# Patient Record
Sex: MALE | Race: WHITE | NOT HISPANIC OR LATINO | Employment: OTHER | URBAN - METROPOLITAN AREA
[De-identification: names, ages, dates, MRNs, and addresses within clinical notes are randomized per-mention and may not be internally consistent; named-entity substitution may affect disease eponyms.]

---

## 2018-11-21 RX ORDER — LOSARTAN POTASSIUM 50 MG/1
50 TABLET ORAL DAILY
Refills: 0 | COMMUNITY
Start: 2018-11-04 | End: 2021-01-01 | Stop reason: HOSPADM

## 2018-11-21 RX ORDER — CEPHALEXIN 500 MG/1
500 CAPSULE ORAL 3 TIMES DAILY
Refills: 0 | COMMUNITY
Start: 2018-11-07 | End: 2018-11-26

## 2018-11-21 RX ORDER — FUROSEMIDE 40 MG/1
40 TABLET ORAL DAILY
Refills: 0 | Status: ON HOLD | COMMUNITY
Start: 2018-09-17 | End: 2021-01-01 | Stop reason: SDUPTHER

## 2018-11-21 RX ORDER — PANTOPRAZOLE SODIUM 40 MG/1
TABLET, DELAYED RELEASE ORAL
Refills: 0 | COMMUNITY
Start: 2018-11-14 | End: 2018-11-27 | Stop reason: ALTCHOICE

## 2018-11-21 RX ORDER — ONDANSETRON 4 MG/1
TABLET, ORALLY DISINTEGRATING ORAL
Refills: 0 | COMMUNITY
Start: 2018-11-07 | End: 2018-11-26

## 2018-11-26 ENCOUNTER — OFFICE VISIT (OUTPATIENT)
Dept: FAMILY MEDICINE CLINIC | Facility: CLINIC | Age: 73
End: 2018-11-26
Payer: MEDICARE

## 2018-11-26 VITALS
HEART RATE: 110 BPM | RESPIRATION RATE: 16 BRPM | BODY MASS INDEX: 28.99 KG/M2 | HEIGHT: 69 IN | OXYGEN SATURATION: 95 % | WEIGHT: 195.7 LBS | DIASTOLIC BLOOD PRESSURE: 80 MMHG | TEMPERATURE: 97.1 F | SYSTOLIC BLOOD PRESSURE: 142 MMHG

## 2018-11-26 DIAGNOSIS — Z23 NEED FOR INFLUENZA VACCINATION: ICD-10-CM

## 2018-11-26 DIAGNOSIS — K85.10 ACUTE GALLSTONE PANCREATITIS: ICD-10-CM

## 2018-11-26 DIAGNOSIS — C50.922: ICD-10-CM

## 2018-11-26 DIAGNOSIS — Z12.11 SCREENING FOR COLON CANCER: Primary | ICD-10-CM

## 2018-11-26 PROBLEM — K86.89 PANCREATIC MASS: Status: ACTIVE | Noted: 2018-11-10

## 2018-11-26 PROBLEM — C50.912 CARCINOMA OF LEFT BREAST (HCC): Status: ACTIVE | Noted: 2018-11-26

## 2018-11-26 PROBLEM — R74.01 TRANSAMINITIS: Status: ACTIVE | Noted: 2018-11-10

## 2018-11-26 PROBLEM — K85.90 PANCREATITIS: Status: ACTIVE | Noted: 2018-11-10

## 2018-11-26 PROBLEM — D72.829 LEUKOCYTOSIS: Status: ACTIVE | Noted: 2018-11-10

## 2018-11-26 PROBLEM — K80.20 CHOLELITHIASIS: Status: ACTIVE | Noted: 2018-11-11

## 2018-11-26 PROBLEM — N63.20 MASS OF BREAST, LEFT: Status: ACTIVE | Noted: 2018-11-10

## 2018-11-26 PROBLEM — I49.1 PAC (PREMATURE ATRIAL CONTRACTION): Status: ACTIVE | Noted: 2017-08-18

## 2018-11-26 PROCEDURE — 99205 OFFICE O/P NEW HI 60 MIN: CPT | Performed by: FAMILY MEDICINE

## 2018-11-26 RX ORDER — ASPIRIN 81 MG/1
81 TABLET, DELAYED RELEASE ORAL
COMMUNITY
End: 2018-12-11 | Stop reason: ALTCHOICE

## 2018-11-26 RX ORDER — ACETAMINOPHEN 500 MG
500 TABLET ORAL EVERY 6 HOURS PRN
COMMUNITY
Start: 2018-11-14 | End: 2018-12-31

## 2018-11-26 NOTE — PROGRESS NOTES
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 breast surgeon today to review his results however patient states that he did not prefer to see any 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  Given this recent finding, patient was advised that he must see a breast specialist   Will refer patient to General surgery immediately to have this further evaluated  Appointment was made for patient on 11/27  Patient was advised that he must keep this appointment  - Ambulatory referral to General Surgery; Future    2  Acute gallstone pancreatitis  Patient appears clinically stable today  At this time, his symptoms appear mildly improving  It appears that he did have cholelithiasis which did cause his pancreatitis  Recommendations were made for patient to follow up after his discharge to have this further evaluated for possible cholecystectomy  Refer patient to General surgery a again further evaluation of this as well  Patient was advised to follow up if any symptoms are worsening   - Ambulatory referral to General Surgery; Future    3  Screening for colon cancer  - Ambulatory referral to Gastroenterology; Future    4  Need for influenza vaccination     Diagnoses and all orders for this visit:    Screening for colon cancer  -     Ambulatory referral to Gastroenterology; Future    Need for influenza vaccination  -     influenza vaccine, 7216-4909, high-dose, PF 0 5 mL, for patients 65 yr+ (FLUZONE HIGH-DOSE)    Other orders  -     furosemide (LASIX) 40 mg tablet;  Take 40 mg by mouth daily  -     losartan (COZAAR) 50 mg tablet;   -     Discontinue: ondansetron (ZOFRAN-ODT) 4 mg disintegrating tablet; dissolve 1 tablet ON TONGUE every 12 hours if needed for nausea and vomiting  -     pantoprazole (PROTONIX) 40 mg tablet; take 1 tablet by mouth every morning 30 MINUTES BEFORE EATING  -     Discontinue: cephalexin (KEFLEX) 500 mg capsule; Take 500 mg by mouth 3 (three) times a day  -     aspirin (EC-81 ASPIRIN) 81 mg EC tablet; Take 81 mg by mouth  -     acetaminophen (TYLENOL) 500 mg tablet; Take 500 mg by mouth every 6 (six) hours as needed          Subjective:    Chief Complaint   Patient presents with    [facility]        Patient ID: Noemi Sigala is a 74 y o  male  Patient is a 13-year-old male presents today as a new patient to [facility]  He has multiple complaints today  He states that he was recently admitted in patient earlier this month secondary to his abdominal pain  He was found to have acute gallstone pancreatitis  He was evaluated medically while at [facility]   He states that his symptoms have been improving  He does have follow-up appointments with General surgery to further evaluate this problem  Patient also has complaints of a breast mass  He has noticed this over the left side of his chest for the past few years  He states that he never had this evaluated  Until recently, he was seen and did have evaluations with mammography as well as an ultrasound  He states that he recently did a biopsy taken however was never given the results  He denies any change in growth  Review of Systems   Constitutional: Negative for activity change, chills, fatigue and fever  HENT: Negative for congestion, ear pain, sinus pressure and sore throat  Eyes: Negative for redness, itching and visual disturbance  Respiratory: Negative for cough and shortness of breath  Cardiovascular: Negative for chest pain and palpitations  Gastrointestinal: Positive for abdominal pain  Negative for diarrhea and nausea     Endocrine: Negative for cold intolerance and heat intolerance  Genitourinary: Negative for dysuria, flank pain and frequency  Musculoskeletal: Negative for arthralgias, back pain, gait problem and myalgias  Skin: Negative for color change  Allergic/Immunologic: Negative for environmental allergies  Neurological: Negative for dizziness, numbness and headaches  Psychiatric/Behavioral: Negative for behavioral problems and sleep disturbance  The following portions of the patient's history were reviewed and updated as appropriate : past family history, past medical history, past social history and past surgical history  Current Outpatient Prescriptions:     acetaminophen (TYLENOL) 500 mg tablet, Take 500 mg by mouth every 6 (six) hours as needed, Disp: , Rfl:     aspirin (EC-81 ASPIRIN) 81 mg EC tablet, Take 81 mg by mouth, Disp: , Rfl:     furosemide (LASIX) 40 mg tablet, Take 40 mg by mouth daily, Disp: , Rfl: 0    losartan (COZAAR) 50 mg tablet, , Disp: , Rfl: 0    pantoprazole (PROTONIX) 40 mg tablet, take 1 tablet by mouth every morning 30 MINUTES BEFORE EATING, Disp: , Rfl: 0    Objective:    Vitals:    11/26/18 0934   BP: 142/80   BP Location: Left arm   Patient Position: Sitting   Cuff Size: Adult   Pulse: (!) 110   Resp: 16   Temp: (!) 97 1 °F (36 2 °C)   TempSrc: Tympanic   SpO2: 95%   Weight: 88 8 kg (195 lb 11 2 oz)   Height: 5' 8 5" (1 74 m)        Physical Exam   Constitutional: He is oriented to person, place, and time  He appears well-developed and well-nourished  HENT:   Head: Normocephalic and atraumatic  Nose: Nose normal    Mouth/Throat: No oropharyngeal exudate  Eyes: Pupils are equal, round, and reactive to light  Right eye exhibits no discharge  Left eye exhibits no discharge  Neck: Normal range of motion  Neck supple  No tracheal deviation present  Cardiovascular: Normal rate, regular rhythm and intact distal pulses  Exam reveals no gallop and no friction rub      No murmur heard  Pulses:       Dorsalis pedis pulses are 2+ on the right side, and 2+ on the left side  Posterior tibial pulses are 2+ on the right side, and 2+ on the left side  Pulmonary/Chest: Effort normal and breath sounds normal  No respiratory distress  He has no wheezes  He has no rales  Abdominal: Soft  Bowel sounds are normal  He exhibits no distension  There is no tenderness  There is no rebound and no guarding  Musculoskeletal: Normal range of motion  He exhibits no edema  Lymphadenopathy:        Head (right side): No submental and no submandibular adenopathy present  Head (left side): No submental and no submandibular adenopathy present  He has no cervical adenopathy  Right cervical: No superficial cervical, no deep cervical and no posterior cervical adenopathy present  Left cervical: No superficial cervical, no deep cervical and no posterior cervical adenopathy present  Neurological: He is alert and oriented to person, place, and time  No cranial nerve deficit or sensory deficit  Skin: Skin is warm, dry and intact  Psychiatric: His speech is normal and behavior is normal  Judgment normal  His mood appears not anxious  Cognition and memory are normal  He does not exhibit a depressed mood  Vitals reviewed

## 2018-11-27 ENCOUNTER — TELEPHONE (OUTPATIENT)
Dept: SURGERY | Facility: CLINIC | Age: 73
End: 2018-11-27

## 2018-11-27 ENCOUNTER — OFFICE VISIT (OUTPATIENT)
Dept: SURGERY | Facility: CLINIC | Age: 73
End: 2018-11-27
Payer: MEDICARE

## 2018-11-27 VITALS
SYSTOLIC BLOOD PRESSURE: 142 MMHG | HEART RATE: 96 BPM | BODY MASS INDEX: 28.88 KG/M2 | TEMPERATURE: 97.7 F | HEIGHT: 69 IN | WEIGHT: 195 LBS | DIASTOLIC BLOOD PRESSURE: 78 MMHG

## 2018-11-27 DIAGNOSIS — K85.10 ACUTE GALLSTONE PANCREATITIS: ICD-10-CM

## 2018-11-27 DIAGNOSIS — C50.922: ICD-10-CM

## 2018-11-27 PROCEDURE — 1123F ACP DISCUSS/DSCN MKR DOCD: CPT | Performed by: PHYSICIAN ASSISTANT

## 2018-11-27 PROCEDURE — 99204 OFFICE O/P NEW MOD 45 MIN: CPT | Performed by: SURGERY

## 2018-11-27 NOTE — TELEPHONE ENCOUNTER
Patient is schedule for two upcoming surgery with Dr Tatianna Cavazos  1   Lap teresa on 12/18/2018  2  Left breast mastectomy on 01/02/2019    Dr Tatianna Cavazos is requesting for medical clearance before proceeding with the surgery  Will Dr Romina Bowman clear the patient from his last office visit or does the patient need to have an appointment in your office?     Thanks

## 2018-11-27 NOTE — LETTER
November 28, 2018     Patient: Kristine Downey   YOB: 1945   Date of Visit: 11/27/2018       To Whom it May Concern:    Kristine Downey is under my professional care  He was seen in my office on 11/27/2018  He is scheduled to have a procedure on 12/18/18 and will need 2-3 weeks of recovery time  This date is subject to change  If you have any questions or concerns, please don't hesitate to call           Sincerely,            Gen Bah MD      CC: No Recipients

## 2018-11-27 NOTE — LETTER
2018     Inderjit MiloDO  [address]  [address] 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 [address] ) and Acute gallstone pancreatitis were pertinent to this visit  1  6 cm node positive, clinically, T3/4 N1 left breast cancer, pathology from [facility] states invasive mucinous carcinoma, nuclear grade 1 mitotic rate 1  ER positive, SD positive, HER2 Mayuri negative  Breast MRI for staging to see if this is invaded the chest wall which it has not appear to be clinically  Outside review of slides by our pathologist, slides at being ordered from [facility]    Will eventually need modified radical mastectomy  We discussed anti hormone therapy as a form of chemotherapy that he might be willing to take since he is estrogen receptor positive  He is afraid of radiation therapy and he is likely not a candidate at his age for chemotherapy but we will have these consult made for him postoperatively  Important point is any male with breast cancer needs to have genetic testing for BRCA testing  Fortunately in this case, he has no biological children  His brothers and sisters are all  but he does have blood relatives nieces and nephews and it is important that they know whether he has genetically positive were not so they can choose whether to pursue testing  We engaged in some genetic counseling  He should see a certified genetic counselor as I am not a certified counselor but I do counseled patients regarding genetic testing  His understanding is complete but also limited and he agreed to genetic counseling in theory  He is willing to have a genetic testing sent today  He understands there might be a small co-pay, but agrees to proceed for the sake of his nieces and nephews  2  Contracted gallbladder with gallstones and recent diagnosis of gallstone pancreatitis  Ultrasound shows a contracted gallbladder with gallstones and sludge  The liver measures 15 cm  There is mild wall thickening and this was on an ultrasound from early November 2018  No common bile duct dilatation was noted, and factor was noted to be 0 34 cm, but he was still given the diagnosis of acute pancreatitis based on gallstones  A CT of the abdomen did show tail of the pancreas edema but I am not sure this was really related to gallstones  3  14 mm 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, per workup at [facility] and no further workup is needed  4  Needs screening colonoscopy  He is very old cool and somewhat against this  We counseled him regarding this  The timing of either gallbladder or mastectomy is not that important is he is had this breast mass for at least 3 years, and has already positive adenopathy  I am not convinced that it was truly gallstone pancreatitis as the laboratory values that I have showed normal bilirubin but perhaps that was upon discharge  In any case a cholecystectomy is probably warranted, but he can't have an MRI of his breast unless he can lie flat on his belly  Therefore will get the MRI of the breast and then time the surgery of the gallbladder and the mastectomy based on his level of concern and his symptoms  Plan:   1  Breast workup including MRI, and genetic testing    Plan mastectomy with lymph node dissection full axillary dissection  Outside review of [facility] slides to confirm the diagnosis  2  Laparoscopic cholecystectomy  This should be done after the breast MRI since he has to lay on his belly  3  Modified radical mastectomy with full axillary dissection, left    4  Consult Radiation Oncology and Medical Oncology as outpatient after the surgery even if he refuses therapy he should be aware of the treatment options  He understands this and at least in theory agrees to this plan  5  If gene positive by blood test, would recommend genetic counseling bite official genetic counselor  Preoperative clearance by Medicine as he is a fairly elderly gentleman in clinical appearance, and he might need to go back to his primary care doctor which she has just switch to [facility], for preoperative clearance  Preoperative Clearance: None and Medical        - None, continue medication regimen including morning of surgery, with sip of water    ______________________________________________________  CC:Breast Complaint (BIRADS 4- Mass of Left breast)    HPI:  Lisa Peres is a 74 y  o male who was referred for evaluation of Breast Complaint (BIRADS 4- Mass of Left breast)    Currently patient has had a 3 year history of a left breast mass for which she has refused or denied further workup  He was admitted on November 9, 2018 to [facility] for pancreatitis of the tail for abdominal pain  Extensive workup was done at that time  Mass of the left breast was noted on physical exam   Subsequent core biopsy that showed this to be a mucinous carcinoma invasive, grade 1, with ERPR positive  He has suspicious lymph nodes on mammogram and ultrasound but these were not biopsied  He had pancreatitis in the tail of his pancreas  This is less likely to be related to gallstones although he does have gallstones  He has occasional abdominal pain    He feels his lower abdominal pain is related to his gallbladder and I did  him that this is not the case  He is adamant about not having chemo or radiation due to his personal experiences in the past   We did  him that moderate techniques her much more kind in gentle and that he would likely survive adjuvant therapy if we determine it is needed  14 the mm mass in the tail of the pancreas which on workup as determined to be a splenule and not worthy of any further workup or intervention  Symptomatic or he has no pain but has a contracted breast with dimpling  He has some lower abdominal pain but it is unclear that this is truly biliary colic  He denies any change in his bowel habits or blood in his stool but he has never had a colonoscopy          ROS:  General ROS: negative  negative for - chills, fatigue, fever or night sweats, weight loss  Respiratory ROS: no cough, shortness of breath, or wheezing  Cardiovascular ROS: no chest pain or dyspnea on exertion  Genito-Urinary ROS: no dysuria, trouble voiding, or hematuria  Musculoskeletal ROS: negative for - gait disturbance, joint pain or muscle pain  Neurological ROS: no TIA or stroke symptoms  Gastrointestinal: see HPI  No abdominal pain, melena, BRB unless specified in HPI  Skin ROS: no new rashes or lesions   Lymphatic ROS: no new adenopathy noted by pt  GYN ROS: see HPI, no new GYN history or bleeding noted  Psy ROS: no new mental or behavioral disturbances       Patient Active Problem List   Diagnosis    Mass of breast, left    Carcinoma of left breast ([facility] [address] )    Hypertension    Acute gallstone pancreatitis    Cholelithiasis    Leukocytosis    PAC (premature atrial contraction)    Pancreatic mass    Pancreatitis    RBBB (right bundle branch block with left anterior fascicular block)    Transaminitis         Allergies:  Patient has no known allergies        Current Outpatient Prescriptions:     acetaminophen (TYLENOL) 500 mg tablet, Take 500 mg by mouth every 6 (six) hours as needed, Disp: , Rfl:     aspirin (EC-81 ASPIRIN) 81 mg EC tablet, Take 81 mg by mouth, Disp: , Rfl:     furosemide (LASIX) 40 mg tablet, Take 40 mg by mouth daily, Disp: , Rfl: 0    losartan (COZAAR) 50 mg tablet, , Disp: , Rfl: 0    Past Medical History:   Diagnosis Date    Acute gallstone pancreatitis     Atrial fibrillation (HCC)     Hypertension     Irregular heartbeat     PAC (premature atrial contraction)     PVC (premature ventricular contraction)     RBBB        Past Surgical History:   Procedure Laterality Date    APPENDECTOMY      CHOLECYSTECTOMY         Family History   Problem Relation Age of Onset    Cancer Mother     Heart disease Father         reports that he has quit smoking  He has never used smokeless tobacco  He reports that he does not drink alcohol or use drugs  Labs:   No results found for: WBC, HGB, HCT, MCV, PLT  No results found for: NA, K, CL, CO2, ANIONGAP, BUN, CREATININE, GLUCOSE, GLUF, CALCIUM, CORRECTEDCA, AST, ALT, ALKPHOS, PROT, BILITOT, EGFR      Imaging: I personally reviewed the radiology studies, my impression is as follows:  Extensive studies from [facility]  We do not have the disc but we reviewed all his reports including MRI of the abdomen, CT of the abdomen, ultrasound of the breast, ultrasound of the right upper quadrant           PHYSICAL EXAM        PHYSICAL EXAM  General Appearance:    Alert, cooperative, no distress, elderly moderately obese   Head:    Normocephalic without obvious abnormality   Eyes:    PERRL, conjunctiva/corneas clear, EOM's intact        Neck:   Supple, no adenopathy, no JVD   Back:     Symmetric, no spinal or CVA tenderness   Lungs:     Clear to auscultation bilaterally, no wheezing or rhonchi   Heart:    Regular rate and rhythm, S1 and S2 normal, no murmur   Abdomen:     Soft, nontender nondistended  Obese     Extremities:   Extremities normal  No clubbing, cyanosis or edema   Psych:   Normal Affect   Neurologic:   CNII-XII intact  Strength symmetric, speech intact      Left breast 6 cm mass palpable, does not appear to be invading the chest wall, palpable adenopathy  Dimpling of the skin or at least retraction of the skin but not true invasion into the skin  Physical Exam   Pulmonary/Chest:       6 cm non fixed mass, with significant gynecomastia on both sides  Palpable adenopathy  No supraclavicular adenopathy  Skin retraction in the areola area without true skin dimpling  Clinical T4 N1  Some portions of this record may have been generated with voice recognition software  There may be translation, syntax,  or grammatical errors  Occasional wrong word or "sound-a-like" substitutions may have occurred due to the inherent limitations of the voice recognition software  Read the chart carefully and recognize, using context, where substitutions may have occurred  If you have any questions, please contact the dictating provider for clarification or correction, as needed  This encounter has been coded by a non-certified coder         Heber Rivera PA-C    Date: 11/27/2018 Time: 1:57 PM

## 2018-11-27 NOTE — PROGRESS NOTES
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, nuclear grade 1 mitotic rate 1  ER positive, OK positive, HER2 Mayuri negative  Breast MRI for staging to see if this is invaded the chest wall which it has not appear to be clinically  Outside review of slides by our pathologist, slides at being ordered from [facility]    Will eventually need modified radical mastectomy  We discussed anti hormone therapy as a form of chemotherapy that he might be willing to take since he is estrogen receptor positive  He is afraid of radiation therapy and he is likely not a candidate at his age for chemotherapy but we will have these consult made for him postoperatively  Important point is any male with breast cancer needs to have genetic testing for BRCA testing  Fortunately in this case, he has no biological children  His brothers and sisters are all  but he does have blood relatives nieces and nephews and it is important that they know whether he has genetically positive were not so they can choose whether to pursue testing  We engaged in some genetic counseling  He should see a certified genetic counselor as I am not a certified counselor but I do counseled patients regarding genetic testing  His understanding is complete but also limited and he agreed to genetic counseling in theory  He is willing to have a genetic testing sent today    He understands there might be a small co-pay, but agrees to proceed for the sake of his nieces and nephews  2  Contracted gallbladder with gallstones and recent diagnosis of gallstone pancreatitis  Ultrasound shows a contracted gallbladder with gallstones and sludge  The liver measures 15 cm  There is mild wall thickening and this was on an ultrasound from early November 2018  No common bile duct dilatation was noted, and factor was noted to be 0 34 cm, but he was still given the diagnosis of acute pancreatitis based on gallstones  A CT of the abdomen did show tail of the pancreas edema but I am not sure this was really related to gallstones  3  14 mm 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, per workup at [facility] and no further workup is needed  4  Needs screening colonoscopy  He is very old cool and somewhat against this  We counseled him regarding this  5  Medical oncology consult for large size tumor  Perhaps preoperative hormonal manipulation would be appropriate to shrink the tumor before resection  He strongly wishes to avoid chemotherapy but we explained to him that he might just need to take a hormonal pill that would shrink tumor size over few months  Will ask medical oncology to consult and I have instructed the office for medical oncology consult  The timing of either gallbladder or mastectomy is not that important is he is had this breast mass for at least 3 years, and has already positive adenopathy  I am not convinced that it was truly gallstone pancreatitis as the laboratory values that I have showed normal bilirubin but perhaps that was upon discharge  In any case a cholecystectomy is probably warranted, but he can't have an MRI of his breast unless he can lie flat on his belly  Therefore will get the MRI of the breast and then time the surgery of the gallbladder and the mastectomy based on his level of concern and his symptoms  Plan:   1   Breast workup including MRI, and genetic testing  Plan mastectomy with lymph node dissection full axillary dissection  Outside review of [facility] slides to confirm the diagnosis  2  Laparoscopic cholecystectomy  This should be done after the breast MRI since he has to lay on his belly  3  Modified radical mastectomy with full axillary dissection, left    4  Consult Radiation Oncology and Medical Oncology as outpatient after the surgery even if he refuses therapy he should be aware of the treatment options  He understands this and at least in theory agrees to this plan  5  If gene positive by blood test, would recommend genetic counseling bite official genetic counselor  Preoperative clearance by Medicine as he is a fairly elderly gentleman in clinical appearance, and he might need to go back to his primary care doctor which she has just switch to [facility], for preoperative clearance  Preoperative Clearance: None and Medical        - None, continue medication regimen including morning of surgery, with sip of water    ______________________________________________________  CC:Breast Complaint (BIRADS 4- Mass of Left breast)    HPI:  Tila Asher is a 74 y  o male who was referred for evaluation of Breast Complaint (BIRADS 4- Mass of Left breast)    Currently patient has had a 3 year history of a left breast mass for which she has refused or denied further workup  He was admitted on November 9, 2018 to [facility] for pancreatitis of the tail for abdominal pain  Extensive workup was done at that time  Mass of the left breast was noted on physical exam   Subsequent core biopsy that showed this to be a mucinous carcinoma invasive, grade 1, with ERPR positive  He has suspicious lymph nodes on mammogram and ultrasound but these were not biopsied  He had pancreatitis in the tail of his pancreas  This is less likely to be related to gallstones although he does have gallstones    He has occasional abdominal pain  He feels his lower abdominal pain is related to his gallbladder and I did  him that this is not the case  He is adamant about not having chemo or radiation due to his personal experiences in the past   We did  him that moderate techniques her much more kind in gentle and that he would likely survive adjuvant therapy if we determine it is needed  14 the mm mass in the tail of the pancreas which on workup as determined to be a splenule and not worthy of any further workup or intervention  Symptomatic or he has no pain but has a contracted breast with dimpling  He has some lower abdominal pain but it is unclear that this is truly biliary colic  He denies any change in his bowel habits or blood in his stool but he has never had a colonoscopy          ROS:  General ROS: negative  negative for - chills, fatigue, fever or night sweats, weight loss  Respiratory ROS: no cough, shortness of breath, or wheezing  Cardiovascular ROS: no chest pain or dyspnea on exertion  Genito-Urinary ROS: no dysuria, trouble voiding, or hematuria  Musculoskeletal ROS: negative for - gait disturbance, joint pain or muscle pain  Neurological ROS: no TIA or stroke symptoms  Gastrointestinal: see HPI  No abdominal pain, melena, BRB unless specified in HPI  Skin ROS: no new rashes or lesions   Lymphatic ROS: no new adenopathy noted by pt  GYN ROS: see HPI, no new GYN history or bleeding noted  Psy ROS: no new mental or behavioral disturbances       Patient Active Problem List   Diagnosis    Mass of breast, left    Carcinoma of left breast [address] [address] )    Hypertension    Acute gallstone pancreatitis    Cholelithiasis    Leukocytosis    PAC (premature atrial contraction)    Pancreatic mass    Pancreatitis    RBBB (right bundle branch block with left anterior fascicular block)    Transaminitis         Allergies:  Patient has no known allergies        Current Outpatient Prescriptions:    acetaminophen (TYLENOL) 500 mg tablet, Take 500 mg by mouth every 6 (six) hours as needed, Disp: , Rfl:     aspirin (EC-81 ASPIRIN) 81 mg EC tablet, Take 81 mg by mouth, Disp: , Rfl:     furosemide (LASIX) 40 mg tablet, Take 40 mg by mouth daily, Disp: , Rfl: 0    losartan (COZAAR) 50 mg tablet, , Disp: , Rfl: 0    Past Medical History:   Diagnosis Date    Acute gallstone pancreatitis     Atrial fibrillation (HCC)     Hypertension     Irregular heartbeat     PAC (premature atrial contraction)     PVC (premature ventricular contraction)     RBBB        Past Surgical History:   Procedure Laterality Date    APPENDECTOMY      CHOLECYSTECTOMY         Family History   Problem Relation Age of Onset    Cancer Mother     Heart disease Father         reports that he has quit smoking  He has never used smokeless tobacco  He reports that he does not drink alcohol or use drugs  Labs:   No results found for: WBC, HGB, HCT, MCV, PLT  No results found for: NA, K, CL, CO2, ANIONGAP, BUN, CREATININE, GLUCOSE, GLUF, CALCIUM, CORRECTEDCA, AST, ALT, ALKPHOS, PROT, BILITOT, EGFR      Imaging: I personally reviewed the radiology studies, my impression is as follows:  Extensive studies from [facility]  We do not have the disc but we reviewed all his reports including MRI of the abdomen, CT of the abdomen, ultrasound of the breast, ultrasound of the right upper quadrant           PHYSICAL EXAM        PHYSICAL EXAM  General Appearance:    Alert, cooperative, no distress, elderly moderately obese   Head:    Normocephalic without obvious abnormality   Eyes:    PERRL, conjunctiva/corneas clear, EOM's intact        Neck:   Supple, no adenopathy, no JVD   Back:     Symmetric, no spinal or CVA tenderness   Lungs:     Clear to auscultation bilaterally, no wheezing or rhonchi   Heart:    Regular rate and rhythm, S1 and S2 normal, no murmur   Abdomen:     Soft, nontender nondistended  Obese     Extremities:   Extremities normal  No clubbing, cyanosis or edema   Psych:   Normal Affect   Neurologic:   CNII-XII intact  Strength symmetric, speech intact      Left breast 6 cm mass palpable, does not appear to be invading the chest wall, palpable adenopathy  Dimpling of the skin or at least retraction of the skin but not true invasion into the skin  Physical Exam   Pulmonary/Chest:       6 cm non fixed mass, with significant gynecomastia on both sides  Palpable adenopathy  No supraclavicular adenopathy  Skin retraction in the areola area without true skin dimpling  Clinical T4 N1  Some portions of this record may have been generated with voice recognition software  There may be translation, syntax,  or grammatical errors  Occasional wrong word or "sound-a-like" substitutions may have occurred due to the inherent limitations of the voice recognition software  Read the chart carefully and recognize, using context, where substitutions may have occurred  If you have any questions, please contact the dictating provider for clarification or correction, as needed  This encounter has been coded by a non-certified coder

## 2018-11-28 ENCOUNTER — TELEPHONE (OUTPATIENT)
Dept: SURGERY | Facility: CLINIC | Age: 73
End: 2018-11-28

## 2018-11-29 ENCOUNTER — TELEPHONE (OUTPATIENT)
Dept: FAMILY MEDICINE CLINIC | Facility: CLINIC | Age: 73
End: 2018-11-29

## 2018-11-29 NOTE — TELEPHONE ENCOUNTER
Pt's wife called into the office and stated that he will be going to his cardiologist for cardiac clearance   Pt is now scheduled with Dr Lidia Cavazos on 12/7 at 10:30am

## 2018-11-29 NOTE — TELEPHONE ENCOUNTER
Pt and his spouse came into the office expressing pt is having upcoming gallbladder surgery on 12/18/2018  His spouse requested to know Dr Gonzalez's hours I did inform her it does depend on the day he works one night a week the doctors rotate nights and weekends so it truly depends on the day and there may be a day that Amanda Miramontes is out of the office  Pt is scheduled as of right now for pre op clearance on 12/04/2018 at 11:00am with Dr Alejandro all is known as of right now is pt has surgery at [facility] on 12/18/208 for gallbladder surgery  Pt's spouse was asking what a pre op clearacne consists of she was informed that her spouse goes under a physical exam to make sure it is safe for him to have surgery and then a doctor usually approves if pt is cleared or not  Per pt's wife she stated so if he is not he just dies  I advised that it depends on the doctor and if a pt were not to be cleared they have a certain protocol they follow  She stated she didn't know if her  needs pre admission testing and an ekg  She stated to her  you should just cancel your surgery l this is to much  I informed pt's wife to please have the surgeon's office call us if that would be easier and we can do pt's pat's in the office at the time of his pre op clearance, advised to needs to ask surgeon what kind of blood work and if pt needs an ekg  Pt's wife did not want to give me the location of the hospital where Iv[facility] Hale would be having his surgeon   As of right now he is scheduled for a pre op clearance  on 12/04/2018 with Dr Alejandro

## 2018-11-29 NOTE — TELEPHONE ENCOUNTER
Brynda Campion called from surgeon's office asking to speak with me  I did  the phone and at the time we were very busy and I requested I please call her back when I have a free moment  Number is [phone]       I called and spoke to Jocelyn Rg verified that they can see pre op note in epic for clearance  They said as far as a cmp he has had one recently if Dwight Arango wants to do and ekg and or additional blwk it is up to him  Pt is schedule for a pre op clearance on 12/04/2018  Pt is scheduled for gallbladder  removal on 12/18/2018 at [address]  Any questions I will galdly call surgeon's office back which is [facility] surgical  There was a lot of confusion for pt and his wife it ws just easier for surgeon's office to contact us directly

## 2018-11-29 NOTE — TELEPHONE ENCOUNTER
Dr Alejandro informed me that p has a history of arrhythmia so he needs a cardiac clearance first so he is to please call his cardiologist to schedule an appointment  Pt goes to [address] [facility] Card[facility] number is [phone]         I called and left a detailed message for Ronald Rooney on his home phone consent ok advising him per Ute Black  That pt needs a cardiac clearance first and he needs to please call his cardiologist to schedule a cardiac clearance he may call our office if he has any questions

## 2018-11-29 NOTE — TELEPHONE ENCOUNTER
Pt's spouse, Stephanie Abel, called with questions regarding pt's pre-op clearance for his surgery on 12/18  Stephanie Abel wants to know why the pt has to see his cardiologist for the surgery clearance and why we wouldn't do the EKG here in the office and if the pt's portal is going to be checked  Advised I would send a message to the dr to call her when he has a chance  Stephanie Abel is aware Dr Izabela Rowley is out until Monday   Stephanie Abel can be reached at [phone]

## 2018-11-30 ENCOUNTER — TELEPHONE (OUTPATIENT)
Dept: SURGERY | Facility: CLINIC | Age: 73
End: 2018-11-30

## 2018-11-30 NOTE — TELEPHONE ENCOUNTER
Late entry   Patient referred to Dr Fernando Blankenship by Dr Rossana Salvador for carcinoma of the left male breast and a contracted gallbladder on 11/26/18  Patient was seen by Dr Fernando Blankenship on 11/27/18  At this appointment, patient scheduled cholecystectomy for 12/18/18 and mastectomy modified radical breast with full axillary dissection for 1/02/18  Patient was informed that it is necessary that he receive pre operative clearance by Dr Alejandro prior to surgery  Patient understood  He was also counselled on [facility] genetic  testing  Test was conducted in office on 11/27/18  Patient was also advised that a consult with Medical Oncology is necessary prior to mastectomy to consider the option of Tamoxifen  Patient and wife, Bonita Negro were informed that this is not radiation but an oral pill that could possibly minimize the size of the breast mass  Patients wife was not content that this would delay surgery  States why dont they just take it out and get it done with   Informed patient and wife that he is not obliged to take medication but that he must consult with Medical Oncology to consider all his available options  Patient and wife agreed  Patients wife called office 11/28/18  She was upset that patient has to make an appointment for pre operative clearance  States patient is a  and is unavailable in the early mornings and in the afternoon  Stated to her  we should just cancel all of this, this is too much   Patients wife is very anxious  Continuously states that  will not be cleared for surgery  Have attempted to calm patients concerns about appointments  Patient has pre op appointment 12/7/18  Patients wife called office 11/29/18  Concerned with pre operative appointments  States what if they dont clear him and he cant have any surgery? What kind of bloodwork do you want him to do and will there be an EKG?  The doctors office said you have to send the referrals to lab work and an [facility]  Informed patient that we do not need any further bloodwork due to his lab report from [facility] on 11/21/18 but that further testing depends on his primary care provider  It was noted that patients wife had called Dr Juan Guerrier office prior to calling office inquiring about what would be needed for testing  Called Dr Juan Guerrier office to speak with Jay mas MA to clarify the confusion  Informed them that the medical clearance would be noted from the pre-op office and that further testing would depend on PCP  As of 11/30/18, patient was advised by Amanda Miramontes that he would also need clearance by his cardiologist due to history of arrhythmia  Patients wife called office  Is upset that patient needs cardiac clearance  States were going to change his doctor  Willi Hannon just going to push this surgery until it bursts   Patient has made appointment with [facility] for 12/5/18  Have continuously reminded and advised patient and wife of appointment with Med Oncology  They are adamant about waiting until after gallbladder surgery to make appointment  State they are taking it one step at a time

## 2018-11-30 NOTE — TELEPHONE ENCOUNTER
Please call patient or his wife  At this time, it would be best for him to be seen by cardiologist preoperatively    He does have cardiac conditions which need to be cleared prior by the specialist   I am not sure what it means to check patients portal   Thank you

## 2018-12-07 ENCOUNTER — OFFICE VISIT (OUTPATIENT)
Dept: FAMILY MEDICINE CLINIC | Facility: CLINIC | Age: 73
End: 2018-12-07
Payer: MEDICARE

## 2018-12-07 VITALS
BODY MASS INDEX: 29.62 KG/M2 | HEIGHT: 69 IN | RESPIRATION RATE: 18 BRPM | HEART RATE: 98 BPM | DIASTOLIC BLOOD PRESSURE: 80 MMHG | SYSTOLIC BLOOD PRESSURE: 140 MMHG | WEIGHT: 200 LBS | TEMPERATURE: 98 F | OXYGEN SATURATION: 96 %

## 2018-12-07 DIAGNOSIS — Z01.818 PREOP EXAMINATION: ICD-10-CM

## 2018-12-07 DIAGNOSIS — K80.20 CALCULUS OF GALLBLADDER WITHOUT CHOLECYSTITIS WITHOUT OBSTRUCTION: Primary | ICD-10-CM

## 2018-12-07 DIAGNOSIS — J30.9 ALLERGIC RHINITIS, UNSPECIFIED SEASONALITY, UNSPECIFIED TRIGGER: ICD-10-CM

## 2018-12-07 PROCEDURE — 99214 OFFICE O/P EST MOD 30 MIN: CPT | Performed by: FAMILY MEDICINE

## 2018-12-07 RX ORDER — FLUTICASONE PROPIONATE 50 MCG
2 SPRAY, SUSPENSION (ML) NASAL DAILY
Qty: 16 G | Refills: 2 | Status: SHIPPED | OUTPATIENT
Start: 2018-12-07 | End: 2018-12-27 | Stop reason: ALTCHOICE

## 2018-12-07 NOTE — PROGRESS NOTES
Assessment/Plan:   1  Preop examination/Calculus of gallbladder without cholecystitis without obstruction  The patient appears well today  He has a good functional capacity  He has been following up regularly with his cardiologist   At this time, he did have cardiac clearance performed  Reviewed his previous blood work with dot this type of dot patient is cleared with intermediate perioperative medical risk  No further testing is needed today appear    2  Allergic rhinitis, unspecified seasonality, unspecified trigger  - fluticasone (FLONASE) 50 mcg/act nasal spray; 2 sprays into each nostril daily  Dispense: 16 g; Refill: 2     There are no diagnoses linked to this encounter  Subjective:    Chief Complaint   Patient presents with    Pre-op Exam     gallbladder removal on 12/18 w/ Dr Gale Khan        Patient ID: Janet Pope is a 74 y o  male  Janet Pope  74 y o   male    SURGEON:  Dr Rajendra Ingram:  Cholecystectomy    DATE OF SURGERY: 12/18    PRIOR ANESTHESIA:yes    COMPLICATION: no    BLEEDING PROBLEM: no    PERTINENT PMH: no    EXERCISE CAPACITY:   CAN WALK 4 BLOCKS AND OR CLIMB 2 FLIGHTS: Yes    HOME LIVING SITUATION SAFE AND SECURE: Yes      TOBACCO: no     ETOH: no     ILLEGAL DRUGS: no          Review of Systems   Constitutional: Negative for activity change, chills, fatigue and fever  HENT: Negative for congestion, ear pain, sinus pressure and sore throat  Eyes: Negative for redness, itching and visual disturbance  Respiratory: Negative for cough and shortness of breath  Cardiovascular: Negative for chest pain and palpitations  Gastrointestinal: Negative for abdominal pain, diarrhea and nausea  Endocrine: Negative for cold intolerance and heat intolerance  Genitourinary: Negative for dysuria, flank pain and frequency  Musculoskeletal: Negative for arthralgias, back pain, gait problem and myalgias  Skin: Negative for color change  Allergic/Immunologic: Negative for environmental allergies  Neurological: Negative for dizziness, numbness and headaches  Psychiatric/Behavioral: Negative for behavioral problems and sleep disturbance  The following portions of the patient's history were reviewed and updated as appropriate : past family history, past medical history, past social history and past surgical history  Current Outpatient Prescriptions:     acetaminophen (TYLENOL) 500 mg tablet, Take 500 mg by mouth every 6 (six) hours as needed, Disp: , Rfl:     furosemide (LASIX) 40 mg tablet, Take 40 mg by mouth daily, Disp: , Rfl: 0    losartan (COZAAR) 50 mg tablet, , Disp: , Rfl: 0    aspirin (EC-81 ASPIRIN) 81 mg EC tablet, Take 81 mg by mouth, Disp: , Rfl:     Objective:    Vitals:    12/07/18 1039   BP: 140/80   BP Location: Left arm   Patient Position: Sitting   Cuff Size: Adult   Pulse: 98   Resp: 18   Temp: 98 °F (36 7 °C)   TempSrc: Tympanic   SpO2: 96%   Weight: 90 7 kg (200 lb)   Height: 5' 8 5" (1 74 m)        Physical Exam   Constitutional: He is oriented to person, place, and time  He appears well-developed and well-nourished  HENT:   Head: Normocephalic and atraumatic  Nose: Nose normal    Mouth/Throat: No oropharyngeal exudate  Eyes: Pupils are equal, round, and reactive to light  Right eye exhibits no discharge  Left eye exhibits no discharge  Neck: Normal range of motion  Neck supple  No tracheal deviation present  Cardiovascular: Normal rate, regular rhythm and intact distal pulses  Exam reveals no gallop and no friction rub  No murmur heard  Pulses:       Dorsalis pedis pulses are 2+ on the right side, and 2+ on the left side  Posterior tibial pulses are 2+ on the right side, and 2+ on the left side  Pulmonary/Chest: Effort normal and breath sounds normal  No respiratory distress  He has no wheezes  He has no rales  Abdominal: Soft  Bowel sounds are normal  He exhibits no distension  There is no tenderness  There is no rebound and no guarding  Musculoskeletal: Normal range of motion  He exhibits no edema  Lymphadenopathy:        Head (right side): No submental and no submandibular adenopathy present  Head (left side): No submental and no submandibular adenopathy present  He has no cervical adenopathy  Right cervical: No superficial cervical, no deep cervical and no posterior cervical adenopathy present  Left cervical: No superficial cervical, no deep cervical and no posterior cervical adenopathy present  Neurological: He is alert and oriented to person, place, and time  No cranial nerve deficit or sensory deficit  Skin: Skin is warm, dry and intact  Psychiatric: His speech is normal and behavior is normal  Judgment normal  His mood appears not anxious  Cognition and memory are normal  He does not exhibit a depressed mood  Vitals reviewed

## 2018-12-08 ENCOUNTER — APPOINTMENT (OUTPATIENT)
Dept: LAB | Facility: MEDICAL CENTER | Age: 73
End: 2018-12-08
Payer: MEDICARE

## 2018-12-08 DIAGNOSIS — C50.922: ICD-10-CM

## 2018-12-08 LAB
BUN SERPL-MCNC: 20 MG/DL (ref 5–25)
CREAT SERPL-MCNC: 1.27 MG/DL (ref 0.6–1.3)
GFR SERPL CREATININE-BSD FRML MDRD: 55 ML/MIN/1.73SQ M

## 2018-12-08 PROCEDURE — 84520 ASSAY OF UREA NITROGEN: CPT

## 2018-12-08 PROCEDURE — 36415 COLL VENOUS BLD VENIPUNCTURE: CPT

## 2018-12-08 PROCEDURE — 82565 ASSAY OF CREATININE: CPT

## 2018-12-11 ENCOUNTER — TELEPHONE (OUTPATIENT)
Dept: HEMATOLOGY ONCOLOGY | Facility: CLINIC | Age: 73
End: 2018-12-11

## 2018-12-11 RX ORDER — ECHINACEA PURPUREA EXTRACT 125 MG
1 TABLET ORAL AS NEEDED
COMMUNITY
End: 2018-12-27 | Stop reason: ALTCHOICE

## 2018-12-11 NOTE — TELEPHONE ENCOUNTER
Received Navigator referral today with message to call patients spouse, Josefa Domingo,  I called patients home number, no answer, left voicemail message with contact information to return call when available    Awaiting return call

## 2018-12-11 NOTE — PRE-PROCEDURE INSTRUCTIONS
Pre-Surgery Instructions:   Medication Instructions    acetaminophen (TYLENOL) 500 mg tablet Patient was instructed by Physician and understands   fluticasone (FLONASE) 50 mcg/act nasal spray Patient was instructed by Physician and understands   furosemide (LASIX) 40 mg tablet Patient was instructed by Physician and understands   losartan (COZAAR) 50 mg tablet Patient was instructed by Physician and understands   sodium chloride (OCEAN) 0 65 % nasal spray Patient was instructed by Physician and understands  [facility] preop instructions reviewed with pt as much as pt allowed

## 2018-12-11 NOTE — TELEPHONE ENCOUNTER
Called and spoke to patient's wife, Johana Lyles  Explained that [facility] has been contacted and that a nurse navigator would be in contact with her  She showed some concern  Believes that they may push a certain treatment on   Eased her concerns and advised her that they are there to facilitate and answer questions she may have prior to Med Onc appt   States she will remain "open- minded"

## 2018-12-12 ENCOUNTER — TELEPHONE (OUTPATIENT)
Dept: SURGERY | Facility: CLINIC | Age: 73
End: 2018-12-12

## 2018-12-13 ENCOUNTER — HOSPITAL ENCOUNTER (OUTPATIENT)
Dept: RADIOLOGY | Facility: HOSPITAL | Age: 73
Discharge: HOME | End: 2018-12-13
Payer: MEDICARE

## 2018-12-13 DIAGNOSIS — C50.922: ICD-10-CM

## 2018-12-13 PROCEDURE — C8908 MRI W/O FOL W/CONT, BREAST,: HCPCS

## 2018-12-14 ENCOUNTER — TELEPHONE (OUTPATIENT)
Dept: HEMATOLOGY ONCOLOGY | Facility: CLINIC | Age: 73
End: 2018-12-14

## 2018-12-17 ENCOUNTER — ANESTHESIA EVENT (OUTPATIENT)
Dept: PERIOP | Facility: HOSPITAL | Age: 73
End: 2018-12-17
Payer: MEDICARE

## 2018-12-18 ENCOUNTER — HOSPITAL ENCOUNTER (OUTPATIENT)
Facility: HOSPITAL | Age: 73
Setting detail: OUTPATIENT SURGERY
Discharge: HOME | End: 2018-12-18
Attending: SURGERY | Admitting: SURGERY
Payer: MEDICARE

## 2018-12-18 ENCOUNTER — HOSPITAL ENCOUNTER (OUTPATIENT)
Dept: RADIOLOGY | Facility: HOSPITAL | Age: 73
Setting detail: OUTPATIENT SURGERY
Discharge: HOME | End: 2018-12-18
Payer: MEDICARE

## 2018-12-18 ENCOUNTER — ANESTHESIA (OUTPATIENT)
Dept: PERIOP | Facility: HOSPITAL | Age: 73
End: 2018-12-18
Payer: MEDICARE

## 2018-12-18 VITALS
TEMPERATURE: 98.2 F | SYSTOLIC BLOOD PRESSURE: 152 MMHG | HEART RATE: 86 BPM | DIASTOLIC BLOOD PRESSURE: 72 MMHG | BODY MASS INDEX: 32.14 KG/M2 | WEIGHT: 200 LBS | OXYGEN SATURATION: 95 % | HEIGHT: 66 IN | RESPIRATION RATE: 18 BRPM

## 2018-12-18 DIAGNOSIS — K85.10 ACUTE GALLSTONE PANCREATITIS: Primary | ICD-10-CM

## 2018-12-18 DIAGNOSIS — C50.922: ICD-10-CM

## 2018-12-18 PROBLEM — K80.20 CHOLELITHIASIS: Status: RESOLVED | Noted: 2018-11-11 | Resolved: 2018-12-18

## 2018-12-18 PROCEDURE — 47562 LAPAROSCOPIC CHOLECYSTECTOMY: CPT | Performed by: SURGERY

## 2018-12-18 PROCEDURE — 47562 LAPAROSCOPIC CHOLECYSTECTOMY: CPT | Performed by: PHYSICIAN ASSISTANT

## 2018-12-18 PROCEDURE — 88304 TISSUE EXAM BY PATHOLOGIST: CPT | Performed by: PATHOLOGY

## 2018-12-18 RX ORDER — ROCURONIUM BROMIDE 10 MG/ML
INJECTION, SOLUTION INTRAVENOUS AS NEEDED
Status: DISCONTINUED | OUTPATIENT
Start: 2018-12-18 | End: 2018-12-18 | Stop reason: SURG

## 2018-12-18 RX ORDER — ONDANSETRON 2 MG/ML
4 INJECTION INTRAMUSCULAR; INTRAVENOUS EVERY 4 HOURS PRN
Status: DISCONTINUED | OUTPATIENT
Start: 2018-12-18 | End: 2018-12-18 | Stop reason: HOSPADM

## 2018-12-18 RX ORDER — HYDROCODONE BITARTRATE AND ACETAMINOPHEN 5; 325 MG/1; MG/1
1-2 TABLET ORAL EVERY 4 HOURS PRN
Qty: 20 TABLET | Refills: 0 | Status: SHIPPED | OUTPATIENT
Start: 2018-12-18 | End: 2018-12-27 | Stop reason: ALTCHOICE

## 2018-12-18 RX ORDER — NEOSTIGMINE METHYLSULFATE 1 MG/ML
INJECTION INTRAVENOUS AS NEEDED
Status: DISCONTINUED | OUTPATIENT
Start: 2018-12-18 | End: 2018-12-18 | Stop reason: SURG

## 2018-12-18 RX ORDER — DEXTROSE AND SODIUM CHLORIDE 5; .45 G/100ML; G/100ML
80 INJECTION, SOLUTION INTRAVENOUS CONTINUOUS
Status: DISCONTINUED | OUTPATIENT
Start: 2018-12-18 | End: 2018-12-18 | Stop reason: HOSPADM

## 2018-12-18 RX ORDER — SODIUM CHLORIDE 9 MG/ML
INJECTION, SOLUTION INTRAVENOUS AS NEEDED
Status: DISCONTINUED | OUTPATIENT
Start: 2018-12-18 | End: 2018-12-18 | Stop reason: HOSPADM

## 2018-12-18 RX ORDER — ONDANSETRON 2 MG/ML
INJECTION INTRAMUSCULAR; INTRAVENOUS AS NEEDED
Status: DISCONTINUED | OUTPATIENT
Start: 2018-12-18 | End: 2018-12-18 | Stop reason: SURG

## 2018-12-18 RX ORDER — GLYCOPYRROLATE 0.2 MG/ML
INJECTION INTRAMUSCULAR; INTRAVENOUS AS NEEDED
Status: DISCONTINUED | OUTPATIENT
Start: 2018-12-18 | End: 2018-12-18 | Stop reason: SURG

## 2018-12-18 RX ORDER — HYDROCODONE BITARTRATE AND ACETAMINOPHEN 5; 325 MG/1; MG/1
1 TABLET ORAL EVERY 4 HOURS PRN
Status: DISCONTINUED | OUTPATIENT
Start: 2018-12-18 | End: 2018-12-18 | Stop reason: HOSPADM

## 2018-12-18 RX ORDER — MORPHINE SULFATE 10 MG/ML
2 INJECTION, SOLUTION INTRAMUSCULAR; INTRAVENOUS EVERY 2 HOUR PRN
Status: DISCONTINUED | OUTPATIENT
Start: 2018-12-18 | End: 2018-12-18 | Stop reason: CLARIF

## 2018-12-18 RX ORDER — ONDANSETRON 4 MG/1
4 TABLET, ORALLY DISINTEGRATING ORAL EVERY 4 HOURS PRN
Status: DISCONTINUED | OUTPATIENT
Start: 2018-12-18 | End: 2018-12-18 | Stop reason: HOSPADM

## 2018-12-18 RX ORDER — SODIUM CHLORIDE 9 MG/ML
125 INJECTION, SOLUTION INTRAVENOUS CONTINUOUS
Status: DISCONTINUED | OUTPATIENT
Start: 2018-12-18 | End: 2018-12-18 | Stop reason: HOSPADM

## 2018-12-18 RX ORDER — FENTANYL CITRATE/PF 50 MCG/ML
50 SYRINGE (ML) INJECTION
Status: COMPLETED | OUTPATIENT
Start: 2018-12-18 | End: 2018-12-18

## 2018-12-18 RX ORDER — PROPOFOL 10 MG/ML
INJECTION, EMULSION INTRAVENOUS AS NEEDED
Status: DISCONTINUED | OUTPATIENT
Start: 2018-12-18 | End: 2018-12-18 | Stop reason: SURG

## 2018-12-18 RX ORDER — ACETAMINOPHEN 325 MG/1
650 TABLET ORAL EVERY 6 HOURS PRN
Status: DISCONTINUED | OUTPATIENT
Start: 2018-12-18 | End: 2018-12-18 | Stop reason: HOSPADM

## 2018-12-18 RX ORDER — ONDANSETRON 2 MG/ML
4 INJECTION INTRAMUSCULAR; INTRAVENOUS ONCE AS NEEDED
Status: DISCONTINUED | OUTPATIENT
Start: 2018-12-18 | End: 2018-12-18 | Stop reason: HOSPADM

## 2018-12-18 RX ORDER — ONDANSETRON 4 MG/1
4 TABLET, FILM COATED ORAL EVERY 8 HOURS PRN
Qty: 20 TABLET | Refills: 0 | Status: SHIPPED | OUTPATIENT
Start: 2018-12-18 | End: 2018-12-27 | Stop reason: ALTCHOICE

## 2018-12-18 RX ORDER — CEFAZOLIN SODIUM 2 G/50ML
2000 SOLUTION INTRAVENOUS ONCE
Status: COMPLETED | OUTPATIENT
Start: 2018-12-18 | End: 2018-12-18

## 2018-12-18 RX ORDER — HYDROMORPHONE HCL/PF 1 MG/ML
0.5 SYRINGE (ML) INJECTION
Status: DISCONTINUED | OUTPATIENT
Start: 2018-12-18 | End: 2018-12-18 | Stop reason: HOSPADM

## 2018-12-18 RX ORDER — MAGNESIUM HYDROXIDE 1200 MG/15ML
LIQUID ORAL AS NEEDED
Status: DISCONTINUED | OUTPATIENT
Start: 2018-12-18 | End: 2018-12-18 | Stop reason: HOSPADM

## 2018-12-18 RX ORDER — MIDAZOLAM HYDROCHLORIDE 1 MG/ML
INJECTION INTRAMUSCULAR; INTRAVENOUS AS NEEDED
Status: DISCONTINUED | OUTPATIENT
Start: 2018-12-18 | End: 2018-12-18 | Stop reason: SURG

## 2018-12-18 RX ORDER — MORPHINE SULFATE 10 MG/ML
2 INJECTION, SOLUTION INTRAMUSCULAR; INTRAVENOUS EVERY 2 HOUR PRN
Status: DISCONTINUED | OUTPATIENT
Start: 2018-12-18 | End: 2018-12-18 | Stop reason: HOSPADM

## 2018-12-18 RX ORDER — DOCUSATE SODIUM 100 MG/1
100 CAPSULE, LIQUID FILLED ORAL 2 TIMES DAILY
Qty: 60 CAPSULE | Refills: 0 | Status: SHIPPED | OUTPATIENT
Start: 2018-12-18 | End: 2018-12-27 | Stop reason: ALTCHOICE

## 2018-12-18 RX ORDER — FENTANYL CITRATE 50 UG/ML
INJECTION, SOLUTION INTRAMUSCULAR; INTRAVENOUS AS NEEDED
Status: DISCONTINUED | OUTPATIENT
Start: 2018-12-18 | End: 2018-12-18 | Stop reason: SURG

## 2018-12-18 RX ADMIN — LIDOCAINE HYDROCHLORIDE 60 MG: 20 INJECTION, SOLUTION INTRAVENOUS at 12:14

## 2018-12-18 RX ADMIN — SODIUM CHLORIDE 125 ML/HR: 0.9 INJECTION, SOLUTION INTRAVENOUS at 10:56

## 2018-12-18 RX ADMIN — FENTANYL CITRATE 50 MCG: 50 INJECTION, SOLUTION INTRAMUSCULAR; INTRAVENOUS at 12:21

## 2018-12-18 RX ADMIN — CEFAZOLIN SODIUM 2000 MG: 2 SOLUTION INTRAVENOUS at 12:11

## 2018-12-18 RX ADMIN — FENTANYL CITRATE 100 MCG: 50 INJECTION, SOLUTION INTRAMUSCULAR; INTRAVENOUS at 12:13

## 2018-12-18 RX ADMIN — SODIUM CHLORIDE: 0.9 INJECTION, SOLUTION INTRAVENOUS at 13:05

## 2018-12-18 RX ADMIN — FENTANYL CITRATE 50 MCG: 50 INJECTION, SOLUTION INTRAMUSCULAR; INTRAVENOUS at 13:50

## 2018-12-18 RX ADMIN — PROPOFOL 150 MG: 10 INJECTION, EMULSION INTRAVENOUS at 12:14

## 2018-12-18 RX ADMIN — FENTANYL CITRATE 50 MCG: 50 INJECTION, SOLUTION INTRAMUSCULAR; INTRAVENOUS at 13:44

## 2018-12-18 RX ADMIN — ONDANSETRON 4 MG: 2 INJECTION INTRAMUSCULAR; INTRAVENOUS at 13:04

## 2018-12-18 RX ADMIN — GLYCOPYRROLATE 0.5 MG: 0.2 INJECTION, SOLUTION INTRAMUSCULAR; INTRAVENOUS at 13:04

## 2018-12-18 RX ADMIN — FENTANYL CITRATE 50 MCG: 50 INJECTION, SOLUTION INTRAMUSCULAR; INTRAVENOUS at 12:32

## 2018-12-18 RX ADMIN — NEOSTIGMINE METHYLSULFATE 3 MG: 1 INJECTION INTRAVENOUS at 13:04

## 2018-12-18 RX ADMIN — MIDAZOLAM 1 MG: 1 INJECTION INTRAMUSCULAR; INTRAVENOUS at 12:09

## 2018-12-18 RX ADMIN — ROCURONIUM BROMIDE 30 MG: 10 INJECTION INTRAVENOUS at 12:14

## 2018-12-18 NOTE — DISCHARGE SUMMARY
Discharge Summary - Janet Pope 74 y o  male MRN: [id number]    Unit/Bed#: OR [place] Encounter: [id number]      Pre-Operative Diagnosis: Pre-Op Diagnosis Codes:     * Acute gallstone pancreatitis [K85 10]    Post-Operative Diagnosis: Post-Op Diagnosis Codes:     * Acute gallstone pancreatitis [K85 10]    Procedures Performed:  Procedure(s):  LAP CHOLECYSTECTOMY; LYSIS OF ADHESIONS; ATTEMPTED CHOLANGIOGRAM    Surgeon: Salena Felix MD    See H & P for full details of admission and Operative Note for full details of operations performed  Patient was seen and examined prior to discharge  Provisions for Follow-Up Care:  See After Visit Summary for information related to follow-up care and home orders  Disposition: Home, in stable condition  Planned Readmission: No    Discharge Medications:  See after visit summary for reconciled discharge medications provided to patient and family  Post Operative instructions: Reviewed with patient and/or family  Some portions of this record may have been generated with voice recognition software  There may be translation, syntax,  or grammatical errors  Occasional wrong word or "sound-a-like" substitutions may have occurred due to the inherent limitations of the voice recognition software  Read the chart carefully and recognize, using context, where substitutions may have occurred  If you have any questions, please contact the dictating provider for clarification or correction, as needed  This encounter has been coded by non certified Coder      Signature:   Salena Felix MD  Date: 12/18/2018 Time: 1:36 PM

## 2018-12-18 NOTE — OP NOTE
LAP CHOLECYSTECTOMY; LYSIS OF ADHESIONS; ATTEMPTED CHOLANGIOGRAM  Postoperative Note  PATIENT NAME: Susana Jerez  : 1945  MRN: [id number]  AL OR ROOM 06    Surgery Date: 2018    Pre operative diagnosis:  Acute gallstone pancreatitis [K85 10]    Operative Indications:  Symptomatic gallbladder disease    Consent:  The risks, benefits, and alternatives to the surgery were discussed with the patient and with the family prior to surgery if present, personally by Dr Steven Posada  If the consent was obtained by the physician assistant or other representative, the consent was reviewed once again personally by the operating physician  Common complications particular for this procedure as well as unusual complications were discussed, including but not limited to:  bleeding, wound infection, prolonged wound healing, open wounds, reoperation, leak from the bowel or viscus, leak from the bile duct or injury to adjacent or other organs or blood vessels in the abdomen  The significance of bile duct reconstruction was discussed  If the surgery was laparoscopic, it was discussed that possible open surgery could also occur during that same surgery and is always an option in laparoscopic surgery and/or reoperation  A  was used if necessary  The patient expressed understanding of the issues discussed and wished and consented to the procedure to proceed  All questions were answered  Dr Steven Posada personally discussed the informed consent with this patient  Operative Findings:  Stones in the cystic duct (2) milked back  Unable to cannulate tortuous cystic duct  Attempted cholangiogram, but duct too tortuous  Excellent critical view of singular duct and artery         Post operative diagnosis:  Post-Op Diagnosis Codes:     * Acute gallstone pancreatitis [K85 10]    Procedure:   Procedure(s):  LAP CHOLECYSTECTOMY; LYSIS OF ADHESIONS; ATTEMPTED CHOLANGIOGRAM    Lysis of adhesions           Surgeon(s) and Role:     * Blessing Barahona MD - Primary     * Berlin Morris PA-C - Assisting     * Sander Rogel PA-C - Assisting        The Physician Assistant was medically necessary for surgical safety the case including suturing, retraction, and hemostasis  No qualified resident was available  I was present for the entire procedure  Drains:       Specimens:  ID Type Source Tests Collected by Time Destination   1 :  Tissue Gallbladder TISSUE EXAM Blessing Barahona MD 12/18/2018 1301        Estimated Blood Loss:   20 mL    Anesthesia Type:   Choice     Procedure: The patient was seen again in the Holding Room  The risks, benefits, complications, treatment options, and expected outcomes were discussed with the patient  The possibilities of reaction to medication, pulmonary aspiration, perforation of viscus, bleeding, recurrent infection, finding a normal gallbladder, the need for additional procedures, failure to diagnose a condition, the possible need to convert to an open procedure, and creating a complication requiring transfusion or operation were discussed with the patient  The patient and/or family concurred with the proposed plan, giving informed consent  The site of surgery properly noted/marked  The patient was taken to Operating Room, identified as Magdal[facility] and the procedure verified as Laparoscopic Cholecystectomy with Possible Intraoperative Cholangiogram  A Time Out was held and the above information confirmed  Prior to the induction of general anesthesia, antibiotic prophylaxis was administered  General endotracheal anesthesia was then administered and tolerated well  After the induction, the abdomen was prepped in the usual sterile fashion  The patient was positioned in the supine position  The patient was positioned in the supine position, along with some reverse Trendelenburg  Local anesthetic agent was injected into the skin near the umbilicus and an incision made   The midline fascia was incised and the open technique was used to introduce a  port under direct vision  Pneumoperitoneum was then created with CO2 and tolerated well without any adverse changes in the patient's vital signs  Additional trocars were introduced under direct vision  All skin incisions were infiltrated with a local anesthetic agent before making the incision and placing the trocars  The patient was placed in the head up, left side down position  The gallbladder was identified, the fundus grasped and retracted cephalad  Adhesions were lysed bluntly and with the electrocautery where indicated, taking care not to injure any adjacent organs or viscus  The infundibulum was grasped and retracted laterally, exposing the peritoneum overlying the triangle of Calot  The peritoneum was removed anteriorly and posteriorly to the gallbladder, with special attention to the backside of the gallbladder dissection  This allowed for freeing up the gallbladder  The critical view of the triangle Calot was carried out, dissecting out the cystic duct and cystic artery as the only two tubular structures leading to the gallbladder  Once these were clearly identified, the back wall of the gallbladder was lifted away from the cystic plate to expose the posterior aspect of this dissection, ensuring that there were no posterior structures leading into the liver  Attempt was made at a cholangiogram through a separate stab incision inserting the cholangiogram catheter  However the duct was quite tortuous, and the catheter could not be advanced and secured  Upon opening the cystic duct, 2 stones were milked out of the duct back worse  These were visualized  The critical view was excellent  However after multiple attempts we were not able to advance the cholangiogram catheter therefore the cholangiogram was then aborted and the catheter removed      The gallbladder was dissected from the liver bed in retrograde fashion with the electrocautery or harmonic scalpel where appropriate  The gallbladder was removed, via an Endo pouch, through the epigastric port    The liver bed was irrigated and inspected  Hemostasis was achieved  Copious irrigation was utilized and was repeatedly aspirated until clear  Pneumoperitoneum was completely reduced after viewing removal of the trocars under direct vision  The wounds were thoroughly irrigated and if needed, fascia was then closed with a figure of eight suture; the skin was then closed with 4-0 Monocryl sutures and a sterile dressing was applied  Instrument, sponge, and needle counts were correct at closure and at the conclusion of the case  The patient tolerated the procedure well  Some portions of this record may have been generated with voice recognition software  There may be translation, syntax,  or grammatical errors  Occasional wrong word or "sound-a-like" substitutions may have occurred due to the inherent limitations of the voice recognition software  Read the chart carefully and recognize, using context, where substations may have occurred  If you have any questions, please contact the dictating provider for clarification or correction, as needed         Complications: None    Condition: Stable to PACU    SIGNATURE: Zachary Guaman MD   DATE: December 18, 2018   TIME: 1:29 PM

## 2018-12-18 NOTE — DISCHARGE INSTRUCTIONS
[facility] Instructions  Dr Beverly Durand MD, FACS    1  General: You will feel pulling sensations around the wound or funny aches and pains around the incisions  This is normal  Even minor surgery is a change in your body and this is your bodys way of reaction to it  If you have had abdominal surgery, it may help to support the incision with a small pillow or blanket for comfort when moving or coughing  2  Wound care: Make sure to remove the bandage in about 24 hours, unless instructed otherwise  You usually don't have to redress the wound after 24-48 hours, unless for comfort  Keep the incision clean and dry  Let air get to it  If this Steri-Strips fall off, just keep the wound clean  3  Water: You may shower over the wound, unless there are drain tubes left in place  Do not bathe or use a pool or hot tub until cleared by the physician  You may shower right over the staples or Steri-Strips and packing dry when you are done  4  Activity: You may go up and down stairs, walk as much as you are comfortable, but walk at least 3 times each day  If you have had abdominal surgery, do not lift anything heavier than 15 pounds for at least 2-4 weeks, unless cleared by the doctor  5  Diet: You may resume a regular diet  If you had a same-day surgery or overnight stay surgery, you may wish to eat lightly for a few days: soups, crackers, and sandwiches  You may resume a regular diet when ready  6  Medications: Resume all of your previous medications, unless told otherwise by the doctor  Avoid aspirin or ibuprofen (Advil, Motrin, etc ) products for 2-3 days after the date of surgery  You may, at that time, began to take them again  Tylenol is always fine, unless you are taking any narcotic pain medication containing Tylenol (such as Percocet, Darvocet, Vicodin, or anything containing acetaminophen)  Do not take Tylenol if you're taking these medications   You do not need to take the narcotic pain medications unless you are having significant pain and discomfort  7  Driving: You will need someone to drive you home on the day of surgery  Do not drive or make any important decisions while on narcotic pain medication or 24 hours and after anesthesia or sedation for surgery  Generally, you may drive when your off all narcotic pain medications  8  Upset Stomach: You may take Maalox, Tums, or similar items for an upset stomach  If your narcotic pain medication causes an upset stomach, do not take it on an empty stomach  Try taking it with at least some crackers or toast      9  Constipation: Patients often experienced constipation after surgery  You may take over-the-counter medication for this, such as Metamucil, Senokot, Dulcolax, milk of magnesia, etc  You may take a suppository unless you have had anorectal surgery such as a procedure on your hemorrhoids  If you experience significant nausea or vomiting after abdominal surgery, call the office before trying any of these medications  10  Call the office: If you are experiencing any of the following, fevers above 101 5°, significant nausea or vomiting, if the wound develops drainage and/or is excessive redness around the wound, or if you have significant diarrhea or other worsening symptoms  11  Pain: You may be given a prescription for pain  This will be given to the hospital, the day of surgery  12  Sexual Activity: You may resume sexual activity when you feel ready and comfortable and your incision is sealed and healed without apparent infection risk  13  Urination: If you haven't urinated in 6 hours, go directly to the ER for evaluation for urinary retention       [address]  [facility], [address]   Phone: [phone]

## 2018-12-18 NOTE — ANESTHESIA PREPROCEDURE EVALUATION
Review of Systems/Medical History  Patient summary reviewed        Cardiovascular  Negative cardio ROS Hypertension , Dysrhythmias , atrial fibrillation,    Pulmonary  Negative pulmonary ROS Recent URI resolved,        GI/Hepatic  Negative GI/hepatic ROS   GERD well controlled,        Negative  ROS        Endo/Other  Negative endo/other ROS      GYN  Negative gynecology ROS          Hematology  Negative hematology ROS      Musculoskeletal  Negative musculoskeletal ROS   Arthritis     Neurology  Negative neurology ROS      Psychology   Negative psychology ROS              Physical Exam    Airway    Mallampati score: II  TM Distance: >3 FB  Neck ROM: full     Dental   No notable dental hx     Cardiovascular  Comment: Negative ROS, Rhythm: irregular, Rate: normal, Cardiovascular exam normal    Pulmonary  Pulmonary exam normal Breath sounds clear to auscultation,     Other Findings        Anesthesia Plan  ASA Score- 3     Anesthesia Type- general with ASA Monitors  Additional Monitors:   Airway Plan: ETT  Plan Factors-    Induction- intravenous  Postoperative Plan-     Informed Consent- Anesthetic plan and risks discussed with patient

## 2018-12-18 NOTE — H&P (VIEW-ONLY)
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, nuclear grade 1 mitotic rate 1  ER positive, SD positive, HER2 Mayuri negative  Breast MRI for staging to see if this is invaded the chest wall which it has not appear to be clinically  Outside review of slides by our pathologist, slides at being ordered from [facility]    Will eventually need modified radical mastectomy  We discussed anti hormone therapy as a form of chemotherapy that he might be willing to take since he is estrogen receptor positive  He is afraid of radiation therapy and he is likely not a candidate at his age for chemotherapy but we will have these consult made for him postoperatively  Important point is any male with breast cancer needs to have genetic testing for BRCA testing  Fortunately in this case, he has no biological children  His brothers and sisters are all  but he does have blood relatives nieces and nephews and it is important that they know whether he has genetically positive were not so they can choose whether to pursue testing  We engaged in some genetic counseling  He should see a certified genetic counselor as I am not a certified counselor but I do counseled patients regarding genetic testing  His understanding is complete but also limited and he agreed to genetic counseling in theory  He is willing to have a genetic testing sent today    He understands there might be a small co-pay, but agrees to proceed for the sake of his nieces and nephews  2  Contracted gallbladder with gallstones and recent diagnosis of gallstone pancreatitis  Ultrasound shows a contracted gallbladder with gallstones and sludge  The liver measures 15 cm  There is mild wall thickening and this was on an ultrasound from early November 2018  No common bile duct dilatation was noted, and factor was noted to be 0 34 cm, but he was still given the diagnosis of acute pancreatitis based on gallstones  A CT of the abdomen did show tail of the pancreas edema but I am not sure this was really related to gallstones  3  14 mm 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, per workup at [facility] and no further workup is needed  4  Needs screening colonoscopy  He is very old cool and somewhat against this  We counseled him regarding this  5  Medical oncology consult for large size tumor  Perhaps preoperative hormonal manipulation would be appropriate to shrink the tumor before resection  He strongly wishes to avoid chemotherapy but we explained to him that he might just need to take a hormonal pill that would shrink tumor size over few months  Will ask medical oncology to consult and I have instructed the office for medical oncology consult  The timing of either gallbladder or mastectomy is not that important is he is had this breast mass for at least 3 years, and has already positive adenopathy  I am not convinced that it was truly gallstone pancreatitis as the laboratory values that I have showed normal bilirubin but perhaps that was upon discharge  In any case a cholecystectomy is probably warranted, but he can't have an MRI of his breast unless he can lie flat on his belly  Therefore will get the MRI of the breast and then time the surgery of the gallbladder and the mastectomy based on his level of concern and his symptoms  Plan:   1   Breast workup including MRI, and genetic testing  Plan mastectomy with lymph node dissection full axillary dissection  Outside review of [facility] slides to confirm the diagnosis  2  Laparoscopic cholecystectomy  This should be done after the breast MRI since he has to lay on his belly  3  Modified radical mastectomy with full axillary dissection, left    4  Consult Radiation Oncology and Medical Oncology as outpatient after the surgery even if he refuses therapy he should be aware of the treatment options  He understands this and at least in theory agrees to this plan  5  If gene positive by blood test, would recommend genetic counseling bite official genetic counselor  Preoperative clearance by Medicine as he is a fairly elderly gentleman in clinical appearance, and he might need to go back to his primary care doctor which she has just switch to [facility], for preoperative clearance  Preoperative Clearance: None and Medical        - None, continue medication regimen including morning of surgery, with sip of water    ______________________________________________________  CC:Breast Complaint (BIRADS 4- Mass of Left breast)    HPI:  Tila Asher is a 74 y  o male who was referred for evaluation of Breast Complaint (BIRADS 4- Mass of Left breast)    Currently patient has had a 3 year history of a left breast mass for which she has refused or denied further workup  He was admitted on November 9, 2018 to [facility] for pancreatitis of the tail for abdominal pain  Extensive workup was done at that time  Mass of the left breast was noted on physical exam   Subsequent core biopsy that showed this to be a mucinous carcinoma invasive, grade 1, with ERPR positive  He has suspicious lymph nodes on mammogram and ultrasound but these were not biopsied  He had pancreatitis in the tail of his pancreas  This is less likely to be related to gallstones although he does have gallstones    He has occasional abdominal pain  He feels his lower abdominal pain is related to his gallbladder and I did  him that this is not the case  He is adamant about not having chemo or radiation due to his personal experiences in the past   We did  him that moderate techniques her much more kind in gentle and that he would likely survive adjuvant therapy if we determine it is needed  14 the mm mass in the tail of the pancreas which on workup as determined to be a splenule and not worthy of any further workup or intervention  Symptomatic or he has no pain but has a contracted breast with dimpling  He has some lower abdominal pain but it is unclear that this is truly biliary colic  He denies any change in his bowel habits or blood in his stool but he has never had a colonoscopy          ROS:  General ROS: negative  negative for - chills, fatigue, fever or night sweats, weight loss  Respiratory ROS: no cough, shortness of breath, or wheezing  Cardiovascular ROS: no chest pain or dyspnea on exertion  Genito-Urinary ROS: no dysuria, trouble voiding, or hematuria  Musculoskeletal ROS: negative for - gait disturbance, joint pain or muscle pain  Neurological ROS: no TIA or stroke symptoms  Gastrointestinal: see HPI  No abdominal pain, melena, BRB unless specified in HPI  Skin ROS: no new rashes or lesions   Lymphatic ROS: no new adenopathy noted by pt  GYN ROS: see HPI, no new GYN history or bleeding noted  Psy ROS: no new mental or behavioral disturbances       Patient Active Problem List   Diagnosis    Mass of breast, left    [facility]cinoma of [facility] [address] [address] )    Hypertension    Acute gallstone pancreatitis    Cholelithiasis    Leukocytosis    PAC (premature atrial contraction)    Pancreatic mass    Pancreatitis    RBBB (right bundle branch block with left anterior fascicular block)    Transaminitis         Allergies:  Patient has no known allergies        Current Outpatient Prescriptions:    acetaminophen (TYLENOL) 500 mg tablet, Take 500 mg by mouth every 6 (six) hours as needed, Disp: , Rfl:     aspirin (EC-81 ASPIRIN) 81 mg EC tablet, Take 81 mg by mouth, Disp: , Rfl:     furosemide (LASIX) 40 mg tablet, Take 40 mg by mouth daily, Disp: , Rfl: 0    losartan (COZAAR) 50 mg tablet, , Disp: , Rfl: 0    Past Medical History:   Diagnosis Date    Acute gallstone pancreatitis     Atrial fibrillation (HCC)     Hypertension     Irregular heartbeat     PAC (premature atrial contraction)     PVC (premature ventricular contraction)     RBBB        Past Surgical History:   Procedure Laterality Date    APPENDECTOMY      CHOLECYSTECTOMY         Family History   Problem Relation Age of Onset    Cancer Mother     Heart disease Father         reports that he has quit smoking  He has never used smokeless tobacco  He reports that he does not drink alcohol or use drugs  Labs:   No results found for: WBC, HGB, HCT, MCV, PLT  No results found for: NA, K, CL, CO2, ANIONGAP, BUN, CREATININE, GLUCOSE, GLUF, CALCIUM, CORRECTEDCA, AST, ALT, ALKPHOS, PROT, BILITOT, EGFR      Imaging: I personally reviewed the radiology studies, my impression is as follows:  Extensive studies from [facility]  We do not have the disc but we reviewed all his reports including MRI of the abdomen, CT of the abdomen, ultrasound of the breast, ultrasound of the right upper quadrant           PHYSICAL EXAM        PHYSICAL EXAM  General Appearance:    Alert, cooperative, no distress, elderly moderately obese   Head:    Normocephalic without obvious abnormality   Eyes:    PERRL, conjunctiva/corneas clear, EOM's intact        Neck:   Supple, no adenopathy, no JVD   Back:     Symmetric, no spinal or CVA tenderness   Lungs:     Clear to auscultation bilaterally, no wheezing or rhonchi   Heart:    Regular rate and rhythm, S1 and S2 normal, no murmur   Abdomen:     Soft, nontender nondistended  Obese     Extremities:   Extremities normal  No clubbing, cyanosis or edema   Psych:   Normal Affect   Neurologic:   CNII-XII intact  Strength symmetric, speech intact      Left breast 6 cm mass palpable, does not appear to be invading the chest wall, palpable adenopathy  Dimpling of the skin or at least retraction of the skin but not true invasion into the skin  Physical Exam   Pulmonary/Chest:       6 cm non fixed mass, with significant gynecomastia on both sides  Palpable adenopathy  No supraclavicular adenopathy  Skin retraction in the areola area without true skin dimpling  Clinical T4 N1  Some portions of this record may have been generated with voice recognition software  There may be translation, syntax,  or grammatical errors  Occasional wrong word or "sound-a-like" substitutions may have occurred due to the inherent limitations of the voice recognition software  Read the chart carefully and recognize, using context, where substitutions may have occurred  If you have any questions, please contact the dictating provider for clarification or correction, as needed  This encounter has been coded by a non-certified coder

## 2018-12-19 ENCOUNTER — TELEPHONE (OUTPATIENT)
Dept: SURGERY | Facility: CLINIC | Age: 73
End: 2018-12-19

## 2018-12-19 DIAGNOSIS — N63.0 BREAST MASS IN MALE: Primary | ICD-10-CM

## 2018-12-19 NOTE — TELEPHONE ENCOUNTER
Called and spoke to patient  Post -op Alison moore on 12/18/18  Patient states he is doing well  Denies N/V/F/C  Eating and drinking ok  No BM yet, will start using stool softener, not using narcotic pain meds  Will keep his mind on this and if it becomes a problem, he will contact the office  Instructed to keep incisions dry and clean, he is aware that steri strips will fall off on their own  He is aware he will receive a call once pathology is finalized  He has post-op ov on 12/31/18  As per Dr Burnett Corporal, patient to remain out of work for 2 weeks minimum  Path pending

## 2018-12-19 NOTE — TELEPHONE ENCOUNTER
Called and spoke to patient  MRI results reviewed by Dr Tatianna Cavazos  Recommended more studies of right breast due to lesions  Patient is aware of MRI results, he is aware orders have been entered for Mammogram and U/S of right breast   He will contact Central Scheduling to schedule further testing  He is aware he should have these studies done prior to his appointment with Dr Josue Ramos on 12/26/18

## 2018-12-20 NOTE — TELEPHONE ENCOUNTER
(late entry) Patient called at 10:45am with question about After Visit Summary for 12/18/18  He states the portal states they discussed upcoming breast surgery scheduled for 1/2/19 with him  However, he states they did not talk to him about that after his lap teresa on 12/18/18  I advised patient that Dr Ashu Yee will review his questions with him at his lap teresa post-op appt on 12/31  I again advised him to schedule the Mammogram and U/S, to have them completed before Dr Kb Rizvi appt on 12/26 As discussed previously  MRI was reviewed by Dr Ashu Yee and further studies recommended due to lesions in right breast   He stated his wife has bronchitis, he will try to get it done prior  If not, it will be done after

## 2018-12-20 NOTE — TELEPHONE ENCOUNTER
Received call from patient clarifying testing needed  Explained that due to MRI results, diagnostic left mammo & u/s of same  He stated that he called Central Scheduling & they told he needed and MRI as well  Reminded patient that he just had his MRI  He states that [facility] does dx mammo - can he go there? He doesn't wish to go to [facility]  I stated that yes, he could, they are now [facility]  Wife stated (screaming) question in the background  Both were speaking loudly  Told patient to ask me his second question and then he could repeat his wife's question  Patient was concerned/irritated that when he went for MRI, the back broke down and they needed/wanted to r/s him AND that the morning of surgery, he received a notification in My Chart that stated he had a 7:00 AM appt day of surgery for radiology that he was unaware of  (Notification of appt was for intra-op testing ) Apologized to patient and explained that he should not have received notification  Apologized for inconvenience at radiology, unsure of what happened  Wife was questioning when we were going to scan his whole body for cancer  If we are now thinking it's in the other breast, she states what to stop if from spreading to every other organ and he might as well be six feet under  She states that [facility] told patient he had no cancer, no [facility] is stated that it's everywhere  (both were yelling back and forth at each other during this ) Reminded patient that his original cancer dx came from [facility], not [facility]  Reminded patient that we are not stating that he now has cancer is his other breast, we are ordering additional testing to see if original cancer has begun to spread  Informed patient that if oncology felt that a PET scan was needed, they would indeed order one to check patient  Patient was very grateful for my help and answers  Wife was not  Told patient to call me if he needed me

## 2018-12-20 NOTE — TELEPHONE ENCOUNTER
Received call from patient requesting to speak to Dr Angela Perez immediately  When informed that she was not in the office, he stated that he needed to speak with her immediately  I told him that I would be more than happy to take his message, forward it to clinical team and they would return call when they have spoken with Dr Angela Perez  Patient is concerned since he hasn't had a bowel movement since Saturday  (Surgery was Tuesday ) Did something go wrong in the surgery? I asked patient was was normal for him - he states that once every day, day and 1/2 was normal  I explained that since he hasn't gone since Saturday - 4 days BEFORE surgery, that I didn't think that was the issue  Anesthesia, surgery and/or pain meds may have exacerbated the situation, but it was obviously an issue prior to surgery  Explained to patient that I was not clinical and they would call him back  (Wife was screaming in background that he needs to go to the emergency room ) Patient states that he feels like he needs to void, unable to, passing gas fine  He states that he's taken two doses of Miralax  No stool softeners  He asked if he could drink some prune juice? I encouraged him to do so  Patient questioned how much? Told him a juice glass full  (wife was screaming in the background she told him to do the same thing - he won't listen to her, why would he listen to me?) Again, told patient clinical would return call with providers recommendations  Before documentation could be entered, patient returned call to office stating that he drank the prune juice, has had a very "healthy" BM and will probably be going again shortly  He states that no return call is needed because problem is solved      (Path pending)

## 2018-12-21 NOTE — PROGRESS NOTES
Please ensure that the follow-up studies, films, or labs as noted by this result are ordered and arranged for the patient  Please notify patient    Thanks as discussed, follow-up mammogram contralateral side on the right

## 2018-12-21 NOTE — TELEPHONE ENCOUNTER
Pathology has been finalized and verified by provider  Called and spoke to patients wife (patient was unavailable)  Pathology results were given and understood  Wife understood and pleased  Gallbladder (cholecystectomy):     - Mild chronic cholecystitis, cholesterolosis and cholelithiasis  - One (1) lymph node, negative for carcinoma

## 2018-12-21 NOTE — TELEPHONE ENCOUNTER
Additional testing - dx mammo & u/s breast are scheduled for Monday, 12/24/18  (In conversation with patients wife, she was questioning if testing results from Monday would help Dr Yordan Phillips with her "decision" to await med/onc recommendation = meds vs surgery  I explained, again, that it was up to med/onc to determine course of care for patient  Reminded wife that appt with med/onc was on Wednesday, 12/26/18  She stated that she understood

## 2018-12-24 ENCOUNTER — HOSPITAL ENCOUNTER (OUTPATIENT)
Dept: ULTRASOUND IMAGING | Facility: CLINIC | Age: 73
Discharge: HOME | End: 2018-12-24
Payer: MEDICARE

## 2018-12-24 ENCOUNTER — HOSPITAL ENCOUNTER (OUTPATIENT)
Dept: MAMMOGRAPHY | Facility: CLINIC | Age: 73
Discharge: HOME | End: 2018-12-24
Payer: MEDICARE

## 2018-12-24 VITALS — HEIGHT: 66 IN | WEIGHT: 200 LBS | BODY MASS INDEX: 32.14 KG/M2

## 2018-12-24 DIAGNOSIS — N63.0 BREAST MASS IN MALE: ICD-10-CM

## 2018-12-24 PROCEDURE — 77065 DX MAMMO INCL CAD UNI: CPT

## 2018-12-26 ENCOUNTER — TELEPHONE (OUTPATIENT)
Dept: HEMATOLOGY ONCOLOGY | Facility: CLINIC | Age: 73
End: 2018-12-26

## 2018-12-26 ENCOUNTER — CONSULT (OUTPATIENT)
Dept: HEMATOLOGY ONCOLOGY | Facility: CLINIC | Age: 73
End: 2018-12-26
Payer: MEDICARE

## 2018-12-26 VITALS
SYSTOLIC BLOOD PRESSURE: 130 MMHG | OXYGEN SATURATION: 98 % | DIASTOLIC BLOOD PRESSURE: 80 MMHG | WEIGHT: 194 LBS | HEIGHT: 66 IN | RESPIRATION RATE: 18 BRPM | HEART RATE: 85 BPM | TEMPERATURE: 97 F | BODY MASS INDEX: 31.18 KG/M2

## 2018-12-26 DIAGNOSIS — N63.20 MASS OF BREAST, LEFT: Primary | ICD-10-CM

## 2018-12-26 PROCEDURE — 99204 OFFICE O/P NEW MOD 45 MIN: CPT | Performed by: INTERNAL MEDICINE

## 2018-12-26 RX ORDER — TAMOXIFEN CITRATE 20 MG/1
20 TABLET ORAL 2 TIMES DAILY
Qty: 30 TABLET | Refills: 2 | Status: SHIPPED | OUTPATIENT
Start: 2018-12-26 | End: 2018-12-28 | Stop reason: SDUPTHER

## 2018-12-26 NOTE — TELEPHONE ENCOUNTER
Patient and his wife called to let us know he won't be having the breast surgery on 1/2/19 because he was put on Tamoxifen from Dr Rosmery Yap Speak also stated that when he does have it done, he'd like to wait until chris Payne know that Dr Octavio Rodrigez would review the notes from the office visit and go over the plan when he comes in to see her Monday 12/31 for his post-op appt from the [facility]  Patient and wife understood

## 2018-12-26 NOTE — TELEPHONE ENCOUNTER
Pt's wife London Caller called  Pt is here for consult  London Caller stated pt needs letter to be out of work for "surgery on 9/9/19 " Pt is not to leave this office without a note  Attempted to talk to London Caller and ask questions but she just kept talking and said "my  will not get chemo or RT because I saw what it did to my mother and I will not allow it"  Then Mobento up  Checked pt's chart  Surgery is 1/2/19  Spoke with pt and updated on the conversation  Explained that will need to get a note to be out of work from surgeon  Verbalized understanding

## 2018-12-26 NOTE — LETTER
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

## 2018-12-26 NOTE — TELEPHONE ENCOUNTER
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

## 2018-12-26 NOTE — PROGRESS NOTES
12/26/2018    Tila Asher was seen in consultation today in regards to invasive mucinous carcinoma of left breast     He underwent laparoscopic cholecystectomy on December 18, 2018 by Dr Dominick Godfery  On surgical pathology there is mild chronic cholecystitis, 1 lymph node negative for carcinoma  Vague post abdominal discomfort is resolving  There is history of gynecomastia since his late teen years, however, he has noted increase in size of left breast in the past 2-3 years  He has been taking furosemide to control lower extremity edema at the direction of his cardiologist Dr Cait Fraire  Hematology/Oncology History:    Bilateral diagnostic mammogram November 12, 2018: There is a suspicious 6 cm mass of the central and upper outer quadrant of the left breast and a 2 3 cm left axillary node  Core biopsy of left subareolar mass on November 14, 2018: Invasive mucinous carcinoma, Jim grade 1, ER >95%, WA 75%, HER2 negative (0 by IHC)  Review of Systems:     General: Feels well, no chills or swaets  Head and Neck: No nosebleeds, no oral cavity or throat soreness  Cardiovascular: No chest pain  See HPI  Respriatory: No cough  He notes mild chronic dyspnea on exertion with 1 flight of steps  GI:   See HPI  Appetite is good, bowel habits formed and regular  : No urinary frequency  He occasionally has nocturia x1  Musculoskeletal:  He has chronic low back pain aggravated by bending  He has mild stiffness of the right knee, but denies arthritic discomfort otherwise    Skin: No skin rash  Neurological: No headache, no numbness, no weakness  Hematologic: No easy bruising  Psychiatric: No emotional problems    Medications:    Current Outpatient Prescriptions   Medication Sig Dispense Refill    acetaminophen (TYLENOL) 500 mg tablet Take 500 mg by mouth every 6 (six) hours as needed      docusate sodium (COLACE) 100 mg capsule Take 1 capsule (100 mg total) by mouth 2 (two) times a day for 30 days 60 capsule 0    fluticasone (FLONASE) 50 mcg/act nasal spray 2 sprays into each nostril daily (Patient taking differently: 2 sprays into each nostril as needed  ) 16 g 2    furosemide (LASIX) 40 mg tablet Take 40 mg by mouth daily  0    losartan (COZAAR) 50 mg tablet   0    sodium chloride (OCEAN) 0 65 % nasal spray 1 spray into each nostril as needed for congestion      HYDROcodone-acetaminophen (NORCO) 5-325 mg per tablet Take 1-2 tablets by mouth every 4 (four) hours as needed for pain for up to 20 doses Max Daily Amount: 12 tablets (Patient not taking: Reported on 2018 ) 20 tablet 0    ondansetron (ZOFRAN) 4 mg tablet Take 1 tablet (4 mg total) by mouth every 8 (eight) hours as needed for nausea or vomiting for up to 7 days 20 tablet 0     No current facility-administered medications for this visit  Past Medical History:    Gynecomastia since his late teen years  Right bundle branch block with left anterior fascicular block    Past Surgical History: Tonsillectomy during childhood  Appendectomy during childhood  Left arm compound fracture during childhood  Laparoscopic cholecystectomy on 2018     Family History:    His mother  age 80 with a pelvic malignancy  His father committed suicide at age 78  One sister  age 68, he does not know the cause of death    Social History:    He is  and lives with his wife for the past 13 years, they have no children  He has never been a cigarette smoker  He has never been an alcohol drinker  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, 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, he has been a  the for the past 8 years  Physical Examination:    General appearance: Appears well  Head: Normocephalic  Eyes: Extraocular movements intact  Ears: No gross hearing deficit  Oropharynx: Clear  Neck: Supple, No lymphadenopathy  Chest: No axillary adenopathy   There is a 9 x 6 cm mass of the central portion of the left breast extending to the upper outer quadrant, inverted left nipple, no skin ulceration or discharge, no overlying erythema  Lungs: Clear to auscultation bilaterally  Heart: Regular rate and rhythm  Abdomen:   Liver and spleen are difficult to assess due to body habitus, there is faded bruising of the lower abdomen at sites of SQ anticoagulant injections, the surgical sites are healing well; No inguinal  lymphadenopathy  Extremities: There is mild distal lower extremity edema bilaterally, left side greater than right  Skin: No rashes  There is mild venous stasis dermatitis of the distal left lower extremity  Neurologic: Grossly intact, no focal neurological deficit  Psychiatric: Oriented to person, place and time, normal mood and affect    ECOG 1-2    Laboratory:    CT of the abdomen and pelvis with IV contrast from November 10, 2018:  Liver and spleen without focal mass, 14 mm nodule within the pancreatic tail, edema about the tail of pancreas suggesting pancreatitis, left adrenal 19 mm adenoma, kidneys are unremarkable, no periaortic or pelvic lymphadenopathy no destructive osseous lesion  MRI of the abdomen with without contrast from November 11, 2018: No suspicious hepatic lesion, slight peripancreatic edema compatible with acute interstitial pancreatitis, 1 4 x 1 3 cm lesion of the distal superior pancreatic tail compatible with intraparenchymal splenule, less likely neuroendocrine tumor (follow-up examination to confirm stability is suggested), multiple splenules of spleen without splenomegaly, adenoma of left adrenal, 1 3 cm right renal cyst     Bilateral breast MRI with and without contrast from December 13, 2018:   The left breast mass occupies the large majority of the outer left breast and extends posteriorly to include the pectoralis muscle/chest wall, bulky lymphadenopathy is identified, significant skin thickening is identified, subtle lesions in the right breast would be difficult to determine in the absence of contrast     Assessment:    Invasive mucinous carcinoma, Jim grade 1, ER >95%, TN 75%, HER2 negative (0 by IHC) diagnosed by core biopsy of left subareolar mass on November 14, 2018  There is a large mass of the central and upper outer quadrant of the left breast with extension posteriorly to include the pectoralis muscle and bulky lymphadenopathy on MRI scan, clinical stage IIIB, T4a N1  Recommendations:    Staging evaluation is to be completed with contrast enhanced CT scan of the chest       Neoadjuvant systemic therapy is recommended  Options include neoadjuvant chemotherapy verses neoadjuvant endocrine therapy  According to NCCN guidelines version 3 2018 for invasive breast cancer, preoperative endocrine therapy may be considered for patients with ER positive disease based upon comorbidities and/or low risk luminal biology  The patient is not in favor of chemotherapy  Furthermore, he has increased risk of chemotherapy related side effects based upon age of 74 and chronic edema  Options for endocrine therapy include that of tamoxifen which is the preferred adjuvant endocrine therapy for hormone receptor positive breast cancer according to 2018 UpToDate, Renard Omalley MD at al, "Breast cancer in men "   Alternatively, an aromatase inhibitor in combination with a gonadotropin releasing hormone agonist for those with contraindications to tamoxifen 2  The purpose, means administration potential risks of tamoxifen were reviewed, the patient was given written information regarding tamoxifen, and written informed consent was obtained  He is aware of risk of hot flashes, mood alteration, increased thrombotic risk including deep venous thrombosis and pulmonary embolism, increased risk of cataracts as well as other risks  The plan is tamoxifen 20 mg daily    After about 3 months of neoadjuvant tamoxifen left mastectomy and axillary lymph node dissection is planned  The patient is aware seek medical attention for bothersome hot flashes, mood alteration, pain or swelling the legs, chest pain, shortness of breath, excessive fatigue, or if other new problems arise  Otherwise, plan to see him again in 6 weeks

## 2018-12-26 NOTE — PATIENT INSTRUCTIONS
The patient is aware seek medical attention for bothersome hot flashes, mood alteration, pain or swelling the legs, chest pain, shortness of breath, excessive fatigue, or if other new problems arise

## 2018-12-26 NOTE — LETTER
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

## 2018-12-27 ENCOUNTER — TELEPHONE (OUTPATIENT)
Dept: HEMATOLOGY ONCOLOGY | Facility: CLINIC | Age: 73
End: 2018-12-27

## 2018-12-27 NOTE — TELEPHONE ENCOUNTER
Called Dr Tiffani Burrows Tamoxifen 20mg one tab daily  Called pt-aware of correct directions  Also, pt noting a pinpoint area in his left breast area that itches and is bothersome-pt notes he did not talk to the   Dr  about this at his visit on 12 26 18  Will check w/Dr Shannan Kurtz on 12 28 18

## 2018-12-27 NOTE — TELEPHONE ENCOUNTER
Patient called , started his tomoxofen today  When he picked it up from the pharmacy it said to take twice a day   When in for the visit he was told to take it once a day  May leave message

## 2018-12-28 DIAGNOSIS — N63.20 MASS OF BREAST, LEFT: ICD-10-CM

## 2018-12-28 RX ORDER — TAMOXIFEN CITRATE 20 MG/1
20 TABLET ORAL DAILY
Qty: 30 TABLET | Refills: 2 | Status: SHIPPED | OUTPATIENT
Start: 2018-12-28 | End: 2019-02-08 | Stop reason: SDUPTHER

## 2018-12-29 ENCOUNTER — HOSPITAL ENCOUNTER (OUTPATIENT)
Dept: CT IMAGING | Facility: HOSPITAL | Age: 73
Discharge: HOME | End: 2018-12-29
Attending: INTERNAL MEDICINE
Payer: MEDICARE

## 2018-12-29 DIAGNOSIS — N63.20 MASS OF BREAST, LEFT: ICD-10-CM

## 2018-12-29 PROCEDURE — 71260 CT THORAX DX C+: CPT

## 2018-12-29 RX ADMIN — IOHEXOL 85 ML: 350 INJECTION, SOLUTION INTRAVENOUS at 13:55

## 2018-12-31 ENCOUNTER — TELEPHONE (OUTPATIENT)
Dept: HEMATOLOGY ONCOLOGY | Facility: HOSPITAL | Age: 73
End: 2018-12-31

## 2018-12-31 ENCOUNTER — OFFICE VISIT (OUTPATIENT)
Dept: SURGERY | Facility: CLINIC | Age: 73
End: 2018-12-31

## 2018-12-31 VITALS
DIASTOLIC BLOOD PRESSURE: 80 MMHG | HEIGHT: 66 IN | BODY MASS INDEX: 31.18 KG/M2 | WEIGHT: 194 LBS | TEMPERATURE: 98.1 F | SYSTOLIC BLOOD PRESSURE: 132 MMHG | HEART RATE: 72 BPM | RESPIRATION RATE: 18 BRPM

## 2018-12-31 DIAGNOSIS — K80.20 CALCULUS OF GALLBLADDER WITHOUT CHOLECYSTITIS WITHOUT OBSTRUCTION: Primary | ICD-10-CM

## 2018-12-31 DIAGNOSIS — Z17.0 MALIGNANT NEOPLASM OF LEFT BREAST IN MALE, ESTROGEN RECEPTOR POSITIVE, UNSPECIFIED SITE OF BREAST (HCC): ICD-10-CM

## 2018-12-31 DIAGNOSIS — C50.922 MALIGNANT NEOPLASM OF LEFT BREAST IN MALE, ESTROGEN RECEPTOR POSITIVE, UNSPECIFIED SITE OF BREAST (HCC): ICD-10-CM

## 2018-12-31 PROCEDURE — 99024 POSTOP FOLLOW-UP VISIT: CPT | Performed by: SURGERY

## 2018-12-31 NOTE — PROGRESS NOTES
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, as he is now to his preoperative baseline  If his preoperative baseline was sufficient for school bus driving, he has now returned to that state  Pathology: Reviewed with patient, all questions answered  2  The regarding the left breast cancer, Hematology-Oncology has decided to place him on tamoxifen, appropriately so, and we will see him back in 3 months to consider surgical options  There is a 6 cm mass with a 2 3 cm left axillary node  Genetic testing, BRCA testing specifically, was negative  I gave him a copy of those results and will relieve leave any further management of this to Dr Tiffany Cerrato, his oncologist    He is considering waiting till June 2019 because he is  dependent or work and needs to continue working as a   There is summer school but is more likely to take some time off to recover in the late spring or early summer at the school years end  He is dependent on his job for both financial reasons and he gets bored as a retired gentleman  He very much wishes to continue to work  At this point he has no surgical restrictions  Thank you for allowing me to take care of this very interesting gentleman  We appreciate his work in service to the community  ______________________________________________________  HPI:  Doing quite well after laparoscopic cholecystectomy, after about in the summer of gallstone pancreatitis  He is moving his bowels and having a regular diet  Had mild chronic cholecystitis on pathology and cholelithiasis  is a 74 y  o male who comes in today for postoperative check after recent  on   Currently doing well without problems, no fever or chills,no nausea and no vomiting  Patient reports they have resumed their normal diet  denies biliary diarrhea  Reports doing quite well, ambulating in feels his strength and energy has returned       ROS:  General ROS: negative for - chills, fatigue, fever or night sweats, weight loss  Respiratory ROS: no cough, shortness of breath, or wheezing  Cardiovascular ROS: no chest pain or dyspnea on exertion  Genito-Urinary ROS: no dysuria, trouble voiding, or hematuria  Musculoskeletal ROS: negative for - gait disturbance, joint pain or muscle pain  Neurological ROS: no TIA or stroke symptoms  GI ROS: see HPI  Skin ROS: no new rashes or lesions   Lymphatic ROS: no new adenopathy noted by pt  GYN ROS: see HPI, no new GYN history or bleeding noted  Psy ROS: no new mental or behavioral disturbances       Patient Active Problem List   Diagnosis    Mass of breast, left    Carcinoma of left breast ([address] )    Hypertension    Leukocytosis    PAC (premature atrial contraction)    Pancreatic mass    Pancreatitis    RBBB (right bundle branch block with left anterior fascicular block)    Transaminitis    Carcinoma of male breast, left (HCC)           Allergies:  Patient has no known allergies        Current Outpatient Prescriptions:     furosemide (LASIX) 40 mg tablet, Take 40 mg by mouth daily, Disp: , Rfl: 0    losartan (COZAAR) 50 mg tablet, , Disp: , Rfl: 0    tamoxifen (NOLVADEX) 20 mg tablet, Take 1 tablet (20 mg total) by mouth daily, Disp: 30 tablet, Rfl: 2    Past Medical History:   Diagnosis Date    Acute gallstone pancreatitis     Arthritis     Atrial fibrillation (HCC)     Cancer (HCC)     breast - left    Hypertension     Irregular heartbeat     PAC (premature atrial contraction)     PVC (premature ventricular contraction)     RBBB     Wears glasses        Past Surgical History: Procedure Laterality Date    APPENDECTOMY      FRACTURE SURGERY Left     arm    SD LAP,CHOLECYSTECTOMY N/A 12/18/2018    Procedure: LAP CHOLECYSTECTOMY; LYSIS OF ADHESIONS; ATTEMPTED CHOLANGIOGRAM;  Surgeon: Bonnie Cavazos MD;  Location: [facility];  Service: General    TONSILLECTOMY         Family History   Problem Relation Age of Onset    Cancer Mother     Heart disease Father         reports that he has never smoked  He has never used smokeless tobacco  He reports that he does not drink alcohol or use drugs  PHYSICAL EXAM  General: normal, cooperative, no distress  Abdominal: soft, nondistended or nontender  Incision: clean, dry, and intact and healing well  No jaundice, abdomen benign  Some portions of this record may have been generated with voice recognition software  There may be translation, syntax,  or grammatical errors  Occasional wrong word or "sound-a-like" substitutions may have occurred due to the inherent limitations of the voice recognition software  Read the chart carefully and recognize, using context, where substitutions may have occurred  If you have any questions, please contact the dictating provider for clarification or correction, as needed  This encounter has been coded by a non-certified coder         Bonnie Cavazos MD      Date: 12/31/2018 Time: 11:25 AM

## 2018-12-31 NOTE — TELEPHONE ENCOUNTER
Called pt to discuss itching as per previous message  Dr Ama Almaraz recommended Benadryl 25mg Q6-8H as needed  Spoke with pt and his wife who understood this may make him drowsy and were discussing trying half a tablet to start

## 2019-01-02 ENCOUNTER — TELEPHONE (OUTPATIENT)
Dept: HEMATOLOGY ONCOLOGY | Facility: CLINIC | Age: 74
End: 2019-01-02

## 2019-01-03 NOTE — TELEPHONE ENCOUNTER
I spoke to patient and his wife by telephone  He has taken the tamoxifen for 3 days which has been well tolerated thus far  CT scan of the chest with contrast from December 29, 2018 was reviewed  There is no known left breast cancer with left axillary lymphadenopathy and left supraclavicular lymphadenopathy and and lymphadenopathy in the right epicardial fat, 6 mm RML nodule of uncertain significance, and findings within the abdomen are consistent with that of the prior CT the abdomen and pelvis of November 10, 2018 and MRI of the abdomen of November 11, 2018  Tamoxifen 20 mg daily is to be continued  The patient is aware seek medical attention for bothersome hot flashes, mood alteration, pain or swelling the legs, chest pain, shortness of breath, excessive fatigue, or if other new problems arise

## 2019-01-10 ENCOUNTER — TELEPHONE (OUTPATIENT)
Dept: HEMATOLOGY ONCOLOGY | Facility: CLINIC | Age: 74
End: 2019-01-10

## 2019-01-11 ENCOUNTER — APPOINTMENT (EMERGENCY)
Dept: RADIOLOGY | Facility: HOSPITAL | Age: 74
End: 2019-01-11
Payer: MEDICARE

## 2019-01-11 ENCOUNTER — HOSPITAL ENCOUNTER (EMERGENCY)
Facility: HOSPITAL | Age: 74
Discharge: HOME | End: 2019-01-11
Attending: EMERGENCY MEDICINE | Admitting: EMERGENCY MEDICINE
Payer: MEDICARE

## 2019-01-11 VITALS
TEMPERATURE: 98.9 F | DIASTOLIC BLOOD PRESSURE: 84 MMHG | HEART RATE: 78 BPM | RESPIRATION RATE: 18 BRPM | OXYGEN SATURATION: 100 % | SYSTOLIC BLOOD PRESSURE: 184 MMHG

## 2019-01-11 DIAGNOSIS — R07.89 CHEST WALL PAIN: Primary | ICD-10-CM

## 2019-01-11 LAB — TROPONIN I SERPL-MCNC: <0.02 NG/ML

## 2019-01-11 PROCEDURE — 99283 EMERGENCY DEPT VISIT LOW MDM: CPT

## 2019-01-11 PROCEDURE — 71046 X-RAY EXAM CHEST 2 VIEWS: CPT

## 2019-01-11 PROCEDURE — 84484 ASSAY OF TROPONIN QUANT: CPT | Performed by: EMERGENCY MEDICINE

## 2019-01-11 PROCEDURE — 93005 ELECTROCARDIOGRAM TRACING: CPT

## 2019-01-11 PROCEDURE — 36415 COLL VENOUS BLD VENIPUNCTURE: CPT | Performed by: EMERGENCY MEDICINE

## 2019-01-11 RX ORDER — LIDOCAINE 50 MG/G
1 PATCH TOPICAL DAILY
Status: DISCONTINUED | OUTPATIENT
Start: 2019-01-12 | End: 2019-01-11

## 2019-01-11 RX ORDER — LIDOCAINE 50 MG/G
1 PATCH TOPICAL EVERY 24 HOURS
Qty: 7 PATCH | Refills: 0 | Status: SHIPPED | OUTPATIENT
Start: 2019-01-11 | End: 2019-04-23

## 2019-01-11 RX ORDER — LIDOCAINE 50 MG/G
1 PATCH TOPICAL ONCE
Status: DISCONTINUED | OUTPATIENT
Start: 2019-01-11 | End: 2019-01-11 | Stop reason: HOSPADM

## 2019-01-11 RX ADMIN — LIDOCAINE 1 PATCH: 50 PATCH TOPICAL at 20:58

## 2019-01-11 NOTE — TELEPHONE ENCOUNTER
Pt has OV in Feb  Sx would not take place for approx 3-6 months based on txmt plan  Radiation would take place months from todays date  Advised pt -financial check would be initiated prior to Rad txmt  Pt's wife verbalizes understanding that financial check at this date would need to be re-submitted at a date closer to expected txmt  Based   On unknown variables over the time period

## 2019-01-12 NOTE — DISCHARGE INSTRUCTIONS

## 2019-01-12 NOTE — ED PROVIDER NOTES
History  Chief Complaint   Patient presents with    Rib Pain     Patient states, "yesterday afternoon while i was laying on the couch my dog was laying across my chest and I think is elbow was digging into my ribs cause when I got up I had bad rib pain  Well then it was fine but tonight I sat down to eat jello with my life and I got this sharp pain in my rib and it felt like nerve pain " Denies chest pain, shortness of breath, dizziness  History provided by:  Patient  Chest Pain   Pain location:  R chest  Pain quality: sharp    Pain radiates to:  Does not radiate  Pain radiates to the back: no    Pain severity:  Moderate  Onset quality:  Gradual  Duration:  2 days  Timing:  Constant  Progression:  Unchanged  Chronicity:  New  Context comment:  Pain started suddenly after patients dog slept on his side  Relieved by:  Nothing  Worsened by: Movement and certain positions  Ineffective treatments:  None tried  Associated symptoms: no abdominal pain, no anxiety, no back pain, no claudication, no cough, no diaphoresis, no fatigue, no fever, no heartburn, no lower extremity edema, no nausea, no near-syncope, no numbness, no orthopnea, no palpitations, no PND, no shortness of breath, not vomiting and no weakness        Prior to Admission Medications   Prescriptions Last Dose Informant Patient Reported?  Taking?   furosemide (LASIX) 40 mg tablet  Self Yes No   Sig: Take 40 mg by mouth daily   losartan (COZAAR) 50 mg tablet  Self Yes No   tamoxifen (NOLVADEX) 20 mg tablet  Self No No   Sig: Take 1 tablet (20 mg total) by mouth daily      Facility-Administered Medications: None       Past Medical History:   Diagnosis Date    Acute gallstone pancreatitis     Arthritis     Atrial fibrillation (HCC)     Cancer (HCC)     breast - left    Hypertension     Irregular heartbeat     PAC (premature atrial contraction)     PVC (premature ventricular contraction)     RBBB     Wears glasses        Past Surgical History: Procedure Laterality Date    APPENDECTOMY      FRACTURE SURGERY Left     arm    MT LAP,CHOLECYSTECTOMY N/A 12/18/2018    Procedure: LAP CHOLECYSTECTOMY; LYSIS OF ADHESIONS; ATTEMPTED CHOLANGIOGRAM;  Surgeon: Emely Hermosillo MD;  Location: [facility];  Service: General    TONSILLECTOMY         Family History   Problem Relation Age of Onset    Cancer Mother     Heart disease Father      I have reviewed and agree with the history as documented  Social History   Substance Use Topics    Smoking status: Never Smoker    Smokeless tobacco: Never Used    Alcohol use No        Review of Systems   Constitutional: Negative for activity change, appetite change, diaphoresis, fatigue and fever  HENT: Negative for congestion, dental problem, ear pain, rhinorrhea and sore throat  Eyes: Negative for pain and redness  Respiratory: Negative for cough, chest tightness, shortness of breath and wheezing  Cardiovascular: Positive for chest pain  Negative for palpitations, orthopnea, claudication, PND and near-syncope  Gastrointestinal: Negative for abdominal pain, blood in stool, constipation, diarrhea, heartburn, nausea and vomiting  Endocrine: Negative for cold intolerance and heat intolerance  Genitourinary: Negative for dysuria, frequency and hematuria  Musculoskeletal: Negative for arthralgias, back pain and myalgias  Skin: Negative for color change, pallor and rash  Neurological: Negative for weakness and numbness  Hematological: Does not bruise/bleed easily  Psychiatric/Behavioral: Negative for agitation, hallucinations and suicidal ideas  Physical Exam  Physical Exam   Constitutional: He is oriented to person, place, and time  He appears well-developed and well-nourished  HENT:   Mouth/Throat: No oropharyngeal exudate     TMs normal bilaterally no pharyngeal erythema no rhinorrhea nontender palpation of sinuses, normal looking turbinates   Eyes: Conjunctivae and EOM are normal  Neck: Normal range of motion  Neck supple  No meningeal signs   Cardiovascular: Normal rate, regular rhythm, normal heart sounds and intact distal pulses  Pulmonary/Chest: Effort normal and breath sounds normal  No respiratory distress  He has no wheezes  He has no rales  He exhibits tenderness (R chest)  Abdominal: Soft  Bowel sounds are normal  He exhibits no distension and no mass  There is no tenderness  No hernia  No cvat   Musculoskeletal: Normal range of motion  He exhibits no edema  Lymphadenopathy:     He has no cervical adenopathy  Neurological: He is alert and oriented to person, place, and time  No cranial nerve deficit  Skin: No rash noted  No erythema  No edema   Psychiatric: He has a normal mood and affect  His behavior is normal    Nursing note and vitals reviewed  Vital Signs  ED Triage Vitals   Temperature Pulse Respirations Blood Pressure SpO2   01/11/19 1829 01/11/19 1829 01/11/19 1829 01/11/19 1831 01/11/19 1829   98 9 °F (37 2 °C) 88 18 (!) 188/83 98 %      Temp Source Heart Rate Source Patient Position - Orthostatic VS BP Location FiO2 (%)   01/11/19 1829 01/11/19 1829 01/11/19 1829 01/11/19 1829 --   Oral Monitor Sitting Right arm       Pain Score       --                  Vitals:    01/11/19 1829 01/11/19 1831 01/11/19 2059   BP:  (!) 188/83 (!) 184/84   Pulse: 88  78   Patient Position - Orthostatic VS: Sitting  Lying       Visual Acuity      ED Medications  Medications   lidocaine (LIDODERM) 5 % patch 1 patch (1 patch Topical Medication Applied 1/11/19 2058)       Diagnostic Studies  Results Reviewed     Procedure Component Value Units Date/Time    Troponin I [[id number]]  (Normal) Collected:  01/11/19 2036    Lab Status:  Final result Specimen:  Blood from Arm, Left Updated:  01/11/19 2106     Troponin I <0 02 ng/mL                  XR chest 2 views   ED Interpretation by Howie Martini MD (01/11 2113)   Primary reviewed: no acute abnormality  Procedures  ECG 12 Lead Documentation  Date/Time: 1/11/2019 9:00 PM  Performed by: Corry Neff by: Chancy Spatz     ECG reviewed by me, the ED Provider: yes    Patient location:  ED  Rate:     ECG rate:  76    ECG rate assessment: normal    Rhythm:     Rhythm: sinus rhythm    Ectopy:     Ectopy: none    QRS:     QRS axis:  Normal    QRS intervals:  Normal  Conduction:     Conduction: abnormal      Abnormal conduction: bifascicular block    ST segments:     ST segments:  Normal  T waves:     T waves: normal             Phone Contacts  ED Phone Contact    ED Course                               MDM  Number of Diagnoses or Management Options  Chest wall pain:   Diagnosis management comments: Reproducible right-sided chest pain-will do chest x-ray, EKG/troponin rule ACS, treat symptoms    CritCare Time    Disposition  Final diagnoses:   Chest wall pain     Time reflects when diagnosis was documented in both MDM as applicable and the Disposition within this note     Time User Action Codes Description Comment    1/11/2019  9:20 PM Judydemivianey Tramaine Add [R07 89] Chest wall pain       ED Disposition     ED Disposition Condition Comment    Discharge  Go Kline discharge to home/self care      Condition at discharge: Good        Follow-up Information     Follow up With Specialties Details Why [address] [place] [address], DO Family Medicine Schedule an appointment as soon as possible for a visit in 2 days  [facility]            Discharge Medication List as of 1/11/2019  9:20 PM      START taking these medications    Details   lidocaine (LIDODERM) 5 % Apply 1 patch topically every 24 hours for 7 doses Remove & Discard patch within 12 hours or as directed by MD, Starting Fri 1/11/2019, Until Fri 1/18/2019, Normal         CONTINUE these medications which have NOT CHANGED    Details   furosemide (LASIX) 40 mg tablet Take 40 mg by mouth daily, Starting Mon 9/17/2018, Historical Med      losartan (COZAAR) 50 mg tablet Starting Sun 11/4/2018, Historical Med      tamoxifen (NOLVADEX) 20 mg tablet Take 1 tablet (20 mg total) by mouth daily, Starting Fri 12/28/2018, Normal           No discharge procedures on file      ED Provider  Electronically Signed by           Kristel Silva MD  01/11/19 2436

## 2019-01-13 LAB
ATRIAL RATE: 76 BPM
P AXIS: 26 DEGREES
PR INTERVAL: 168 MS
QRS AXIS: -66 DEGREES
QRSD INTERVAL: 130 MS
QT INTERVAL: 412 MS
QTC INTERVAL: 463 MS
T WAVE AXIS: 28 DEGREES
VENTRICULAR RATE: 76 BPM

## 2019-01-13 PROCEDURE — 93010 ELECTROCARDIOGRAM REPORT: CPT | Performed by: INTERNAL MEDICINE

## 2019-02-04 ENCOUNTER — APPOINTMENT (OUTPATIENT)
Dept: LAB | Facility: MEDICAL CENTER | Age: 74
End: 2019-02-04
Payer: MEDICARE

## 2019-02-04 DIAGNOSIS — N63.20 MASS OF BREAST, LEFT: ICD-10-CM

## 2019-02-04 LAB
ALBUMIN SERPL BCP-MCNC: 3.4 G/DL (ref 3.5–5)
ALP SERPL-CCNC: 82 U/L (ref 46–116)
ALT SERPL W P-5'-P-CCNC: 36 U/L (ref 12–78)
ANION GAP SERPL CALCULATED.3IONS-SCNC: 4 MMOL/L (ref 4–13)
AST SERPL W P-5'-P-CCNC: 21 U/L (ref 5–45)
BASOPHILS # BLD AUTO: 0.06 THOUSANDS/ΜL (ref 0–0.1)
BASOPHILS NFR BLD AUTO: 1 % (ref 0–1)
BILIRUB SERPL-MCNC: 0.21 MG/DL (ref 0.2–1)
BUN SERPL-MCNC: 27 MG/DL (ref 5–25)
CALCIUM SERPL-MCNC: 8.8 MG/DL (ref 8.3–10.1)
CHLORIDE SERPL-SCNC: 106 MMOL/L (ref 100–108)
CO2 SERPL-SCNC: 29 MMOL/L (ref 21–32)
CREAT SERPL-MCNC: 1.11 MG/DL (ref 0.6–1.3)
EOSINOPHIL # BLD AUTO: 0.21 THOUSAND/ΜL (ref 0–0.61)
EOSINOPHIL NFR BLD AUTO: 2 % (ref 0–6)
ERYTHROCYTE [DISTWIDTH] IN BLOOD BY AUTOMATED COUNT: 13.4 % (ref 11.6–15.1)
GFR SERPL CREATININE-BSD FRML MDRD: 65 ML/MIN/1.73SQ M
GLUCOSE SERPL-MCNC: 109 MG/DL (ref 65–140)
HCT VFR BLD AUTO: 34.9 % (ref 36.5–49.3)
HGB BLD-MCNC: 11.1 G/DL (ref 12–17)
IMM GRANULOCYTES # BLD AUTO: 0.03 THOUSAND/UL (ref 0–0.2)
IMM GRANULOCYTES NFR BLD AUTO: 0 % (ref 0–2)
LYMPHOCYTES # BLD AUTO: 2.56 THOUSANDS/ΜL (ref 0.6–4.47)
LYMPHOCYTES NFR BLD AUTO: 28 % (ref 14–44)
MCH RBC QN AUTO: 30.4 PG (ref 26.8–34.3)
MCHC RBC AUTO-ENTMCNC: 31.8 G/DL (ref 31.4–37.4)
MCV RBC AUTO: 96 FL (ref 82–98)
MONOCYTES # BLD AUTO: 0.91 THOUSAND/ΜL (ref 0.17–1.22)
MONOCYTES NFR BLD AUTO: 10 % (ref 4–12)
NEUTROPHILS # BLD AUTO: 5.31 THOUSANDS/ΜL (ref 1.85–7.62)
NEUTS SEG NFR BLD AUTO: 59 % (ref 43–75)
NRBC BLD AUTO-RTO: 0 /100 WBCS
PLATELET # BLD AUTO: 338 THOUSANDS/UL (ref 149–390)
PMV BLD AUTO: 10.1 FL (ref 8.9–12.7)
POTASSIUM SERPL-SCNC: 4.1 MMOL/L (ref 3.5–5.3)
PROT SERPL-MCNC: 7.2 G/DL (ref 6.4–8.2)
RBC # BLD AUTO: 3.65 MILLION/UL (ref 3.88–5.62)
SODIUM SERPL-SCNC: 139 MMOL/L (ref 136–145)
WBC # BLD AUTO: 9.08 THOUSAND/UL (ref 4.31–10.16)

## 2019-02-04 PROCEDURE — 36415 COLL VENOUS BLD VENIPUNCTURE: CPT

## 2019-02-04 PROCEDURE — 85025 COMPLETE CBC W/AUTO DIFF WBC: CPT

## 2019-02-04 PROCEDURE — 80053 COMPREHEN METABOLIC PANEL: CPT

## 2019-02-08 ENCOUNTER — OFFICE VISIT (OUTPATIENT)
Dept: HEMATOLOGY ONCOLOGY | Facility: CLINIC | Age: 74
End: 2019-02-08
Payer: MEDICARE

## 2019-02-08 VITALS
RESPIRATION RATE: 18 BRPM | DIASTOLIC BLOOD PRESSURE: 84 MMHG | WEIGHT: 193 LBS | OXYGEN SATURATION: 98 % | SYSTOLIC BLOOD PRESSURE: 138 MMHG | HEART RATE: 104 BPM | HEIGHT: 65 IN | BODY MASS INDEX: 32.15 KG/M2 | TEMPERATURE: 96.3 F

## 2019-02-08 DIAGNOSIS — N63.20 MASS OF BREAST, LEFT: ICD-10-CM

## 2019-02-08 DIAGNOSIS — C50.922: Primary | ICD-10-CM

## 2019-02-08 PROCEDURE — 99214 OFFICE O/P EST MOD 30 MIN: CPT | Performed by: INTERNAL MEDICINE

## 2019-02-08 RX ORDER — TAMOXIFEN CITRATE 20 MG/1
20 TABLET ORAL DAILY
Qty: 90 TABLET | Refills: 1 | Status: SHIPPED | OUTPATIENT
Start: 2019-02-08 | End: 2019-08-20 | Stop reason: SDUPTHER

## 2019-02-08 NOTE — PROGRESS NOTES
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%, HER2 negative (0 by IHC)  Current therapy:    Tamoxifen 20 mg daily initiated on December 27, 2018    Review of systems:      General: Feels well, no chills or swaets  Head and Neck: No nosebleeds, no oral cavity or throat soreness  Cardiovascular: No chest pain  He has been taking furosemide to control lower extremity edema in addition to utilizing knee-high graduated compression stockings  Respriatory: No cough  He notes mild chronic dyspnea on exertion with 1 flight of steps  GI:   See HPI  Appetite is good, bowel habits formed and regular  : No urinary frequency  He occasionally has nocturia x1  Musculoskeletal:  He has chronic low back pain aggravated by bending  He has mild stiffness of the right knee, but denies arthritic discomfort otherwise  Skin: No skin rash  Neurological: No headache, no numbness, no weakness  Hematologic: No easy bruising  Psychiatric: No emotional problems    Physical Examination:    Blood pressure 138/84, pulse 104, temperature (!) 96 3 °F (35 7 °C), resp  rate 18, height 5' 5" (1 651 m), weight 87 5 kg (193 lb), SpO2 98 %  Body surface area is 1 95 meters squared  General appearance: Appears well  Head: Normocephalic  Eyes: Extraocular movements intact  Ears: No gross hearing deficit  Oropharynx: Clear  Neck: Supple, No lymphadenopathy  Chest: No axillary adenopathy  There is a 8 x 6 cm mass of the central portion of the left breast extending to the upper outer quadrant, inverted left nipple, no skin ulceration or discharge, no overlying erythema    Lungs: Clear to auscultation bilaterally  Heart: Regular rate and rhythm  Abdomen:   Liver and spleen are difficult to assess due to body habitus; No inguinal  lymphadenopathy  Extremities: There is mild distal lower extremity edema bilaterally, left side greater than right  Skin: No rashes  There is mild venous stasis dermatitis of the distal lower extremities left greater than right  Neurologic: Grossly intact, no focal neurological deficit  Psychiatric: Oriented to person, place and time, normal mood and affect    ECOG 1-2    Laboratory:    From February 4, 2019:  Creatinine 1 11, calcium 8 8, AST 21, ALT 36, alk-phos 82, bili 0 21, WBC 9 08, hemoglobin 11 1 with MCV 96, platelets 310  Contrast enhanced CT scan of the chest from December 29, 2018: There is a 6 mm RML nodule, right epicardial LN 1 5 x 2 1 cm, no other mediastinal or hilar adenopathy, left breast mass 3 4 x 6 2 cm, left axillary LN 1 5 x 3 1 cm, left supraclavicular LN 1 7 x 1 9 cm, 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, left upper quadrant nodular densities adjacent to the spleen consistent with splenules, indeterminate nodule in pancreatic tail 1 3 x 1 4 cm which may represent a splenule  Assessment/Plan:     There is lack of evidence of progressive disease of the left breast since neoadjuvant tamoxifen 20 mg daily was started on December 27, 2018  Accordingly, neoadjuvant systemic therapy with tamoxifen 20 mg daily is to be continued  Again the patient is aware of risk of hot flashes, mood alteration, increased thrombotic risk including deep venous thrombosis and pulmonary embolism, increased risk of cataracts as well as other risks    A follow-up diagnostic left breast mammogram and targeted ultrasound is planned near the end of March 2017 i e  after 3 months of tamoxifen      The patient is aware seek medical attention for bothersome hot flashes, mood alteration, pain or swelling the legs, chest pain, shortness of breath, excessive fatigue, or if other new problems arise  Otherwise, plan to see him again in 8 weeks

## 2019-03-12 ENCOUNTER — TELEPHONE (OUTPATIENT)
Dept: SURGERY | Facility: CLINIC | Age: 74
End: 2019-03-12

## 2019-03-12 NOTE — TELEPHONE ENCOUNTER
Patient called with some billing concerns  Referred him to the Billing Department, gave him phone #  Patient called back stating  They told hung up on him  He called them back started talking with them and they told him that he needed to call each individual doctors office on the bill and hung up on him  Patient wanted to know who Dr Karol Mccoy and Bebeto Osman is and what their role was with his surgery  I told Nimo Ortez, Dr Jay Dalton was the pathologist and Bebeto Osman, was the PA who assisted Dr Steven Posada during the surgery  He was very thankful  I referred him back to the billing department with any further billing questions

## 2019-03-27 ENCOUNTER — HOSPITAL ENCOUNTER (OUTPATIENT)
Dept: MAMMOGRAPHY | Facility: CLINIC | Age: 74
Discharge: HOME | End: 2019-03-27
Payer: MEDICARE

## 2019-03-27 ENCOUNTER — HOSPITAL ENCOUNTER (OUTPATIENT)
Dept: ULTRASOUND IMAGING | Facility: CLINIC | Age: 74
Discharge: HOME | End: 2019-03-27
Payer: MEDICARE

## 2019-03-27 VITALS — BODY MASS INDEX: 31.49 KG/M2 | HEIGHT: 65 IN | WEIGHT: 189 LBS

## 2019-03-27 DIAGNOSIS — C50.922: ICD-10-CM

## 2019-03-27 PROCEDURE — 77065 DX MAMMO INCL CAD UNI: CPT

## 2019-03-27 PROCEDURE — 76642 ULTRASOUND BREAST LIMITED: CPT

## 2019-04-01 ENCOUNTER — TELEPHONE (OUTPATIENT)
Dept: HEMATOLOGY ONCOLOGY | Facility: HOSPITAL | Age: 74
End: 2019-04-01

## 2019-04-05 ENCOUNTER — TELEPHONE (OUTPATIENT)
Dept: SURGERY | Facility: CLINIC | Age: 74
End: 2019-04-05

## 2019-04-05 ENCOUNTER — OFFICE VISIT (OUTPATIENT)
Dept: HEMATOLOGY ONCOLOGY | Facility: CLINIC | Age: 74
End: 2019-04-05
Payer: MEDICARE

## 2019-04-05 VITALS
DIASTOLIC BLOOD PRESSURE: 80 MMHG | OXYGEN SATURATION: 97 % | HEIGHT: 65 IN | HEART RATE: 83 BPM | BODY MASS INDEX: 32.15 KG/M2 | SYSTOLIC BLOOD PRESSURE: 134 MMHG | WEIGHT: 193 LBS | RESPIRATION RATE: 16 BRPM | TEMPERATURE: 96.7 F

## 2019-04-05 DIAGNOSIS — C50.922: Primary | ICD-10-CM

## 2019-04-05 DIAGNOSIS — C50.922: ICD-10-CM

## 2019-04-05 PROBLEM — C50.912 CARCINOMA OF LEFT BREAST (HCC): Status: RESOLVED | Noted: 2018-11-26 | Resolved: 2019-04-05

## 2019-04-05 PROCEDURE — 99214 OFFICE O/P EST MOD 30 MIN: CPT | Performed by: INTERNAL MEDICINE

## 2019-04-05 RX ORDER — ONDANSETRON HYDROCHLORIDE 8 MG/1
8 TABLET, FILM COATED ORAL EVERY 12 HOURS PRN
Qty: 30 TABLET | Refills: 0 | Status: SHIPPED | OUTPATIENT
Start: 2019-04-05 | End: 2019-07-29

## 2019-04-05 RX ORDER — DEXAMETHASONE 4 MG/1
TABLET ORAL
Qty: 24 TABLET | Refills: 0 | Status: SHIPPED | OUTPATIENT
Start: 2019-04-05 | End: 2019-07-29

## 2019-04-06 ENCOUNTER — APPOINTMENT (OUTPATIENT)
Dept: LAB | Facility: MEDICAL CENTER | Age: 74
End: 2019-04-06
Payer: MEDICARE

## 2019-04-06 LAB
ALBUMIN SERPL BCP-MCNC: 3.6 G/DL (ref 3.5–5)
ALP SERPL-CCNC: 71 U/L (ref 46–116)
ALT SERPL W P-5'-P-CCNC: 23 U/L (ref 12–78)
ANION GAP SERPL CALCULATED.3IONS-SCNC: 2 MMOL/L (ref 4–13)
AST SERPL W P-5'-P-CCNC: 16 U/L (ref 5–45)
BASOPHILS # BLD AUTO: 0.05 THOUSANDS/ΜL (ref 0–0.1)
BASOPHILS NFR BLD AUTO: 1 % (ref 0–1)
BILIRUB SERPL-MCNC: 0.38 MG/DL (ref 0.2–1)
BUN SERPL-MCNC: 25 MG/DL (ref 5–25)
CALCIUM SERPL-MCNC: 8.7 MG/DL (ref 8.3–10.1)
CHLORIDE SERPL-SCNC: 107 MMOL/L (ref 100–108)
CO2 SERPL-SCNC: 29 MMOL/L (ref 21–32)
CREAT SERPL-MCNC: 1.18 MG/DL (ref 0.6–1.3)
EOSINOPHIL # BLD AUTO: 0.2 THOUSAND/ΜL (ref 0–0.61)
EOSINOPHIL NFR BLD AUTO: 2 % (ref 0–6)
ERYTHROCYTE [DISTWIDTH] IN BLOOD BY AUTOMATED COUNT: 13.4 % (ref 11.6–15.1)
GFR SERPL CREATININE-BSD FRML MDRD: 60 ML/MIN/1.73SQ M
GLUCOSE P FAST SERPL-MCNC: 91 MG/DL (ref 65–99)
HCT VFR BLD AUTO: 36 % (ref 36.5–49.3)
HGB BLD-MCNC: 11.6 G/DL (ref 12–17)
IMM GRANULOCYTES # BLD AUTO: 0.03 THOUSAND/UL (ref 0–0.2)
IMM GRANULOCYTES NFR BLD AUTO: 0 % (ref 0–2)
LYMPHOCYTES # BLD AUTO: 2.69 THOUSANDS/ΜL (ref 0.6–4.47)
LYMPHOCYTES NFR BLD AUTO: 27 % (ref 14–44)
MCH RBC QN AUTO: 30.9 PG (ref 26.8–34.3)
MCHC RBC AUTO-ENTMCNC: 32.2 G/DL (ref 31.4–37.4)
MCV RBC AUTO: 96 FL (ref 82–98)
MONOCYTES # BLD AUTO: 0.9 THOUSAND/ΜL (ref 0.17–1.22)
MONOCYTES NFR BLD AUTO: 9 % (ref 4–12)
NEUTROPHILS # BLD AUTO: 6.22 THOUSANDS/ΜL (ref 1.85–7.62)
NEUTS SEG NFR BLD AUTO: 61 % (ref 43–75)
NRBC BLD AUTO-RTO: 0 /100 WBCS
PLATELET # BLD AUTO: 345 THOUSANDS/UL (ref 149–390)
PMV BLD AUTO: 9.9 FL (ref 8.9–12.7)
POTASSIUM SERPL-SCNC: 4.3 MMOL/L (ref 3.5–5.3)
PROT SERPL-MCNC: 7 G/DL (ref 6.4–8.2)
RBC # BLD AUTO: 3.76 MILLION/UL (ref 3.88–5.62)
SODIUM SERPL-SCNC: 138 MMOL/L (ref 136–145)
WBC # BLD AUTO: 10.09 THOUSAND/UL (ref 4.31–10.16)

## 2019-04-06 PROCEDURE — 80053 COMPREHEN METABOLIC PANEL: CPT | Performed by: INTERNAL MEDICINE

## 2019-04-06 PROCEDURE — 36415 COLL VENOUS BLD VENIPUNCTURE: CPT | Performed by: INTERNAL MEDICINE

## 2019-04-06 PROCEDURE — 85025 COMPLETE CBC W/AUTO DIFF WBC: CPT | Performed by: INTERNAL MEDICINE

## 2019-04-08 ENCOUNTER — TELEPHONE (OUTPATIENT)
Dept: SURGERY | Facility: CLINIC | Age: 74
End: 2019-04-08

## 2019-04-08 ENCOUNTER — TELEPHONE (OUTPATIENT)
Dept: HEMATOLOGY ONCOLOGY | Facility: CLINIC | Age: 74
End: 2019-04-08

## 2019-04-08 ENCOUNTER — TELEPHONE (OUTPATIENT)
Dept: HEMATOLOGY ONCOLOGY | Facility: HOSPITAL | Age: 74
End: 2019-04-08

## 2019-04-09 ENCOUNTER — HOSPITAL ENCOUNTER (OUTPATIENT)
Dept: INFUSION CENTER | Facility: CLINIC | Age: 74
Discharge: HOME | End: 2019-04-09

## 2019-04-12 ENCOUNTER — TELEPHONE (OUTPATIENT)
Dept: FAMILY MEDICINE CLINIC | Facility: CLINIC | Age: 74
End: 2019-04-12

## 2019-04-18 RX ORDER — ACETAMINOPHEN 500 MG
500 TABLET ORAL EVERY 6 HOURS PRN
Status: ON HOLD | COMMUNITY
Start: 2018-11-14 | End: 2021-01-01 | Stop reason: CLARIF

## 2019-04-18 RX ORDER — POLYETHYLENE GLYCOL 3350 17 G/17G
17 POWDER, FOR SOLUTION ORAL DAILY PRN
COMMUNITY
Start: 2018-11-14

## 2019-04-19 ENCOUNTER — OFFICE VISIT (OUTPATIENT)
Dept: SURGERY | Facility: CLINIC | Age: 74
End: 2019-04-19
Payer: MEDICARE

## 2019-04-19 ENCOUNTER — TELEPHONE (OUTPATIENT)
Dept: SURGERY | Facility: CLINIC | Age: 74
End: 2019-04-19

## 2019-04-19 VITALS
SYSTOLIC BLOOD PRESSURE: 120 MMHG | BODY MASS INDEX: 32.49 KG/M2 | HEIGHT: 65 IN | WEIGHT: 195 LBS | TEMPERATURE: 97.6 F | HEART RATE: 83 BPM | DIASTOLIC BLOOD PRESSURE: 72 MMHG

## 2019-04-19 DIAGNOSIS — C50.022 MALIGNANT NEOPLASM INVOLVING BOTH NIPPLE AND AREOLA OF LEFT BREAST IN MALE, ESTROGEN RECEPTOR POSITIVE (HCC): Primary | ICD-10-CM

## 2019-04-19 DIAGNOSIS — Z17.0 MALIGNANT NEOPLASM INVOLVING BOTH NIPPLE AND AREOLA OF LEFT BREAST IN MALE, ESTROGEN RECEPTOR POSITIVE (HCC): Primary | ICD-10-CM

## 2019-04-19 PROCEDURE — 99213 OFFICE O/P EST LOW 20 MIN: CPT | Performed by: SURGERY

## 2019-05-01 ENCOUNTER — ANESTHESIA EVENT (OUTPATIENT)
Dept: PERIOP | Facility: HOSPITAL | Age: 74
End: 2019-05-01
Payer: MEDICARE

## 2019-05-02 ENCOUNTER — ANESTHESIA (OUTPATIENT)
Dept: PERIOP | Facility: HOSPITAL | Age: 74
End: 2019-05-02
Payer: MEDICARE

## 2019-05-02 ENCOUNTER — HOSPITAL ENCOUNTER (OUTPATIENT)
Facility: HOSPITAL | Age: 74
Setting detail: OUTPATIENT SURGERY
Discharge: HOME HEALTH | End: 2019-05-03
Attending: SURGERY | Admitting: SURGERY
Payer: MEDICARE

## 2019-05-02 ENCOUNTER — TELEPHONE (OUTPATIENT)
Dept: SURGERY | Facility: CLINIC | Age: 74
End: 2019-05-02

## 2019-05-02 DIAGNOSIS — C50.022 MALIGNANT NEOPLASM INVOLVING BOTH NIPPLE AND AREOLA OF LEFT BREAST IN MALE, ESTROGEN RECEPTOR POSITIVE (HCC): ICD-10-CM

## 2019-05-02 DIAGNOSIS — I10 HYPERTENSION, UNSPECIFIED TYPE: Primary | ICD-10-CM

## 2019-05-02 DIAGNOSIS — Z17.0 MALIGNANT NEOPLASM INVOLVING BOTH NIPPLE AND AREOLA OF LEFT BREAST IN MALE, ESTROGEN RECEPTOR POSITIVE (HCC): ICD-10-CM

## 2019-05-02 LAB
PLATELET # BLD AUTO: 297 THOUSANDS/UL (ref 149–390)
PMV BLD AUTO: 9.2 FL (ref 8.9–12.7)

## 2019-05-02 PROCEDURE — 19307 MAST MOD RAD: CPT | Performed by: PHYSICIAN ASSISTANT

## 2019-05-02 PROCEDURE — 88309 TISSUE EXAM BY PATHOLOGIST: CPT | Performed by: PATHOLOGY

## 2019-05-02 PROCEDURE — 88342 IMHCHEM/IMCYTCHM 1ST ANTB: CPT | Performed by: PATHOLOGY

## 2019-05-02 PROCEDURE — 88363 XM ARCHIVE TISSUE MOLEC ANAL: CPT | Performed by: PATHOLOGY

## 2019-05-02 PROCEDURE — C9113 INJ PANTOPRAZOLE SODIUM, VIA: HCPCS | Performed by: PHYSICIAN ASSISTANT

## 2019-05-02 PROCEDURE — 19307 MAST MOD RAD: CPT | Performed by: SURGERY

## 2019-05-02 PROCEDURE — 88307 TISSUE EXAM BY PATHOLOGIST: CPT | Performed by: PATHOLOGY

## 2019-05-02 PROCEDURE — 85049 AUTOMATED PLATELET COUNT: CPT | Performed by: PHYSICIAN ASSISTANT

## 2019-05-02 RX ORDER — DEXAMETHASONE SODIUM PHOSPHATE 4 MG/ML
INJECTION, SOLUTION INTRA-ARTICULAR; INTRALESIONAL; INTRAMUSCULAR; INTRAVENOUS; SOFT TISSUE AS NEEDED
Status: DISCONTINUED | OUTPATIENT
Start: 2019-05-02 | End: 2019-05-02 | Stop reason: SURG

## 2019-05-02 RX ORDER — ONDANSETRON 2 MG/ML
4 INJECTION INTRAMUSCULAR; INTRAVENOUS ONCE AS NEEDED
Status: DISCONTINUED | OUTPATIENT
Start: 2019-05-02 | End: 2019-05-02 | Stop reason: HOSPADM

## 2019-05-02 RX ORDER — ONDANSETRON 2 MG/ML
INJECTION INTRAMUSCULAR; INTRAVENOUS AS NEEDED
Status: DISCONTINUED | OUTPATIENT
Start: 2019-05-02 | End: 2019-05-02 | Stop reason: SURG

## 2019-05-02 RX ORDER — LOSARTAN POTASSIUM 50 MG/1
50 TABLET ORAL DAILY
Status: DISCONTINUED | OUTPATIENT
Start: 2019-05-02 | End: 2019-05-03 | Stop reason: HOSPADM

## 2019-05-02 RX ORDER — HYDROMORPHONE HCL/PF 1 MG/ML
0.5 SYRINGE (ML) INJECTION
Status: DISCONTINUED | OUTPATIENT
Start: 2019-05-02 | End: 2019-05-02 | Stop reason: HOSPADM

## 2019-05-02 RX ORDER — MEPERIDINE HYDROCHLORIDE 50 MG/ML
12.5 INJECTION INTRAMUSCULAR; INTRAVENOUS; SUBCUTANEOUS ONCE AS NEEDED
Status: DISCONTINUED | OUTPATIENT
Start: 2019-05-02 | End: 2019-05-02 | Stop reason: HOSPADM

## 2019-05-02 RX ORDER — HYDROMORPHONE HCL/PF 1 MG/ML
0.5 SYRINGE (ML) INJECTION
Status: DISCONTINUED | OUTPATIENT
Start: 2019-05-02 | End: 2019-05-03 | Stop reason: HOSPADM

## 2019-05-02 RX ORDER — PANTOPRAZOLE SODIUM 40 MG/1
40 INJECTION, POWDER, FOR SOLUTION INTRAVENOUS DAILY
Status: DISCONTINUED | OUTPATIENT
Start: 2019-05-02 | End: 2019-05-03 | Stop reason: HOSPADM

## 2019-05-02 RX ORDER — FENTANYL CITRATE/PF 50 MCG/ML
50 SYRINGE (ML) INJECTION
Status: DISCONTINUED | OUTPATIENT
Start: 2019-05-02 | End: 2019-05-02 | Stop reason: HOSPADM

## 2019-05-02 RX ORDER — SODIUM CHLORIDE 9 MG/ML
125 INJECTION, SOLUTION INTRAVENOUS CONTINUOUS
Status: DISCONTINUED | OUTPATIENT
Start: 2019-05-02 | End: 2019-05-03 | Stop reason: HOSPADM

## 2019-05-02 RX ORDER — HYDROCODONE BITARTRATE AND ACETAMINOPHEN 5; 325 MG/1; MG/1
1 TABLET ORAL EVERY 4 HOURS PRN
Status: DISCONTINUED | OUTPATIENT
Start: 2019-05-02 | End: 2019-05-03 | Stop reason: HOSPADM

## 2019-05-02 RX ORDER — PROPOFOL 10 MG/ML
INJECTION, EMULSION INTRAVENOUS AS NEEDED
Status: DISCONTINUED | OUTPATIENT
Start: 2019-05-02 | End: 2019-05-02 | Stop reason: SURG

## 2019-05-02 RX ORDER — CEFAZOLIN SODIUM 2 G/50ML
2000 SOLUTION INTRAVENOUS ONCE
Status: COMPLETED | OUTPATIENT
Start: 2019-05-02 | End: 2019-05-02

## 2019-05-02 RX ORDER — FUROSEMIDE 40 MG/1
40 TABLET ORAL DAILY
Status: DISCONTINUED | OUTPATIENT
Start: 2019-05-02 | End: 2019-05-03 | Stop reason: HOSPADM

## 2019-05-02 RX ORDER — ONDANSETRON 2 MG/ML
4 INJECTION INTRAMUSCULAR; INTRAVENOUS EVERY 4 HOURS PRN
Status: DISCONTINUED | OUTPATIENT
Start: 2019-05-02 | End: 2019-05-03 | Stop reason: HOSPADM

## 2019-05-02 RX ORDER — TAMOXIFEN CITRATE 10 MG/1
20 TABLET ORAL DAILY
Status: DISCONTINUED | OUTPATIENT
Start: 2019-05-02 | End: 2019-05-03 | Stop reason: HOSPADM

## 2019-05-02 RX ORDER — MAGNESIUM HYDROXIDE 1200 MG/15ML
LIQUID ORAL AS NEEDED
Status: DISCONTINUED | OUTPATIENT
Start: 2019-05-02 | End: 2019-05-02 | Stop reason: HOSPADM

## 2019-05-02 RX ORDER — ACETAMINOPHEN 325 MG/1
500 TABLET ORAL EVERY 6 HOURS PRN
Status: DISCONTINUED | OUTPATIENT
Start: 2019-05-02 | End: 2019-05-02 | Stop reason: SDUPTHER

## 2019-05-02 RX ORDER — HEPARIN SODIUM 5000 [USP'U]/ML
5000 INJECTION, SOLUTION INTRAVENOUS; SUBCUTANEOUS EVERY 8 HOURS SCHEDULED
Status: DISCONTINUED | OUTPATIENT
Start: 2019-05-03 | End: 2019-05-03 | Stop reason: HOSPADM

## 2019-05-02 RX ORDER — ACETAMINOPHEN 325 MG/1
650 TABLET ORAL EVERY 6 HOURS PRN
Status: DISCONTINUED | OUTPATIENT
Start: 2019-05-02 | End: 2019-05-03 | Stop reason: HOSPADM

## 2019-05-02 RX ORDER — FENTANYL CITRATE 50 UG/ML
INJECTION, SOLUTION INTRAMUSCULAR; INTRAVENOUS AS NEEDED
Status: DISCONTINUED | OUTPATIENT
Start: 2019-05-02 | End: 2019-05-02 | Stop reason: SURG

## 2019-05-02 RX ADMIN — PROPOFOL 50 MG: 10 INJECTION, EMULSION INTRAVENOUS at 08:52

## 2019-05-02 RX ADMIN — LIDOCAINE HYDROCHLORIDE 100 MG: 20 INJECTION, SOLUTION INTRAVENOUS at 08:45

## 2019-05-02 RX ADMIN — FENTANYL CITRATE 25 MCG: 50 INJECTION, SOLUTION INTRAMUSCULAR; INTRAVENOUS at 09:05

## 2019-05-02 RX ADMIN — ONDANSETRON HYDROCHLORIDE 4 MG: 2 INJECTION, SOLUTION INTRAVENOUS at 08:35

## 2019-05-02 RX ADMIN — CEFAZOLIN SODIUM 2000 MG: 2 SOLUTION INTRAVENOUS at 08:45

## 2019-05-02 RX ADMIN — SODIUM CHLORIDE: 0.9 INJECTION, SOLUTION INTRAVENOUS at 09:33

## 2019-05-02 RX ADMIN — TAMOXIFEN CITRATE 20 MG: 10 TABLET, FILM COATED ORAL at 15:22

## 2019-05-02 RX ADMIN — FENTANYL CITRATE 50 MCG: 50 INJECTION, SOLUTION INTRAMUSCULAR; INTRAVENOUS at 11:01

## 2019-05-02 RX ADMIN — ACETAMINOPHEN 650 MG: 325 TABLET ORAL at 21:46

## 2019-05-02 RX ADMIN — SODIUM CHLORIDE 125 ML/HR: 0.9 INJECTION, SOLUTION INTRAVENOUS at 07:12

## 2019-05-02 RX ADMIN — ACETAMINOPHEN 650 MG: 325 TABLET ORAL at 15:20

## 2019-05-02 RX ADMIN — FUROSEMIDE 40 MG: 40 TABLET ORAL at 15:20

## 2019-05-02 RX ADMIN — DEXAMETHASONE SODIUM PHOSPHATE 4 MG: 4 INJECTION, SOLUTION INTRAMUSCULAR; INTRAVENOUS at 08:50

## 2019-05-02 RX ADMIN — PANTOPRAZOLE SODIUM 40 MG: 40 INJECTION, POWDER, FOR SOLUTION INTRAVENOUS at 15:20

## 2019-05-02 RX ADMIN — FENTANYL CITRATE 50 MCG: 50 INJECTION, SOLUTION INTRAMUSCULAR; INTRAVENOUS at 08:59

## 2019-05-02 RX ADMIN — PROPOFOL 150 MG: 10 INJECTION, EMULSION INTRAVENOUS at 08:45

## 2019-05-02 RX ADMIN — FENTANYL CITRATE 25 MCG: 50 INJECTION, SOLUTION INTRAMUSCULAR; INTRAVENOUS at 09:35

## 2019-05-02 RX ADMIN — FENTANYL CITRATE 25 MCG: 50 INJECTION, SOLUTION INTRAMUSCULAR; INTRAVENOUS at 08:52

## 2019-05-02 RX ADMIN — LOSARTAN POTASSIUM 50 MG: 50 TABLET, FILM COATED ORAL at 15:20

## 2019-05-02 RX ADMIN — SODIUM CHLORIDE 125 ML/HR: 0.9 INJECTION, SOLUTION INTRAVENOUS at 21:46

## 2019-05-02 RX ADMIN — FENTANYL CITRATE 25 MCG: 50 INJECTION, SOLUTION INTRAMUSCULAR; INTRAVENOUS at 09:55

## 2019-05-03 VITALS
HEART RATE: 87 BPM | BODY MASS INDEX: 31.49 KG/M2 | TEMPERATURE: 98.1 F | OXYGEN SATURATION: 98 % | SYSTOLIC BLOOD PRESSURE: 140 MMHG | WEIGHT: 189 LBS | HEIGHT: 65 IN | DIASTOLIC BLOOD PRESSURE: 64 MMHG | RESPIRATION RATE: 18 BRPM

## 2019-05-03 LAB
ALBUMIN SERPL BCP-MCNC: 2.7 G/DL (ref 3.5–5)
ALP SERPL-CCNC: 61 U/L (ref 46–116)
ALT SERPL W P-5'-P-CCNC: 27 U/L (ref 12–78)
ANION GAP SERPL CALCULATED.3IONS-SCNC: 7 MMOL/L (ref 4–13)
AST SERPL W P-5'-P-CCNC: 21 U/L (ref 5–45)
BASOPHILS # BLD AUTO: 0.02 THOUSANDS/ΜL (ref 0–0.1)
BASOPHILS NFR BLD AUTO: 0 % (ref 0–1)
BILIRUB SERPL-MCNC: 0.24 MG/DL (ref 0.2–1)
BUN SERPL-MCNC: 24 MG/DL (ref 5–25)
CALCIUM SERPL-MCNC: 8.5 MG/DL (ref 8.3–10.1)
CHLORIDE SERPL-SCNC: 110 MMOL/L (ref 100–108)
CO2 SERPL-SCNC: 25 MMOL/L (ref 21–32)
CREAT SERPL-MCNC: 1.19 MG/DL (ref 0.6–1.3)
EOSINOPHIL # BLD AUTO: 0.01 THOUSAND/ΜL (ref 0–0.61)
EOSINOPHIL NFR BLD AUTO: 0 % (ref 0–6)
ERYTHROCYTE [DISTWIDTH] IN BLOOD BY AUTOMATED COUNT: 13.3 % (ref 11.6–15.1)
GFR SERPL CREATININE-BSD FRML MDRD: 59 ML/MIN/1.73SQ M
GLUCOSE SERPL-MCNC: 105 MG/DL (ref 65–140)
HCT VFR BLD AUTO: 28.4 % (ref 36.5–49.3)
HGB BLD-MCNC: 9.2 G/DL (ref 12–17)
IMM GRANULOCYTES # BLD AUTO: 0.04 THOUSAND/UL (ref 0–0.2)
IMM GRANULOCYTES NFR BLD AUTO: 0 % (ref 0–2)
LYMPHOCYTES # BLD AUTO: 2.64 THOUSANDS/ΜL (ref 0.6–4.47)
LYMPHOCYTES NFR BLD AUTO: 22 % (ref 14–44)
MCH RBC QN AUTO: 32.3 PG (ref 26.8–34.3)
MCHC RBC AUTO-ENTMCNC: 32.4 G/DL (ref 31.4–37.4)
MCV RBC AUTO: 100 FL (ref 82–98)
MONOCYTES # BLD AUTO: 1.2 THOUSAND/ΜL (ref 0.17–1.22)
MONOCYTES NFR BLD AUTO: 10 % (ref 4–12)
NEUTROPHILS # BLD AUTO: 8.15 THOUSANDS/ΜL (ref 1.85–7.62)
NEUTS SEG NFR BLD AUTO: 68 % (ref 43–75)
NRBC BLD AUTO-RTO: 0 /100 WBCS
PLATELET # BLD AUTO: 253 THOUSANDS/UL (ref 149–390)
PMV BLD AUTO: 9.3 FL (ref 8.9–12.7)
POTASSIUM SERPL-SCNC: 4.2 MMOL/L (ref 3.5–5.3)
PROT SERPL-MCNC: 5.7 G/DL (ref 6.4–8.2)
RBC # BLD AUTO: 2.85 MILLION/UL (ref 3.88–5.62)
SODIUM SERPL-SCNC: 142 MMOL/L (ref 136–145)
WBC # BLD AUTO: 12.06 THOUSAND/UL (ref 4.31–10.16)

## 2019-05-03 PROCEDURE — 80053 COMPREHEN METABOLIC PANEL: CPT | Performed by: PHYSICIAN ASSISTANT

## 2019-05-03 PROCEDURE — NC001 PR NO CHARGE: Performed by: SURGERY

## 2019-05-03 PROCEDURE — C9113 INJ PANTOPRAZOLE SODIUM, VIA: HCPCS | Performed by: PHYSICIAN ASSISTANT

## 2019-05-03 PROCEDURE — G8978 MOBILITY CURRENT STATUS: HCPCS

## 2019-05-03 PROCEDURE — 85025 COMPLETE CBC W/AUTO DIFF WBC: CPT | Performed by: PHYSICIAN ASSISTANT

## 2019-05-03 PROCEDURE — G8979 MOBILITY GOAL STATUS: HCPCS

## 2019-05-03 PROCEDURE — 99024 POSTOP FOLLOW-UP VISIT: CPT | Performed by: SURGERY

## 2019-05-03 PROCEDURE — 97163 PT EVAL HIGH COMPLEX 45 MIN: CPT

## 2019-05-03 PROCEDURE — 97530 THERAPEUTIC ACTIVITIES: CPT

## 2019-05-03 PROCEDURE — 97110 THERAPEUTIC EXERCISES: CPT

## 2019-05-03 RX ORDER — HYDROCODONE BITARTRATE AND ACETAMINOPHEN 5; 325 MG/1; MG/1
1 TABLET ORAL EVERY 4 HOURS PRN
Qty: 6 TABLET | Refills: 0 | Status: SHIPPED | OUTPATIENT
Start: 2019-05-03 | End: 2019-06-25 | Stop reason: CLARIF

## 2019-05-03 RX ADMIN — TAMOXIFEN CITRATE 20 MG: 10 TABLET, FILM COATED ORAL at 09:56

## 2019-05-03 RX ADMIN — LOSARTAN POTASSIUM 50 MG: 50 TABLET, FILM COATED ORAL at 09:55

## 2019-05-03 RX ADMIN — HEPARIN SODIUM 5000 UNITS: 5000 INJECTION INTRAVENOUS; SUBCUTANEOUS at 09:55

## 2019-05-03 RX ADMIN — ACETAMINOPHEN 650 MG: 325 TABLET ORAL at 09:56

## 2019-05-03 RX ADMIN — SODIUM CHLORIDE 125 ML/HR: 0.9 INJECTION, SOLUTION INTRAVENOUS at 04:25

## 2019-05-03 RX ADMIN — PANTOPRAZOLE SODIUM 40 MG: 40 INJECTION, POWDER, FOR SOLUTION INTRAVENOUS at 09:55

## 2019-05-06 ENCOUNTER — OFFICE VISIT (OUTPATIENT)
Dept: SURGERY | Facility: CLINIC | Age: 74
End: 2019-05-06

## 2019-05-06 VITALS
TEMPERATURE: 97.8 F | RESPIRATION RATE: 18 BRPM | HEART RATE: 98 BPM | BODY MASS INDEX: 32.32 KG/M2 | HEIGHT: 65 IN | WEIGHT: 194 LBS

## 2019-05-06 DIAGNOSIS — Z17.0 MALIGNANT NEOPLASM INVOLVING BOTH NIPPLE AND AREOLA OF LEFT BREAST IN MALE, ESTROGEN RECEPTOR POSITIVE (HCC): Primary | ICD-10-CM

## 2019-05-06 DIAGNOSIS — C50.022 MALIGNANT NEOPLASM INVOLVING BOTH NIPPLE AND AREOLA OF LEFT BREAST IN MALE, ESTROGEN RECEPTOR POSITIVE (HCC): Primary | ICD-10-CM

## 2019-05-06 PROCEDURE — 99024 POSTOP FOLLOW-UP VISIT: CPT | Performed by: PHYSICIAN ASSISTANT

## 2019-05-13 ENCOUNTER — OFFICE VISIT (OUTPATIENT)
Dept: SURGERY | Facility: CLINIC | Age: 74
End: 2019-05-13

## 2019-05-13 VITALS
RESPIRATION RATE: 18 BRPM | SYSTOLIC BLOOD PRESSURE: 138 MMHG | HEIGHT: 65 IN | HEART RATE: 80 BPM | BODY MASS INDEX: 31.65 KG/M2 | DIASTOLIC BLOOD PRESSURE: 66 MMHG | WEIGHT: 190 LBS | TEMPERATURE: 97.8 F

## 2019-05-13 DIAGNOSIS — C78.89: Primary | ICD-10-CM

## 2019-05-13 DIAGNOSIS — C50.922: ICD-10-CM

## 2019-05-13 DIAGNOSIS — C50.912: Primary | ICD-10-CM

## 2019-05-13 DIAGNOSIS — C50.922: Primary | ICD-10-CM

## 2019-05-13 PROCEDURE — 99024 POSTOP FOLLOW-UP VISIT: CPT | Performed by: SURGERY

## 2019-05-20 ENCOUNTER — EVALUATION (OUTPATIENT)
Dept: PHYSICAL THERAPY | Facility: CLINIC | Age: 74
End: 2019-05-20
Payer: MEDICARE

## 2019-05-20 DIAGNOSIS — C50.022 MALIGNANT NEOPLASM INVOLVING BOTH NIPPLE AND AREOLA OF LEFT BREAST IN MALE, ESTROGEN RECEPTOR POSITIVE (HCC): Primary | ICD-10-CM

## 2019-05-20 DIAGNOSIS — M25.512 ACUTE PAIN OF LEFT SHOULDER: ICD-10-CM

## 2019-05-20 DIAGNOSIS — Z17.0 MALIGNANT NEOPLASM INVOLVING BOTH NIPPLE AND AREOLA OF LEFT BREAST IN MALE, ESTROGEN RECEPTOR POSITIVE (HCC): Primary | ICD-10-CM

## 2019-05-20 PROCEDURE — 97163 PT EVAL HIGH COMPLEX 45 MIN: CPT | Performed by: PHYSICAL THERAPIST

## 2019-05-21 ENCOUNTER — TRANSCRIBE ORDERS (OUTPATIENT)
Dept: PHYSICAL THERAPY | Facility: CLINIC | Age: 74
End: 2019-05-21

## 2019-05-21 DIAGNOSIS — I89.0 CHRONIC ACQUIRED LYMPHEDEMA: Primary | ICD-10-CM

## 2019-05-30 ENCOUNTER — OFFICE VISIT (OUTPATIENT)
Dept: SURGERY | Facility: CLINIC | Age: 74
End: 2019-05-30

## 2019-05-30 VITALS
WEIGHT: 192 LBS | RESPIRATION RATE: 18 BRPM | HEART RATE: 85 BPM | TEMPERATURE: 97.8 F | HEIGHT: 65 IN | SYSTOLIC BLOOD PRESSURE: 134 MMHG | DIASTOLIC BLOOD PRESSURE: 68 MMHG | BODY MASS INDEX: 31.99 KG/M2

## 2019-05-30 DIAGNOSIS — C50.922: Primary | ICD-10-CM

## 2019-05-30 PROCEDURE — 99024 POSTOP FOLLOW-UP VISIT: CPT | Performed by: PHYSICIAN ASSISTANT

## 2019-06-04 ENCOUNTER — OFFICE VISIT (OUTPATIENT)
Dept: PHYSICAL THERAPY | Facility: CLINIC | Age: 74
End: 2019-06-04
Payer: MEDICARE

## 2019-06-04 DIAGNOSIS — M25.512 ACUTE PAIN OF LEFT SHOULDER: ICD-10-CM

## 2019-06-04 DIAGNOSIS — C50.022 MALIGNANT NEOPLASM INVOLVING BOTH NIPPLE AND AREOLA OF LEFT BREAST IN MALE, ESTROGEN RECEPTOR POSITIVE (HCC): Primary | ICD-10-CM

## 2019-06-04 DIAGNOSIS — Z17.0 MALIGNANT NEOPLASM INVOLVING BOTH NIPPLE AND AREOLA OF LEFT BREAST IN MALE, ESTROGEN RECEPTOR POSITIVE (HCC): Primary | ICD-10-CM

## 2019-06-04 PROCEDURE — 97110 THERAPEUTIC EXERCISES: CPT | Performed by: PHYSICAL THERAPIST

## 2019-06-04 PROCEDURE — 97140 MANUAL THERAPY 1/> REGIONS: CPT | Performed by: PHYSICAL THERAPIST

## 2019-06-06 ENCOUNTER — TELEPHONE (OUTPATIENT)
Dept: HEMATOLOGY ONCOLOGY | Facility: CLINIC | Age: 74
End: 2019-06-06

## 2019-06-06 NOTE — TELEPHONE ENCOUNTER
Lavell Reeder called and wanted to know if Dr Colin Melgar had a chance to talk to Dr Arna Simmonds about his current situation  Lavell Reeder can be called back at [phone] for an update

## 2019-06-07 ENCOUNTER — OFFICE VISIT (OUTPATIENT)
Dept: PHYSICAL THERAPY | Facility: CLINIC | Age: 74
End: 2019-06-07
Payer: MEDICARE

## 2019-06-07 ENCOUNTER — TELEPHONE (OUTPATIENT)
Dept: HEMATOLOGY ONCOLOGY | Facility: CLINIC | Age: 74
End: 2019-06-07

## 2019-06-07 DIAGNOSIS — C50.022 MALIGNANT NEOPLASM INVOLVING BOTH NIPPLE AND AREOLA OF LEFT BREAST IN MALE, ESTROGEN RECEPTOR POSITIVE (HCC): Primary | ICD-10-CM

## 2019-06-07 DIAGNOSIS — M25.512 ACUTE PAIN OF LEFT SHOULDER: ICD-10-CM

## 2019-06-07 DIAGNOSIS — Z17.0 MALIGNANT NEOPLASM INVOLVING BOTH NIPPLE AND AREOLA OF LEFT BREAST IN MALE, ESTROGEN RECEPTOR POSITIVE (HCC): Primary | ICD-10-CM

## 2019-06-07 PROCEDURE — 97110 THERAPEUTIC EXERCISES: CPT | Performed by: PHYSICAL THERAPIST

## 2019-06-07 PROCEDURE — 97140 MANUAL THERAPY 1/> REGIONS: CPT | Performed by: PHYSICAL THERAPIST

## 2019-06-10 ENCOUNTER — OFFICE VISIT (OUTPATIENT)
Dept: PHYSICAL THERAPY | Facility: CLINIC | Age: 74
End: 2019-06-10
Payer: MEDICARE

## 2019-06-10 DIAGNOSIS — M25.512 ACUTE PAIN OF LEFT SHOULDER: ICD-10-CM

## 2019-06-10 DIAGNOSIS — Z17.0 MALIGNANT NEOPLASM INVOLVING BOTH NIPPLE AND AREOLA OF LEFT BREAST IN MALE, ESTROGEN RECEPTOR POSITIVE (HCC): Primary | ICD-10-CM

## 2019-06-10 DIAGNOSIS — C50.022 MALIGNANT NEOPLASM INVOLVING BOTH NIPPLE AND AREOLA OF LEFT BREAST IN MALE, ESTROGEN RECEPTOR POSITIVE (HCC): Primary | ICD-10-CM

## 2019-06-10 PROCEDURE — 97140 MANUAL THERAPY 1/> REGIONS: CPT | Performed by: PHYSICAL THERAPIST

## 2019-06-10 PROCEDURE — 97110 THERAPEUTIC EXERCISES: CPT | Performed by: PHYSICAL THERAPIST

## 2019-06-14 ENCOUNTER — OFFICE VISIT (OUTPATIENT)
Dept: PHYSICAL THERAPY | Facility: CLINIC | Age: 74
End: 2019-06-14
Payer: MEDICARE

## 2019-06-14 DIAGNOSIS — Z17.0 MALIGNANT NEOPLASM INVOLVING BOTH NIPPLE AND AREOLA OF LEFT BREAST IN MALE, ESTROGEN RECEPTOR POSITIVE (HCC): Primary | ICD-10-CM

## 2019-06-14 DIAGNOSIS — C50.022 MALIGNANT NEOPLASM INVOLVING BOTH NIPPLE AND AREOLA OF LEFT BREAST IN MALE, ESTROGEN RECEPTOR POSITIVE (HCC): Primary | ICD-10-CM

## 2019-06-14 DIAGNOSIS — M25.512 ACUTE PAIN OF LEFT SHOULDER: ICD-10-CM

## 2019-06-14 PROCEDURE — 97140 MANUAL THERAPY 1/> REGIONS: CPT | Performed by: PHYSICAL THERAPIST

## 2019-06-14 PROCEDURE — 97110 THERAPEUTIC EXERCISES: CPT | Performed by: PHYSICAL THERAPIST

## 2019-06-17 ENCOUNTER — OFFICE VISIT (OUTPATIENT)
Dept: PHYSICAL THERAPY | Facility: CLINIC | Age: 74
End: 2019-06-17
Payer: MEDICARE

## 2019-06-17 DIAGNOSIS — Z17.0 MALIGNANT NEOPLASM INVOLVING BOTH NIPPLE AND AREOLA OF LEFT BREAST IN MALE, ESTROGEN RECEPTOR POSITIVE (HCC): Primary | ICD-10-CM

## 2019-06-17 DIAGNOSIS — M25.512 ACUTE PAIN OF LEFT SHOULDER: ICD-10-CM

## 2019-06-17 DIAGNOSIS — C50.022 MALIGNANT NEOPLASM INVOLVING BOTH NIPPLE AND AREOLA OF LEFT BREAST IN MALE, ESTROGEN RECEPTOR POSITIVE (HCC): Primary | ICD-10-CM

## 2019-06-17 PROCEDURE — 97110 THERAPEUTIC EXERCISES: CPT | Performed by: PHYSICAL THERAPIST

## 2019-06-17 PROCEDURE — 97140 MANUAL THERAPY 1/> REGIONS: CPT | Performed by: PHYSICAL THERAPIST

## 2019-06-21 ENCOUNTER — OFFICE VISIT (OUTPATIENT)
Dept: PHYSICAL THERAPY | Facility: CLINIC | Age: 74
End: 2019-06-21
Payer: MEDICARE

## 2019-06-21 DIAGNOSIS — M25.512 ACUTE PAIN OF LEFT SHOULDER: ICD-10-CM

## 2019-06-21 DIAGNOSIS — Z17.0 MALIGNANT NEOPLASM INVOLVING BOTH NIPPLE AND AREOLA OF LEFT BREAST IN MALE, ESTROGEN RECEPTOR POSITIVE (HCC): Primary | ICD-10-CM

## 2019-06-21 DIAGNOSIS — C50.022 MALIGNANT NEOPLASM INVOLVING BOTH NIPPLE AND AREOLA OF LEFT BREAST IN MALE, ESTROGEN RECEPTOR POSITIVE (HCC): Primary | ICD-10-CM

## 2019-06-21 PROCEDURE — 97110 THERAPEUTIC EXERCISES: CPT | Performed by: PHYSICAL THERAPIST

## 2019-06-21 PROCEDURE — 97140 MANUAL THERAPY 1/> REGIONS: CPT | Performed by: PHYSICAL THERAPIST

## 2019-06-25 ENCOUNTER — OFFICE VISIT (OUTPATIENT)
Dept: HEMATOLOGY ONCOLOGY | Facility: CLINIC | Age: 74
End: 2019-06-25
Payer: MEDICARE

## 2019-06-25 VITALS
TEMPERATURE: 97.3 F | DIASTOLIC BLOOD PRESSURE: 80 MMHG | OXYGEN SATURATION: 97 % | RESPIRATION RATE: 18 BRPM | BODY MASS INDEX: 32.36 KG/M2 | WEIGHT: 194.2 LBS | HEART RATE: 81 BPM | SYSTOLIC BLOOD PRESSURE: 130 MMHG | HEIGHT: 65 IN

## 2019-06-25 DIAGNOSIS — C50.922: Primary | ICD-10-CM

## 2019-06-25 PROCEDURE — 99214 OFFICE O/P EST MOD 30 MIN: CPT | Performed by: INTERNAL MEDICINE

## 2019-06-26 ENCOUNTER — APPOINTMENT (OUTPATIENT)
Dept: LAB | Facility: MEDICAL CENTER | Age: 74
End: 2019-06-26
Payer: MEDICARE

## 2019-06-26 DIAGNOSIS — C50.922: ICD-10-CM

## 2019-06-26 LAB
FERRITIN SERPL-MCNC: 89 NG/ML (ref 8–388)
IRON SATN MFR SERPL: 23 %
IRON SERPL-MCNC: 71 UG/DL (ref 65–175)
TIBC SERPL-MCNC: 311 UG/DL (ref 250–450)

## 2019-06-26 PROCEDURE — 83550 IRON BINDING TEST: CPT

## 2019-06-26 PROCEDURE — 82728 ASSAY OF FERRITIN: CPT

## 2019-06-26 PROCEDURE — 83540 ASSAY OF IRON: CPT

## 2019-07-01 ENCOUNTER — OFFICE VISIT (OUTPATIENT)
Dept: PHYSICAL THERAPY | Facility: CLINIC | Age: 74
End: 2019-07-01
Payer: MEDICARE

## 2019-07-01 DIAGNOSIS — C50.022 MALIGNANT NEOPLASM INVOLVING BOTH NIPPLE AND AREOLA OF LEFT BREAST IN MALE, ESTROGEN RECEPTOR POSITIVE (HCC): Primary | ICD-10-CM

## 2019-07-01 DIAGNOSIS — Z17.0 MALIGNANT NEOPLASM INVOLVING BOTH NIPPLE AND AREOLA OF LEFT BREAST IN MALE, ESTROGEN RECEPTOR POSITIVE (HCC): Primary | ICD-10-CM

## 2019-07-01 DIAGNOSIS — M25.512 ACUTE PAIN OF LEFT SHOULDER: ICD-10-CM

## 2019-07-01 PROCEDURE — 97110 THERAPEUTIC EXERCISES: CPT | Performed by: PHYSICAL THERAPIST

## 2019-07-01 PROCEDURE — 97140 MANUAL THERAPY 1/> REGIONS: CPT | Performed by: PHYSICAL THERAPIST

## 2019-07-01 NOTE — PROGRESS NOTES
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, estrogen receptor positive ([facility] [address] ) C50 022     Z17 0    2  Acute pain of left shoulder M25 512                   Subjective: Pt appears more tired this day but reports he is doing well, citing still some tightness in shldr at times, noting "pocket of fluid" still in posterior aspect of shldr/shldr blade  Objective: See treatment diary below    Assessment: Tolerated treatment well  Patient would benefit from continued PT  Edema persists thru L inferior to medial scapular border, also more noticeable within axilla this day, mildly reduced as MLD performed  Fatigue realized w/ increased resistance this day for TE  Advised cont'd PT to address pt's strength deficits, limited endurance, and edema  Plan: Continue per plan of care          Precautions: NKA, S/P L BREAST MASTECTOMY    Daily Treatment Diary     Manual  -4 6-7 6-10 6-14 6-17 6-21 7-1     PROM L shldr NV jph jph jph jph jph jph jph     Scap mobs NV jph jph jph jph jph jph jph     MLD L scapula   jph jph jph jph jph jph                    x15' x30' x25' x25' x25' x25' x25'         Exercise Diary  - 6-4 6-7 6-10 6-14 6-17 6-21 7-1     Scap retraction 3s x10 GrnTB 3s x20 GrnTB 3s x30 GrnTB 3s x30 GrnTB 3s x30 BlueTB 3s x30 BlueTB 3s x30 BlueTB 3s x30     Chin tucks 3s x10 3s x10 3s x15 3s x20 3s x30 3s x30 3s x30 3s x30     Pulleys  x4' x5' x5' x6' x6' x6' x6'     TB B shldr ext  Pnk x10 PnkTB x20 PnkTB x30 PnkT x30 GrnTB x30 GrnTB x30 GrnTB x30     Grn strp IR str  10s x5 10s x5 10s x5 10s x5 10s x5 10s x5 HEP     Wand shldr abd  5s x10 5s x15 5s x15 5s x15 5s x15 5s x15 5s x10     Wand shldr IR  5s x10 5s x15 5s x15 5s x15 5s x15 5s x15 5s x10     Wand shldr flex   5s x15 5s x20 5s x15 5s x15 5s x15 5s x10     AROM flex   1# x15 1# 2x15 1# 2x15 1# 2x15 1# 2x15 2# 2x15     AROM scaption   1# x15 1# 2x15 1# 2x15 1# 2x15 1# 2x15 2# 2x15     Wall push ups     x20  x30 x30 x30     Wall circles: CW/CCW      x30e x30e x30e     Shldr shrugs       3# x30 3# x30                                                                                                    Modalities

## 2019-07-02 ENCOUNTER — TELEPHONE (OUTPATIENT)
Dept: OTHER | Facility: HOSPITAL | Age: 74
End: 2019-07-02

## 2019-07-03 ENCOUNTER — OFFICE VISIT (OUTPATIENT)
Dept: PHYSICAL THERAPY | Facility: CLINIC | Age: 74
End: 2019-07-03
Payer: MEDICARE

## 2019-07-03 ENCOUNTER — TELEPHONE (OUTPATIENT)
Dept: SURGERY | Facility: CLINIC | Age: 74
End: 2019-07-03

## 2019-07-03 DIAGNOSIS — M25.512 ACUTE PAIN OF LEFT SHOULDER: ICD-10-CM

## 2019-07-03 DIAGNOSIS — Z17.0 MALIGNANT NEOPLASM INVOLVING BOTH NIPPLE AND AREOLA OF LEFT BREAST IN MALE, ESTROGEN RECEPTOR POSITIVE (HCC): Primary | ICD-10-CM

## 2019-07-03 DIAGNOSIS — C50.022 MALIGNANT NEOPLASM INVOLVING BOTH NIPPLE AND AREOLA OF LEFT BREAST IN MALE, ESTROGEN RECEPTOR POSITIVE (HCC): Primary | ICD-10-CM

## 2019-07-03 PROCEDURE — 97140 MANUAL THERAPY 1/> REGIONS: CPT | Performed by: PHYSICAL THERAPIST

## 2019-07-03 PROCEDURE — 97110 THERAPEUTIC EXERCISES: CPT | Performed by: PHYSICAL THERAPIST

## 2019-07-03 NOTE — TELEPHONE ENCOUNTER
Called and spoke to Talon  His wife was yelling in the back ground "there was no reasons for him to call me back"  I asked Talon how his appt with Dr Becca Mi went  He said he is still trying to process the information  He states the doctor doesn't tell him much of anything  I explained I will make an appointment for him to see Dr Pa Reyes to discuss the additional surgery  He wishes to wait a few days before he makes the appt  His wife continuing to yell in the background she is still working, last day is 8/2  She can't come in  But she thinks he should see Dr Pa Reyes before he sees Dr Rodger Flores again on 7/12  Again he does not want to schedule appt right now  States his incision is still lumpy  And his wife is yelling he should heal before he is cut again  A few minutes later she yelled tell Dr Pa Reyes he looks great  He looks better than he did before surgery  She states arm is better from therapy  At this point there are yelling back and fourth at each other  I told Talon to process Dr Bernabe Lunsford appointment and myself or someone else from the office would call to schedule him an appointment for Dr Pa Hanley was agreeable

## 2019-07-03 NOTE — TELEPHONE ENCOUNTER
Tried reaching patient, went right to voicemail  Left message for patient to return call (he is aware I am in another office today at [phone])  Left message that I was calling to see how his appointment went with Dr Renetta Lowe

## 2019-07-03 NOTE — PROGRESS NOTES
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, estrogen receptor positive ([facility] [address] ) C50 022     Z17 0    2  Acute pain of left shoulder M25 512                   Subjective: Pt still notices "bump" or pocket of fluid thru periscapular region  Objective: See treatment diary below    Assessment: Tolerated treatment well  Patient would benefit from continued PT  Improving strength gradually, still limited endurance, postural fatigue w/ repetitive OH motions requiring several rest prds  Periscapular edema slightly reduced this day  Plan: Continue per plan of care          Precautions: NKA, S/P L BREAST MASTECTOMY    Daily Treatment Diary     Manual  -20 6-4 6-7 6-10 6-14 6-17 6-21 7-1 7-3    PROM L shldr NV jph jph jph jph jph jph jph jph    Scap mobs NV jph jph jph jph jph jph jph jph    MLD L scapula   jph jph jph jph jph jph jph                   x15' x30' x25' x25' x25' x25' x25' x25'        Exercise Diary  5-20 6-4 6-7 6-10 6-14 6-17 6-21 7-1 7-3    Scap retraction 3s x10 GrnTB 3s x20 GrnTB 3s x30 GrnTB 3s x30 GrnTB 3s x30 BlueTB 3s x30 BlueTB 3s x30 BlueTB 3s x30 BlueTB 3s x30    Chin tucks 3s x10 3s x10 3s x15 3s x20 3s x30 3s x30 3s x30 3s x30 3s x30    Pulleys  x4' x5' x5' x6' x6' x6' x6' x6'    TB B shldr ext  Pnk x10 PnkTB x20 PnkTB x30 PnkT x30 GrnTB x30 GrnTB x30 GrnTB x30 GrnTB x30    Grn strp IR str  10s x5 10s x5 10s x5 10s x5 10s x5 10s x5 HEP HEP    Wand shldr abd  5s x10 5s x15 5s x15 5s x15 5s x15 5s x15 5s x10 5s x15    Wand shldr IR  5s x10 5s x15 5s x15 5s x15 5s x15 5s x15 5s x10 5s x15    Wand shldr flex   5s x15 5s x20 5s x15 5s x15 5s x15 5s x10 5s x15    AROM flex   1# x15 1# 2x15 1# 2x15 1# 2x15 1# 2x15 2# 2x15 2# 2x15    AROM scaption   1# x15 1# 2x15 1# 2x15 1# 2x15 1# 2x15 2# 2x15 2# 2x15    Wall push ups     x20  x30 x30 x30 x30    Wall circles: CW/CCW      x30e x30e x30e x30e    Shldr shrugs       3# x30 3# x30 4# x30                                                                                                   Modalities

## 2019-07-08 ENCOUNTER — OFFICE VISIT (OUTPATIENT)
Dept: PHYSICAL THERAPY | Facility: CLINIC | Age: 74
End: 2019-07-08
Payer: MEDICARE

## 2019-07-08 DIAGNOSIS — M25.512 ACUTE PAIN OF LEFT SHOULDER: ICD-10-CM

## 2019-07-08 DIAGNOSIS — C50.022 MALIGNANT NEOPLASM INVOLVING BOTH NIPPLE AND AREOLA OF LEFT BREAST IN MALE, ESTROGEN RECEPTOR POSITIVE (HCC): Primary | ICD-10-CM

## 2019-07-08 DIAGNOSIS — Z17.0 MALIGNANT NEOPLASM INVOLVING BOTH NIPPLE AND AREOLA OF LEFT BREAST IN MALE, ESTROGEN RECEPTOR POSITIVE (HCC): Primary | ICD-10-CM

## 2019-07-08 PROCEDURE — 97110 THERAPEUTIC EXERCISES: CPT | Performed by: PHYSICAL THERAPIST

## 2019-07-08 PROCEDURE — 97140 MANUAL THERAPY 1/> REGIONS: CPT | Performed by: PHYSICAL THERAPIST

## 2019-07-08 NOTE — PROGRESS NOTES
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, estrogen receptor positive ([facility] [address] ) C50 022     Z17 0    2  Acute pain of left shoulder M25 512                   Subjective: Pt still notices "bump" or pocket of fluid thru periscapular region  Objective: See treatment diary below    Assessment: Tolerated treatment well  Patient would benefit from continued PT  Challenged by wall posture stretches, particularly w/ hands behind the back  Also instructed pt in importance of slow controlled movement w/ strengthening vs compensation patterns  Region of edema L posterior shldr reduced this day upon palpable and visual inspection, will cont to monitor  Plan: Continue per plan of care          Precautions: NKA, S/P L BREAST MASTECTOMY    Daily Treatment Diary     Manual  5-20 6-4 6-7 6-10 6-14 6-17 6-21 7-1 7-3 7-8   PROM L shldr NV jph jph jph jph jph jph jph jph jph   Scap mobs NV jph jph jph jph jph jph jph jph jph   MLD L scapula   jph jph jph jph jph jph jph jph                  x15' x30' x25' x25' x25' x25' x25' x25' x25'       Exercise Diary  5-20 6-4 6-7 6-10 6-14 6-17 6-21 7-1 7-3 7-8   Scap retraction 3s x10 GrnTB 3s x20 GrnTB 3s x30 GrnTB 3s x30 GrnTB 3s x30 BlueTB 3s x30 BlueTB 3s x30 BlueTB 3s x30 BlueTB 3s x30 BlueTB 3s x30   Chin tucks 3s x10 3s x10 3s x15 3s x20 3s x30 3s x30 3s x30 3s x30 3s x30 3s x30   Pulleys  x4' x5' x5' x6' x6' x6' x6' x6' x6'   TB B shldr ext  Pnk x10 PnkTB x20 PnkTB x30 PnkT x30 GrnTB x30 GrnTB x30 GrnTB x30 GrnTB x30 GrnTB x30   Grn strp IR str  10s x5 10s x5 10s x5 10s x5 10s x5 10s x5 HEP HEP HEP   Wand shldr abd  5s x10 5s x15 5s x15 5s x15 5s x15 5s x15 5s x10 5s x15 5s x15   Wand shldr IR  5s x10 5s x15 5s x15 5s x15 5s x15 5s x15 5s x10 5s x15 5s x15   Wand shldr flex   5s x15 5s x20 5s x15 5s x15 5s x15 5s x10 5s x15 5s x15   AROM flex   1# x15 1# 2x15 1# 2x15 1# 2x15 1# 2x15 2# 2x15 2# 2x15 2# x30   AROM scaption   1# x15 1# 2x15 1# 2x15 1# 2x15 1# 2x15 2# 2x15 2# 2x15 2# 2x15   Wall push ups     x20  x30 x30 x30 x30 x30   Wall circles: CW/CCW      x30e x30e x30e x30e x30e   Shldr shrugs       3# x30 3# x30 4# x30 5# x30   Wall posture str          10s x5   Wall chest/shldr ER str          10s x5   TB triceps ext          GrnTB x30                                                           Modalities

## 2019-07-11 ENCOUNTER — OFFICE VISIT (OUTPATIENT)
Dept: PHYSICAL THERAPY | Facility: CLINIC | Age: 74
End: 2019-07-11
Payer: MEDICARE

## 2019-07-11 DIAGNOSIS — C50.022 MALIGNANT NEOPLASM INVOLVING BOTH NIPPLE AND AREOLA OF LEFT BREAST IN MALE, ESTROGEN RECEPTOR POSITIVE (HCC): Primary | ICD-10-CM

## 2019-07-11 DIAGNOSIS — Z17.0 MALIGNANT NEOPLASM INVOLVING BOTH NIPPLE AND AREOLA OF LEFT BREAST IN MALE, ESTROGEN RECEPTOR POSITIVE (HCC): Primary | ICD-10-CM

## 2019-07-11 DIAGNOSIS — M25.512 ACUTE PAIN OF LEFT SHOULDER: ICD-10-CM

## 2019-07-11 PROCEDURE — 97110 THERAPEUTIC EXERCISES: CPT | Performed by: PHYSICAL THERAPIST

## 2019-07-11 PROCEDURE — 97140 MANUAL THERAPY 1/> REGIONS: CPT | Performed by: PHYSICAL THERAPIST

## 2019-07-11 NOTE — PROGRESS NOTES
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, estrogen receptor positive ([facility] [address] ) C50 022     Z17 0    2  Acute pain of left shoulder M25 512                   Subjective: Pt reports he has been considring f/u w/ oncology regarding need for cont'd surgery, reports original incision sites bother him due to scar tissue  Objective: See treatment diary below    Assessment: Tolerated treatment well  Patient would benefit from continued PT  Better w/ posturing today, still challenged to keep head back w/ wall posture str, but advancing in strength, better form today w/ less cueing required  Plan: Continue per plan of care          Precautions: NKA, S/P L BREAST MASTECTOMY    Daily Treatment Diary     Manual     PROM L shldr jph         jph   Scap mobs jph         jph   MLD L scapula jph         jph                 x25'         x25'       Exercise Diary  -   Scap retraction BlueTB 3s x30         BlueTB 3s x30   Chin tucks 3s x30         3s x30   Pulleys x6'         x6'   TB B shldr ext GrnTB x30         GrnTB x30   Grn strp IR str          HEP   Wand shldr abd 5s x20         5s x15   Wand shldr IR 5s x20         5s x15   Wand shldr flex 5s x20         5s x15   AROM flex 2# 2x15         2# x30   AROM scaption 2# 2x15         2# 2x15   Wall push ups x30         x30   Wall circles: CW/CCW x30e         x30e   Shldr shrugs 5# x30         5# x30   Wall posture str 10s x5         10s x5   Wall chest/shldr ER str 10s x5         10s x5   TB triceps ext GrnTB x30         GrnTB x30                                                           Modalities

## 2019-07-12 ENCOUNTER — TELEPHONE (OUTPATIENT)
Dept: HEMATOLOGY ONCOLOGY | Facility: CLINIC | Age: 74
End: 2019-07-12

## 2019-07-12 NOTE — TELEPHONE ENCOUNTER
Patient called to cancel his afternoon appointment for today  He did not want to reschedule the appointment because he hasn't done the CT scan  According to the patient he canceled the CT scan the same day he was made, and he has no plans to do the CT scan anytime in the near future

## 2019-07-15 ENCOUNTER — OFFICE VISIT (OUTPATIENT)
Dept: PHYSICAL THERAPY | Facility: CLINIC | Age: 74
End: 2019-07-15
Payer: MEDICARE

## 2019-07-15 DIAGNOSIS — Z17.0 MALIGNANT NEOPLASM INVOLVING BOTH NIPPLE AND AREOLA OF LEFT BREAST IN MALE, ESTROGEN RECEPTOR POSITIVE (HCC): Primary | ICD-10-CM

## 2019-07-15 DIAGNOSIS — C50.022 MALIGNANT NEOPLASM INVOLVING BOTH NIPPLE AND AREOLA OF LEFT BREAST IN MALE, ESTROGEN RECEPTOR POSITIVE (HCC): Primary | ICD-10-CM

## 2019-07-15 DIAGNOSIS — M25.512 ACUTE PAIN OF LEFT SHOULDER: ICD-10-CM

## 2019-07-15 PROCEDURE — 97140 MANUAL THERAPY 1/> REGIONS: CPT | Performed by: PHYSICAL THERAPIST

## 2019-07-15 PROCEDURE — 97110 THERAPEUTIC EXERCISES: CPT | Performed by: PHYSICAL THERAPIST

## 2019-07-15 NOTE — PROGRESS NOTES
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, estrogen receptor positive ([facility] [address] ) C50 022     Z17 0    2  Acute pain of left shoulder M25 512                   Subjective: Pt reports shldr is feeling "alright" still citing some strength deficits when using arm more  Admits he was spraying some yard chemicals in his sprayer pump which yields some fatigue in his arm creating need to switch arms  Objective: See treatment diary below    Assessment: Tolerated treatment well  Patient would benefit from continued PT  Able to advance resistance w/ TE this day w/ slight fatigue  Posturing improving w/ pt better able to maintain wall posture str  Edema persists along posterior aspect of L shldr, slightly increased vs previous session which had shown a slight reduction  Plan: Continue per plan of care          Precautions: NKA, S/P L BREAST MASTECTOMY    Daily Treatment Diary     Manual  7-11 7-15        7-8   PROM L shldr jph jph        jph   Scap mobs jph jph        jph   MLD L scapula jph jph        jph                 x25' x25'        x25'       Exercise Diary  7-11 7-15        7-8   Scap retraction BlueTB 3s x30 BlueTB 3s x30        BlueTB 3s x30   Chin tucks 3s x30 3s x30        3s x30   Pulleys x6' x6'        x6'   TB B shldr ext GrnTB x30 GrnTB x30        GrnTB x30   Grn strp IR str          HEP   Wand shldr abd 5s x20 5s x20        5s x15   Wand shldr IR 5s x20 5s x20        5s x15   Wand shldr flex 5s x20 5s x20        5s x15   AROM flex 2# 2x15 3# 2x15        2# x30   AROM scaption 2# 2x15 3# 2x15        2# 2x15   Wall push ups x30 x30        x30   Wall circles: CW/CCW x30e x30e        x30e   Shldr shrugs 5# x30 5# x30        5# x30   Wall posture str 10s x5 10s x5        10s x5   Wall chest/shldr ER str 10s x5         10s x5   TB triceps ext GrnTB x30 GrnTB x30        GrnTB x30   TB B shldr ER  PnkTB x30                                                      Modalities

## 2019-07-16 NOTE — TELEPHONE ENCOUNTER
Tried reaching patient, no answer  Left message on voicemail for patient to return call to discuss schedule follow up appt

## 2019-07-16 NOTE — TELEPHONE ENCOUNTER
Patient's wife called back, stated Linda Burgos wasn't there to talk to me  I asked her to have him call me when he comes home, that I wanted talk to him since I'm back in the office  She stated he's great, doing well  I said ok and thank you and can you still have him call me when he comes homes  She was hesitant and wanted to know why I wanted to talk to him  I let her know I just wanted to hear in his voice how we was doing and talk to him about an appt  She stated he doesn't want to have any appointments until his shoulder is back to normal from the lymphedema with therapy  I said ok, but can you please have him call me  She hesitated again and then said oh, he just came in the door  Then yelled to Linda Burgos take the call  Spoke to Linda Burgos, asked how Dr Joaquín Wild appt went, he said he cancelled it along with the CT scan he wanted him to have  He stated he cancelled it because he just had one in November and that they are good for a year  I explained Dr Ellis Jimenez wanted to see him in the office to discuss the re-excision surgery  Patient stated he doesn't want to do anything until his fluid/lump from the lymphedema is better  He states he would like Dr Ellis Jimenez to do a phone conference with him, in the evening when he is available, he would really appreciate that instead of coming in the office  I told him I would check with Dr Ellis Jimenez if she would be available and when, to do that  We were disconnected  Linda Burgos called back  At that time, I was on another lengthy  Phone call with a patient  He hung up  I called back to speak to him and got answering machine  Left message for Linda Burgos to call back again  While typing this note, Linda Burgos returned call  Linda Burgos stated he doesn't want to do this right now  He is not giving a day, month, or year when he wants to have it done  He said he has had enough of everything and he is annoyed  He states he has had enough medical and blood work and tests   I explained to Linda Burgos that I understand his concerns  In the background his wife was yelling , I have a question  I said ok, what can I help you with  She stated when is she going to have a sit down and go over things/tests  I stated that is what we wanted to schedule an appt for and also to discuss again, re-excision  Linda Burgos is then yelling at his wife , now you opened up a can of worm  She yelled forget we have it covered, just have her call us  Linda Burgos ended the call saying good bye, nice talking to you , have a nice day  I told him I would get in touch with Dr Ellis Jimenez who was at the hospital doing procedures  I said it was nice talking to you too, have a nice day also

## 2019-07-18 ENCOUNTER — OFFICE VISIT (OUTPATIENT)
Dept: PHYSICAL THERAPY | Facility: CLINIC | Age: 74
End: 2019-07-18
Payer: MEDICARE

## 2019-07-18 DIAGNOSIS — M25.512 ACUTE PAIN OF LEFT SHOULDER: ICD-10-CM

## 2019-07-18 DIAGNOSIS — C50.022 MALIGNANT NEOPLASM INVOLVING BOTH NIPPLE AND AREOLA OF LEFT BREAST IN MALE, ESTROGEN RECEPTOR POSITIVE (HCC): Primary | ICD-10-CM

## 2019-07-18 DIAGNOSIS — Z17.0 MALIGNANT NEOPLASM INVOLVING BOTH NIPPLE AND AREOLA OF LEFT BREAST IN MALE, ESTROGEN RECEPTOR POSITIVE (HCC): Primary | ICD-10-CM

## 2019-07-18 PROCEDURE — 97110 THERAPEUTIC EXERCISES: CPT | Performed by: PHYSICAL THERAPIST

## 2019-07-18 PROCEDURE — 97140 MANUAL THERAPY 1/> REGIONS: CPT | Performed by: PHYSICAL THERAPIST

## 2019-07-18 NOTE — PROGRESS NOTES
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, estrogen receptor positive ([facility] [address] ) C50 022     Z17 0    2  Acute pain of left shoulder M25 512                   Subjective: Pt reports shldr is feeling "alright" still citing some strength deficits when using arm more  Admits he was spraying some yard chemicals in his sprayer pump which yields some fatigue in his arm creating need to switch arms  Objective: See treatment diary below    Assessment: Tolerated treatment well  Patient would benefit from continued PT  Challenged w/ OH activity including wall angels w/ limited motion and strength, but improved w/ practice  Pt had numerous questions regarding health and course of care, again encouraged pt to F/u w/ Dr Tadeo Gallagher office regarding CT scan scheduling and scheduling surgery, pt acknowledges he should f/u  Plan: Continue per plan of care          Precautions: NKA, S/P L BREAST MASTECTOMY    Daily Treatment Diary     Manual  7-11 7-15 7-18       7-8   PROM L shldr jph jph jph       jph   Scap mobs jph jph jph       jph   MLD L scapula jph jph jph       jph                 x25' x25' x25'       x25'       Exercise Diary  7-11 7-15 7-18       7-8   Scap retraction BlueTB 3s x30 BlueTB 3s x30 BlueTB 3s x30       BlueTB 3s x30   Chin tucks 3s x30 3s x30 3s x30       3s x30   Pulleys x6' x6' x6'       x6'   TB B shldr ext GrnTB x30 GrnTB x30 GrnTB x30       GrnTB x30   Grn strp IR str          HEP   Wand shldr abd 5s x20 5s x20 5s x20       5s x15   Wand shldr IR 5s x20 5s x20 5s x20       5s x15   Wand shldr flex 5s x20 5s x20 5s x20       5s x15   AROM flex 2# 2x15 3# 2x15 3# 2x15       2# x30   AROM scaption 2# 2x15 3# 2x15 3# 2x15       2# 2x15   Wall push ups x30 x30 x30       x30   Wall circles: CW/CCW x30e x30e x30e       x30e   Shldr shrugs 5# x30 5# x30 5# x30       5# x30   Wall posture str 10s x5 10s x5 10s x5       10s x5   Wall chest/shldr ER str 10s x5  10s x5       10s x5   TB triceps ext GrnTB x30 GrnTB x30 GrnTB x30       GrnTB x30   TB B shldr ER  PnkTB x30 GrnTB x30          Wall angels   2x10                                        Modalities

## 2019-07-22 ENCOUNTER — OFFICE VISIT (OUTPATIENT)
Dept: PHYSICAL THERAPY | Facility: CLINIC | Age: 74
End: 2019-07-22
Payer: MEDICARE

## 2019-07-22 DIAGNOSIS — Z17.0 MALIGNANT NEOPLASM INVOLVING BOTH NIPPLE AND AREOLA OF LEFT BREAST IN MALE, ESTROGEN RECEPTOR POSITIVE (HCC): Primary | ICD-10-CM

## 2019-07-22 DIAGNOSIS — M25.512 ACUTE PAIN OF LEFT SHOULDER: ICD-10-CM

## 2019-07-22 DIAGNOSIS — C50.022 MALIGNANT NEOPLASM INVOLVING BOTH NIPPLE AND AREOLA OF LEFT BREAST IN MALE, ESTROGEN RECEPTOR POSITIVE (HCC): Primary | ICD-10-CM

## 2019-07-22 PROCEDURE — 97140 MANUAL THERAPY 1/> REGIONS: CPT | Performed by: PHYSICAL THERAPIST

## 2019-07-22 PROCEDURE — 97110 THERAPEUTIC EXERCISES: CPT | Performed by: PHYSICAL THERAPIST

## 2019-07-22 NOTE — PROGRESS NOTES
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, estrogen receptor positive ([facility] [address] ) C50 022     Z17 0    2  Acute pain of left shoulder M25 512                   Assessment  Assessment details: Pt presents w/ limited L shldr ROM, poor postural strength, and moderate scapulothoracic hypomobility w/ noticeable edema at inferior and lateral borders  Baseline measurements obtained to appropriately screen for risk of lymphedema w/ follow up measurements anticipated and pt instructed in risk factors + symptoms to monitor for  Skilled PT is advised to address limitations and maximize functional activity potential, also recommend MLD to address edema accumulated along posterior thorax along borders of scapula      19 Update:  Pt demonstrates progressive gains in L shldr ROM and L UE strength, also increased functional activity potential   Pt's f/u measurements of L UE + for slight gain in L forearm and mid biceps region, warranting cont'd monitoring  Cont'd persistent edema L posterior scapula into L axillary region remains but fluctuates, responding fair to MLD techniques  Believe further reduction could be achieved but challenging to provide prolonged compression garment to this area  Pt also has c/o scarring along chest for which scar tissue MS and MLD is utilized along L anterior chest wall  Cont'd skilled PT is advised to address current limitations and advance toward established goals, optimizing functional activity potential and minimizing L UE lymphedema risks     Impairments: abnormal or restricted ROM, activity intolerance, impaired physical strength, lacks appropriate home exercise program, pain with function, poor posture  and poor body mechanics  Functional limitations: Limited household maintainence Understanding of Dx/Px/POC: good   Prognosis: good    Goals  ST  Reduce PN <1/10 w/ all activity within  3 wks - Partially met  2  Increase L shldr ROM >25% within 3 wks - MET  3  Increase postural strength + 1 grade within 3 wks -MET  LT  I in HEP within 6 wks -Partially met  2  Return postural strength + L UE 5/5 within 6 wks -Not met  3  L shldr ROM WNL's within 6 wks - Partially met  4  Reduce L posterior shldr/axillary edema >25% within 6 wks    Plan  Patient would benefit from: skilled physical therapy  Referral necessary: Yes  Planned therapy interventions: manual therapy, joint mobilization, strengthening, stretching, therapeutic exercise, home exercise program and flexibility  Frequency: 2x week  Duration in visits: 10  Duration in weeks: 5  Plan of Care beginning date: 2019  Plan of Care expiration date: 2019  Treatment plan discussed with: patient        Subjective Evaluation    History of Present Illness  Date of surgery: 2019  Mechanism of injury: Pt presents s/p L breast mastectomy related to CA diagnosis, performed 19  Pt admits limited L shldr ROM, mild strength deficit, and remains on 15# lifting restriction  Has maintained HEP to enhance ROM of L shldr   Admits he remains limited in ability to maintain home and perform yardwork  Pt reports he is just starting to be able to lay on L side for short time prds  C/o itchiness within L anterior thorax and some swelling noted from axilla to mod thorax, L side  Pt contemplating whether he wants to initiate chemo or radiation - pending consultation w/ MD per pt report  Pt goals include to be able to maximize use of L arm to be able to maintain his home  19 Update:  Pt reports overall improvement w/ therapy citing the ability to resume most of his normal household chores and yardwork activity  Repetitive PH lifting remains a challenge and pt states he avoids heavier lifting per instruction  Pt does admit cont'd edema noted within region of axilla and posterior shldr, more noticeable when in s/l positions  Pt does report he has scheduled a f/u w/ Dr Mitchell Phoenix to discuss the 2nd surgery  Recurrent probem    Quality of life: good    Pain  Current pain ratin  At best pain ratin  At worst pain ratin  Quality: dull ache, discomfort and tight  Aggravating factors: overhead activity and lifting  Progression: improved    Patient Goals  Patient goals for therapy: increased strength, decreased pain, increased motion, independence with ADLs/IADLs and return to sport/leisure activities  Patient goal: Keep active and be able to maintain home  Objective     Active Range of Motion   Left Shoulder   Flexion: 174 degrees   Abduction: 172 degrees   External rotation 90°: [facility]  Internal rotation 0°: [facility]  Internal rotation 90°: 64 degrees     Right Shoulder   Normal active range of motion    Passive Range of Motion   Left Shoulder   Flexion: 178 degrees   Abduction: 176 degrees   External rotation 90°: WFL  Internal rotation 90°: WFL    Scapular Mobility   Left Shoulder   Scapular mobility: fair    Strength/Myotome Testing     Left Shoulder     Planes of Motion   Flexion: 4-   Extension: 4-   Abduction: 4-   Adduction: 4-   External rotation at 45°: 4-   Internal rotation at 45°: 4-     General Comments:      Shoulder Comments   L thorax incisions L axilla, C curve, convexity facing distal, 19 5 cm in length, closed, no exudate present  x2 1 5 cm in length small incisions also closed, no present exudate approx 3 cm inferior to above listed incision  Ankle/Foot Comments   Physical assessment: Mild heaviness noted in L forearm, marked edema L axilla into L posterior shldr to inferior border of L scapula  Palpation: Mild heaviness in areas noted above    Skin Mobility: L UE WNL, limited in L posterior shldr  Skin color: WNL  Pitting: trace  Wound presence: none  Wound size: n/a  Wound color: n/a  Stemmer's Sign: -  Gait assessment: WNL  Transfer status: WNL  Ability to don/doff clothing/garments: I       Flowsheet Rows      Most Recent Value   Thumb   L Thumb Initial Girth  6 5 cm   Palmar Crease   L Palmar Crease Initial Girth  21 25 cm   Wrist   L Wrist Initial Girth  17 75 cm   W+10cm   L W+10cm Initial Girth  25 9 cm   W+20cm   L W+20cm Initial Girth  27 9 cm   W+30cm   L W+30cm Initial Girth  30 5 cm   W+40cm   L W+40cm Initial Girth  34 5 cm                Precautions: NKA, S/P L BREAST MASTECTOMY    Daily Treatment Diary     Manual  7-11 7-15 7-18 7-22      7-8   PROM L shldr jph jph jph jph      jph   Scap mobs jph jph jph jph      jph   MLD L scapula jph jph jph jph      jph                 x25' x25' x25' x30'      x25'       Exercise Diary  7-11 7-15 7-18 7-22      7-8   Scap retraction BlueTB 3s x30 BlueTB 3s x30 BlueTB 3s x30 BlkTB 3s x30      BlueTB 3s x30   Chin tucks 3s x30 3s x30 3s x30 3s x30      3s x30   Pulleys x6' x6' x6' x6'      x6'   TB B shldr ext GrnTB x30 GrnTB x30 GrnTB x30 GrnTB x30      GrnTB x30   Grn strp IR str          HEP   Wand shldr abd 5s x20 5s x20 5s x20 5s x20      5s x15   Wand shldr IR 5s x20 5s x20 5s x20 5s x20      5s x15   Wand shldr flex 5s x20 5s x20 5s x20 5s x20      5s x15   AROM flex 2# 2x15 3# 2x15 3# 2x15 2# 2x15      2# x30   AROM scaption 2# 2x15 3# 2x15 3# 2x15 2# 2x15      2# 2x15   Wall push ups x30 x30 x30 x30      x30   Wall circles: CW/CCW x30e x30e x30e x30e      x30e   Shldr shrugs 5# x30 5# x30 5# x30 5# x30      5# x30   Wall posture str 10s x5 10s x5 10s x5 10s x5      10s x5   Wall chest/shldr ER str 10s x5  10s x5 10s x5      10s x5   TB triceps ext GrnTB x30 GrnTB x30 GrnTB x30 BlueTB x30      GrnTB x30   TB B shldr ER  PnkTB x30 GrnTB x30 GrnTB x30         Wall angels   2x10 2x10                                       Modalities

## 2019-07-26 ENCOUNTER — OFFICE VISIT (OUTPATIENT)
Dept: PHYSICAL THERAPY | Facility: CLINIC | Age: 74
End: 2019-07-26
Payer: MEDICARE

## 2019-07-26 DIAGNOSIS — C50.022 MALIGNANT NEOPLASM INVOLVING BOTH NIPPLE AND AREOLA OF LEFT BREAST IN MALE, ESTROGEN RECEPTOR POSITIVE (HCC): Primary | ICD-10-CM

## 2019-07-26 DIAGNOSIS — M25.512 ACUTE PAIN OF LEFT SHOULDER: ICD-10-CM

## 2019-07-26 DIAGNOSIS — Z17.0 MALIGNANT NEOPLASM INVOLVING BOTH NIPPLE AND AREOLA OF LEFT BREAST IN MALE, ESTROGEN RECEPTOR POSITIVE (HCC): Primary | ICD-10-CM

## 2019-07-26 PROCEDURE — 97140 MANUAL THERAPY 1/> REGIONS: CPT | Performed by: PHYSICAL THERAPIST

## 2019-07-26 PROCEDURE — 97110 THERAPEUTIC EXERCISES: CPT | Performed by: PHYSICAL THERAPIST

## 2019-07-26 NOTE — PROGRESS NOTES
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, estrogen receptor positive ([facility] [address] ) C50 022     Z17 0    2  Acute pain of left shoulder M25 512                   Subjective: Pt offers minimal c/o this day, does cite some tightness that persists chest into shldr region  Objective: See treatment diary below    Assessment: Tolerated treatment well  Patient would benefit from continued PT  Able to advance resistance this day w/ good nemo, mod fatigue to be expected  Pt's wife presents this day for instruction in MLD for home to perform for Fely Norman  Instruction provided, verbal, and visual w/ cueing on hand placement, technique, and frequency  Plan: Continue per plan of care          Precautions: NKA, S/P L BREAST MASTECTOMY    Daily Treatment Diary     Manual  7-11 7-15 7-18 7-26      7-8   PROM L shldr jph jph jph jph      jph   Scap mobs jph jph jph jph      jph   MLD L scapula jph jph jph jph      jph                 x25' x25' x25' x30'      x25'       Exercise Diary  7-11 7-15 7-18 7-26      7-8   Scap retraction BlueTB 3s x30 BlueTB 3s x30 BlueTB 3s x30 BlkTB 3s x30      BlueTB 3s x30   Chin tucks 3s x30 3s x30 3s x30 3s x30      3s x30   Pulleys x6' x6' x6' x6'      x6'   TB B shldr ext GrnTB x30 GrnTB x30 GrnTB x30 BlueTB x30      GrnTB x30   Grn strp IR str          HEP   Wand shldr abd 5s x20 5s x20 5s x20 5s x20      5s x15   Wand shldr IR 5s x20 5s x20 5s x20 5s x20      5s x15   Wand shldr flex 5s x20 5s x20 5s x20 5s x20      5s x15   AROM flex 2# 2x15 3# 2x15 3# 2x15 3# 2x10      2# x30   AROM scaption 2# 2x15 3# 2x15 3# 2x15 3# 2x10      2# 2x15   Wall push ups x30 x30 x30 x30      x30   Wall circles: CW/CCW x30e x30e x30e x30e      x30e   Shldr shrugs 5# x30 5# x30 5# x30 5# x30      5# x30   Wall posture str 10s x5 10s x5 10s x5 10s x5      10s x5   Wall chest/shldr ER str 10s x5  10s x5 10s x5      10s x5   TB triceps ext GrnTB x30 GrnTB x30 GrnTB x30 BlueTB x30      GrnTB x30   TB B shldr ER  PnkTB x30 GrnTB x30 BlueTB x30         Wall angels   2x10 2x10                                       Modalities

## 2019-07-29 ENCOUNTER — OFFICE VISIT (OUTPATIENT)
Dept: SURGERY | Facility: CLINIC | Age: 74
End: 2019-07-29

## 2019-07-29 VITALS
HEIGHT: 65 IN | TEMPERATURE: 97.6 F | WEIGHT: 192 LBS | SYSTOLIC BLOOD PRESSURE: 148 MMHG | DIASTOLIC BLOOD PRESSURE: 84 MMHG | HEART RATE: 87 BPM | BODY MASS INDEX: 31.99 KG/M2 | RESPIRATION RATE: 18 BRPM

## 2019-07-29 DIAGNOSIS — C50.922: Primary | ICD-10-CM

## 2019-07-29 PROCEDURE — 99024 POSTOP FOLLOW-UP VISIT: CPT | Performed by: SURGERY

## 2019-07-29 NOTE — PROGRESS NOTES
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 approximately 5 1 cm  He has metastatic cancer in 1 lymph node with extranodal and static extension and that focus measures at least 1 5 cm  Number of lymph nodes examined was 10, it appears all 10 were involved with cancer  The largest was 2 5 cm  pT3N3a M0 left breast cancer  BRCA neg ER pos       1  Consider re-excision of skin margins as he seemed to have a skip lesion as the anterior margin was originally negative but a additional margin was positive  He is agreeable to this  2  Follow up with Oncology as he remains on tamoxifen  HE REFUSED TO ALLOW US TO SCHEDULE THE APPOINTMENT IN THE OFFICE SO I WILL ENCOURAGE ONCOLOGY TO FOLLOW UP WITH HIM BOTH FOR AN OPINION REGARDING RE-EXCISION (CONSIDERING THE ADVANCED TUMOR STAGE ALREADY IS A RE-EXCISION NECESSARY) , AND FOR FOLLOW-UP ON HIS TAMOXIFEN  3  Consider radiation oncology follow-up for radiation to the chest wall and axilla considering the number of lymph nodes involved  It is highly unlikely he will agree to this extensive treatment, and he has already  3 months out from his original therapy on May 2, 2019  He we have had difficulty with getting him to follow-up and canceled appointments  He understands the seriousness of the situation and the need for urgent and consistent follow-up  ______________________________________________________  CC: Follow-up (left breast cancer )    HPI:  Naun Boyle is a 66 y o male who was referred for evaluation of Follow-up (left breast cancer )    Currently he has missed his CT scan follow-up from Dr Acosta Mcpherson from Oncology  He says he had 1 a year ago and does not need another 1  He finally comes in having missed is oncology appointment to discuss his questionable positive margin  On his original mastectomy the original margin was positive but an additional inferior left medial margin was taken and 1 of this is negative but the other 1 shows anterior my the margin positive showing that it is somewhat skip lesions in the skin  He shows no evidence of recurrence on physical exam and has no symptoms          ROS:  General ROS: negative  negative for - chills, fatigue, fever or night sweats, weight loss  Respiratory ROS: no cough, shortness of breath, or wheezing  Cardiovascular ROS: no chest pain or dyspnea on exertion  Genito-Urinary ROS: no dysuria, trouble voiding, or hematuria  Musculoskeletal ROS: negative for - gait disturbance, joint pain or muscle pain  Neurological ROS: no TIA or stroke symptoms  Gastrointestinal: see HPI  No abdominal pain, melena, BRB unless specified in HPI  Skin ROS: no new rashes or lesions   Lymphatic ROS: no new adenopathy noted by pt     GYN ROS: see HPI, no new GYN history or bleeding noted  Psy ROS: no new mental or behavioral disturbances       Patient Active Problem List   Diagnosis    Mass of breast, left    Hypertension    Leukocytosis    PAC (premature atrial contraction)    Pancreatic mass    Pancreatitis    RBBB (right bundle branch block with left anterior fascicular block)    Transaminitis    Carcinoma of male breast, left ([address] )    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, estrogen receptor positive ([address] )         Allergies:  Patient has no known allergies  Current Outpatient Medications:     acetaminophen (TYLENOL) 500 mg tablet, Take 500 mg by mouth every 6 (six) hours as needed, Disp: , Rfl:     furosemide (LASIX) 40 mg tablet, Take 40 mg by mouth daily, Disp: , Rfl: 0    losartan (COZAAR) 50 mg tablet, Take 50 mg by mouth daily , Disp: , Rfl: 0    polyethylene glycol (MIRALAX) 17 g packet, Take 17 g by mouth daily as needed , Disp: , Rfl:     tamoxifen (NOLVADEX) 20 mg tablet, Take 1 tablet (20 mg total) by mouth daily, Disp: 90 tablet, Rfl: 1    Past Medical History:   Diagnosis Date    Acute gallstone pancreatitis     Arthritis     Atrial fibrillation (HCC)     Cancer ([facility] [address] )     breast - left    Hypertension     Irregular heartbeat     PAC (premature atrial contraction)     PVC (premature ventricular contraction)     RBBB     Wears glasses        Past Surgical History:   Procedure Laterality Date    APPENDECTOMY      FRACTURE SURGERY Left     arm    UT LAP,CHOLECYSTECTOMY N/A 12/18/2018    Procedure: LAP CHOLECYSTECTOMY; LYSIS OF ADHESIONS; ATTEMPTED CHOLANGIOGRAM;  Surgeon: Andressa Santana MD;  Location: [facility];  Service: General    UT MASTECTOMY, MODIFIED RADICAL Left 5/2/2019    Procedure: MASTECTOMY MODIFIED RADICAL; AXILLARY DISSECTION;  Surgeon: Andressa Santana MD;  Location: [facility];  Service: General    TONSILLECTOMY         Family History   Problem Relation Age of Onset    Cancer Mother     Heart disease Father         reports that he has never smoked  He has never used smokeless tobacco  He reports that he does not drink alcohol or use drugs      Labs:   Lab Results   Component Value Date    WBC 9 23 06/26/2019    HGB 11 5 (L) 06/26/2019    HCT 36 8 06/26/2019     (H) 06/26/2019     06/26/2019     Lab Results   Component Value Date    K 4 0 06/26/2019     06/26/2019    CO2 26 06/26/2019    BUN 21 06/26/2019    CREATININE 1 19 06/26/2019    GLUF 84 06/26/2019    CALCIUM 9 0 06/26/2019 AST 17 06/26/2019    ALT 24 06/26/2019    ALKPHOS 87 06/26/2019    EGFR 59 06/26/2019         Imaging:  Extensive review of pathology        PHYSICAL EXAM    Vitals:    07/29/19 1010   BP: 148/84   Pulse: 87   Resp: 18   Temp: 97 6 °F (36 4 °C)          PHYSICAL EXAM  General Appearance:    Alert, cooperative, no distress,    Head:    Normocephalic without obvious abnormality   Eyes:    PERRL, conjunctiva/corneas clear, EOM's intact        Neck:   Supple, no adenopathy, no JVD   Back:     Symmetric, no spinal or CVA tenderness   Lungs:     Clear to auscultation bilaterally, no wheezing or rhonchi   Heart:    Regular rate and rhythm, S1 and S2 normal, no murmur   Abdomen:     Soft, nontender   Extremities:   Extremities normal  No clubbing, cyanosis or edema   Psych:   Normal Affect   Neurologic:   CNII-XII intact  Strength symmetric, speech intact      Left mastectomy scars well healed  No lymphedema  Of note he has a large lipoma the posterior aspect of the left scapula which is not a seroma related to the surgery but is a lipoma  He can have this excised when he is finished his therapy for breast cancer  Some portions of this record may have been generated with voice recognition software  There may be translation, syntax,  or grammatical errors  Occasional wrong word or "sound-a-like" substitutions may have occurred due to the inherent limitations of the voice recognition software  Read the chart carefully and recognize, using context, where substitutions may have occurred  If you have any questions, please contact the dictating provider for clarification or correction, as needed  This encounter has been coded by a non-certified coder         Aiden Lopez MD    Date: 7/29/2019 Time: 11:42 AM

## 2019-07-30 ENCOUNTER — OFFICE VISIT (OUTPATIENT)
Dept: PHYSICAL THERAPY | Facility: CLINIC | Age: 74
End: 2019-07-30
Payer: MEDICARE

## 2019-07-30 DIAGNOSIS — C50.022 MALIGNANT NEOPLASM INVOLVING BOTH NIPPLE AND AREOLA OF LEFT BREAST IN MALE, ESTROGEN RECEPTOR POSITIVE (HCC): Primary | ICD-10-CM

## 2019-07-30 DIAGNOSIS — Z17.0 MALIGNANT NEOPLASM INVOLVING BOTH NIPPLE AND AREOLA OF LEFT BREAST IN MALE, ESTROGEN RECEPTOR POSITIVE (HCC): Primary | ICD-10-CM

## 2019-07-30 DIAGNOSIS — M25.512 ACUTE PAIN OF LEFT SHOULDER: ICD-10-CM

## 2019-07-30 PROCEDURE — 97140 MANUAL THERAPY 1/> REGIONS: CPT | Performed by: PHYSICAL THERAPIST

## 2019-07-30 PROCEDURE — 97110 THERAPEUTIC EXERCISES: CPT | Performed by: PHYSICAL THERAPIST

## 2019-07-30 NOTE — PROGRESS NOTES
Daily Note     Today's date: 2019  Patient name: Abida Anthony  : 1945  MRN: [id number]  Referring provider: Derrick Aguero MD  Dx:   Encounter Diagnosis     ICD-10-CM    1  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, estrogen receptor positive ([facility] [address] ) C50 022     Z17 0    2  Acute pain of left shoulder M25 512                   Subjective: Pt reports he saw his MD and surgery is planned for Oct 9th  Also said his MD feels the bulbous region along his posterior shldr is a solid mass tat could be removed if he'd like  Objective: See treatment diary below    Assessment: Tolerated treatment well  Patient would benefit from continued PT  Posterior aspect of shldr contains fluid, but mass is also moveable w/ palpation, feeling like consistency of a "water balloon"  Advancing in ROM and strength thru L shldr w/ cont'd postural strengthening encouraged  Plan: Continue per plan of care          Precautions: NKA, S/P L BREAST MASTECTOMY    Daily Treatment Diary     Manual  7-11 7-15 7-18 7-26 7-30     7-8   PROM L shldr jph jph jph jph jph     jph   Scap mobs jph jph jph jph jph     jph   MLD L scapula jph jph jph jph jph     jph                 x25' x25' x25' x30' x25'     x25'       Exercise Diary  7-11 7-15 7-18 7-26 7-30     7-8   Scap retraction BlueTB 3s x30 BlueTB 3s x30 BlueTB 3s x30 BlkTB 3s x30 BlkTB 3s x30     BlueTB 3s x30   Chin tucks 3s x30 3s x30 3s x30 3s x30 3s x30     3s x30   Pulleys x6' x6' x6' x6' x6'     x6'   TB B shldr ext GrnTB x30 GrnTB x30 GrnTB x30 BlueTB x30 BlueTB x30     GrnTB x30   Grn strp IR str          HEP   Wand shldr abd 5s x20 5s x20 5s x20 5s x20 5s x20     5s x15   Wand shldr IR 5s x20 5s x20 5s x20 5s x20 5s x20     5s x15   Wand shldr flex 5s x20 5s x20 5s x20 5s x20 5s x20     5s x15   AROM flex 2# 2x15 3# 2x15 3# 2x15 3# 2x10 3# 2x15     2# x30   AROM scaption 2# 2x15 3# 2x15 3# 2x15 3# 2x10 3# 2x15     2# 2x15   Wall push ups x30 x30 x30 x30 x30     x30   Wall circles: CW/CCW x30e x30e x30e x30e x30e     x30e   Shldr shrugs 5# x30 5# x30 5# x30 5# x30 6# x30     5# x30   Wall posture str 10s x5 10s x5 10s x5 10s x5 10s x5     10s x5   Wall chest/shldr ER str 10s x5  10s x5 10s x5 10s x5     10s x5   TB triceps ext GrnTB x30 GrnTB x30 GrnTB x30 BlueTB x30 BlueTB x30     GrnTB x30   TB B shldr ER  PnkTB x30 GrnTB x30 BlueTB x30 BlueTB x30        Wall angels   2x10 2x10 x15        TB upright row      PnkTB x20                         Modalities

## 2019-08-01 ENCOUNTER — APPOINTMENT (OUTPATIENT)
Dept: PHYSICAL THERAPY | Facility: CLINIC | Age: 74
End: 2019-08-01
Payer: MEDICARE

## 2019-08-02 ENCOUNTER — TELEPHONE (OUTPATIENT)
Dept: FAMILY MEDICINE CLINIC | Facility: CLINIC | Age: 74
End: 2019-08-02

## 2019-08-02 NOTE — TELEPHONE ENCOUNTER
Pt came into the office and asked if he needs a prescription to get a medical bracelet  Pt is asking if he can get on if it is needed  He stated he would like it for LIMB [employer]ERT  Pt stated when we call with an answer we should leave a message because he won't answer his calls  Phone number to call and leave message at is [phone]

## 2019-08-05 ENCOUNTER — OFFICE VISIT (OUTPATIENT)
Dept: PHYSICAL THERAPY | Facility: CLINIC | Age: 74
End: 2019-08-05
Payer: MEDICARE

## 2019-08-05 DIAGNOSIS — C50.022 MALIGNANT NEOPLASM INVOLVING BOTH NIPPLE AND AREOLA OF LEFT BREAST IN MALE, ESTROGEN RECEPTOR POSITIVE (HCC): Primary | ICD-10-CM

## 2019-08-05 DIAGNOSIS — M25.512 ACUTE PAIN OF LEFT SHOULDER: ICD-10-CM

## 2019-08-05 DIAGNOSIS — Z17.0 MALIGNANT NEOPLASM INVOLVING BOTH NIPPLE AND AREOLA OF LEFT BREAST IN MALE, ESTROGEN RECEPTOR POSITIVE (HCC): Primary | ICD-10-CM

## 2019-08-05 PROCEDURE — 97110 THERAPEUTIC EXERCISES: CPT | Performed by: PHYSICAL THERAPIST

## 2019-08-05 PROCEDURE — 97140 MANUAL THERAPY 1/> REGIONS: CPT | Performed by: PHYSICAL THERAPIST

## 2019-08-05 NOTE — PROGRESS NOTES
Daily Note     Today's date: 2019  Patient name: Nishi Morales  : 1945  MRN: [id number]  Referring provider: Scooter Colbert MD  Dx:   Encounter Diagnosis     ICD-10-CM    1  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, estrogen receptor positive ([facility] [address] ) C50 022     Z17 0    2   Acute pain of left shoulder M25 512                   Subjective: Pt notes no new changes since last session   " I am able to do everything I want to, but I have been staying away from lifting anything too heavy "      Objective: See treatment diary below      Precautions: NKA, S/P L BREAST MASTECTOMY    Daily Treatment Diary     Manual  7-11 7-15 7-18 7-26 7-30 8-    7-8   PROM L shldr jph jph jph jph jph arb    jph   Scap mobs jph jph jph jph jph np    jph   MLD L scapula jph jph jph jph jph arb    jph                 x25' x25' x25' x30' x25' x20'    x25'       Exercise Diary  7-11 7-15 7-18 7-26 7-30 8-5    7-8   Scap retraction BlueTB 3s x30 BlueTB 3s x30 BlueTB 3s x30 BlkTB 3s x30 BlkTB 3s x30 Black TB 3s 30x    BlueTB 3s x30   Chin tucks 3s x30 3s x30 3s x30 3s x30 3s x30 3s 30x    3s x30   Pulleys x6' x6' x6' x6' x6' x6'    x6'   TB B shldr ext GrnTB x30 GrnTB x30 GrnTB x30 BlueTB x30 BlueTB x30 Blue TB B 30x    GrnTB x30   Grn strp IR str          HEP   Wand shldr abd 5s x20 5s x20 5s x20 5s x20 5s x20 5s 20x     5s x15   Wand shldr IR 5s x20 5s x20 5s x20 5s x20 5s x20 5s 20x    5s x15   Wand shldr flex 5s x20 5s x20 5s x20 5s x20 5s x20 5s 20x    5s x15   AROM flex 2# 2x15 3# 2x15 3# 2x15 3# 2x10 3# 2x15 #3 2x15    2# x30   AROM scaption 2# 2x15 3# 2x15 3# 2x15 3# 2x10 3# 2x15 #3 2x15    2# 2x15   Wall push ups x30 x30 x30 x30 x30 30x    x30   Wall circles: CW/CCW x30e x30e x30e x30e x30e 30x e    x30e   Shldr shrugs 5# x30 5# x30 5# x30 5# x30 6# x30 #6 30x    5# x30   Wall posture str 10s x5 10s x5 10s x5 10s x5 10s x5     10s x5   Wall chest/shldr ER str 10s x5  10s x5 10s x5 10s x5 10s 5x    10s x5   TB triceps ext GrnTB x30 GrnTB x30 GrnTB x30 BlueTB x30 BlueTB x30 Blue TB B 30x    GrnTB x30   TB B shldr ER  PnkTB x30 GrnTB x30 BlueTB x30 BlueTB x30 Blue TB B 30x       Wall angels   2x10 2x10 x15 15x        TB upright row      PnkTB x20 Pink 20 B                        Modalities                                                         Assessment: Tolerated treatment well  Pt was able to perform all exercises without increased symptoms in UE  Pt still has pocket of fluid around scapula on L; pt reports that he does not know if it is decreasing in size at all  Pt noted that his second surgery is Oct  9th  Patient demonstrated fatigue post treatment and would benefit from continued PT  Plan: Continue per plan of care

## 2019-08-08 ENCOUNTER — OFFICE VISIT (OUTPATIENT)
Dept: PHYSICAL THERAPY | Facility: CLINIC | Age: 74
End: 2019-08-08
Payer: MEDICARE

## 2019-08-08 DIAGNOSIS — C50.022 MALIGNANT NEOPLASM INVOLVING BOTH NIPPLE AND AREOLA OF LEFT BREAST IN MALE, ESTROGEN RECEPTOR POSITIVE (HCC): Primary | ICD-10-CM

## 2019-08-08 DIAGNOSIS — Z17.0 MALIGNANT NEOPLASM INVOLVING BOTH NIPPLE AND AREOLA OF LEFT BREAST IN MALE, ESTROGEN RECEPTOR POSITIVE (HCC): Primary | ICD-10-CM

## 2019-08-08 DIAGNOSIS — M25.512 ACUTE PAIN OF LEFT SHOULDER: ICD-10-CM

## 2019-08-08 PROCEDURE — 97110 THERAPEUTIC EXERCISES: CPT | Performed by: PHYSICAL THERAPIST

## 2019-08-08 PROCEDURE — 97140 MANUAL THERAPY 1/> REGIONS: CPT | Performed by: PHYSICAL THERAPIST

## 2019-08-08 NOTE — PROGRESS NOTES
Daily Note     Today's date: 2019  Patient name: Alex Mondragon  : 1945  MRN: [id number]  Referring provider: Will Bernard MD  Dx:   Encounter Diagnosis     ICD-10-CM    1  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, estrogen receptor positive ([facility] [address] ) C50 022     Z17 0    2  Acute pain of left shoulder M25 512                   Subjective: Pt states that his shoulder if feeling pretty good, denies any pain currently  Pt states that he only feels the pocket of fluid on his shoulder blade when he is sleeping on his L side         Objective: See treatment diary below      Precautions: NKA, S/P L BREAST MASTECTOMY    Daily Treatment Diary     Manual  7-11 7-15 7-18 7-26 7 8-5 8-8   7-8   PROM L shldr jph jph jph jph jph arb arb   jph   Scap mobs jph jph jph jph jph np arb   jph   MLD L scapula jph jph jph jph jph arb arb   jph                 x25' x25' x25' x30' x25' x20' x25'   x25'       Exercise Diary  7-11 7-15 7-18 7 730 8-5 8-8   7-8   Scap retraction BlueTB 3s x30 BlueTB 3s x30 BlueTB 3s x30 BlkTB 3s x30 BlkTB 3s x30 Black TB 3s 30x Black TB 30x 3"   BlueTB 3s x30   Chin tucks 3s x30 3s x30 3s x30 3s x30 3s x30 3s 30x    3s x30   Pulleys x6' x6' x6' x6' x6' x6' x6'   x6'   TB B shldr ext GrnTB x30 GrnTB x30 GrnTB x30 BlueTB x30 BlueTB x30 Blue TB B 30x Blue TB 30x   GrnTB x30   Grn strp IR str          HEP   Wand shldr abd 5s x20 5s x20 5s x20 5s x20 5s x20 5s 20x  5" 20x #1   5s x15   Wand shldr IR 5s x20 5s x20 5s x20 5s x20 5s x20 5s 20x 5s 20x #1    5s x15   Wand shldr flex 5s x20 5s x20 5s x20 5s x20 5s x20 5s 20x 5s 20x #1   5s x15   AROM flex 2# 2x15 3# 2x15 3# 2x15 3# 2x10 3# 2x15 #3 2x15 #3 30x    2# x30   AROM scaption 2# 2x15 3# 2x15 3# 2x15 3# 2x10 3# 2x15 #3 2x15 #3 2x15   2# 2x15   Wall push ups x30 x30 x30 x30 x30 30x 30x   x30   Wall circles: CW/CCW x30e x30e x30e x30e x30e 30x e 30x ea   x30e   Shldr shrugs 5# x30 5# x30 5# x30 5# x30 6# x30 #6 30x #6 30x   5# x30   Wall posture str 10s x5 10s x5 10s x5 10s x5 10s x5     10s x5   Wall chest/shldr ER str 10s x5  10s x5 10s x5 10s x5 10s 5x 10s 5x   10s x5   TB triceps ext GrnTB x30 GrnTB x30 GrnTB x30 BlueTB x30 BlueTB x30 Blue TB B 30x Blue TB 30x   GrnTB x30   TB B shldr ER  PnkTB x30 GrnTB x30 BlueTB x30 BlueTB x30 Blue TB B 30x Blue TB 30x      Wall angels   2x10 2x10 x15 15x  15x      TB upright row      PnkTB x20 Pink 20 B Pink TB 20x                       Modalities                                                         Assessment: Tolerated treatment well  Pt needed minimal cueing for proper form; pt need frequent rest breaks due to fatigue  Pt and wife had questions about possible radiation treatment he may need after his surgery in October; PT discussed the risks involved with them  Pt still had "pocket" of fluid around shoulder blade, did not look any different from last session  Patient demonstrated fatigue post treatment and would benefit from continued PT to address current limitations  Plan: Continue per plan of care

## 2019-08-12 ENCOUNTER — OFFICE VISIT (OUTPATIENT)
Dept: PHYSICAL THERAPY | Facility: CLINIC | Age: 74
End: 2019-08-12
Payer: MEDICARE

## 2019-08-12 DIAGNOSIS — M25.512 ACUTE PAIN OF LEFT SHOULDER: ICD-10-CM

## 2019-08-12 DIAGNOSIS — Z17.0 MALIGNANT NEOPLASM INVOLVING BOTH NIPPLE AND AREOLA OF LEFT BREAST IN MALE, ESTROGEN RECEPTOR POSITIVE (HCC): Primary | ICD-10-CM

## 2019-08-12 DIAGNOSIS — C50.022 MALIGNANT NEOPLASM INVOLVING BOTH NIPPLE AND AREOLA OF LEFT BREAST IN MALE, ESTROGEN RECEPTOR POSITIVE (HCC): Primary | ICD-10-CM

## 2019-08-12 PROCEDURE — 97110 THERAPEUTIC EXERCISES: CPT | Performed by: PHYSICAL THERAPIST

## 2019-08-12 PROCEDURE — 97140 MANUAL THERAPY 1/> REGIONS: CPT | Performed by: PHYSICAL THERAPIST

## 2019-08-12 NOTE — PROGRESS NOTES
Daily Note     Today's date: 2019  Patient name: Alex Mondragon  : 1945  MRN: [id number]  Referring provider: Will Bernard MD  Dx:   Encounter Diagnosis     ICD-10-CM    1  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, estrogen receptor positive ([facility] [address] ) C50 022     Z17 0    2  Acute pain of left shoulder M25 512                   Subjective: Pt reports he feels like he may be noticing "more flab" along L axillary region into L thorax       Objective: See treatment diary below      Precautions: NKA, S/P L BREAST MASTECTOMY    Daily Treatment Diary     Manual  -11 7-15 7- 8-5 8-8 8-12     PROM L shldr jph jph jph jph jph arb arb jph     Scap mobs jph jph jph jph jph np arb jph     MLD L scapula jph jph jph jph jph arb arb jph                   x25' x25' x25' x30' x25' x20' x25' x25'         Exercise Diary  7-11 7-15 7-18 7-26 7 8-5 8-8 8-12     Scap retraction BlueTB 3s x30 BlueTB 3s x30 BlueTB 3s x30 BlkTB 3s x30 BlkTB 3s x30 Black TB 3s 30x Black TB 30x 3" BlackTB 3s x30     Chin tucks 3s x30 3s x30 3s x30 3s x30 3s x30 3s 30x  3s x30     Pulleys x6' x6' x6' x6' x6' x6' x6' x6'     TB B shldr ext GrnTB x30 GrnTB x30 GrnTB x30 BlueTB x30 BlueTB x30 Blue TB B 30x Blue TB 30x BlueTB x30     Grn strp IR str             Wand shldr abd 5s x20 5s x20 5s x20 5s x20 5s x20 5s 20x  5" 20x #1 D/c     Wand shldr IR 5s x20 5s x20 5s x20 5s x20 5s x20 5s 20x 5s 20x #1  5s x20     Wand shldr flex 5s x20 5s x20 5s x20 5s x20 5s x20 5s 20x 5s 20x #1 D/c     AROM flex 2# 2x15 3# 2x15 3# 2x15 3# 2x10 3# 2x15 #3 2x15 #3 30x  3# x30     AROM scaption 2# 2x15 3# 2x15 3# 2x15 3# 2x10 3# 2x15 #3 2x15 #3 2x15 3# x30     Wall push ups x30 x30 x30 x30 x30 30x 30x x30     Wall circles: CW/CCW x30e x30e x30e x30e x30e 30x e 30x ea x30e CW/CCW     Shldr shrugs 5# x30 5# x30 5# x30 5# x30 6# x30 #6 30x #6 30x 7# x30     Wall posture str 10s x5 10s x5 10s x5 10s x5 10s x5   10s x5 Wall chest/shldr ER str 10s x5  10s x5 10s x5 10s x5 10s 5x 10s 5x 10s x5     TB triceps ext GrnTB x30 GrnTB x30 GrnTB x30 BlueTB x30 BlueTB x30 Blue TB B 30x Blue TB 30x BlueTB x30     TB B shldr ER  PnkTB x30 GrnTB x30 BlueTB x30 BlueTB x30 Blue TB B 30x Blue TB 30x BlueTB x30     Wall angels   2x10 2x10 x15 15x  15x x15     TB upright row      PnkTB x20 Pink 20 B Pink TB 20x GrnTB x20                      Modalities                                                         Assessment: Tolerated treatment well  Patient would benefit from continued PT to address current limitations  Gross observation yields that L UE appears 'heavier" then when previously treated, also slight heaviness is noted to region just inferior to L axilla into L thorax  MLD focused on addressing these areas today and will monitor response  Plan to measure L UE in the upcoming visits  Plan: Continue per plan of care

## 2019-08-15 ENCOUNTER — OFFICE VISIT (OUTPATIENT)
Dept: PHYSICAL THERAPY | Facility: CLINIC | Age: 74
End: 2019-08-15
Payer: MEDICARE

## 2019-08-15 DIAGNOSIS — Z17.0 MALIGNANT NEOPLASM INVOLVING BOTH NIPPLE AND AREOLA OF LEFT BREAST IN MALE, ESTROGEN RECEPTOR POSITIVE (HCC): Primary | ICD-10-CM

## 2019-08-15 DIAGNOSIS — M25.512 ACUTE PAIN OF LEFT SHOULDER: ICD-10-CM

## 2019-08-15 DIAGNOSIS — C50.022 MALIGNANT NEOPLASM INVOLVING BOTH NIPPLE AND AREOLA OF LEFT BREAST IN MALE, ESTROGEN RECEPTOR POSITIVE (HCC): Primary | ICD-10-CM

## 2019-08-15 PROCEDURE — 97140 MANUAL THERAPY 1/> REGIONS: CPT | Performed by: PHYSICAL THERAPIST

## 2019-08-15 PROCEDURE — 97110 THERAPEUTIC EXERCISES: CPT | Performed by: PHYSICAL THERAPIST

## 2019-08-15 NOTE — PROGRESS NOTES
Daily Note     Today's date: 8/15/2019  Patient name: Alonso Guevara  : 1945  MRN: [id number]  Referring provider: Sonia Ye MD  Dx:   Encounter Diagnosis     ICD-10-CM    1  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, estrogen receptor positive ([facility] [address] ) C50 022     Z17 0    2  Acute pain of left shoulder M25 512                   Subjective: Pt offers minimal c/o this day, slight fatigue but overall feeling okay  Pt does admit still that sense of some heaviness posterior shldr and also within axilla       Objective: See treatment diary below      Precautions: NKA, S/P L BREAST MASTECTOMY    Daily Treatment Diary     Manual  7- 7-15 7-18 7- 7-30 8-5 8-8 8-12 8-15    PROM L shldr jph jph jph jph jph arb arb jph jph    Scap mobs jph jph jph jph jph np arb jph jph    MLD L scapula jph jph jph jph jph arb arb jph jph                  x25' x25' x25' x30' x25' x20' x25' x25' x25'        Exercise Diary  7-11 7-15 7-18 7-26 7-30 8-5 8-8 8-12 8-15    Scap retraction BlueTB 3s x30 BlueTB 3s x30 BlueTB 3s x30 BlkTB 3s x30 BlkTB 3s x30 Black TB 3s 30x Black TB 30x 3" BlackTB 3s x30 BlackTB 3s x30    Chin tucks 3s x30 3s x30 3s x30 3s x30 3s x30 3s 30x  3s x30 3s x30    Pulleys x6' x6' x6' x6' x6' x6' x6' x6' x6'    TB B shldr ext GrnTB x30 GrnTB x30 GrnTB x30 BlueTB x30 BlueTB x30 Blue TB B 30x Blue TB 30x BlueTB x30 BlueTB x30    Grn strp IR str             Wand shldr abd 5s x20 5s x20 5s x20 5s x20 5s x20 5s 20x  5" 20x #1 D/c ----    Wand shldr IR 5s x20 5s x20 5s x20 5s x20 5s x20 5s 20x 5s 20x #1  5s x20 5s x20    Wand shldr flex 5s x20 5s x20 5s x20 5s x20 5s x20 5s 20x 5s 20x #1 D/c ----    AROM flex 2# 2x15 3# 2x15 3# 2x15 3# 2x10 3# 2x15 #3 2x15 #3 30x  3# x30 3# x30    AROM scaption 2# 2x15 3# 2x15 3# 2x15 3# 2x10 3# 2x15 #3 2x15 #3 2x15 3# x30 3# x30    Wall push ups x30 x30 x30 x30 x30 30x 30x x30 x30    Wall circles: CW/CCW x30e x30e x30e x30e x30e 30x e 30x ea x30e CW/CCW x30e CW/CCW    Shldr shrugs 5# x30 5# x30 5# x30 5# x30 6# x30 #6 30x #6 30x 7# x30 7# x30    Wall posture str 10s x5 10s x5 10s x5 10s x5 10s x5   10s x5 10s x5    Wall chest/shldr ER str 10s x5  10s x5 10s x5 10s x5 10s 5x 10s 5x 10s x5 10s x5    TB triceps ext GrnTB x30 GrnTB x30 GrnTB x30 BlueTB x30 BlueTB x30 Blue TB B 30x Blue TB 30x BlueTB x30 BlueTB x30    TB B shldr ER  PnkTB x30 GrnTB x30 BlueTB x30 BlueTB x30 Blue TB B 30x Blue TB 30x BlueTB x30 BlueTB x30    Wall angels   2x10 2x10 x15 15x  15x x15 x20    TB upright row      PnkTB x20 Pink 20 B Pink TB 20x GrnTB x20 GrnTB x30                     Modalities                                                         Assessment: Tolerated treatment well  Patient would benefit from continued PT Slight heaviness noted within L axilla, more significant edema noted posterior aspect of shldr this day which tends to fluctuate from tx to tx  Mod fatigue w/ TE this day  Plan: Continue per plan of care

## 2019-08-19 ENCOUNTER — OFFICE VISIT (OUTPATIENT)
Dept: PHYSICAL THERAPY | Facility: CLINIC | Age: 74
End: 2019-08-19
Payer: MEDICARE

## 2019-08-19 ENCOUNTER — TELEPHONE (OUTPATIENT)
Dept: HEMATOLOGY ONCOLOGY | Facility: CLINIC | Age: 74
End: 2019-08-19

## 2019-08-19 DIAGNOSIS — N63.20 MASS OF BREAST, LEFT: ICD-10-CM

## 2019-08-19 DIAGNOSIS — M25.512 ACUTE PAIN OF LEFT SHOULDER: ICD-10-CM

## 2019-08-19 DIAGNOSIS — C50.022 MALIGNANT NEOPLASM INVOLVING BOTH NIPPLE AND AREOLA OF LEFT BREAST IN MALE, ESTROGEN RECEPTOR POSITIVE (HCC): Primary | ICD-10-CM

## 2019-08-19 DIAGNOSIS — Z17.0 MALIGNANT NEOPLASM INVOLVING BOTH NIPPLE AND AREOLA OF LEFT BREAST IN MALE, ESTROGEN RECEPTOR POSITIVE (HCC): Primary | ICD-10-CM

## 2019-08-19 PROCEDURE — 97110 THERAPEUTIC EXERCISES: CPT | Performed by: PHYSICAL THERAPIST

## 2019-08-19 PROCEDURE — 97140 MANUAL THERAPY 1/> REGIONS: CPT | Performed by: PHYSICAL THERAPIST

## 2019-08-19 NOTE — TELEPHONE ENCOUNTER
Patient called on 8/19 stating that he needs a refill on his tamoxifen 20mg tablet- 90 day supply to [facility] [place]   This is a Dr HESS patient- he would like a call once the script is signed by Dr HESS

## 2019-08-19 NOTE — PROGRESS NOTES
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, estrogen receptor positive ([facility] [address] ) C50 022     Z17 0    2  Acute pain of left shoulder M25 512                   Subjective: Pt reports feeling a bit better in the armpit, sometimes at night when in s/l he has a slight discomfort        Objective: See treatment diary below      Precautions: NKA, S/P L BREAST MASTECTOMY    Daily Treatment Diary     Manual  - 7-15 7-18 7- 7-30 8-5 8-8 8-12 8-15 8-19   PROM L shldr jph jph jph jph jph arb arb jph jph jph   Scap mobs jph jph jph jph jph np arb jph jph jph   MLD L scapula jph jph jph jph jph arb arb jph jph jph                 x25' x25' x25' x30' x25' x20' x25' x25' x25' x25'       Exercise Diary  7- 7-15 7-18 7-26 7-30 8-5 8-8 8-12 8-15 8-19   Scap retraction BlueTB 3s x30 BlueTB 3s x30 BlueTB 3s x30 BlkTB 3s x30 BlkTB 3s x30 Black TB 3s 30x Black TB 30x 3" BlackTB 3s x30 BlackTB 3s x30 BlackTB 3s x30   Chin tucks 3s x30 3s x30 3s x30 3s x30 3s x30 3s 30x  3s x30 3s x30 3s x30   Pulleys x6' x6' x6' x6' x6' x6' x6' x6' x6' x6'   TB B shldr ext GrnTB x30 GrnTB x30 GrnTB x30 BlueTB x30 BlueTB x30 Blue TB B 30x Blue TB 30x BlueTB x30 BlueTB x30 BlueTB x30   Grn strp IR str             Wand shldr abd 5s x20 5s x20 5s x20 5s x20 5s x20 5s 20x  5" 20x #1 D/c ---- ---   Wand shldr IR 5s x20 5s x20 5s x20 5s x20 5s x20 5s 20x 5s 20x #1  5s x20 5s x20 5s x20   Wand shldr flex 5s x20 5s x20 5s x20 5s x20 5s x20 5s 20x 5s 20x #1 D/c ---- ---   AROM flex 2# 2x15 3# 2x15 3# 2x15 3# 2x10 3# 2x15 #3 2x15 #3 30x  3# x30 3# x30 3# x30   AROM scaption 2# 2x15 3# 2x15 3# 2x15 3# 2x10 3# 2x15 #3 2x15 #3 2x15 3# x30 3# x30 3# x30   Wall push ups x30 x30 x30 x30 x30 30x 30x x30 x30 x30   Wall circles: CW/CCW x30e x30e x30e x30e x30e 30x e 30x ea x30e CW/CCW x30e CW/CCW x30e CW/CCW   Shldr shrugs 5# x30 5# x30 5# x30 5# x30 6# x30 #6 30x #6 30x 7# x30 7# x30 7# x30   Wall posture str 10s x5 10s x5 10s x5 10s x5 10s x5   10s x5 10s x5 10s x5   Wall chest/shldr ER str 10s x5  10s x5 10s x5 10s x5 10s 5x 10s 5x 10s x5 10s x5 10s x5   TB triceps ext GrnTB x30 GrnTB x30 GrnTB x30 BlueTB x30 BlueTB x30 Blue TB B 30x Blue TB 30x BlueTB x30 BlueTB x30 BlueTB x30   TB B shldr ER  PnkTB x30 GrnTB x30 BlueTB x30 BlueTB x30 Blue TB B 30x Blue TB 30x BlueTB x30 BlueTB x30 Blue TB x30   Wall angels   2x10 2x10 x15 15x  15x x15 x20 x20   TB upright row      PnkTB x20 Pink 20 B Pink TB 20x GrnTB x20 GrnTB x30 GrnTB x30                    Modalities                                                         Assessment: Tolerated treatment well  Patient would benefit from continued PT L shldr ROM WNL's passively, still some slight fatigue w strengtheing TE  Good response to scar MS w/ improvement noted including reduced scar adhesions, but still sig along lateral aspect of original incision site  Plan: Continue per plan of care

## 2019-08-20 RX ORDER — TAMOXIFEN CITRATE 20 MG/1
20 TABLET ORAL DAILY
Qty: 90 TABLET | Refills: 0 | Status: SHIPPED | OUTPATIENT
Start: 2019-08-20 | End: 2019-12-17 | Stop reason: SDUPTHER

## 2019-08-20 NOTE — TELEPHONE ENCOUNTER
I spoke to patient and his wife by telephone  He has decided in favor of re-excision of the inferior medial margin to reduce risk of local recurrence of breast cancer, surgery with Dr Td Hernandez is scheduled for October 9, 2019  Furthermore, at this time he is not in favor of adjuvant endocrine therapy with anastrozole and goserelin as discussed at the time of our office visit on June 25, 2019  Rather, he prefers to remain on tamoxifen even though the course of neoadjuvant tamoxifen did not seem to be effective in terms of left breast tumor size reduction and there was significant residual disease at the time of left modified radical mastectomy  Accordingly, the plan is tamoxifen 20 mg daily  The patient is aware seek medical attention for bothersome hot flashes, mood alteration, pain or swelling the legs, chest pain, shortness of breath, excessive fatigue, or if other new problems arise    Otherwise, I plan to see him again on September 17, 2019 as previously planned

## 2019-08-22 ENCOUNTER — OFFICE VISIT (OUTPATIENT)
Dept: PHYSICAL THERAPY | Facility: CLINIC | Age: 74
End: 2019-08-22
Payer: MEDICARE

## 2019-08-22 DIAGNOSIS — M25.512 ACUTE PAIN OF LEFT SHOULDER: ICD-10-CM

## 2019-08-22 DIAGNOSIS — C50.022 MALIGNANT NEOPLASM INVOLVING BOTH NIPPLE AND AREOLA OF LEFT BREAST IN MALE, ESTROGEN RECEPTOR POSITIVE (HCC): Primary | ICD-10-CM

## 2019-08-22 DIAGNOSIS — Z17.0 MALIGNANT NEOPLASM INVOLVING BOTH NIPPLE AND AREOLA OF LEFT BREAST IN MALE, ESTROGEN RECEPTOR POSITIVE (HCC): Primary | ICD-10-CM

## 2019-08-22 PROCEDURE — 97110 THERAPEUTIC EXERCISES: CPT | Performed by: PHYSICAL THERAPIST

## 2019-08-22 PROCEDURE — 97140 MANUAL THERAPY 1/> REGIONS: CPT | Performed by: PHYSICAL THERAPIST

## 2019-08-22 NOTE — PROGRESS NOTES
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, estrogen receptor positive ([facility] [address] ) C50 022     Z17 0    2   Acute pain of left shoulder M25 512                   Subjective: Pt reports L shldr strength mildly limited still, minimal PN, but does feel ROM has improved to near normal       Objective: See treatment diary below      Precautions: NKA, S/P L BREAST MASTECTOMY    Daily Treatment Diary     Manual   8-15 8   PROM L shldr jph       jph jph jph   Scap mobs jph       jph jph jph   MLD L scapula jph       jph jph jph                 x25'       x25' x25' x25'       Exercise Diary   8-15 8   Scap retraction BlackTB 3s x30       BlackTB 3s x30 BlackTB 3s x30 BlackTB 3s x30   Chin tucks 3s x30       3s x30 3s x30 3s x30   Pulleys x6'       x6' x6' x6'   TB B shldr ext BlueTB x30       BlueTB x30 BlueTB x30 BlueTB x30   Grn strp IR str ----            Wand shldr abd ----       D/c ---- ---   Wand shldr IR 5s x20       5s x20 5s x20 5s x20   Wand shldr flex ----       D/c ---- ---   AROM flex 3# x30       3# x30 3# x30 3# x30   AROM scaption 3# x30       3# x30 3# x30 3# x30   Wall push ups x30       x30 x30 x30   Wall circles: CW/CCW x30e CW/CCW       x30e CW/CCW x30e CW/CCW x30e CW/CCW   Shldr shrugs 8# x30       7# x30 7# x30 7# x30   Wall posture str 10s x5       10s x5 10s x5 10s x5   Wall chest/shldr ER str 10s x5       10s x5 10s x5 10s x5   TB triceps ext BlueTB x30       BlueTB x30 BlueTB x30 BlueTB x30   TB B shldr ER BlueTB x30       BlueTB x30 BlueTB x30 Blue TB x30   Wall angels x20       x15 x20 x20   TB upright row  GrnTB x30       GrnTB x20 GrnTB x30 GrnTB x30                    Modalities                                                         Assessment: Tolerated treatment well  Patient would benefit from continued PT Mild to mod fatigue of L UE this day w/ TE advancement  Plan to measure L UE NV  Plan: Continue per plan of care

## 2019-09-03 ENCOUNTER — OFFICE VISIT (OUTPATIENT)
Dept: PHYSICAL THERAPY | Facility: CLINIC | Age: 74
End: 2019-09-03
Payer: MEDICARE

## 2019-09-03 DIAGNOSIS — Z17.0 MALIGNANT NEOPLASM INVOLVING BOTH NIPPLE AND AREOLA OF LEFT BREAST IN MALE, ESTROGEN RECEPTOR POSITIVE (HCC): Primary | ICD-10-CM

## 2019-09-03 DIAGNOSIS — M25.512 ACUTE PAIN OF LEFT SHOULDER: ICD-10-CM

## 2019-09-03 DIAGNOSIS — C50.022 MALIGNANT NEOPLASM INVOLVING BOTH NIPPLE AND AREOLA OF LEFT BREAST IN MALE, ESTROGEN RECEPTOR POSITIVE (HCC): Primary | ICD-10-CM

## 2019-09-03 PROCEDURE — 97110 THERAPEUTIC EXERCISES: CPT | Performed by: PHYSICAL THERAPIST

## 2019-09-03 PROCEDURE — 97140 MANUAL THERAPY 1/> REGIONS: CPT | Performed by: PHYSICAL THERAPIST

## 2019-09-03 NOTE — PROGRESS NOTES
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, estrogen receptor positive ([facility] [address] ) C50 022     Z17 0    2  Acute pain of left shoulder M25 512                   Subjective: Pt offers minimal c/o this day, admits heavier lifting remains limited as he notices w/ household chores and such       Objective: See treatment diary below      Precautions: NKA, S/P L BREAST MASTECTOMY    Daily Treatment Diary     Manual  -3      8- 8-15 8-19   PROM L shldr jph jph      jph jph jph   Scap mobs jph jph      jph jph jph   MLD L scapula jph jph      jph jph jph                 x25' x30'      x25' x25' x25'       Exercise Diary  -3      8-12 8-15 819   Scap retraction BlackTB 3s x30 BlackB 3s x30      BlackTB 3s x30 BlackTB 3s x30 BlackTB 3s x30   Chin tucks 3s x30 3s x30      3s x30 3s x30 3s x30   Pulleys x6' x6'       x6' x6' x6'   TB B shldr ext BlueTB x30 BlueTB x30      BlueTB x30 BlueTB x30 BlueTB x30   Grn strp IR str ----            Wand shldr abd ----       D/c ---- ---   Wand shldr IR 5s x20 5s x20      5s x20 5s x20 5s x20   Wand shldr flex ---- ----      D/c ---- ---   AROM flex 3# x30 4# 2x15      3# x30 3# x30 3# x30   AROM scaption 3# x30 4# 2x15      3# x30 3# x30 3# x30   Wall push ups x30 x30      x30 x30 x30   Wall circles: CW/CCW x30e CW/CCW x30e      x30e CW/CCW x30e CW/CCW x30e CW/CCW   Shldr shrugs 8# x30 8# x30      7# x30 7# x30 7# x30   Wall posture str 10s x5 10s x5      10s x5 10s x5 10s x5   Wall chest/shldr ER str 10s x5 10s x5      10s x5 10s x5 10s x5   TB triceps ext BlueTB x30 BlueTB x30      BlueTB x30 BlueTB x30 BlueTB x30   TB B shldr ER BlueTB x30 BlueTB x30      BlueTB x30 BlueTB x30 Blue TB x30   Wall angels x20 x20      x15 x20 x20   TB upright row  GrnTB x30 GrnTB x30      GrnTB x20 GrnTB x30 GrnTB x30   UBE: Fwd/Rev  x2'e               Modalities                                                         Assessment: Tolerated treatment well  Patient would benefit from continued PT Lateral motions, shldr abd challenging w/ increase to 4# weights  Mild fatigue w/ addition of UBE this day  Slightly heavier in upper arm and posterior shldr this day w/ hiatus in care x 1 wk w/ PT on vacation  Plan to measure L UE NV  Plan: Continue per plan of care

## 2019-09-05 ENCOUNTER — OFFICE VISIT (OUTPATIENT)
Dept: PHYSICAL THERAPY | Facility: CLINIC | Age: 74
End: 2019-09-05
Payer: MEDICARE

## 2019-09-05 DIAGNOSIS — Z17.0 MALIGNANT NEOPLASM INVOLVING BOTH NIPPLE AND AREOLA OF LEFT BREAST IN MALE, ESTROGEN RECEPTOR POSITIVE (HCC): Primary | ICD-10-CM

## 2019-09-05 DIAGNOSIS — M25.512 ACUTE PAIN OF LEFT SHOULDER: ICD-10-CM

## 2019-09-05 DIAGNOSIS — C50.022 MALIGNANT NEOPLASM INVOLVING BOTH NIPPLE AND AREOLA OF LEFT BREAST IN MALE, ESTROGEN RECEPTOR POSITIVE (HCC): Primary | ICD-10-CM

## 2019-09-05 PROCEDURE — 97140 MANUAL THERAPY 1/> REGIONS: CPT | Performed by: PHYSICAL THERAPIST

## 2019-09-05 PROCEDURE — 97110 THERAPEUTIC EXERCISES: CPT | Performed by: PHYSICAL THERAPIST

## 2019-09-05 NOTE — PROGRESS NOTES
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, estrogen receptor positive ([facility] [address] ) C50 022     Z17 0    2  Acute pain of left shoulder M25 512                   Subjective: Pt admits more fatigue w/ increases in resistance and addition of UBE last visit        Objective: See treatment diary below      Precautions: NKA, S/P L BREAST MASTECTOMY    Daily Treatment Diary     Manual  -3 9-5     8-12 8-15 8-19   PROM L shldr jph jph jph     jph jph jph   Scap mobs jph jph jph     jph [de-identified] jph   MLD L scapula jph jph jph     jph jph jph                 x25' x30' x30'     x25' x25' x25'       Exercise Diary  -3 9-5     8-12 8-15 819   Scap retraction BlackTB 3s x30 BlackB 3s x30 BlackTB 3s x30     BlackTB 3s x30 BlackTB 3s x30 BlackTB 3s x30   Chin tucks 3s x30 3s x30 3s x30     3s x30 3s x30 3s x30   Pulleys x6' x6'  x6'     x6' x6' x6'   TB B shldr ext BlueTB x30 BlueTB x30 BlueTB x30     BlueTB x30 BlueTB x30 BlueTB x30   Grn strp IR str ----            Wand shldr abd ----       D/c ---- ---   Wand shldr IR 5s x20 5s x20 5s x20     5s x20 5s x20 5s x20   Wand shldr flex ---- ----      D/c ---- ---   AROM flex 3# x30 4# 2x15 4# 2x15     3# x30 3# x30 3# x30   AROM scaption 3# x30 4# 2x15 4# 2x15     3# x30 3# x30 3# x30   Wall push ups x30 x30 x30     x30 x30 x30   Wall circles: CW/CCW x30e CW/CCW x30e x30e cw/ccw     x30e CW/CCW x30e CW/CCW x30e CW/CCW   Shldr shrugs 8# x30 8# x30 8# x30     7# x30 7# x30 7# x30   Wall posture str 10s x5 10s x5 10s x5     10s x5 10s x5 10s x5   Wall chest/shldr ER str 10s x5 10s x5 10s x5     10s x5 10s x5 10s x5   TB triceps ext BlueTB x30 BlueTB x30 BlueTB x30     BlueTB x30 BlueTB x30 BlueTB x30   TB B shldr ER BlueTB x30 BlueTB x30 BlueTB x30     BlueTB x30 BlueTB x30 Blue TB x30   Wall angels x20 x20 x20     x15 x20 x20   TB upright row  GrnTB x30 GrnTB x30 GrnTB x30     GrnTB x20 GrnTB x30 GrnTB x30   UBE: Fwd/Rev  x2'e x2'e              Modalities                                                         Assessment: Tolerated treatment well  Patient would benefit from continued PT Pt demonstrates improved nemo to TE this day, still fatigues most w/ lateral motions w/ 4# wts  Improvement in L posterior shldr this day vs previous session w/ observed reduction in edema of posterior shldr   Plan to measure L UE NV  Plan: Continue per plan of care

## 2019-09-09 ENCOUNTER — OFFICE VISIT (OUTPATIENT)
Dept: PHYSICAL THERAPY | Facility: CLINIC | Age: 74
End: 2019-09-09
Payer: MEDICARE

## 2019-09-09 DIAGNOSIS — M25.512 ACUTE PAIN OF LEFT SHOULDER: ICD-10-CM

## 2019-09-09 DIAGNOSIS — C50.022 MALIGNANT NEOPLASM INVOLVING BOTH NIPPLE AND AREOLA OF LEFT BREAST IN MALE, ESTROGEN RECEPTOR POSITIVE (HCC): Primary | ICD-10-CM

## 2019-09-09 DIAGNOSIS — Z17.0 MALIGNANT NEOPLASM INVOLVING BOTH NIPPLE AND AREOLA OF LEFT BREAST IN MALE, ESTROGEN RECEPTOR POSITIVE (HCC): Primary | ICD-10-CM

## 2019-09-09 PROCEDURE — 97140 MANUAL THERAPY 1/> REGIONS: CPT | Performed by: PHYSICAL THERAPIST

## 2019-09-09 PROCEDURE — 97110 THERAPEUTIC EXERCISES: CPT | Performed by: PHYSICAL THERAPIST

## 2019-09-09 NOTE — PROGRESS NOTES
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, estrogen receptor positive ([facility] [address] ) C50 022     Z17 0    2  Acute pain of left shoulder M25 512                   Subjective: Pt presents amb w/ SPC this day complaining of R knee and thigh PN which comes and goes but recently more aggravated OTW  Pt instructed in proper use of SPC, fitted for proper sizing, and practiced use of SPC in L hand to reduce WB R LE which is obviously painful for pt, antalgic gt       Objective: See treatment diary below      Precautions: NKA, S/P L BREAST MASTECTOMY    Daily Treatment Diary     Manual   9-3 9-5 9-9    8-12 8-15 8   PROM L shldr jph jph jph jph    jph jph jph   Scap mobs jph jph jph jph    jph jph jph   MLD L scapula jph jph jph jph    jph jph jph                 x25' x30' x30' x30'    x25' x25' x25'       Exercise Diary   9-3 9-5 9-9    8-12 8-15 8   Scap retraction BlackTB 3s x30 BlackB 3s x30 BlackTB 3s x30 BlackTB 3s x30    BlackTB 3s x30 BlackTB 3s x30 BlackTB 3s x30   Chin tucks 3s x30 3s x30 3s x30 3s x30    3s x30 3s x30 3s x30   Pulleys x6' x6'  x6' x6'    x6' x6' x6'   TB B shldr ext BlueTB x30 BlueTB x30 BlueTB x30 BlueTB x30    BlueTB x30 BlueTB x30 BlueTB x30   Grn strp IR str ----            Wand shldr abd ----       D/c ---- ---   Wand shldr IR 5s x20 5s x20 5s x20 5s x20    5s x20 5s x20 5s x20   Wand shldr flex ---- ----      D/c ---- ---   AROM flex 3# x30 4# 2x15 4# 2x15 4# 2x15    3# x30 3# x30 3# x30   AROM scaption 3# x30 4# 2x15 4# 2x15 4# 2x15    3# x30 3# x30 3# x30   Wall push ups x30 x30 x30 x30    x30 x30 x30   Wall circles: CW/CCW x30e CW/CCW x30e x30e cw/ccw x30e CW/CCW    x30e CW/CCW x30e CW/CCW x30e CW/CCW   Shldr shrugs 8# x30 8# x30 8# x30 8# x30    7# x30 7# x30 7# x30   Wall posture str 10s x5 10s x5 10s x5 10s x5    10s x5 10s x5 10s x5   Wall chest/shldr ER str 10s x5 10s x5 10s x5 10s x5    10s x5 10s x5 10s x5   TB triceps ext BlueTB x30 BlueTB x30 BlueTB x30 BlueTB x30    BlueTB x30 BlueTB x30 BlueTB x30   TB B shldr ER BlueTB x30 BlueTB x30 BlueTB x30 BlueTB x30    BlueTB x30 BlueTB x30 Blue TB x30   Wall angels x20 x20 x20 x20    x15 x20 x20   TB upright row  GrnTB x30 GrnTB x30 GrnTB x30 GrnTB x30    GrnTB x20 GrnTB x30 GrnTB x30   UBE: Fwd/Rev  x2'e x2'e ---             Modalities                                                         Assessment: Tolerated treatment well  Patient would benefit from continued PT  Held UBE today w/ concern for pt's inability to amb w/o losing balance even w/ SPC due to PN R knee  Quick screen reveals lateral R knee jt line PN but also ttp along distal ITB to insertion  Advised pt to maintain use of CP x 10 mins several times per day  F/u w/ family MD of sx's worsen or linger  Plan: Continue per plan of care

## 2019-09-12 ENCOUNTER — OFFICE VISIT (OUTPATIENT)
Dept: SURGERY | Facility: CLINIC | Age: 74
End: 2019-09-12
Payer: MEDICARE

## 2019-09-12 VITALS
SYSTOLIC BLOOD PRESSURE: 130 MMHG | HEIGHT: 65 IN | WEIGHT: 198.6 LBS | HEART RATE: 75 BPM | BODY MASS INDEX: 33.09 KG/M2 | TEMPERATURE: 97.4 F | DIASTOLIC BLOOD PRESSURE: 90 MMHG

## 2019-09-12 DIAGNOSIS — C50.922: Primary | ICD-10-CM

## 2019-09-12 PROCEDURE — 1123F ACP DISCUSS/DSCN MKR DOCD: CPT | Performed by: SURGERY

## 2019-09-12 PROCEDURE — 99213 OFFICE O/P EST LOW 20 MIN: CPT | Performed by: SURGERY

## 2019-09-12 NOTE — H&P (VIEW-ONLY)
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  Metastatic to 1 lymph node with extranodal and static extension that focus measures 1 5 cm   10/10 lymph nodes were positive the largest 2 5 cm     [id number] left breast cancer, BRCA neg ER pos    Plan: re-excision of skin margins as seem to have a skip lesion as anterior margin was originally negative but additional margins was positive    Oncology follow-up continues as he remains on tamoxifen  _______________________________________________________  HPI:  Valeria Massey is a 66 y o male who was referred for evaluation of No chief complaint on file       Currently: breast cancer left breast  Duration symptoms:   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  Metastatic to 1 lymph node with extranodal and static extension that focus measures 1 5 cm   10/10 lymph nodes were positive the largest 2 5 cm       Previous breast biopsy: mastectomy left breast    Family history: None    ROS:  General ROS: negative  negative for - chills, fatigue, fever or night sweats, weight loss  Respiratory ROS: no cough, shortness of breath, or wheezing  Cardiovascular ROS: no chest pain or dyspnea on exertion  Genito-Urinary ROS: no dysuria, trouble voiding, or hematuria  Musculoskeletal ROS: negative for - gait disturbance, joint pain or muscle pain  Neurological ROS: no TIA or stroke symptoms  Breast ROS: see HPI  GI ROS: see HPI  Skin ROS: no new rashes or lesions   Lymphatic ROS: no new adenopathy noted by pt     GYN ROS: see HPI, no new GYN history or bleeding noted  Psy ROS: no new mental or behavioral disturbances               Patient Active Problem List   Diagnosis    Mass of breast, left    Hypertension    Leukocytosis    PAC (premature atrial contraction)    Pancreatic mass    Pancreatitis    RBBB (right bundle branch block with left anterior fascicular block)    Transaminitis    Carcinoma of male breast, left (HCC)    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, estrogen receptor positive (HCC)    Carcinoma of areola of left breast in male [facility])         Allergies: Patient has no known allergies  Meds:   Current Outpatient Medications:     acetaminophen (TYLENOL) 500 mg tablet, Take 500 mg by mouth every 6 (six) hours as needed, Disp: , Rfl:     furosemide (LASIX) 40 mg tablet, Take 40 mg by mouth daily, Disp: , Rfl: 0    losartan (COZAAR) 50 mg tablet, Take 50 mg by mouth daily , Disp: , Rfl: 0    polyethylene glycol (MIRALAX) 17 g packet, Take 17 g by mouth daily as needed , Disp: , Rfl:     tamoxifen (NOLVADEX) 20 mg tablet, Take 1 tablet (20 mg total) by mouth daily, Disp: 90 tablet, Rfl: 0    PMH:   Past Medical History:   Diagnosis Date    Acute gallstone pancreatitis     Arthritis     Atrial fibrillation ([facility] [address]     Cancer ([facility] [address] )     breast - left    Hypertension     Irregular heartbeat     PAC (premature atrial contraction)     PVC (premature ventricular contraction)     RBBB     Wears glasses        PSHx:   Past Surgical History:   Procedure Laterality Date    APPENDECTOMY      FRACTURE SURGERY Left     arm    PA LAP,CHOLECYSTECTOMY N/A 12/18/2018    Procedure: LAP CHOLECYSTECTOMY; LYSIS OF ADHESIONS; ATTEMPTED CHOLANGIOGRAM;  Surgeon: Clarissa Brewer MD;  Location: [facility];  Service: General    PA MASTECTOMY, MODIFIED RADICAL Left 5/2/2019    Procedure: MASTECTOMY MODIFIED RADICAL; AXILLARY DISSECTION;  Surgeon: Clarissa Brewer MD;  Location: [facility];  Service: General    TONSILLECTOMY         Family History:   Family History   Problem Relation Age of Onset    Cancer Mother     Heart disease Father        Social History:  reports that he has never smoked   He has never used smokeless tobacco  He reports that he does not drink alcohol or use drugs  Imaging: No new pertinent imaging studies  Lab Results   Component Value Date    WBC 9 23 06/26/2019    HGB 11 5 (L) 06/26/2019    HCT 36 8 06/26/2019     (H) 06/26/2019     06/26/2019     Lab Results   Component Value Date    K 4 0 06/26/2019     06/26/2019    CO2 26 06/26/2019    BUN 21 06/26/2019    CREATININE 1 19 06/26/2019    GLUF 84 06/26/2019    CALCIUM 9 0 06/26/2019    AST 17 06/26/2019    ALT 24 06/26/2019    ALKPHOS 87 06/26/2019    EGFR 59 06/26/2019       Vitals:    09/12/19 1258   BP: (!) 200/90   Temp: (!) 97 4 °F (36 3 °C)          PHYSICAL EXAM  General Appearance:    Alert, cooperative, no distress   Head:    Normocephalic without obvious abnormality   Eyes:    PERRL, conjunctiva/corneas clear, EOM's intact        Neck:   Supple, no adenopathy, no JVD   Back:     Symmetric, no spinal or CVA tenderness   Lungs:     Clear to auscultation bilaterally, no wheezing or rhonchi   Heart:  Breast:    Regular rate and rhythm, S1 and S2 normal, no murmur    Inspection negative, breasts appear normal, no suspicious masses, no skin or nipple changes or axillary nodes, surgical scars noted left breast  Into axilla  Abdomen:     Soft, nontender   Extremities:   Extremities normal  No clubbing, cyanosis or edema   Psych:   Normal Affect, AOx3  Neurologic:  Skin:   CNII-XII intact  Strength symmetric, speech intact    Warm, dry, intact, no visible rashes or lesions                       Signature:     oPrtillo Levy MD    Date: 9/12/2019 Time: 1:16 PM                 Some portions of this record may have been generated with voice recognition software  There may be translation, syntax,  or grammatical errors  Occasional wrong word or "sound-a-like" substitutions may have occurred due to the inherent limitations of the voice recognition software  Read the chart carefully and recognize, using context, where substitutions may have occurred  If you have any questions, please contact the dictating provider for clarification or correction, as needed  This encounter has been coded by a physician, non certified coder

## 2019-09-12 NOTE — PROGRESS NOTES
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  Metastatic to 1 lymph node with extranodal and static extension that focus measures 1 5 cm   10/10 lymph nodes were positive the largest 2 5 cm     [id number] left breast cancer, BRCA neg ER pos    Plan: re-excision of skin margins as seem to have a skip lesion as anterior margin was originally negative but additional margins was positive    Oncology follow-up continues as he remains on tamoxifen  _______________________________________________________  HPI:  Tyler Aschoff is a 66 y o male who was referred for evaluation of No chief complaint on file       Currently: breast cancer left breast  Duration symptoms:   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  Metastatic to 1 lymph node with extranodal and static extension that focus measures 1 5 cm   10/10 lymph nodes were positive the largest 2 5 cm       Previous breast biopsy: mastectomy left breast    Family history: None    ROS:  General ROS: negative  negative for - chills, fatigue, fever or night sweats, weight loss  Respiratory ROS: no cough, shortness of breath, or wheezing  Cardiovascular ROS: no chest pain or dyspnea on exertion  Genito-Urinary ROS: no dysuria, trouble voiding, or hematuria  Musculoskeletal ROS: negative for - gait disturbance, joint pain or muscle pain  Neurological ROS: no TIA or stroke symptoms  Breast ROS: see HPI  GI ROS: see HPI  Skin ROS: no new rashes or lesions   Lymphatic ROS: no new adenopathy noted by pt     GYN ROS: see HPI, no new GYN history or bleeding noted  Psy ROS: no new mental or behavioral disturbances               Patient Active Problem List   Diagnosis    Mass of breast, left    Hypertension    Leukocytosis    PAC (premature atrial contraction)    Pancreatic mass    Pancreatitis    RBBB (right bundle branch block with left anterior fascicular block)    Transaminitis    Carcinoma of male breast, left (HCC)    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, estrogen receptor positive (HCC)    Carcinoma of areola of left breast in male [facility])         Allergies: Patient has no known allergies  Meds:   Current Outpatient Medications:     acetaminophen (TYLENOL) 500 mg tablet, Take 500 mg by mouth every 6 (six) hours as needed, Disp: , Rfl:     furosemide (LASIX) 40 mg tablet, Take 40 mg by mouth daily, Disp: , Rfl: 0    losartan (COZAAR) 50 mg tablet, Take 50 mg by mouth daily , Disp: , Rfl: 0    polyethylene glycol (MIRALAX) 17 g packet, Take 17 g by mouth daily as needed , Disp: , Rfl:     tamoxifen (NOLVADEX) 20 mg tablet, Take 1 tablet (20 mg total) by mouth daily, Disp: 90 tablet, Rfl: 0    PMH:   Past Medical History:   Diagnosis Date    Acute gallstone pancreatitis     Arthritis     Atrial fibrillation ([facility] [address] )     Cancer ([facility] [address] )     breast - left    Hypertension     Irregular heartbeat     PAC (premature atrial contraction)     PVC (premature ventricular contraction)     RBBB     Wears glasses        PSHx:   Past Surgical History:   Procedure Laterality Date    APPENDECTOMY      FRACTURE SURGERY Left     arm    [place] LAP,CHOLECYSTECTOMY N/A 12/18/2018    Procedure: LAP CHOLECYSTECTOMY; LYSIS OF ADHESIONS; ATTEMPTED CHOLANGIOGRAM;  Surgeon: Jf Mcclure MD;  Location: [facility];  Service: General    [place] MASTECTOMY, MODIFIED RADICAL Left 5/2/2019    Procedure: MASTECTOMY MODIFIED RADICAL; AXILLARY DISSECTION;  Surgeon: Jf Mcclure MD;  Location: [facility];  Service: General    TONSILLECTOMY         Family History:   Family History   Problem Relation Age of Onset    Cancer Mother     Heart disease Father        Social History:  reports that he has never smoked   He has never used smokeless tobacco  He reports that he does not drink alcohol or use drugs  Imaging: No new pertinent imaging studies  Lab Results   Component Value Date    WBC 9 23 06/26/2019    HGB 11 5 (L) 06/26/2019    HCT 36 8 06/26/2019     (H) 06/26/2019     06/26/2019     Lab Results   Component Value Date    K 4 0 06/26/2019     06/26/2019    CO2 26 06/26/2019    BUN 21 06/26/2019    CREATININE 1 19 06/26/2019    GLUF 84 06/26/2019    CALCIUM 9 0 06/26/2019    AST 17 06/26/2019    ALT 24 06/26/2019    ALKPHOS 87 06/26/2019    EGFR 59 06/26/2019       Vitals:    09/12/19 1258   BP: (!) 200/90   Temp: (!) 97 4 °F (36 3 °C)          PHYSICAL EXAM  General Appearance:    Alert, cooperative, no distress   Head:    Normocephalic without obvious abnormality   Eyes:    PERRL, conjunctiva/corneas clear, EOM's intact        Neck:   Supple, no adenopathy, no JVD   Back:     Symmetric, no spinal or CVA tenderness   Lungs:     Clear to auscultation bilaterally, no wheezing or rhonchi   Heart:  Breast:    Regular rate and rhythm, S1 and S2 normal, no murmur    Inspection negative, breasts appear normal, no suspicious masses, no skin or nipple changes or axillary nodes, surgical scars noted left breast  Into axilla  Abdomen:     Soft, nontender   Extremities:   Extremities normal  No clubbing, cyanosis or edema   Psych:   Normal Affect, AOx3  Neurologic:  Skin:   CNII-XII intact  Strength symmetric, speech intact    Warm, dry, intact, no visible rashes or lesions                       Signature:     Ros Vivar MD    Date: 9/12/2019 Time: 1:16 PM                 Some portions of this record may have been generated with voice recognition software  There may be translation, syntax,  or grammatical errors  Occasional wrong word or "sound-a-like" substitutions may have occurred due to the inherent limitations of the voice recognition software  Read the chart carefully and recognize, using context, where substitutions may have occurred  If you have any questions, please contact the dictating provider for clarification or correction, as needed  This encounter has been coded by a physician, non certified coder

## 2019-09-13 ENCOUNTER — OFFICE VISIT (OUTPATIENT)
Dept: PHYSICAL THERAPY | Facility: CLINIC | Age: 74
End: 2019-09-13
Payer: MEDICARE

## 2019-09-13 ENCOUNTER — TELEPHONE (OUTPATIENT)
Dept: SURGERY | Facility: CLINIC | Age: 74
End: 2019-09-13

## 2019-09-13 DIAGNOSIS — C50.022 MALIGNANT NEOPLASM INVOLVING BOTH NIPPLE AND AREOLA OF LEFT BREAST IN MALE, ESTROGEN RECEPTOR POSITIVE (HCC): Primary | ICD-10-CM

## 2019-09-13 DIAGNOSIS — M25.512 ACUTE PAIN OF LEFT SHOULDER: ICD-10-CM

## 2019-09-13 DIAGNOSIS — Z17.0 MALIGNANT NEOPLASM INVOLVING BOTH NIPPLE AND AREOLA OF LEFT BREAST IN MALE, ESTROGEN RECEPTOR POSITIVE (HCC): Primary | ICD-10-CM

## 2019-09-13 PROCEDURE — 97140 MANUAL THERAPY 1/> REGIONS: CPT | Performed by: PHYSICAL THERAPIST

## 2019-09-13 PROCEDURE — 97110 THERAPEUTIC EXERCISES: CPT | Performed by: PHYSICAL THERAPIST

## 2019-09-13 NOTE — TELEPHONE ENCOUNTER
(Late Entry)    When patient presented for yesterdays appt with Dr Agustina Raphael in the [facility] office, he expressed displeasure with the "doctor" who came into exam room at last appt  He does not believe she was a physician  He believes that she was family/friend of staff - brought in to office to "observe" him  By description of her, I informed patient that she was a resident physician who was working with Dr Agustina Raphael at that time  He stated that he didn't believe me  I informed patient that resident physicians and/or medical students were with Dr Agustina Raphael as part of their training/rotation  He again stated that he felt I was not telling him the truth  Patient was being very argumentative and seemed to be in a foul mood  I told patient that for today's visit and for future visits, he could inform the MA that he did NOT want a resident in the exam room with him and his room would be marked as such  He, again, had some nasty, inappropriate comments regarding resident  Clinical was made aware of patients demeanor prior to them rooming the patient  Dr Agustina Raphael was paged to come to the office to see patient (she was in between emergent cases)      (After patient's visit, he appeared to have calmed down and seemed to be in a better frame of mind )

## 2019-09-13 NOTE — PROGRESS NOTES
Daily Note     Today's date: 2019  Patient name: Vannessa Dietrich  : 1945  MRN: [id number]  Referring provider: Haim Gaytan MD  Dx:   Encounter Diagnosis     ICD-10-CM    1  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, estrogen receptor positive ([facility] [address] ) C50 022     Z17 0    2  Acute pain of left shoulder M25 512                   Subjective: Pt remains w/ antalgic gait citing that his PN has gotten better overall, but still bothersome        Objective: See treatment diary below      Precautions: NKA, S/P L BREAST MASTECTOMY    Daily Treatment Diary     Manual  -3 9-5 9-9 9-13   8-12 815 819   PROM L shldr jph jph jph jph jph   jph jph jph   Scap mobs jph jph jph jph jph   jph jph jph   MLD L scapula jph jph jph jph jph   jph jph jph                 x25' x30' x30' x30' x30'   x25' x25' x25'       Exercise Diary   9-3 9-5 9-9 9-13   8-12 8-15 8-19   Scap retraction BlackTB 3s x30 BlackB 3s x30 BlackTB 3s x30 BlackTB 3s x30 BlackTB 3s x30   BlackTB 3s x30 BlackTB 3s x30 BlackTB 3s x30   Chin tucks 3s x30 3s x30 3s x30 3s x30 3s x30   3s x30 3s x30 3s x30   Pulleys x6' x6'  x6' x6' x6'   x6' x6' x6'   TB B shldr ext BlueTB x30 BlueTB x30 BlueTB x30 BlueTB x30 BlueTB x30   BlueTB x30 BlueTB x30 BlueTB x30   Grn strp IR str ----            Wand shldr abd ----       D/c ---- ---   Wand shldr IR 5s x20 5s x20 5s x20 5s x20 5s x20   5s x20 5s x20 5s x20   Wand shldr flex ---- ----      D/c ---- ---   AROM flex 3# x30 4# 2x15 4# 2x15 4# 2x15 4# 2x15   3# x30 3# x30 3# x30   AROM scaption 3# x30 4# 2x15 4# 2x15 4# 2x15 4# 2x15   3# x30 3# x30 3# x30   Wall push ups x30 x30 x30 x30 x30   x30 x30 x30   Wall circles: CW/CCW x30e CW/CCW x30e x30e cw/ccw x30e CW/CCW x30e CW/CCW   x30e CW/CCW x30e CW/CCW x30e CW/CCW   Shldr shrugs 8# x30 8# x30 8# x30 8# x30 8# x30   7# x30 7# x30 7# x30   Wall posture str 10s x5 10s x5 10s x5 10s x5 10s x5   10s x5 10s x5 10s x5   Wall chest/shldr ER str 10s x5 10s x5 10s x5 10s x5 10s x5   10s x5 10s x5 10s x5   TB triceps ext BlueTB x30 BlueTB x30 BlueTB x30 BlueTB x30 BlueTB x30   BlueTB x30 BlueTB x30 BlueTB x30   TB B shldr ER BlueTB x30 BlueTB x30 BlueTB x30 BlueTB x30 BlueTB x30   BlueTB x30 BlueTB x30 Blue TB x30   Wall angels x20 x20 x20 x20 x20   x15 x20 x20   TB upright row  GrnTB x30 GrnTB x30 GrnTB x30 GrnTB x30 GrnTB x30   GrnTB x20 GrnTB x30 GrnTB x30   UBE: Fwd/Rev  x2'e x2'e ---             Modalities                                                         Assessment: Tolerated treatment well  Patient would benefit from continued PT  Pt amb w/o SPC today, LOB x several incidents today, pt able to self correct  Advised to maintain use of SPC  Pt's PROM L shldr WNL's, mild PN at end range  Pt reports he saw his surgeon yesterday, plans for surgery to be performed 10-9-19  Plan: Continue per plan of care

## 2019-09-16 ENCOUNTER — OFFICE VISIT (OUTPATIENT)
Dept: PHYSICAL THERAPY | Facility: CLINIC | Age: 74
End: 2019-09-16
Payer: MEDICARE

## 2019-09-16 DIAGNOSIS — C50.022 MALIGNANT NEOPLASM INVOLVING BOTH NIPPLE AND AREOLA OF LEFT BREAST IN MALE, ESTROGEN RECEPTOR POSITIVE (HCC): Primary | ICD-10-CM

## 2019-09-16 DIAGNOSIS — Z17.0 MALIGNANT NEOPLASM INVOLVING BOTH NIPPLE AND AREOLA OF LEFT BREAST IN MALE, ESTROGEN RECEPTOR POSITIVE (HCC): Primary | ICD-10-CM

## 2019-09-16 DIAGNOSIS — M25.512 ACUTE PAIN OF LEFT SHOULDER: ICD-10-CM

## 2019-09-16 PROCEDURE — 97140 MANUAL THERAPY 1/> REGIONS: CPT | Performed by: PHYSICAL THERAPIST

## 2019-09-16 PROCEDURE — 97110 THERAPEUTIC EXERCISES: CPT | Performed by: PHYSICAL THERAPIST

## 2019-09-16 NOTE — PROGRESS NOTES
Daily Note     Today's date: 2019  Patient name: Erickson El  : 1945  MRN: [id number]  Referring provider: Wyvonne Canavan, MD  Dx:   Encounter Diagnosis     ICD-10-CM    1  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, estrogen receptor positive ([facility] [address] ) C50 022     Z17 0    2  Acute pain of left shoulder M25 512                   Subjective: Pt presents this day w/ SPC, less irritated hip yields better amb potential and balance this day    Pt denies sig PN thru shldr and chest region but admits a tightness still and also a sense that there is less muscle in his L chest      Objective: See treatment diary below      Precautions: NKA, S/P L BREAST MASTECTOMY    Daily Treatment Diary     Manual   9-3 9-5 9-9 9-13 9-16  8-12 8-15 8   PROM L shldr jph Thressa Mends jph   MLD L scapula jph jph jph jph jph jph  jph jph jph                 x25' x30' x30' x30' x30' x30'  x25' x25' x25'       Exercise Diary   9-3 9-5 9-9 9-13 9-16  8-12 8-15 8-19   Scap retraction BlackTB 3s x30 BlackB 3s x30 BlackTB 3s x30 BlackTB 3s x30 BlackTB 3s x30 BlackTB 3s x30  BlackTB 3s x30 BlackTB 3s x30 BlackTB 3s x30   Chin tucks 3s x30 3s x30 3s x30 3s x30 3s x30 3s x30  3s x30 3s x30 3s x30   Pulleys x6' x6'  x6' x6' x6' x6'  x6' x6' x6'   TB B shldr ext BlueTB x30 BlueTB x30 BlueTB x30 BlueTB x30 BlueTB x30 BlueTB x30  BlueTB x30 BlueTB x30 BlueTB x30   Grn strp IR str ----            Wand shldr abd ----       D/c ---- ---   Wand shldr IR 5s x20 5s x20 5s x20 5s x20 5s x20 5s x20  5s x20 5s x20 5s x20   Wand shldr flex ---- ----      D/c ---- ---   AROM flex 3# x30 4# 2x15 4# 2x15 4# 2x15 4# 2x15 4# 2x15  3# x30 3# x30 3# x30   AROM scaption 3# x30 4# 2x15 4# 2x15 4# 2x15 4# 2x15 4# 2x15  3# x30 3# x30 3# x30   Wall push ups x30 x30 x30 x30 x30 x30  x30 x30 x30   Wall circles: CW/CCW x30e CW/CCW x30e x30e cw/ccw x30e CW/CCW x30e CW/CCW x30e CW/CCW  x30e CW/CCW x30e CW/CCW x30e CW/CCW   Shldr shrugs 8# x30 8# x30 8# x30 8# x30 8# x30 8# x30  7# x30 7# x30 7# x30   Wall posture str 10s x5 10s x5 10s x5 10s x5 10s x5 10s x5  10s x5 10s x5 10s x5   Wall chest/shldr ER str 10s x5 10s x5 10s x5 10s x5 10s x5 10s x5  10s x5 10s x5 10s x5   TB triceps ext BlueTB x30 BlueTB x30 BlueTB x30 BlueTB x30 BlueTB x30 BlueTB x30  BlueTB x30 BlueTB x30 BlueTB x30   TB B shldr ER BlueTB x30 BlueTB x30 BlueTB x30 BlueTB x30 BlueTB x30 [id number]  BlueTB x30 BlueTB x30 Blue TB x30   Wall angels x20 x20 x20 x20 x20 x20  x15 x20 x20   TB upright row  GrnTB x30 GrnTB x30 GrnTB x30 GrnTB x30 GrnTB x30 GrnTB x30  GrnTB x20 GrnTB x30 GrnTB x30   UBE: Fwd/Rev  x2'e x2'e ---  x2'e           Modalities                                                         Assessment: Tolerated treatment well  Patient would benefit from continued PT  Pt has mild restriction in [facility] shldr flexion today, minimal PN  Plan to advance shldr stab strengthening and pectoral strengthening w/ anticipated d/c withi the next couple weeks  plans for surgery to be performed 10-9-19  Plan: Continue per plan of care

## 2019-09-16 NOTE — TELEPHONE ENCOUNTER
After rooming patient, he became upset and expressed displeasure in other coming into his exam room  He stated he didn't appreciate that at the last office visit the resident came in to talk with him  He stated she was not a doctor and thought she was my friend  I myself and Dr Moon Marroquin explained to him that she was a resident and not someone just off the street  Dr Moon Marroquin also explained we won't have anyone else see him but her or Fauzia Cardoza the PA  After he was done his visit with Dr Moon Marroquin, I brought him into the scheduling room to go over instructions with him for his upcoming surgery  Afterwards I let him talk, he enjoyed talking about his new car and showed me his flowers around around his house  He was pleasant at the end of the visit

## 2019-09-17 ENCOUNTER — OFFICE VISIT (OUTPATIENT)
Dept: HEMATOLOGY ONCOLOGY | Facility: CLINIC | Age: 74
End: 2019-09-17
Payer: MEDICARE

## 2019-09-17 VITALS
HEART RATE: 86 BPM | HEIGHT: 65 IN | DIASTOLIC BLOOD PRESSURE: 90 MMHG | RESPIRATION RATE: 18 BRPM | BODY MASS INDEX: 33.36 KG/M2 | TEMPERATURE: 98 F | OXYGEN SATURATION: 94 % | WEIGHT: 200.2 LBS | SYSTOLIC BLOOD PRESSURE: 148 MMHG

## 2019-09-17 DIAGNOSIS — Z17.0 MALIGNANT NEOPLASM INVOLVING BOTH NIPPLE AND AREOLA OF LEFT BREAST IN MALE, ESTROGEN RECEPTOR POSITIVE (HCC): Primary | ICD-10-CM

## 2019-09-17 DIAGNOSIS — C50.922: ICD-10-CM

## 2019-09-17 DIAGNOSIS — C50.022 MALIGNANT NEOPLASM INVOLVING BOTH NIPPLE AND AREOLA OF LEFT BREAST IN MALE, ESTROGEN RECEPTOR POSITIVE (HCC): Primary | ICD-10-CM

## 2019-09-17 DIAGNOSIS — D64.9 ANEMIA, UNSPECIFIED TYPE: ICD-10-CM

## 2019-09-17 PROCEDURE — 99214 OFFICE O/P EST MOD 30 MIN: CPT | Performed by: INTERNAL MEDICINE

## 2019-09-17 NOTE — PATIENT INSTRUCTIONS
The patient is aware seek medical attention for hot flashes, mood alteration, joint pain or muscle aches, lower extremity edema, excessive fatigue, or if other new problems arise

## 2019-09-17 NOTE — PROGRESS NOTES
9/17/2019    Albino Handsome    He experiences hot flashes for approximately 5 minutes 3-4 times per day on tamoxifen 20 mg daily  Left mastectomy scar excision for margins in the medial and inferior aspect is planned on October 9, 2019  Hematology/Oncology History:    Bilateral diagnostic mammogram November 12, 2018: David Cruz is a suspicious 6 cm mass of the central and upper outer quadrant of the left breast and a 2 3 cm left axillary node      Core biopsy of left subareolar mass on November 14, 2018: Invasive mucinous carcinoma, [place] grade 1, ER >95%, RI 75%, HER2 negative (0 by IHC)     Neoadjuvant tamoxifen 20 mg daily from December 27, 2018 until May 2, 2019  Left modified radical mastectomy and axillary LN D was done on May 2, 2019 by Dr Ros Vivar  There was invasive mixed mucinous carcinoma (invasive mammary carcinoma NOS and mucinous carcinoma), grade 2, 51 mm in greatest dimension involving dermis, ER 90-95%, RI 65-70%, HER2 negative (0 by IHC), metastatic carcinoma in 1 lymph left periaxillary node, 1 5 cm, invasive mammary carcinoma focally positive at the left inferior medial medial skin margin, ypT3 N3a, anatomic stage IIIC, pathologic prognostic stage IIIA    Tamoxifen 20 mg daily was resumed postoperatively at the patient's preference (although alternative treatment was recommended )    Review of systems:      Review of systems:  General: Feels well, no chills or swaets  Head and Neck: No nosebleeds, no oral cavity or throat soreness  Cardiovascular: No chest pain  He has been taking furosemide to control lower extremity edema in addition to utilizing knee-high graduated compression stockings  Respriatory: No cough  He notes mild chronic dyspnea on exertion with 1 flight of steps  GI:  Appetite is good, bowel habits formed and regular  : No urinary frequency    He occasionally has nocturia x1  Musculoskeletal:  He has chronic low back pain aggravated by bending   He has mild stiffness of the right knee, but denies arthritic discomfort otherwise  Skin: No skin rash  Neurological: No headache, no numbness, no weakness  Hematologic: No easy bruising  Psychiatric: No emotional problems  Physical Examination:    Blood pressure 148/90, pulse 86, temperature 98 °F (36 7 °C), resp  rate 18, height 5' 5" (1 651 m), weight 90 8 kg (200 lb 3 2 oz), SpO2 94 %  Body surface area is 1 98 meters squared  General appearance: Appears well  HEENT:  EOMI   Oropharynx clear   No lymphadenopathy of the neck  Chest: No axillary adenopathy  No recurrent tumor over the left chest wall, the right breast was not examined today  Lungs: Clear to auscultation bilaterally  Heart: Regular rate and rhythm  Abdomen:   Liver and spleen are difficult to assess due to body habitus; No inguinal  lymphadenopathy  Extremities:   He is wearing knee-high graduated compression stockings bilaterally, no lower extremity edema  Skin: No rashes  There is mild venous stasis dermatitis of the distal lower extremities left greater than right  Neurologic: Grossly intact, no focal neurological deficit  Psychiatric: Oriented to person, place and time, normal mood and affect    ECOG 1-2    Laboratory:    From June 26, 2019:  Creatinine 1 19, calcium 9 0, AST 17, ALT 24, alk-phos 87, bili 0 38, WBC 9 23, hemoglobin 11 5 with , platelets 617, serum iron is 71 with saturation 23%, ferritin 89    Assessment/Plan:    Fortunately, all gross left breast cancer has been surgically removed, however, the left inferior medial medial skin margin is focally positive  The patient refuses further evaluation with contrast enhanced CT scan of the chest, abdomen and pelvis to re-stage the left breast cancer  He is now agreeable  to undergo re-excision of the medial and inferior medial left mastectomy margin to reduce risk of local recurrence of breast cancer   Afterwards, radiation therapy to the left chest wall and axilla is recommended       The patient indicated that he would not accept adjuvant chemotherapy  Previously he indicated that he would consider consider adjuvant endocrine with an aromatase inhibitor in conjunction with a gonadal troponin releasing hormone such as goserelin  The purpose, means restriction potential risks of anastrozole and goserelin were reviewed and the patient was given information regarding these agents  After further consideration the patient declined anastrozole and goserelin and resume taking tamoxifen 20 mg daily on his own  He is aware that the tamoxifen may not be effective as an adjuvant treatment based upon lack of response to the neoadjuvant tamoxifen      The patient is aware seek medical attention for hot flashes, mood alteration, joint pain or muscle aches, lower extremity edema, excessive fatigue, or if other new problems arise  Otherwise, plan to see him again in 3 months  Today's office visit required 25 minutes, over 50% of the time was utilized to review diagnostic tests, impressions and recommendations with the patient

## 2019-09-19 ENCOUNTER — OFFICE VISIT (OUTPATIENT)
Dept: PHYSICAL THERAPY | Facility: CLINIC | Age: 74
End: 2019-09-19
Payer: MEDICARE

## 2019-09-19 DIAGNOSIS — M25.512 ACUTE PAIN OF LEFT SHOULDER: ICD-10-CM

## 2019-09-19 DIAGNOSIS — Z17.0 MALIGNANT NEOPLASM INVOLVING BOTH NIPPLE AND AREOLA OF LEFT BREAST IN MALE, ESTROGEN RECEPTOR POSITIVE (HCC): Primary | ICD-10-CM

## 2019-09-19 DIAGNOSIS — C50.022 MALIGNANT NEOPLASM INVOLVING BOTH NIPPLE AND AREOLA OF LEFT BREAST IN MALE, ESTROGEN RECEPTOR POSITIVE (HCC): Primary | ICD-10-CM

## 2019-09-19 PROCEDURE — 97140 MANUAL THERAPY 1/> REGIONS: CPT | Performed by: PHYSICAL THERAPIST

## 2019-09-19 PROCEDURE — 97110 THERAPEUTIC EXERCISES: CPT | Performed by: PHYSICAL THERAPIST

## 2019-09-19 NOTE — PROGRESS NOTES
Daily Note     Today's date: 2019  Patient name: Ivan Medina  : 1945  MRN: [id number]  Referring provider: Sukhwinder Diaz MD  Dx:   Encounter Diagnosis     ICD-10-CM    1  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, estrogen receptor positive ([facility] [address] ) C50 022     Z17 0    2  Acute pain of left shoulder M25 512                   Subjective: Pt ambulating w/o SPC, minimal hip PN c/o noted    Pt admits he is feling more comfortable w/ shldr, but still notices some heaviness in axilla ans also in spot posterior to shldr      Objective: See treatment diary below      Precautions: NKA, S/P L BREAST MASTECTOMY    Daily Treatment Diary     Manual  -3 9- 9-      PROM L shldr Jazmyne Alejo jph      MLD L scapula jph jph jph jph jph jph jph                    x25' x30' x30' x30' x30' x30' x30'          Exercise Diary   9-3 9-5 9-9 - 919      Scap retraction BlackTB 3s x30 BlackB 3s x30 BlackTB 3s x30 BlackTB 3s x30 BlackTB 3s x30 BlackTB 3s x30 BlackTB x30      Chin tucks 3s x30 3s x30 3s x30 3s x30 3s x30 3s x30 3s x30      Pulleys x6' x6'  x6' x6' x6' x6' x6'      TB B shldr ext BlueTB x30 BlueTB x30 BlueTB x30 BlueTB x30 BlueTB x30 BlueTB x30 BlueTB x30      Grn strp IR str ----            Wand shldr abd ----            Wand shldr IR 5s x20 5s x20 5s x20 5s x20 5s x20 5s x20 5s x20      Wand shldr flex ---- ----           AROM flex 3# x30 4# 2x15 4# 2x15 4# 2x15 4# 2x15 4# 2x15 4# 2x15      AROM scaption 3# x30 4# 2x15 4# 2x15 4# 2x15 4# 2x15 4# 2x15 4# 2x15      Wall push ups x30 x30 x30 x30 x30 x30 x30      Wall circles: CW/CCW x30e CW/CCW x30e x30e cw/ccw x30e CW/CCW x30e CW/CCW x30e CW/CCW x30e CW/CCW      Shldr shrugs 8# x30 8# x30 8# x30 8# x30 8# x30 8# x30 9# x30      Wall posture str 10s x5 10s x5 10s x5 10s x5 10s x5 10s x5 10s x5      Wall chest/shldr ER str 10s x5 10s x5 10s x5 10s x5 10s x5 10s x5 10s x5      TB triceps ext BlueTB x30 BlueTB x30 BlueTB x30 BlueTB x30 BlueTB x30 BlueTB x30 BlueTB x30      TB B shldr ER BlueTB x30 BlueTB x30 BlueTB x30 BlueTB x30 BlueTB x30 [id number] BlueTB x30      Wall angels x20 x20 x20 x20 x20 x20 x30      TB upright row  GrnTB x30 GrnTB x30 GrnTB x30 GrnTB x30 GrnTB x30 GrnTB x30 GrnTB x30      UBE: Fwd/Rev  x2'e x2'e ---  x2'e x2'e          Modalities                                                         Assessment: Tolerated treatment well  Patient would benefit from continued PT  ROM better today, returned to [facility] w/ minimal restriction  Strength advancing well, mild fatigue by end of TE session  Plan: Continue per plan of care

## 2019-09-23 ENCOUNTER — OFFICE VISIT (OUTPATIENT)
Dept: PHYSICAL THERAPY | Facility: CLINIC | Age: 74
End: 2019-09-23
Payer: MEDICARE

## 2019-09-23 DIAGNOSIS — Z17.0 MALIGNANT NEOPLASM INVOLVING BOTH NIPPLE AND AREOLA OF LEFT BREAST IN MALE, ESTROGEN RECEPTOR POSITIVE (HCC): Primary | ICD-10-CM

## 2019-09-23 DIAGNOSIS — M25.512 ACUTE PAIN OF LEFT SHOULDER: ICD-10-CM

## 2019-09-23 DIAGNOSIS — C50.022 MALIGNANT NEOPLASM INVOLVING BOTH NIPPLE AND AREOLA OF LEFT BREAST IN MALE, ESTROGEN RECEPTOR POSITIVE (HCC): Primary | ICD-10-CM

## 2019-09-23 PROCEDURE — 97140 MANUAL THERAPY 1/> REGIONS: CPT | Performed by: PHYSICAL THERAPIST

## 2019-09-23 PROCEDURE — 97110 THERAPEUTIC EXERCISES: CPT | Performed by: PHYSICAL THERAPIST

## 2019-09-23 NOTE — PROGRESS NOTES
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, estrogen receptor positive ([facility] [address] ) C50 022     Z17 0    2  Acute pain of left shoulder M25 512                   Subjective: Pt inquires if there is anything he shouldn't be doing w/ arm, reviewed lifting restrictions and avoiding over stress       Objective: See treatment diary below      Precautions: NKA, S/P L BREAST MASTECTOMY    Daily Treatment Diary     Manual  -3 9-5 9-9 9-13 9-16      PROM L shldr jph jph jph jph jph jph jph jph     Scap mobs jph jph jph jph jph jph jph jph     MLD L scapula jph jph jph jph jph jph jph jph                   x25' x30' x30' x30' x30' x30' x30' x20'         Exercise Diary   9-3 9-5 9-9 9-13 9-16      Scap retraction BlackTB 3s x30 BlackB 3s x30 BlackTB 3s x30 BlackTB 3s x30 BlackTB 3s x30 BlackTB 3s x30 BlackTB x30 BlackTB x30     Chin tucks 3s x30 3s x30 3s x30 3s x30 3s x30 3s x30 3s x30 3s x30     Pulleys x6' x6'  x6' x6' x6' x6' x6' x6'     TB B shldr ext BlueTB x30 BlueTB x30 BlueTB x30 BlueTB x30 BlueTB x30 BlueTB x30 BlueTB x30 BlueTB x30     Grn strp IR str ----            Wand shldr abd ----            Wand shldr IR 5s x20 5s x20 5s x20 5s x20 5s x20 5s x20 5s x20 5s x20     Wand shldr flex ---- ----           AROM flex 3# x30 4# 2x15 4# 2x15 4# 2x15 4# 2x15 4# 2x15 4# 2x15 4# 2x15     AROM scaption 3# x30 4# 2x15 4# 2x15 4# 2x15 4# 2x15 4# 2x15 4# 2x15 4# 2x15     Wall push ups x30 x30 x30 x30 x30 x30 x30 x30     Wall circles: CW/CCW x30e CW/CCW x30e x30e cw/ccw x30e CW/CCW x30e CW/CCW x30e CW/CCW x30e CW/CCW x30e CW/CCW     Shldr shrugs 8# x30 8# x30 8# x30 8# x30 8# x30 8# x30 9# x30 10# x30     Wall posture str 10s x5 10s x5 10s x5 10s x5 10s x5 10s x5 10s x5 10s x5     Wall chest/shldr ER str 10s x5 10s x5 10s x5 10s x5 10s x5 10s x5 10s x5 10s x5     TB triceps ext BlueTB x30 BlueTB x30 BlueTB x30 BlueTB x30 BlueTB x30 BlueTB x30 BlueTB x30 BlueTB x30     TB B shldr ER BlueTB x30 BlueTB x30 BlueTB x30 BlueTB x30 BlueTB x30 [id number] BlueTB x30 BlueTB x30     Wall angels x20 x20 x20 x20 x20 x20 x30 x30     TB upright row  GrnTB x30 GrnTB x30 GrnTB x30 GrnTB x30 GrnTB x30 GrnTB x30 GrnTB x30 GrnTB x30     UBE: Fwd/Rev  x2'e x2'e ---  x2'e x2'e x2'e L2         Modalities                                                         Assessment: Tolerated treatment well  Patient would benefit from continued PT  Fatigue w/ increased resistance this day for shrugs then AROM shldr motions  Plan: Continue per plan of care  Discussed d/c planning following next wks visits

## 2019-09-26 ENCOUNTER — APPOINTMENT (OUTPATIENT)
Dept: PREADMISSION TESTING | Facility: HOSPITAL | Age: 74
End: 2019-09-26
Payer: MEDICARE

## 2019-09-26 ENCOUNTER — ANESTHESIA EVENT (OUTPATIENT)
Dept: PERIOP | Facility: HOSPITAL | Age: 74
End: 2019-09-26
Payer: MEDICARE

## 2019-09-26 ENCOUNTER — OFFICE VISIT (OUTPATIENT)
Dept: PHYSICAL THERAPY | Facility: CLINIC | Age: 74
End: 2019-09-26
Payer: MEDICARE

## 2019-09-26 DIAGNOSIS — M25.512 ACUTE PAIN OF LEFT SHOULDER: ICD-10-CM

## 2019-09-26 DIAGNOSIS — C50.022 MALIGNANT NEOPLASM INVOLVING BOTH NIPPLE AND AREOLA OF LEFT BREAST IN MALE, ESTROGEN RECEPTOR POSITIVE (HCC): Primary | ICD-10-CM

## 2019-09-26 DIAGNOSIS — Z17.0 MALIGNANT NEOPLASM INVOLVING BOTH NIPPLE AND AREOLA OF LEFT BREAST IN MALE, ESTROGEN RECEPTOR POSITIVE (HCC): Primary | ICD-10-CM

## 2019-09-26 PROCEDURE — 97110 THERAPEUTIC EXERCISES: CPT | Performed by: PHYSICAL THERAPIST

## 2019-09-26 PROCEDURE — 97140 MANUAL THERAPY 1/> REGIONS: CPT | Performed by: PHYSICAL THERAPIST

## 2019-09-26 RX ORDER — SODIUM CHLORIDE 9 MG/ML
125 INJECTION, SOLUTION INTRAVENOUS CONTINUOUS
Status: CANCELLED | OUTPATIENT
Start: 2019-10-09

## 2019-09-26 NOTE — PROGRESS NOTES
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, estrogen receptor positive ([facility] [address] ) C50 022     Z17 0    2  Acute pain of left shoulder M25 512                   Subjective: Pt reports no sig PN remains in arm, slight discomfort in his side but for the most part that has cleared up  Pt also reports leg PN/hip PN is clearing up, noticeably this is the best pt has walked in several wks       Objective: See treatment diary below      Precautions: NKA, S/P L BREAST MASTECTOMY    Daily Treatment Diary     Manual  -3 9-5 9-9 --    PROM L shldr jph jph jph jph jph jph jph jph jph    Scap mobs jph jph jph jph jph jph jph jph jph    MLD L scapula jph jph jph jph jph jph jph jph jph                  x25' x30' x30' x30' x30' x30' x30' x20' x25'        Exercise Diary   9-3 9-5 9-9 --    Scap retraction BlackTB 3s x30 BlackB 3s x30 BlackTB 3s x30 BlackTB 3s x30 BlackTB 3s x30 BlackTB 3s x30 BlackTB x30 BlackTB x30 BlackTB x30    Chin tucks 3s x30 3s x30 3s x30 3s x30 3s x30 3s x30 3s x30 3s x30 3s x30    Pulleys x6' x6'  x6' x6' x6' x6' x6' x6' x6'    TB B shldr ext BlueTB x30 BlueTB x30 BlueTB x30 BlueTB x30 BlueTB x30 BlueTB x30 BlueTB x30 BlueTB x30 BlueTB x30    Grn strp IR str ----            Wand shldr abd ----            Wand shldr IR 5s x20 5s x20 5s x20 5s x20 5s x20 5s x20 5s x20 5s x20 5s x20    Wand shldr flex ---- ----           AROM flex 3# x30 4# 2x15 4# 2x15 4# 2x15 4# 2x15 4# 2x15 4# 2x15 4# 2x15 4# 2x15    AROM scaption 3# x30 4# 2x15 4# 2x15 4# 2x15 4# 2x15 4# 2x15 4# 2x15 4# 2x15 4# 2x15    Wall push ups x30 x30 x30 x30 x30 x30 x30 x30 x30    Wall circles: CW/CCW x30e CW/CCW x30e x30e cw/ccw x30e CW/CCW x30e CW/CCW x30e CW/CCW x30e CW/CCW x30e CW/CCW x30e CW/CCW    Shldr shrugs 8# x30 8# x30 8# x30 8# x30 8# x30 8# x30 9# x30 10# x30 10# x30    Wall posture str 10s x5 10s x5 10s x5 10s x5 10s x5 10s x5 10s x5 10s x5 10s x5    Wall chest/shldr ER str 10s x5 10s x5 10s x5 10s x5 10s x5 10s x5 10s x5 10s x5 10s x5    TB triceps ext BlueTB x30 BlueTB x30 BlueTB x30 BlueTB x30 BlueTB x30 BlueTB x30 BlueTB x30 BlueTB x30 BlueTB x30    TB B shldr ER BlueTB x30 BlueTB x30 BlueTB x30 BlueTB x30 BlueTB x30 LonxICx04 BlueTB x30 BlueTB x30 BlueTB x30    Wall angels x20 x20 x20 x20 x20 x20 x30 x30 x30    TB upright row  GrnTB x30 GrnTB x30 GrnTB x30 GrnTB x30 GrnTB x30 GrnTB x30 GrnTB x30 GrnTB x30 GrnTB x30    UBE: Fwd/Rev  x2'e x2'e ---  x2'e x2'e x2'e L2 x2'e L3        Modalities                                                         Assessment: Tolerated treatment well  Patient would benefit from continued PT  Minimal fatigue noted w/ TE this day, good response to progressions, anticipate potential d/c Next wk  Plan: Continue per plan of care  Discussed d/c planning following next wks visits

## 2019-09-26 NOTE — PERIOPERATIVE NURSING NOTE
Pt arrived in [facility] stating he requested this appointment because he had 3 questions for the anesthesiologist  Pt refused to get his weight checked  He also stated he did not want vitals signs checked or to discuss his health history or medications  Dr Quintin Arnett of anesthesia came to see the pt and he asked questions regarding his up-coming anesthesia  Pt was given the [facility] booklet, along with the chlorahexidine body wash to take home  Was instructed to wash the evening before surgery and morning of surgery  Pt instructed that he would receive phone call the evening before surgery with arrival time and instructions  Pt verbalized understanding

## 2019-09-30 ENCOUNTER — OFFICE VISIT (OUTPATIENT)
Dept: PHYSICAL THERAPY | Facility: CLINIC | Age: 74
End: 2019-09-30
Payer: MEDICARE

## 2019-09-30 DIAGNOSIS — C50.022 MALIGNANT NEOPLASM INVOLVING BOTH NIPPLE AND AREOLA OF LEFT BREAST IN MALE, ESTROGEN RECEPTOR POSITIVE (HCC): Primary | ICD-10-CM

## 2019-09-30 DIAGNOSIS — M25.512 ACUTE PAIN OF LEFT SHOULDER: ICD-10-CM

## 2019-09-30 DIAGNOSIS — Z17.0 MALIGNANT NEOPLASM INVOLVING BOTH NIPPLE AND AREOLA OF LEFT BREAST IN MALE, ESTROGEN RECEPTOR POSITIVE (HCC): Primary | ICD-10-CM

## 2019-09-30 PROCEDURE — 97140 MANUAL THERAPY 1/> REGIONS: CPT | Performed by: PHYSICAL THERAPIST

## 2019-09-30 NOTE — PROGRESS NOTES
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, estrogen receptor positive ([facility] [address] ) C50 022     Z17 0    2  Acute pain of left shoulder M25 512                   Subjective: Pt offers minimal c/o this day other than some tightness in his thorax that persists due to scar tissue       Objective: See treatment diary below      Precautions: NKA, S/P L BREAST MASTECTOMY    Daily Treatment Diary     Manual   9-3 9-5 9-9 9-13 -16  9   PROM L shldr jph jph jph jph jph jph jph jph jph jph   Scap mobs jph jph jph jph jph jph jph jph jph jph   MLD L scapula jph jph jph jph jph jph jph jph jph jph                 x25' x30' x30' x30' x30' x30' x30' x20' x25' x25'       Exercise Diary   9-3 9-5 9-9 9-13 9-16 9- 930   Scap retraction BlackTB 3s x30 BlackB 3s x30 BlackTB 3s x30 BlackTB 3s x30 BlackTB 3s x30 BlackTB 3s x30 BlackTB x30 BlackTB x30 BlackTB x30 BlackTB x30   Chin tucks 3s x30 3s x30 3s x30 3s x30 3s x30 3s x30 3s x30 3s x30 3s x30 3s x30   Pulleys x6' x6'  x6' x6' x6' x6' x6' x6' x6' x6'   TB B shldr ext BlueTB x30 BlueTB x30 BlueTB x30 BlueTB x30 BlueTB x30 BlueTB x30 BlueTB x30 BlueTB x30 BlueTB x30 BlueTB x30   Grn strp IR str ----            Wand shldr abd ----            Wand shldr IR 5s x20 5s x20 5s x20 5s x20 5s x20 5s x20 5s x20 5s x20 5s x20 5s x20   Wand shldr flex ---- ----           AROM flex 3# x30 4# 2x15 4# 2x15 4# 2x15 4# 2x15 4# 2x15 4# 2x15 4# 2x15 4# 2x15 4# 2x15   AROM scaption 3# x30 4# 2x15 4# 2x15 4# 2x15 4# 2x15 4# 2x15 4# 2x15 4# 2x15 4# 2x15 4# 2x15   Wall push ups x30 x30 x30 x30 x30 x30 x30 x30 x30 x30   Wall circles: CW/CCW x30e CW/CCW x30e x30e cw/ccw x30e CW/CCW x30e CW/CCW x30e CW/CCW x30e CW/CCW x30e CW/CCW x30e CW/CCW x30e CW/CCW Shldr shrugs 8# x30 8# x30 8# x30 8# x30 8# x30 8# x30 9# x30 10# x30 10# x30 10# x30   Wall posture str 10s x5 10s x5 10s x5 10s x5 10s x5 10s x5 10s x5 10s x5 10s x5 10s x5   Wall chest/shldr ER str 10s x5 10s x5 10s x5 10s x5 10s x5 10s x5 10s x5 10s x5 10s x5 10s x5   TB triceps ext BlueTB x30 BlueTB x30 BlueTB x30 BlueTB x30 BlueTB x30 BlueTB x30 BlueTB x30 BlueTB x30 BlueTB x30 BlueTB x30   TB B shldr ER BlueTB x30 BlueTB x30 BlueTB x30 BlueTB x30 BlueTB x30 RazlDCx10 BlueTB x30 BlueTB x30 BlueTB x30 BlueTB x30   Wall angels x20 x20 x20 x20 x20 x20 x30 x30 x30 x30   TB upright row  GrnTB x30 GrnTB x30 GrnTB x30 GrnTB x30 GrnTB x30 GrnTB x30 GrnTB x30 GrnTB x30 GrnTB x30 GrnTB x30   UBE: Fwd/Rev  x2'e x2'e ---  x2'e x2'e x2'e L2 x2'e L3 X2 5'e L3       Modalities                                                         Assessment: Tolerated treatment well  Patient would benefit from continued PT  Moderate scar tissue noted within thorax  Shldr ROM WNL's, still a mild fatigue w/ TE progression  Plan: Continue per plan of care  Discussed d/c planning following next wks visits

## 2019-10-02 ENCOUNTER — TELEPHONE (OUTPATIENT)
Dept: SURGERY | Facility: CLINIC | Age: 74
End: 2019-10-02

## 2019-10-02 NOTE — TELEPHONE ENCOUNTER
Per Sonia Garcia PA-C patient called to tell he has to be discharged from [place] [address] in order to go to physical therapy  Called and spoke to Keily Blanca at [address], she states patient has been discharged long ago already

## 2019-10-03 ENCOUNTER — OFFICE VISIT (OUTPATIENT)
Dept: PHYSICAL THERAPY | Facility: CLINIC | Age: 74
End: 2019-10-03
Payer: MEDICARE

## 2019-10-03 DIAGNOSIS — M25.512 ACUTE PAIN OF LEFT SHOULDER: ICD-10-CM

## 2019-10-03 DIAGNOSIS — Z17.0 MALIGNANT NEOPLASM INVOLVING BOTH NIPPLE AND AREOLA OF LEFT BREAST IN MALE, ESTROGEN RECEPTOR POSITIVE (HCC): Primary | ICD-10-CM

## 2019-10-03 DIAGNOSIS — C50.022 MALIGNANT NEOPLASM INVOLVING BOTH NIPPLE AND AREOLA OF LEFT BREAST IN MALE, ESTROGEN RECEPTOR POSITIVE (HCC): Primary | ICD-10-CM

## 2019-10-03 PROCEDURE — 97110 THERAPEUTIC EXERCISES: CPT | Performed by: PHYSICAL THERAPIST

## 2019-10-03 PROCEDURE — 97140 MANUAL THERAPY 1/> REGIONS: CPT | Performed by: PHYSICAL THERAPIST

## 2019-10-03 NOTE — PROGRESS NOTES
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, estrogen receptor positive ([facility] [address] ) C50 022     Z17 0    2  Acute pain of left shoulder M25 512                   Subjective: Pt reports doing well, has resumed "pretty normal" use of the arm and feels as if he is no longer limited in his daily routine/activity  Objective: See treatment diary below  L Shldr ROM A/P WNL's in all planes  L shldr strength:  4+/5 IR, 4/5 ER, 4/5 flexion, 4/5 abd    Flowsheet Rows      Most Recent Value   Thumb   L Thumb Initial Girth  6 5 cm   L Thumb Updated Girth  6 4 cm   L Thumb Girth Calculation  -0 1 cm   Palmar Crease   L Palmar Crease Initial Girth  21 25 cm   L Palmar Crease Updated Girth  21 cm   L Palmar Crease Girth Calculation  -0 25 cm   Wrist   L Wrist Initial Girth  17 75 cm   L Wrist Updated Girth  17 25 cm   L Wrist Girth Calculation  -0 5 cm   W+10cm   L W+10cm Initial Girth  25 9 cm   W+20cm   L W+20cm Initial Girth  27 9 cm   L W+20cm Updated Girth  26 cm   L W+20cm Girth Calculation  -1 9 cm   W+30cm   L W+30cm Initial Girth  30 5 cm   L W+30cm Updated Girth  30 5 cm   L W+30cm Girth Calculation  0 cm   W+40cm   L W+40cm Initial Girth  34 5 cm   L W+40cm Updated Girth  35 cm   L W+40cm Girth Calculation  0 5 cm        Goals  ST  Reduce PN <1/10 w/ all activity within  3 wks - MET  2  Increase L shldr ROM >25% within 3 wks - MET  3  Increase postural strength + 1 grade within 3 wks -MET  LT  I in HEP within 6 wks -MET  2   Return postural strength + L UE 5/5 within 6 wks -MET  3  L shldr ROM WNL's within 6 wks - MET  4  Reduce L posterior shldr/axillary edema >25% within 6 wks -Partially met    Precautions: NKA, S/P L BREAST MASTECTOMY    Daily Treatment Diary     Manual  10-3 9-30   PROM L shldr jph         jph   Scap mobs jph         jph   MLD L scapula jph         jph                 x25'         x25'       Exercise Diary  10-3         9-30   Scap retraction BlackTB 3s x30         BlackTB x30   Chin tucks 3s x30         3s x30   Pulleys x6'         x6'   TB B shldr ext BlueTB x30         BlueTB x30   Grn strp IR str ----            Wand shldr abd ----            Wand shldr IR 5s x20         5s x20   Wand shldr flex ----            AROM flex 4# x30         4# 2x15   AROM scaption 4# x30         4# 2x15   Wall push ups x30         x30   Wall circles: CW/CCW x30e CW/CCW         x30e CW/CCW   Shldr shrugs 10# x30         10# x30   Wall posture str 10s x5         10s x5   Wall chest/shldr ER str 10s x5         10s x5   TB triceps ext BlueTB x30         BlueTB x30   TB B shldr ER BlueTB x30         BlueTB x30   Wall angels x20         x30   TB upright row  GrnTB x30         GrnTB x30   UBE: Fwd/Rev x2 5'e L3         X2 5'e L3       Modalities                                                         Assessment: Tolerated treatment well  Now d/c skilled PT w/ goals met and anticipation of surgical intervention 10-9-19 to address cont'd CA concern  Advised pt to f/u as necessary afterwards  Plan: D/c skilled PT

## 2019-10-09 ENCOUNTER — HOSPITAL ENCOUNTER (OUTPATIENT)
Facility: HOSPITAL | Age: 74
Setting detail: OUTPATIENT SURGERY
Discharge: HOME | End: 2019-10-09
Attending: SURGERY | Admitting: SURGERY
Payer: MEDICARE

## 2019-10-09 ENCOUNTER — ANESTHESIA (OUTPATIENT)
Dept: PERIOP | Facility: HOSPITAL | Age: 74
End: 2019-10-09
Payer: MEDICARE

## 2019-10-09 VITALS
OXYGEN SATURATION: 98 % | HEART RATE: 70 BPM | WEIGHT: 200 LBS | BODY MASS INDEX: 33.32 KG/M2 | TEMPERATURE: 98.2 F | HEIGHT: 65 IN | DIASTOLIC BLOOD PRESSURE: 72 MMHG | SYSTOLIC BLOOD PRESSURE: 150 MMHG | RESPIRATION RATE: 16 BRPM

## 2019-10-09 DIAGNOSIS — C50.022: Primary | ICD-10-CM

## 2019-10-09 PROCEDURE — 88307 TISSUE EXAM BY PATHOLOGIST: CPT | Performed by: PATHOLOGY

## 2019-10-09 PROCEDURE — NC001 PR NO CHARGE: Performed by: SURGERY

## 2019-10-09 PROCEDURE — 21552 EXC NECK LES SC 3 CM/>: CPT | Performed by: SURGERY

## 2019-10-09 PROCEDURE — 21552 EXC NECK LES SC 3 CM/>: CPT | Performed by: PHYSICIAN ASSISTANT

## 2019-10-09 RX ORDER — DEXTROSE AND SODIUM CHLORIDE 5; .45 G/100ML; G/100ML
80 INJECTION, SOLUTION INTRAVENOUS CONTINUOUS
Status: DISCONTINUED | OUTPATIENT
Start: 2019-10-09 | End: 2019-10-09 | Stop reason: HOSPADM

## 2019-10-09 RX ORDER — MORPHINE SULFATE 10 MG/ML
2 INJECTION, SOLUTION INTRAMUSCULAR; INTRAVENOUS EVERY 2 HOUR PRN
Status: DISCONTINUED | OUTPATIENT
Start: 2019-10-09 | End: 2019-10-09 | Stop reason: HOSPADM

## 2019-10-09 RX ORDER — ONDANSETRON 2 MG/ML
4 INJECTION INTRAMUSCULAR; INTRAVENOUS EVERY 8 HOURS PRN
Status: DISCONTINUED | OUTPATIENT
Start: 2019-10-09 | End: 2019-10-09 | Stop reason: HOSPADM

## 2019-10-09 RX ORDER — DEXAMETHASONE SODIUM PHOSPHATE 4 MG/ML
INJECTION, SOLUTION INTRA-ARTICULAR; INTRALESIONAL; INTRAMUSCULAR; INTRAVENOUS; SOFT TISSUE AS NEEDED
Status: DISCONTINUED | OUTPATIENT
Start: 2019-10-09 | End: 2019-10-09 | Stop reason: SURG

## 2019-10-09 RX ORDER — KETOROLAC TROMETHAMINE 30 MG/ML
INJECTION, SOLUTION INTRAMUSCULAR; INTRAVENOUS AS NEEDED
Status: DISCONTINUED | OUTPATIENT
Start: 2019-10-09 | End: 2019-10-09 | Stop reason: SURG

## 2019-10-09 RX ORDER — ONDANSETRON 2 MG/ML
INJECTION INTRAMUSCULAR; INTRAVENOUS AS NEEDED
Status: DISCONTINUED | OUTPATIENT
Start: 2019-10-09 | End: 2019-10-09 | Stop reason: SURG

## 2019-10-09 RX ORDER — HYDROCODONE BITARTRATE AND ACETAMINOPHEN 5; 325 MG/1; MG/1
1 TABLET ORAL EVERY 4 HOURS PRN
Qty: 6 TABLET | Refills: 0 | Status: SHIPPED | OUTPATIENT
Start: 2019-10-09 | End: 2020-01-20 | Stop reason: ALTCHOICE

## 2019-10-09 RX ORDER — ONDANSETRON 4 MG/1
4 TABLET, FILM COATED ORAL EVERY 8 HOURS PRN
Qty: 20 TABLET | Refills: 0 | Status: SHIPPED | OUTPATIENT
Start: 2019-10-09 | End: 2020-01-20 | Stop reason: ALTCHOICE

## 2019-10-09 RX ORDER — HYDROCODONE BITARTRATE AND ACETAMINOPHEN 5; 325 MG/1; MG/1
1 TABLET ORAL EVERY 4 HOURS PRN
Status: DISCONTINUED | OUTPATIENT
Start: 2019-10-09 | End: 2019-10-09 | Stop reason: HOSPADM

## 2019-10-09 RX ORDER — IBUPROFEN 200 MG
400 TABLET ORAL EVERY 6 HOURS PRN
COMMUNITY
End: 2020-01-20 | Stop reason: ALTCHOICE

## 2019-10-09 RX ORDER — DOCUSATE SODIUM 100 MG/1
100 CAPSULE, LIQUID FILLED ORAL 2 TIMES DAILY
Qty: 60 CAPSULE | Refills: 0 | Status: SHIPPED | OUTPATIENT
Start: 2019-10-09 | End: 2020-01-20 | Stop reason: ALTCHOICE

## 2019-10-09 RX ORDER — FENTANYL CITRATE/PF 50 MCG/ML
50 SYRINGE (ML) INJECTION
Status: DISCONTINUED | OUTPATIENT
Start: 2019-10-09 | End: 2019-10-09 | Stop reason: HOSPADM

## 2019-10-09 RX ORDER — FENTANYL CITRATE 50 UG/ML
INJECTION, SOLUTION INTRAMUSCULAR; INTRAVENOUS AS NEEDED
Status: DISCONTINUED | OUTPATIENT
Start: 2019-10-09 | End: 2019-10-09 | Stop reason: SURG

## 2019-10-09 RX ORDER — ACETAMINOPHEN 325 MG/1
650 TABLET ORAL EVERY 6 HOURS PRN
Status: DISCONTINUED | OUTPATIENT
Start: 2019-10-09 | End: 2019-10-09 | Stop reason: HOSPADM

## 2019-10-09 RX ORDER — ONDANSETRON 2 MG/ML
4 INJECTION INTRAMUSCULAR; INTRAVENOUS ONCE AS NEEDED
Status: DISCONTINUED | OUTPATIENT
Start: 2019-10-09 | End: 2019-10-09 | Stop reason: HOSPADM

## 2019-10-09 RX ORDER — SODIUM CHLORIDE 9 MG/ML
125 INJECTION, SOLUTION INTRAVENOUS CONTINUOUS
Status: DISCONTINUED | OUTPATIENT
Start: 2019-10-09 | End: 2019-10-09 | Stop reason: HOSPADM

## 2019-10-09 RX ORDER — CEFAZOLIN SODIUM 2 G/50ML
2000 SOLUTION INTRAVENOUS ONCE
Status: COMPLETED | OUTPATIENT
Start: 2019-10-09 | End: 2019-10-09

## 2019-10-09 RX ORDER — HYDROMORPHONE HCL/PF 1 MG/ML
0.5 SYRINGE (ML) INJECTION
Status: DISCONTINUED | OUTPATIENT
Start: 2019-10-09 | End: 2019-10-09 | Stop reason: HOSPADM

## 2019-10-09 RX ORDER — PROPOFOL 10 MG/ML
INJECTION, EMULSION INTRAVENOUS AS NEEDED
Status: DISCONTINUED | OUTPATIENT
Start: 2019-10-09 | End: 2019-10-09 | Stop reason: SURG

## 2019-10-09 RX ORDER — MAGNESIUM HYDROXIDE 1200 MG/15ML
LIQUID ORAL AS NEEDED
Status: DISCONTINUED | OUTPATIENT
Start: 2019-10-09 | End: 2019-10-09 | Stop reason: HOSPADM

## 2019-10-09 RX ORDER — ONDANSETRON 4 MG/1
4 TABLET, ORALLY DISINTEGRATING ORAL EVERY 8 HOURS PRN
Status: DISCONTINUED | OUTPATIENT
Start: 2019-10-09 | End: 2019-10-09 | Stop reason: HOSPADM

## 2019-10-09 RX ADMIN — SODIUM CHLORIDE 125 ML/HR: 0.9 INJECTION, SOLUTION INTRAVENOUS at 08:42

## 2019-10-09 RX ADMIN — KETOROLAC TROMETHAMINE 15 MG: 30 INJECTION, SOLUTION INTRAMUSCULAR at 10:01

## 2019-10-09 RX ADMIN — FENTANYL CITRATE 25 MCG: 50 INJECTION, SOLUTION INTRAMUSCULAR; INTRAVENOUS at 09:57

## 2019-10-09 RX ADMIN — LIDOCAINE HYDROCHLORIDE 100 MG: 20 INJECTION, SOLUTION INTRAVENOUS at 09:43

## 2019-10-09 RX ADMIN — FENTANYL CITRATE 50 MCG: 50 INJECTION, SOLUTION INTRAMUSCULAR; INTRAVENOUS at 09:38

## 2019-10-09 RX ADMIN — ONDANSETRON 4 MG: 2 INJECTION INTRAMUSCULAR; INTRAVENOUS at 09:58

## 2019-10-09 RX ADMIN — DEXAMETHASONE SODIUM PHOSPHATE 4 MG: 4 INJECTION, SOLUTION INTRAMUSCULAR; INTRAVENOUS at 09:47

## 2019-10-09 RX ADMIN — CEFAZOLIN SODIUM 2000 MG: 2 SOLUTION INTRAVENOUS at 09:27

## 2019-10-09 RX ADMIN — PROPOFOL 200 MG: 10 INJECTION, EMULSION INTRAVENOUS at 09:43

## 2019-10-09 NOTE — INTERVAL H&P NOTE
H&P reviewed  After examining the patient I find no changes in the patients condition since the H&P had been written      Vitals:    10/09/19 0847   BP: 158/67   Pulse: 82   Resp: 18   Temp: 97 6 °F (36 4 °C)   SpO2: 96%

## 2019-10-09 NOTE — OP NOTE
BREAST RE-EXCISION OF SKIN MARGINS  Postoperative Note  PATIENT NAME: Jaymie Johnson  : 1945  MRN: [id number]  AL OR ROOM 07    Surgery Date: 10/9/2019    Pre operative diagnosis:   Carcinoma of areola of left breast in male, unspecified [facility] ) [C50 022]    Operative Indications:  Soft tissue mass    Operative Findings:  Re-excision of mastectomy margins with focus on inferior medial aspect as per pathology report  Specimen marked both by suture short superior long lateral and by margin marker    Consent:  The risks, benefits, and alternatives to the surgery were discussed with the patient and with the family prior to surgery, personally by Dr Diane Watson  If the consent was obtained by the physician assistant or other representative, the consent was reviewed once again personally by the operating physician  Common complications particular for this procedure as well as unusual complications were discussed, including but not limited to:  bleeding, wound infection, prolonged wound healing, open wounds, reoperation and/or recurrence of the lesion  A  was used if necessary  The patient expressed understanding of the issues discussed and wished and consented to the procedure to proceed  All questions were answered  Dr Diane Watson personally discussed the informed consent with this patient  Post operative diagnosis :and findings  Post-Op Diagnosis Codes:     * Carcinoma of areola of left breast in male, unspecified estrogen receptor status ([facility] ) [C50 022]    Procedure:   Procedure(s):  BREAST RE-EXCISION OF SKIN MARGINS    Surgeon(s) and Role:     * Jackie De Dios MD - Primary     * Aminta Vides PA-C - Assisting    The Physician Assistant was medically necessary for surgical safety the case including suturing, retraction, and hemostasis  No qualified resident was available  I was present for the entire procedure       Drains:  Closed/Suction Drain Left;Medial Chest Bulb 10 Fr  (Active)   Number of days: 160       Closed/Suction Drain Left;Lateral Chest Bulb 10 Fr  (Active)   Number of days: 160       Specimens:  ID Type Source Tests Collected by Time Destination   1 : Left Breast, Reexcision scar mastectomy site medial and inferior margins, short superior, long lateral,  Tissue Breast, Left TISSUE EXAM Beka Pink MD 10/9/2019 1011        Estimated Blood Loss:   * Case end time is documented earlier than case start time so the value cannot be calculated  *    Anesthesia Type:   Choice     Procedure: The patient was seen in the Holding Room  The risks, benefits, complications, treatment options, and expected outcomes were discussed with the patient  The possibilities of reaction to medication, bleeding, infection, the need for additional procedures, failure to diagnose a condition, and creating a complication operation were discussed with the patient  The patient concurred with the proposed plan, giving informed consent  The site of surgery properly noted/marked  The patient was taken to Operating Room, identified as Jeancarlos Levels and staff verified the patient name, , site, and laterality, if applicable  A Time Out was held and the above information confirmed  The patient was placed supine  The chest was prepped and draped in standard fashion  Local anesthesia was used to anesthetize the skin surrounding a 10 cm lesion  A oblique elliptical incision was made over the lesion  Sharp and blunt dissection were used to mobilize the mass which was in a subcutaneous location  Hemostasis was achieved with cautery  Scissors, knife, and cautery were used in the excision so that 5mm  margins were taken around the lesion        Tissue removed: without necrosis, devitalization, and non viable tissue     Type Tissue removed: skin, subcutaneous tissue and fat , this included the scar from the medial and inferior aspect to about 3/4 of the way across the chest wall  Flaps were created around the superior and inferior aspect of skin and soft tissue in order to bring the wound together without tension  Closure was achieved a with layered closure utilizing a 3-0 Vicryl subcutaneous layer and a  4-0 Monocryl subcuticular stitch  Additional nylon sutures were used as simple and horizontal mattress sutures to release any tension on the wound  Steristrips   applied and the wound dressed  At the end of the operation, all sponge, instrument, and needle counts were correct  Skin and soft tissue/subcutaneous tissue and fat were removed along with the mass  Some portions of this records may have been generated with voice recognition software  There may be translation, syntax,  or grammatical errors  Occasional wrong word or "sound-a-like" substitutions may have occurred due to the inherent limitations of the voice recognition software  Read the chart carefully and recognize, using context, where substations may have occurred  If you have any questions, please contact the dictating provider for clarification or correction, as needed         Complications: None    Condition: Stable to PACU    SIGNATURE: Beka Pink MD   DATE: October 9, 2019   TIME: 10:26 AM

## 2019-10-09 NOTE — DISCHARGE INSTRUCTIONS
Mily Gagnon Instructions  Dr Neymar Doyle MD, FACS    1  General: You will feel pulling sensations around the wound or funny aches and pains around the incisions  This is normal  Even minor surgery is a change in your body and this is your bodys way of reaction to it  If you have had abdominal surgery, it may help to support the incision with a small pillow or blanket for comfort when moving or coughing  2  Wound care: Make sure to remove the bandage in about 24 hours, unless instructed otherwise  You usually don't have to redress the wound after 24-48 hours, unless for comfort  Keep the incision clean and dry  Let air get to it  If this Steri-Strips fall off, just keep the wound clean  3  Water: You may shower over the wound, unless there are drain tubes left in place  Do not bathe or use a pool or hot tub until cleared by the physician  You may shower right over the staples or Steri-Strips and packing dry when you are done  4  Activity: You may go up and down stairs, walk as much as you are comfortable, but walk at least 3 times each day  If you have had abdominal surgery, do not lift anything heavier than 15 pounds for at least 2-4 weeks, unless cleared by the doctor  5  Diet: You may resume a regular diet  If you had a same-day surgery or overnight stay surgery, you may wish to eat lightly for a few days: soups, crackers, and sandwiches  You may resume a regular diet when ready  6  Medications: Resume all of your previous medications, unless told otherwise by the doctor  Avoid aspirin or ibuprofen (Advil, Motrin, etc ) products for 2-3 days after the date of surgery  You may, at that time, began to take them again  Tylenol is always fine, unless you are taking any narcotic pain medication containing Tylenol (such as Percocet, Darvocet, Vicodin, or anything containing acetaminophen)  Do not take Tylenol if you're taking these medications   You do not need to take the narcotic pain medications unless you are having significant pain and discomfort  7  Driving: You will need someone to drive you home on the day of surgery  Do not drive or make any important decisions while on narcotic pain medication or 24 hours and after anesthesia or sedation for surgery  Generally, you may drive when your off all narcotic pain medications  8  Upset Stomach: You may take Maalox, Tums, or similar items for an upset stomach  If your narcotic pain medication causes an upset stomach, do not take it on an empty stomach  Try taking it with at least some crackers or toast      9  Constipation: Patients often experienced constipation after surgery  You may take over-the-counter medication for this, such as Metamucil, Senokot, Dulcolax, milk of magnesia, etc  You may take a suppository unless you have had anorectal surgery such as a procedure on your hemorrhoids  If you experience significant nausea or vomiting after abdominal surgery, call the office before trying any of these medications  10  Call the office: If you are experiencing any of the following, fevers above 101 5°, significant nausea or vomiting, if the wound develops drainage and/or is excessive redness around the wound, or if you have significant diarrhea or other worsening symptoms  11  Pain: You may be given a prescription for pain  This will be given to the hospital, the day of surgery  12  Sexual Activity: You may resume sexual activity when you feel ready and comfortable and your incision is sealed and healed without apparent infection risk  13  Urination: If you haven't urinated in 6 hours, go directly to the ER for evaluation for urinary retention       [address]  [place], [address]   Phone: [phone]

## 2019-10-09 NOTE — DISCHARGE SUMMARY
Discharge Summary - Rubio Henriquez 66 y o  male MRN: [id number]    Unit/Bed#: OR POOL Encounter: [id number]      Pre-Operative Diagnosis: Pre-Op Diagnosis Codes:     * Carcinoma of areola of left breast in male, unspecified estrogen receptor status ([facility] [address] ) [C50 022]    Post-Operative Diagnosis: Post-Op Diagnosis Codes:     * Carcinoma of areola of left breast in male, unspecified estrogen receptor status [facility] ) [C50 022]    Procedures Performed:  Procedure(s):  BREAST RE-EXCISION OF SKIN MARGINS    Surgeon: Chely Del Rosario MD    See H & P for full details of admission and Operative Note for full details of operations performed  Patient was seen and examined prior to discharge  Provisions for Follow-Up Care:  See After Visit Summary for information related to follow-up care and home orders  Disposition: Home, in stable condition  Planned Readmission: No    Discharge Medications:  See after visit summary for reconciled discharge medications provided to patient and family  Post Operative instructions: Reviewed with patient and/or family  Of note patient is request that we do not call his wife until ready for discharge  Some portions of this record may have been generated with voice recognition software  There may be translation, syntax,  or grammatical errors  Occasional wrong word or "sound-a-like" substitutions may have occurred due to the inherent limitations of the voice recognition software  Read the chart carefully and recognize, using context, where substitutions may have occurred  If you have any questions, please contact the dictating provider for clarification or correction, as needed  This encounter has been coded by non certified Coder      Signature:   Chely Del Rosario MD  Date: 10/9/2019 Time: 10:28 AM

## 2019-10-10 ENCOUNTER — TELEPHONE (OUTPATIENT)
Dept: SURGERY | Facility: CLINIC | Age: 74
End: 2019-10-10

## 2019-10-10 NOTE — TELEPHONE ENCOUNTER
Called and spoke to patient  Post-op BREAST RE-EXCISION OF SKIN MARGINS on 10/9/19  "Doing pretty great, I have the best doctor"  Denies any N/V/F/C  Eating and drinking ok  He just made some eggs and they tasted fantastic  He is washing the dishes  No BM yet but feels he will go today  Will use colace daily  Instructed him to keep incision dry and clean  He is aware he will receive a call once pathology is finalized  Confirmed post-op appt for 10/21  He will contact office sonner with any questions/concerns  Path pending

## 2019-10-11 NOTE — TELEPHONE ENCOUNTER
Call from patient  Concerned that the incision from surgery looks more like 8 inches vs  The 4 inches that doctor said it would be  He is concerned that the doctor may have found something  He wanted to speak to Dr Saulo Haro  I made hi aware she was not at the office at this time  He is aware I read over the OP report while on the phone with him and that it didn't mention anything out of the ordinary  He is aware that I would talk to Dr Saulo Haro if she stopped in and check with her and that I angie call him back  He is aware I may not see her until Monday  Patient agreeable with plan

## 2019-10-14 ENCOUNTER — OFFICE VISIT (OUTPATIENT)
Dept: SURGERY | Facility: CLINIC | Age: 74
End: 2019-10-14

## 2019-10-14 VITALS
TEMPERATURE: 97.9 F | HEIGHT: 65 IN | HEART RATE: 70 BPM | BODY MASS INDEX: 32.76 KG/M2 | SYSTOLIC BLOOD PRESSURE: 130 MMHG | WEIGHT: 196.6 LBS | DIASTOLIC BLOOD PRESSURE: 70 MMHG

## 2019-10-14 DIAGNOSIS — C50.922: Primary | ICD-10-CM

## 2019-10-14 DIAGNOSIS — Z71.89 COUNSELING AND COORDINATION OF CARE: ICD-10-CM

## 2019-10-14 PROCEDURE — 99024 POSTOP FOLLOW-UP VISIT: CPT | Performed by: SURGERY

## 2019-10-14 NOTE — PROGRESS NOTES
Assessment/Plan:   Nikkie King is a 66 y o male who comes in today for postoperative check after excision of a re-excision of his left mastectomy scar for negative margins  Pathology is still pending  He is doing quite well  He had a small drop of blood come from the incision and the wife was worried  This is the 1st time I have met her so we had a lengthy counseling discussion  1  Wound is healing nicely both postop week 1  Pathology pending  2  Extensive lengthy counseling over 45 minutes to an hour with wife as this is the 1st time of actually met her  She has been a challenge in the past     She mentions that she thought only 1 node was positive  I pulled out the pathology report and related to her verbatim and showed her the report  He has 10/10 nodes including a note high up in the area of the axillary vein  These are all positive  This relates his very high risk of systemic disease in the future  She went into extensive discussion about how her mother  of complications of radiation therapy in the 46s as did her father and she was extremely negative in reference to her , comment in front of him ,  referring to how he will die and "ugly"  death  She started making comments like so will he die, does he have   brain Mets already? She was extremely negative  We discussed that at this point he is in remission and has seems to have control of his disease by surgical management as well as tamoxifen  I offered emotional support, offered referral for cancer therapy counseling, especially in the lack of social support he seems to have at home, and discussed post cancer therapy depression and over anxiety regarding recurrent disease  At this point he has remission from his advanced breast cancer  [id number] stage IIIC   Greater than 5 cm in size, 10/10 nodes  BRCA negative  No children  He had neoadjuvant tamoxifen for approximately 6 months prior to surgery  He continues on tamoxifen now on suffers from hot flashes but is willing to continue to work his way through them  Postoperative restrictions reviewed  All questions answered  ____________________________________________________________    HPI:  Jeancarlos Rizzo is a 66 y o male who comes in today for postoperative check after recent surgery  Currently doing well without problems, no fever or chills,no nausea and no vomiting  Reports doing well  A small drop of blood coming from the incision for which he was worried but this is clinically insignificant       ROS:  General ROS: negative for - chills, fatigue, fever or night sweats, weight loss  Respiratory ROS: no cough, shortness of breath, or wheezing  Cardiovascular ROS: no chest pain or dyspnea on exertion  Genito-Urinary ROS: no dysuria, trouble voiding, or hematuria  Musculoskeletal ROS: negative for - gait disturbance, joint pain or muscle pain  Neurological ROS: no TIA or stroke symptoms  GI ROS: see HPI  Skin ROS: no new rashes or lesions   Lymphatic ROS: no new adenopathy noted by pt  GYN ROS: see HPI, no new GYN history or bleeding noted  Psy ROS: no new mental or behavioral disturbances       Patient Active Problem List   Diagnosis    Mass of breast, left    Hypertension    Leukocytosis    PAC (premature atrial contraction)    Pancreatic mass    Pancreatitis    RBBB (right bundle branch block with left anterior fascicular block)    Transaminitis    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, estrogen receptor positive (HCC)    Carcinoma of areola of left breast in male ([facility] [address] )    Anemia, unspecified         Allergies:  Patient has no known allergies        Current Outpatient Medications:     acetaminophen (TYLENOL) 500 mg tablet, Take 500 mg by mouth every 6 (six) hours as needed, Disp: , Rfl:     docusate sodium (COLACE) 100 mg capsule, Take 1 capsule (100 mg total) by mouth 2 (two) times a day, Disp: 60 capsule, Rfl: 0    furosemide (LASIX) 40 mg tablet, Take 40 mg by mouth daily, Disp: , Rfl: 0    ibuprofen (MOTRIN) 200 mg tablet, Take 400 mg by mouth every 6 (six) hours as needed for mild pain, Disp: , Rfl:     losartan (COZAAR) 50 mg tablet, Take 50 mg by mouth daily , Disp: , Rfl: 0    ondansetron (ZOFRAN) 4 mg tablet, Take 1 tablet (4 mg total) by mouth every 8 (eight) hours as needed for nausea or vomiting for up to 7 days, Disp: 20 tablet, Rfl: 0    polyethylene glycol (MIRALAX) 17 g packet, Take 17 g by mouth daily as needed , Disp: , Rfl:     tamoxifen (NOLVADEX) 20 mg tablet, Take 1 tablet (20 mg total) by mouth daily, Disp: 90 tablet, Rfl: 0    HYDROcodone-acetaminophen (NORCO) 5-325 mg per tablet, Take 1 tablet by mouth every 4 (four) hours as needed for pain for up to 6 dosesMax Daily Amount: 6 tablets (Patient not taking: Reported on 10/14/2019), Disp: 6 tablet, Rfl: 0    Past Medical History:   Diagnosis Date    Acute gallstone pancreatitis     Arthritis     Atrial fibrillation (HCC)     Cancer (HCC)     breast - left    Hypertension     Irregular heartbeat     PAC (premature atrial contraction)     PVC (premature ventricular contraction)     RBBB     Wears glasses        Past Surgical History:   Procedure Laterality Date    APPENDECTOMY      CHOLECYSTECTOMY      FRACTURE SURGERY Left     arm    NE EXC SKIN BENIG 3 1-4 CM TRUNK,ARM,LEG Left 10/9/2019    Procedure: BREAST RE-EXCISION OF SKIN MARGINS;  Surgeon: Lamonte Tapia MD;  [facility];  Service: General    NE LAP,CHOLECYSTECTOMY N/A 12/18/2018    Procedure: LAP CHOLECYSTECTOMY; LYSIS OF ADHESIONS; ATTEMPTED CHOLANGIOGRAM;  Surgeon: Lamonte Tapia MD;  [facility];  Service: General    NE MASTECTOMY, MODIFIED RADICAL Left 5/2/2019    Procedure: MASTECTOMY MODIFIED RADICAL; AXILLARY DISSECTION;  Surgeon: Lamonte Tapia MD;  [facility];  Service: General    TONSILLECTOMY Family History   Problem Relation Age of Onset    Cancer Mother     Heart disease Father         reports that he has never smoked  He has never used smokeless tobacco  He reports that he does not drink alcohol or use drugs  Invalid input(s):  EOSPCT          Invalid input(s): LABALBU    Imaging: No new pertinent imaging studies  Vitals:    10/14/19 1030   BP: 130/70   Pulse: 70   Temp: 97 9 °F (36 6 °C)        PHYSICAL EXAM  General: normal, cooperative, no distress  Incision: clean, dry, and intact and healing well      Some portions of this record may have been generated with voice recognition software  There may be translation, syntax,  or grammatical errors  Occasional wrong word or "sound-a-like" substitutions may have occurred due to the inherent limitations of the voice recognition software  Read the chart carefully and recognize, using context, where substitutions may have occurred  If you have any questions, please contact the dictating provider for clarification or correction, as needed  This encounter has been coded by a non-certified coder         Cecilia Hamilton MD    Date: 10/14/2019 Time: 11:15 AM

## 2019-10-14 NOTE — TELEPHONE ENCOUNTER
Patient stopped into office this morning, with wife, with questions for Dr Sarah Camacho  Scheduled patient for a walk-in, same day, POPV appt with Dr Sarah Camacho   (lengthy appt = 45 minutes with provider)

## 2019-10-16 NOTE — RESULT ENCOUNTER NOTE
Please call pt with abnormal results and schedule follow up  Let the patient know that there was residual cancer in the scar and it is good that we got it out  Do let him know that all margins are negative and we got it all out as he will ask you that question  It appears the margins are negative  But was good we did this

## 2019-10-21 ENCOUNTER — OFFICE VISIT (OUTPATIENT)
Dept: SURGERY | Facility: CLINIC | Age: 74
End: 2019-10-21

## 2019-10-21 VITALS
DIASTOLIC BLOOD PRESSURE: 70 MMHG | RESPIRATION RATE: 18 BRPM | WEIGHT: 198 LBS | BODY MASS INDEX: 32.99 KG/M2 | SYSTOLIC BLOOD PRESSURE: 136 MMHG | HEART RATE: 96 BPM | TEMPERATURE: 98 F | HEIGHT: 65 IN

## 2019-10-21 DIAGNOSIS — C50.922: Primary | ICD-10-CM

## 2019-10-21 DIAGNOSIS — Z17.0: Primary | ICD-10-CM

## 2019-10-21 PROCEDURE — 99024 POSTOP FOLLOW-UP VISIT: CPT | Performed by: SURGERY

## 2019-10-21 NOTE — PROGRESS NOTES
Assessment/Plan:   Wilhemena Habermann is a 66 y o male who comes in today for postoperative check after excision of a status post re-excision of a left mastectomy scar for advanced age local breast cancer  Pathology is benign  Interestingly, the pathology pathology showed multiple breast cancer lesions closest 1 was 0 7 millimeters from the margin but the margins are negative  The original margins from the original mastectomy were negative but additional margins taken at this time of surgery showed some skip lesions  This pathology from the 2nd surgery, his 3rd margin excision, shows more skip lesions and negative margins  Patient may require or benefit from radiation to the chest wall considering we do not know if he has other skip lesions  I will leave this to the discretion of his oncologist with hernias an appointment in the next few weeks  Discussed postoperative work, restrictions and other activity levels  He seems very pleasant today  Her wound is healed nicely and is very pleased with his results  Will see him back in 3 months for continued follow-up  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, 5 1 centimeters  Metastatic to multiple lymph nodes, 1 of those is fairly extensive and high in the axilla  He had preoperative tamoxifen  [id number], anatomic stage IIIC, pathologic prognosis stage IIIA  Per Oncology  ER greater than 95 percent, VT 75 percent HER2 Mayuri negative  Took neoadjuvant tamoxifen 20 milligrams daily from December 27, 2018 until May 2, 2019    ____________________________________________________________    HPI:  Wilhemena Habermann is a 66 y o male who comes in today for postoperative check after recent surgery  Currently doing well without problems, no fever or chills,no nausea and no vomiting  Reports doing well  No problems  No seroma  Sutures removed today       ROS:  General ROS: negative for - chills, fatigue, fever or night sweats, weight loss  Respiratory ROS: no cough, shortness of breath, or wheezing  Cardiovascular ROS: no chest pain or dyspnea on exertion  Genito-Urinary ROS: no dysuria, trouble voiding, or hematuria  Musculoskeletal ROS: negative for - gait disturbance, joint pain or muscle pain  Neurological ROS: no TIA or stroke symptoms  GI ROS: see HPI  Skin ROS: no new rashes or lesions   Lymphatic ROS: no new adenopathy noted by pt  GYN ROS: see HPI, no new GYN history or bleeding noted  Psy ROS: no new mental or behavioral disturbances       Patient Active Problem List   Diagnosis    Mass of breast, left    Hypertension    Leukocytosis    PAC (premature atrial contraction)    Pancreatic mass    Pancreatitis    RBBB (right bundle branch block with left anterior fascicular block)    Transaminitis    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, estrogen receptor positive (HCC)    Carcinoma of areola of left breast in male ([facility] [address] )    Anemia, unspecified         Allergies:  Patient has no known allergies        Current Outpatient Medications:     furosemide (LASIX) 40 mg tablet, Take 40 mg by mouth daily, Disp: , Rfl: 0    ibuprofen (MOTRIN) 200 mg tablet, Take 400 mg by mouth every 6 (six) hours as needed for mild pain, Disp: , Rfl:     losartan (COZAAR) 50 mg tablet, Take 50 mg by mouth daily , Disp: , Rfl: 0    Multiple Vitamins-Minerals (CENTRUM SILVER 50+MEN PO), Take by mouth, Disp: , Rfl:     polyethylene glycol (MIRALAX) 17 g packet, Take 17 g by mouth daily as needed , Disp: , Rfl:     tamoxifen (NOLVADEX) 20 mg tablet, Take 1 tablet (20 mg total) by mouth daily, Disp: 90 tablet, Rfl: 0    acetaminophen (TYLENOL) 500 mg tablet, Take 500 mg by mouth every 6 (six) hours as needed, Disp: , Rfl:     docusate sodium (COLACE) 100 mg capsule, Take 1 capsule (100 mg total) by mouth 2 (two) times a day (Patient not taking: Reported on 10/21/2019), Disp: 60 capsule, Rfl: 0   HYDROcodone-acetaminophen (NORCO) 5-325 mg per tablet, Take 1 tablet by mouth every 4 (four) hours as needed for pain for up to 6 dosesMax Daily Amount: 6 tablets (Patient not taking: Reported on 10/14/2019), Disp: 6 tablet, Rfl: 0    ondansetron (ZOFRAN) 4 mg tablet, Take 1 tablet (4 mg total) by mouth every 8 (eight) hours as needed for nausea or vomiting for up to 7 days, Disp: 20 tablet, Rfl: 0    Past Medical History:   Diagnosis Date    Acute gallstone pancreatitis     Arthritis     Atrial fibrillation (HCC)     Cancer (HCC)     breast - left    Hypertension     Irregular heartbeat     PAC (premature atrial contraction)     PVC (premature ventricular contraction)     RBBB     Wears glasses        Past Surgical History:   Procedure Laterality Date    APPENDECTOMY      CHOLECYSTECTOMY      FRACTURE SURGERY Left     arm    MS EXC SKIN BENIG 3 1-4 CM TRUNK,ARM,LEG Left 10/9/2019    Procedure: BREAST RE-EXCISION OF SKIN MARGINS;  Surgeon: Shawanda Grimaldo MD;  [facility];  Service: General    MS LAP,CHOLECYSTECTOMY N/A 12/18/2018    Procedure: LAP CHOLECYSTECTOMY; LYSIS OF ADHESIONS; ATTEMPTED CHOLANGIOGRAM;  Surgeon: Shawanda Grimaldo MD;  [facility];  Service: General    MS MASTECTOMY, MODIFIED RADICAL Left 5/2/2019    Procedure: MASTECTOMY MODIFIED RADICAL; AXILLARY DISSECTION;  Surgeon: Shawanda Grimaldo MD;  [facility];  Service: General    TONSILLECTOMY         Family History   Problem Relation Age of Onset    Cancer Mother     Heart disease Father         reports that he has never smoked  He has never used smokeless tobacco  He reports that he does not drink alcohol or use drugs  Invalid input(s):  EOSPCT          Invalid input(s): LABALBU    Imaging: No new pertinent imaging studies       Vitals:    10/21/19 1310   BP: 136/70   Pulse: 96   Resp: 18   Temp: 98 °F (36 7 °C)        PHYSICAL EXAM  General: normal, cooperative, no distress  Incision: clean, dry, and intact and healing well  No adenopathy, well-healed  No obvious seroma  Some portions of this record may have been generated with voice recognition software  There may be translation, syntax,  or grammatical errors  Occasional wrong word or "sound-a-like" substitutions may have occurred due to the inherent limitations of the voice recognition software  Read the chart carefully and recognize, using context, where substitutions may have occurred  If you have any questions, please contact the dictating provider for clarification or correction, as needed  This encounter has been coded by a non-certified coder         Tip Gonsalves MD    Date: 10/21/2019 Time: 1:58 PM

## 2019-11-18 ENCOUNTER — TELEPHONE (OUTPATIENT)
Dept: HEMATOLOGY ONCOLOGY | Facility: CLINIC | Age: 74
End: 2019-11-18

## 2019-11-18 DIAGNOSIS — N63.20 MASS OF BREAST, LEFT: Primary | ICD-10-CM

## 2019-11-18 RX ORDER — TAMOXIFEN CITRATE 20 MG/1
20 TABLET ORAL DAILY
Qty: 90 TABLET | Refills: 0 | Status: CANCELLED | OUTPATIENT
Start: 2019-11-18

## 2019-11-18 NOTE — TELEPHONE ENCOUNTER
Call transferred from Steve mendez CSA from patient asking for refill on Tamoxifen 20mg  Please send to [facility] on file  Patient has only a few tabs left

## 2019-11-18 NOTE — TELEPHONE ENCOUNTER
Called [facility]--Tamoxifen 20mg #90 Take one tab by mouth daily  No refill    12 17 19 appt/Dr HESS/Hawa

## 2019-12-03 ENCOUNTER — APPOINTMENT (OUTPATIENT)
Dept: LAB | Facility: MEDICAL CENTER | Age: 74
End: 2019-12-03
Payer: MEDICARE

## 2019-12-03 DIAGNOSIS — C50.022 MALIGNANT NEOPLASM INVOLVING BOTH NIPPLE AND AREOLA OF LEFT BREAST IN MALE, ESTROGEN RECEPTOR POSITIVE (HCC): ICD-10-CM

## 2019-12-03 DIAGNOSIS — Z17.0 MALIGNANT NEOPLASM INVOLVING BOTH NIPPLE AND AREOLA OF LEFT BREAST IN MALE, ESTROGEN RECEPTOR POSITIVE (HCC): ICD-10-CM

## 2019-12-03 DIAGNOSIS — D64.9 ANEMIA, UNSPECIFIED TYPE: ICD-10-CM

## 2019-12-03 LAB
ALBUMIN SERPL BCP-MCNC: 4 G/DL (ref 3.5–5)
ALP SERPL-CCNC: 103 U/L (ref 46–116)
ALT SERPL W P-5'-P-CCNC: 31 U/L (ref 12–78)
ANION GAP SERPL CALCULATED.3IONS-SCNC: 5 MMOL/L (ref 4–13)
AST SERPL W P-5'-P-CCNC: 23 U/L (ref 5–45)
BASOPHILS # BLD AUTO: 0.05 THOUSANDS/ΜL (ref 0–0.1)
BASOPHILS NFR BLD AUTO: 0 % (ref 0–1)
BILIRUB SERPL-MCNC: 0.35 MG/DL (ref 0.2–1)
BUN SERPL-MCNC: 29 MG/DL (ref 5–25)
CALCIUM SERPL-MCNC: 9.8 MG/DL (ref 8.3–10.1)
CHLORIDE SERPL-SCNC: 106 MMOL/L (ref 100–108)
CO2 SERPL-SCNC: 29 MMOL/L (ref 21–32)
CREAT SERPL-MCNC: 1.34 MG/DL (ref 0.6–1.3)
EOSINOPHIL # BLD AUTO: 0.33 THOUSAND/ΜL (ref 0–0.61)
EOSINOPHIL NFR BLD AUTO: 3 % (ref 0–6)
ERYTHROCYTE [DISTWIDTH] IN BLOOD BY AUTOMATED COUNT: 13.2 % (ref 11.6–15.1)
GFR SERPL CREATININE-BSD FRML MDRD: 51 ML/MIN/1.73SQ M
GLUCOSE P FAST SERPL-MCNC: 98 MG/DL (ref 65–99)
HCT VFR BLD AUTO: 38.2 % (ref 36.5–49.3)
HGB BLD-MCNC: 12 G/DL (ref 12–17)
IMM GRANULOCYTES # BLD AUTO: 0.04 THOUSAND/UL (ref 0–0.2)
IMM GRANULOCYTES NFR BLD AUTO: 0 % (ref 0–2)
LYMPHOCYTES # BLD AUTO: 3.96 THOUSANDS/ΜL (ref 0.6–4.47)
LYMPHOCYTES NFR BLD AUTO: 35 % (ref 14–44)
MCH RBC QN AUTO: 30.8 PG (ref 26.8–34.3)
MCHC RBC AUTO-ENTMCNC: 31.4 G/DL (ref 31.4–37.4)
MCV RBC AUTO: 98 FL (ref 82–98)
MONOCYTES # BLD AUTO: 0.84 THOUSAND/ΜL (ref 0.17–1.22)
MONOCYTES NFR BLD AUTO: 7 % (ref 4–12)
NEUTROPHILS # BLD AUTO: 6.18 THOUSANDS/ΜL (ref 1.85–7.62)
NEUTS SEG NFR BLD AUTO: 55 % (ref 43–75)
NRBC BLD AUTO-RTO: 0 /100 WBCS
PLATELET # BLD AUTO: 404 THOUSANDS/UL (ref 149–390)
PMV BLD AUTO: 9.5 FL (ref 8.9–12.7)
POTASSIUM SERPL-SCNC: 4.6 MMOL/L (ref 3.5–5.3)
PROT SERPL-MCNC: 7.6 G/DL (ref 6.4–8.2)
RBC # BLD AUTO: 3.9 MILLION/UL (ref 3.88–5.62)
RETICS # AUTO: NORMAL 10*3/UL (ref 14356–105094)
RETICS # CALC: 1.17 % (ref 0.37–1.87)
SODIUM SERPL-SCNC: 140 MMOL/L (ref 136–145)
VIT B12 SERPL-MCNC: 488 PG/ML (ref 100–900)
WBC # BLD AUTO: 11.4 THOUSAND/UL (ref 4.31–10.16)

## 2019-12-03 PROCEDURE — 36415 COLL VENOUS BLD VENIPUNCTURE: CPT

## 2019-12-03 PROCEDURE — 85045 AUTOMATED RETICULOCYTE COUNT: CPT

## 2019-12-03 PROCEDURE — 82607 VITAMIN B-12: CPT

## 2019-12-03 PROCEDURE — 85025 COMPLETE CBC W/AUTO DIFF WBC: CPT

## 2019-12-03 PROCEDURE — 80053 COMPREHEN METABOLIC PANEL: CPT

## 2019-12-04 ENCOUNTER — TELEPHONE (OUTPATIENT)
Dept: HEMATOLOGY ONCOLOGY | Facility: CLINIC | Age: 74
End: 2019-12-04

## 2019-12-04 NOTE — TELEPHONE ENCOUNTER
Patient called asking for his lab results to be released on my chart  Offered to chanda to nurse for results patient declined  Patient can be reached at [phone]

## 2019-12-17 ENCOUNTER — OFFICE VISIT (OUTPATIENT)
Dept: HEMATOLOGY ONCOLOGY | Facility: CLINIC | Age: 74
End: 2019-12-17
Payer: MEDICARE

## 2019-12-17 VITALS
BODY MASS INDEX: 32.82 KG/M2 | TEMPERATURE: 97.1 F | DIASTOLIC BLOOD PRESSURE: 70 MMHG | RESPIRATION RATE: 16 BRPM | OXYGEN SATURATION: 96 % | HEIGHT: 65 IN | SYSTOLIC BLOOD PRESSURE: 124 MMHG | HEART RATE: 95 BPM | WEIGHT: 197 LBS

## 2019-12-17 DIAGNOSIS — C50.022: ICD-10-CM

## 2019-12-17 DIAGNOSIS — N63.20 MASS OF BREAST, LEFT: ICD-10-CM

## 2019-12-17 DIAGNOSIS — Z17.0 MALIGNANT NEOPLASM INVOLVING BOTH NIPPLE AND AREOLA OF LEFT BREAST IN MALE, ESTROGEN RECEPTOR POSITIVE (HCC): Primary | ICD-10-CM

## 2019-12-17 DIAGNOSIS — R79.89 PRERENAL AZOTEMIA: ICD-10-CM

## 2019-12-17 DIAGNOSIS — C50.022 MALIGNANT NEOPLASM INVOLVING BOTH NIPPLE AND AREOLA OF LEFT BREAST IN MALE, ESTROGEN RECEPTOR POSITIVE (HCC): Primary | ICD-10-CM

## 2019-12-17 DIAGNOSIS — D72.823 LEUKEMOID REACTION: ICD-10-CM

## 2019-12-17 PROCEDURE — 99214 OFFICE O/P EST MOD 30 MIN: CPT | Performed by: INTERNAL MEDICINE

## 2019-12-17 RX ORDER — TAMOXIFEN CITRATE 20 MG/1
20 TABLET ORAL DAILY
Qty: 90 TABLET | Refills: 1 | Status: SHIPPED | OUTPATIENT
Start: 2019-12-17 | End: 2020-02-26 | Stop reason: ALTCHOICE

## 2019-12-17 NOTE — PROGRESS NOTES
12/17/2019    Go Kline     Left breast re-excision of medial and inferior skin margins was done on October 9, 2019 by Dr Uche Brooks, there was residual invasive breast carcinoma, multifocal, for foci ranging from 1 5 to 12 mm, tumor foci in dermis and adjacent subcutaneous tissue, all margins are negative for tumor  He experiences hot flashes for approximately 5 minutes 3-4 times per day on tamoxifen 20 mg daily  Hematology/Oncology History:    Bilateral diagnostic mammogram November 12, 2018: Yolette Peck is a suspicious 6 cm mass of the central and upper outer quadrant of the left breast and a 2 3 cm left axillary node      Core biopsy of left subareolar mass on November 14, 2018: Invasive mucinous carcinoma, [place] grade 1, ER >95%, OK 75%, HER2 negative (0 by IHC)     Neoadjuvant tamoxifen 20 mg daily from December 27, 2018 until May 2, 2019      Left modified radical mastectomy and axillary LN D was done on May 2, 2019 by Dr Feliz Marycarmen was invasive mixed mucinous carcinoma (invasive mammary carcinoma NOS and mucinous carcinoma), grade 2, 51 mm in greatest dimension involving dermis, ER 90-95%, OK 65-70%, HER2 negative (0 by IHC), metastatic carcinoma in 1 lymph left periaxillary node, 1 5 cm, invasive mammary carcinoma focally positive at the left inferior medial medial skin margin, ypT3 N3a, anatomic stage IIIC, pathologic prognostic stage IIIA     Tamoxifen 20 mg daily was resumed postoperatively at the patient's preference (although alternative treatment was recommended )    Review of systems:      Review of systems:  General: Feels well, no chills or swaets  Head and Neck: No nosebleeds, no oral cavity or throat soreness  Cardiovascular: No chest pain  He has been taking furosemide daily to control lower extremity edema in addition to utilizing knee-high graduated compression stockings  Respriatory: No cough   He notes mild chronic dyspnea on exertion with 1 flight of steps   GI:  Appetite is good, bowel habits formed and regular  : No urinary frequency    He occasionally has nocturia x1  Musculoskeletal:  He has chronic low back pain aggravated by bending  He has mild stiffness of the right knee, but denies arthritic discomfort otherwise  Skin: No skin rash  Neurological: No headache, no numbness, no weakness  Hematologic: No easy bruising  Psychiatric: No emotional problems    Physical Examination:    Blood pressure 124/70, pulse 95, temperature (!) 97 1 °F (36 2 °C), temperature source Tympanic, resp  rate 16, height 5' 5" (1 651 m), weight 89 4 kg (197 lb), SpO2 96 %  Body surface area is 1 97 meters squared  General appearance: Appears well  HEENT:  EOMI   Oropharynx clear   No lymphadenopathy of the neck  Chest: No axillary adenopathy   No recurrent tumor over the left chest wall, no right breast mass  Lungs: Clear to auscultation bilaterally  Heart: Regular rate and rhythm  Abdomen:   Liver and spleen are difficult to assess due to body habitus; No inguinal  lymphadenopathy  Extremities:   He is wearing knee-high graduated compression stockings bilaterally, no lower extremity edema  Skin: No rashes  There is mild venous stasis dermatitis of the distal lower extremities left greater than right  Neurologic: Grossly intact, no focal neurological deficit  Psychiatric: Oriented to person, place and time, normal mood and affect    ECOG 1-2    Laboratory:    From December 3, 2019:  Creatinine 1 34, BUN 29, calcium 9 8, AST 23, ALT 31, alk-phos 103, bili 0 35, vitamin B12 488, WBC 11 40, hemoglobin 12 0, platelets 353, retic count 1 17%     Assessment/Plan:    Fortunately, all gross left breast cancer has been surgically removed, and subsequently, upon re-excision skin margins are negative  The patient refuses further evaluation with contrast enhanced CT scan of the chest, abdomen and pelvis to re-stage the left breast cancer    He is agreeable to adjuvant radiation therapy to the left chest wall and axilla as recommended       The patient indicated that he would not accept adjuvant chemotherapy  Furthermore, the patient has declined anastrozole and goserelin  but is agreeable to continue tamoxifen 20 mg daily  He is aware that the tamoxifen may not be effective as an adjuvant treatment based upon lack of response to the neoadjuvant tamoxifen  I have recommended that the furosemide be decreased to 40 mg every other day in view of the azotemia, however, the patient does not agree to decrease the furosemide dosing being concerned as to potential recurrent lower extremity edema  He is aware of potential kidney failure  He is agreeable to kidney and bladder ultrasound to assess for obstructive uropathy  At the patient's request he is being referred to nutritional counseling for weight management and for controlling blood sugar      The patient is aware seek medical attention for hot flashes, mood alteration, joint pain or muscle aches, lower extremity edema, excessive fatigue, or if other new problems arise   Otherwise, plan to see him again in 3 months  Today's office visit required 25 minutes, over 50% of the time was utilized to review diagnostic tests, impressions and recommendations          Today's office visit required 25 minutes, over 50% of the time was utilized to review diagnostic tests, impressions and recommendations with the patient

## 2019-12-18 ENCOUNTER — TELEPHONE (OUTPATIENT)
Dept: HEMATOLOGY ONCOLOGY | Facility: CLINIC | Age: 74
End: 2019-12-18

## 2019-12-18 NOTE — TELEPHONE ENCOUNTER
Pt is calling in to inform that he will be looking on his own for radiation options, does not want anyone calling him for now, maybe after the holidays

## 2019-12-19 ENCOUNTER — TELEPHONE (OUTPATIENT)
Dept: OTHER | Facility: HOSPITAL | Age: 74
End: 2019-12-19

## 2019-12-24 ENCOUNTER — TELEPHONE (OUTPATIENT)
Dept: SURGERY | Facility: CLINIC | Age: 74
End: 2019-12-24

## 2019-12-24 NOTE — TELEPHONE ENCOUNTER
Call from patients wife  She would Dr Johanna Nassar to read an article  She should look it up on the computer under"Taking certain vitamins during breast caner chem tied to recurrence of breast cancer or death"  Patients wife and meghann want to know if he should continuing taking his centrum silver or stop the,   He states article says there is a 41% chance of recurrence or death  Also his wife states his recent blood work was excellent and that she was very pleased with his results  She is aware I will not see Dr Johanna Nassar until 12/30 and that I will discuss with her than  Patient and wife thankful

## 2019-12-30 NOTE — TELEPHONE ENCOUNTER
I spoke with Homer Guzmán our PA today she states it is safe for him to take his vitamins  I called and spoke with him he was very thankful  He is aware to call the office if he has any questions or concerns

## 2020-01-01 ENCOUNTER — NUTRITION (OUTPATIENT)
Dept: NUTRITION | Facility: CLINIC | Age: 75
End: 2020-01-01

## 2020-01-01 ENCOUNTER — TELEPHONE (OUTPATIENT)
Dept: NUTRITION | Facility: CLINIC | Age: 75
End: 2020-01-01

## 2020-01-01 ENCOUNTER — TELEPHONE (OUTPATIENT)
Dept: HEMATOLOGY ONCOLOGY | Facility: CLINIC | Age: 75
End: 2020-01-01

## 2020-01-01 ENCOUNTER — OFFICE VISIT (OUTPATIENT)
Dept: HEMATOLOGY ONCOLOGY | Facility: CLINIC | Age: 75
End: 2020-01-01
Payer: MEDICARE

## 2020-01-01 ENCOUNTER — TELEPHONE (OUTPATIENT)
Dept: NEPHROLOGY | Facility: CLINIC | Age: 75
End: 2020-01-01

## 2020-01-01 ENCOUNTER — OFFICE VISIT (OUTPATIENT)
Dept: NEPHROLOGY | Facility: CLINIC | Age: 75
End: 2020-01-01
Payer: MEDICARE

## 2020-01-01 ENCOUNTER — OFFICE VISIT (OUTPATIENT)
Dept: PHYSICAL THERAPY | Facility: CLINIC | Age: 75
End: 2020-01-01
Payer: MEDICARE

## 2020-01-01 ENCOUNTER — DOCUMENTATION (OUTPATIENT)
Dept: HEMATOLOGY ONCOLOGY | Facility: CLINIC | Age: 75
End: 2020-01-01

## 2020-01-01 ENCOUNTER — LAB (OUTPATIENT)
Dept: LAB | Facility: MEDICAL CENTER | Age: 75
End: 2020-01-01
Payer: MEDICARE

## 2020-01-01 VITALS
BODY MASS INDEX: 30.19 KG/M2 | TEMPERATURE: 98.5 F | SYSTOLIC BLOOD PRESSURE: 128 MMHG | HEIGHT: 65 IN | WEIGHT: 181.2 LBS | DIASTOLIC BLOOD PRESSURE: 80 MMHG | HEART RATE: 70 BPM

## 2020-01-01 VITALS
HEART RATE: 107 BPM | HEIGHT: 65 IN | RESPIRATION RATE: 18 BRPM | SYSTOLIC BLOOD PRESSURE: 156 MMHG | OXYGEN SATURATION: 97 % | WEIGHT: 188 LBS | BODY MASS INDEX: 31.32 KG/M2 | TEMPERATURE: 98.2 F | DIASTOLIC BLOOD PRESSURE: 82 MMHG

## 2020-01-01 DIAGNOSIS — C50.022 MALIGNANT NEOPLASM INVOLVING BOTH NIPPLE AND AREOLA OF LEFT BREAST IN MALE, ESTROGEN RECEPTOR POSITIVE (HCC): ICD-10-CM

## 2020-01-01 DIAGNOSIS — Z71.3 NUTRITIONAL COUNSELING: Primary | ICD-10-CM

## 2020-01-01 DIAGNOSIS — Z17.0 MALIGNANT NEOPLASM INVOLVING BOTH NIPPLE AND AREOLA OF LEFT BREAST IN MALE, ESTROGEN RECEPTOR POSITIVE (HCC): ICD-10-CM

## 2020-01-01 DIAGNOSIS — I10 HYPERTENSION, UNSPECIFIED TYPE: ICD-10-CM

## 2020-01-01 DIAGNOSIS — C78.7 LIVER METASTASIS (HCC): Primary | ICD-10-CM

## 2020-01-01 DIAGNOSIS — R29.898 WEAKNESS OF BOTH LOWER EXTREMITIES: Primary | ICD-10-CM

## 2020-01-01 DIAGNOSIS — C50.922: ICD-10-CM

## 2020-01-01 DIAGNOSIS — C50.922: Primary | ICD-10-CM

## 2020-01-01 DIAGNOSIS — N18.9 CHRONIC RENAL IMPAIRMENT, UNSPECIFIED CKD STAGE: Primary | ICD-10-CM

## 2020-01-01 DIAGNOSIS — C78.00 MALIGNANT NEOPLASM METASTATIC TO LUNG, UNSPECIFIED LATERALITY (HCC): ICD-10-CM

## 2020-01-01 DIAGNOSIS — R53.81 PHYSICAL DECONDITIONING: ICD-10-CM

## 2020-01-01 DIAGNOSIS — R53.1 LACK OF STRENGTH: ICD-10-CM

## 2020-01-01 DIAGNOSIS — C79.51 OSSEOUS METASTASIS (HCC): ICD-10-CM

## 2020-01-01 LAB
ALBUMIN SERPL BCP-MCNC: 3.7 G/DL (ref 3.5–5)
ALP SERPL-CCNC: 91 U/L (ref 46–116)
ALT SERPL W P-5'-P-CCNC: 33 U/L (ref 12–78)
ANION GAP SERPL CALCULATED.3IONS-SCNC: 5 MMOL/L (ref 4–13)
AST SERPL W P-5'-P-CCNC: 33 U/L (ref 5–45)
BASOPHILS # BLD AUTO: 0.08 THOUSANDS/ΜL (ref 0–0.1)
BASOPHILS NFR BLD AUTO: 2 % (ref 0–1)
BILIRUB SERPL-MCNC: 0.54 MG/DL (ref 0.2–1)
BUN SERPL-MCNC: 42 MG/DL (ref 5–25)
CALCIUM SERPL-MCNC: 9.4 MG/DL (ref 8.3–10.1)
CANCER AG27-29 SERPL-ACNC: 239.6 U/ML (ref 0–42.3)
CHLORIDE SERPL-SCNC: 107 MMOL/L (ref 100–108)
CO2 SERPL-SCNC: 29 MMOL/L (ref 21–32)
CREAT SERPL-MCNC: 1.66 MG/DL (ref 0.6–1.3)
EOSINOPHIL # BLD AUTO: 0.13 THOUSAND/ΜL (ref 0–0.61)
EOSINOPHIL NFR BLD AUTO: 2 % (ref 0–6)
ERYTHROCYTE [DISTWIDTH] IN BLOOD BY AUTOMATED COUNT: 13.1 % (ref 11.6–15.1)
GFR SERPL CREATININE-BSD FRML MDRD: 39 ML/MIN/1.73SQ M
GLUCOSE P FAST SERPL-MCNC: 86 MG/DL (ref 65–99)
HCT VFR BLD AUTO: 33.8 % (ref 36.5–49.3)
HGB BLD-MCNC: 11.2 G/DL (ref 12–17)
IMM GRANULOCYTES # BLD AUTO: 0.01 THOUSAND/UL (ref 0–0.2)
IMM GRANULOCYTES NFR BLD AUTO: 0 % (ref 0–2)
LYMPHOCYTES # BLD AUTO: 2.35 THOUSANDS/ΜL (ref 0.6–4.47)
LYMPHOCYTES NFR BLD AUTO: 43 % (ref 14–44)
MCH RBC QN AUTO: 38.2 PG (ref 26.8–34.3)
MCHC RBC AUTO-ENTMCNC: 33.1 G/DL (ref 31.4–37.4)
MCV RBC AUTO: 115 FL (ref 82–98)
MONOCYTES # BLD AUTO: 0.36 THOUSAND/ΜL (ref 0.17–1.22)
MONOCYTES NFR BLD AUTO: 7 % (ref 4–12)
NEUTROPHILS # BLD AUTO: 2.51 THOUSANDS/ΜL (ref 1.85–7.62)
NEUTS SEG NFR BLD AUTO: 46 % (ref 43–75)
NRBC BLD AUTO-RTO: 0 /100 WBCS
PLATELET # BLD AUTO: 325 THOUSANDS/UL (ref 149–390)
PMV BLD AUTO: 9.8 FL (ref 8.9–12.7)
POTASSIUM SERPL-SCNC: 4.2 MMOL/L (ref 3.5–5.3)
PROT SERPL-MCNC: 7.5 G/DL (ref 6.4–8.2)
RBC # BLD AUTO: 2.93 MILLION/UL (ref 3.88–5.62)
SODIUM SERPL-SCNC: 141 MMOL/L (ref 136–145)
WBC # BLD AUTO: 5.44 THOUSAND/UL (ref 4.31–10.16)

## 2020-01-01 PROCEDURE — 86300 IMMUNOASSAY TUMOR CA 15-3: CPT

## 2020-01-01 PROCEDURE — 85025 COMPLETE CBC W/AUTO DIFF WBC: CPT

## 2020-01-01 PROCEDURE — 99214 OFFICE O/P EST MOD 30 MIN: CPT | Performed by: INTERNAL MEDICINE

## 2020-01-01 PROCEDURE — 97140 MANUAL THERAPY 1/> REGIONS: CPT

## 2020-01-01 PROCEDURE — 97110 THERAPEUTIC EXERCISES: CPT

## 2020-01-01 PROCEDURE — 80053 COMPREHEN METABOLIC PANEL: CPT

## 2020-01-01 PROCEDURE — 36415 COLL VENOUS BLD VENIPUNCTURE: CPT

## 2020-01-06 ENCOUNTER — TELEPHONE (OUTPATIENT)
Dept: NUTRITION | Facility: CLINIC | Age: 75
End: 2020-01-06

## 2020-01-06 NOTE — TELEPHONE ENCOUNTER
Contacted Go to discuss his nutrition after receiving notification by OP MNT RD on 1/6/20 that pt is appropriate for oncology nutrition care (reason for referral: Referral sent to [facility] for nutrition services, pt with oncology dx/hx )  Go reports that he is currently undecided if he would like to proceed with nutrition services  He is also currently deciding whether or not he would like to receive radiation treatments and "didn't want to make a big deal" about his nutrition at this time  Explained role of Oncology RD and role of Oncology Nutrition Services throughout cancer treatment  Go stated that he has met with a grocery store RD who has helped him make better food choices while grocery shopping  Provided this RDs contact information asking that Go reach out prn with oncology nutrition questions/concerns  Also provided contact info for [facility] Nutrition Services if Go chooses not to proceed with Maik Martind treatment and requires nutrition services in the future  All questions/concerns addressed at this time  PMH:afib, HTN, pancreatitis   Oncology Diagnosis & Treatments: Left breast carcinoma diagnosed 11/2018  Neoadjuvant tamoxifen 20 mg daily from December 27, 2018 until May 2, 2019  Left modified radical mastectomy May 2, 2019  Tamoxifen was resumed postoperatively  Pt has declined adjuvant chemotherapy, and currently deciding if he would like to receive recommended adjuvant radiation therapy to the left chest wall and axilla  No history exists       Past Medical History:   Diagnosis Date    Acute gallstone pancreatitis     Arthritis     Atrial fibrillation ([address] )     Cancer (HCC)     breast - left    Hypertension     Irregular heartbeat     PAC (premature atrial contraction)     PVC (premature ventricular contraction)     RBBB     Wears glasses      Past Surgical History:   Procedure Laterality Date    APPENDECTOMY      CHOLECYSTECTOMY      FRACTURE SURGERY Left     arm    OH EXC SKIN BENIG 3 1-4 CM TRUNK,ARM,LEG Left 10/9/2019    Procedure: BREAST RE-EXCISION OF SKIN MARGINS;  Surgeon: Angelia Ocasio MD;  [facility];  Service: General    OH LAP,CHOLECYSTECTOMY N/A 12/18/2018    Procedure: LAP CHOLECYSTECTOMY; LYSIS OF ADHESIONS; ATTEMPTED CHOLANGIOGRAM;  Surgeon: Angelia Ocasio MD;  [facility];  Service: General    OH MASTECTOMY, MODIFIED RADICAL Left 5/2/2019    Procedure: MASTECTOMY MODIFIED RADICAL; AXILLARY DISSECTION;  Surgeon: Angelia Ocasio MD;  [facility];  Service: General    TONSILLECTOMY         Review of Medications:     Current Outpatient Medications:     acetaminophen (TYLENOL) 500 mg tablet, Take 500 mg by mouth every 6 (six) hours as needed, Disp: , Rfl:     docusate sodium (COLACE) 100 mg capsule, Take 1 capsule (100 mg total) by mouth 2 (two) times a day (Patient not taking: Reported on 10/21/2019), Disp: 60 capsule, Rfl: 0    furosemide (LASIX) 40 mg tablet, Take 40 mg by mouth daily, Disp: , Rfl: 0    HYDROcodone-acetaminophen (NORCO) 5-325 mg per tablet, Take 1 tablet by mouth every 4 (four) hours as needed for pain for up to 6 dosesMax Daily Amount: 6 tablets, Disp: 6 tablet, Rfl: 0    ibuprofen (MOTRIN) 200 mg tablet, Take 400 mg by mouth every 6 (six) hours as needed for mild pain, Disp: , Rfl:     losartan (COZAAR) 50 mg tablet, Take 50 mg by mouth daily , Disp: , Rfl: 0    Multiple Vitamins-Minerals (CENTRUM SILVER 50+MEN PO), Take by mouth, Disp: , Rfl:     ondansetron (ZOFRAN) 4 mg tablet, Take 1 tablet (4 mg total) by mouth every 8 (eight) hours as needed for nausea or vomiting for up to 7 days, Disp: 20 tablet, Rfl: 0    polyethylene glycol (MIRALAX) 17 g packet, Take 17 g by mouth daily as needed , Disp: , Rfl:     tamoxifen (NOLVADEX) 20 mg tablet, Take 1 tablet (20 mg total) by mouth daily, Disp: 90 tablet, Rfl: 1    Most Recent Lab Results:   Lab Results   Component Value Date WBC 11 40 (H) 12/03/2019    IRON 71 06/26/2019    TIBC 311 06/26/2019    FERRITIN 89 06/26/2019    ALT 31 12/03/2019    AST 23 12/03/2019    K 4 6 12/03/2019    K 4 0 06/26/2019    BUN 29 (H) 12/03/2019    BUN 21 06/26/2019    CREATININE 1 34 (H) 12/03/2019    CREATININE 1 19 06/26/2019    CALCIUM 9 8 12/03/2019       Anthropometric Measurements:   Ht Readings from Last 1 Encounters:   12/17/19 5' 5" (1 651 m)     -Weight History: Wt Readings from Last 15 Encounters:   12/17/19 89 4 kg (197 lb)   10/21/19 89 8 kg (198 lb)   10/14/19 89 2 kg (196 lb 9 6 oz)   10/09/19 90 7 kg (200 lb)   09/17/19 90 8 kg (200 lb 3 2 oz)   09/12/19 90 1 kg (198 lb 9 6 oz)   07/29/19 87 1 kg (192 lb)   06/25/19 88 1 kg (194 lb 3 2 oz)   05/30/19 87 1 kg (192 lb)   05/13/19 86 2 kg (190 lb)   05/06/19 88 kg (194 lb)   05/02/19 85 7 kg (189 lb)   04/19/19 88 5 kg (195 lb)   04/05/19 87 5 kg (193 lb)   03/27/19 85 7 kg (189 lb)     Estimated body mass index is 32 78 kg/m² as calculated from the following:    Height as of 12/17/19: 5' 5" (1 651 m)  Weight as of 12/17/19: 89 4 kg (197 lb)

## 2020-01-15 ENCOUNTER — TELEPHONE (OUTPATIENT)
Dept: HEMATOLOGY ONCOLOGY | Facility: CLINIC | Age: 75
End: 2020-01-15

## 2020-01-15 NOTE — TELEPHONE ENCOUNTER
Task received  Patient called to review radiation question  Patient wanted to know if radiation could effect his CBC lab results  Patient advised that his Radiation Oncologist would order labs based on his treatment  Patient's CBC level would be monitored  Patient advised that he will have a weekly on treatment visit with the Radiation Oncology nurse and doctor  Advised patient that CBC counts can decrease while on treatment but they will be monitored

## 2020-01-20 ENCOUNTER — CLINICAL SUPPORT (OUTPATIENT)
Dept: RADIATION ONCOLOGY | Facility: CLINIC | Age: 75
End: 2020-01-20
Attending: STUDENT IN AN ORGANIZED HEALTH CARE EDUCATION/TRAINING PROGRAM
Payer: MEDICARE

## 2020-01-20 VITALS
HEIGHT: 65 IN | OXYGEN SATURATION: 97 % | TEMPERATURE: 98.2 F | HEART RATE: 107 BPM | BODY MASS INDEX: 32.07 KG/M2 | SYSTOLIC BLOOD PRESSURE: 138 MMHG | RESPIRATION RATE: 16 BRPM | WEIGHT: 192.46 LBS | DIASTOLIC BLOOD PRESSURE: 74 MMHG

## 2020-01-20 DIAGNOSIS — C50.022 MALIGNANT NEOPLASM INVOLVING BOTH NIPPLE AND AREOLA OF LEFT BREAST IN MALE, ESTROGEN RECEPTOR POSITIVE (HCC): Primary | ICD-10-CM

## 2020-01-20 DIAGNOSIS — Z17.0 MALIGNANT NEOPLASM INVOLVING BOTH NIPPLE AND AREOLA OF LEFT BREAST IN MALE, ESTROGEN RECEPTOR POSITIVE (HCC): Primary | ICD-10-CM

## 2020-01-20 PROCEDURE — 99211 OFF/OP EST MAY X REQ PHY/QHP: CPT | Performed by: STUDENT IN AN ORGANIZED HEALTH CARE EDUCATION/TRAINING PROGRAM

## 2020-01-20 NOTE — PROGRESS NOTES
Consultation - Radiation Oncology     BIK:[id number] : 1945  Encounter: [id number]  Patient Information: [address]  Chief Complaint   Patient presents with    Consult     Radiation Oncology     Cancer Staging  No matching staging information was found for the patient  History of Present Illness   Mukund Saucedo is a 66y o  year old male who presents today for radiation consult for adjuvant radiation to the left chest wall and axilla, referred by Dr Jesica Puckett  History of left breast cancer      66year old male with a history of gynecomastia since his teen years  He noted an increase in the size of his left breast over the past few years  Bilateral diagnostic mammogram in 2018 revealed a 6 cm mass in the central and upper outer quadrant of the left breast and 2 3 cm left axillary node  He had a biopsy at [facility] on 18 of the left subareolar breast mass which revealed invasive mammary carcinoma with mucinous features  He consulted with Dr Jesica Puckett in 2018 and started neoadjuvant Tamoxifen daily in 2018  BRCA genetic testing was negative      19 Left modified radical mastectomy and axillary lymph node dissection at [address] [place] [address], Dr Radha Whitney   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), grade 2, 51 mm in greatest dimension involving dermis, ER 90-95%, DC 65-70%, HER2 negative (0 by IHC), metastatic carcinoma in 1 lymph left periaxillary node, 1 5 cm, invasive mammary carcinoma focally positive at the left inferior medial medial skin margin, ypT3 N3a, anatomic stage IIIC, pathologic prognostic stage IIIA        Patient resumed Tamoxifen post operatively      19 Dr Jesica Puckett follow-up  All gross left breast cancer has been surgically removed, however, the left inferior medial medial skin margin is focally positive   The patient refused further evaluation with contrast enhanced CT scan of the chest, abdomen and pelvis to re-stage the left breast cancer  Sigrid Cee is now agreeable  to undergo re-excision of the medial and inferior medial left mastectomy margin to reduce risk of local recurrence of breast cancer  Afterwards, radiation therapy to the left chest wall and axilla is recommended       10/9/19 Left Breast Re-excision scar mastectomy site medial and inferior margins     He resumed Tamoxifen daily postoperatively  Adjuvant chemo was recommended but pt declined, he also declined anastrozole but agreed to continue Tamoxifen          10/21/19 Dr Cl Veliz, Gen Surgery  Pathology is benign   Interestingly, the pathology showed multiple breast cancer lesions closest 1 was 0 7 millimeters from the margin but the margins are negative  Patient may require or benefit from radiation to the chest wall considering we do not know if he has other skip lesions, will leave this to the discretion of his oncologist      12/17/19 Dr Sander Lizama follow-up  Continues on Tamoxifen with daily hot flashes  All gross left breast cancer has been surgically removed, and subsequently, upon re-excision skin margins are negative  The patient refuses further evaluation with contrast enhanced CT scan of the chest, abdomen and pelvis to re-stage the left breast cancer   He is agreeable to adjuvant radiation therapy to the left chest wall and axilla as recommended       3/24/20 Dr Sander Lizama f/u     Historical Information      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, estrogen receptor positive ([address] )    2018 Initial Diagnosis     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, estrogen receptor positive ([address] )      11/14/2018 Biopsy     A  Breast, left subareolar mass, core biopsy (11 slides, 40 Rukimmy Amin, collected on 11/14/2018): - Invasive mammary carcinoma with mucinous features (see note)      -- [place] grade 1 of 3 (total score: 5 of 9)        * Tubule formation < 10%, score 3        * Nuclear grade 1 of 3, score 1        * Mitoses < 3/mm2, (</= 7 mitoses/10HPF), score 1     -- Invasive carcinoma involves 4 of 4 submitted core biopsy fragments, max  Dimension= 22 mm     -- Estrogen, Progesterone & HER2 receptor studies performed at the referring institution show the tumor to be positive for ER (>95% strong), positive for CO (75% moderate), and negative for HER2 IHC (score 0)  - Ductal carcinoma in-situ (DCIS): Not identified  - Lymph-vascular invasion: Not identified  - Microcalcifications: Absent  - Best representative tumor block:  A1    -- Sufficient tumor present for        Agendia Mammaprint/Blueprint (1 cm2 of invasive tumor in aggregate): No         MI Profile/Foundation One (at least 5 x 5 mm of tumor): Yes  Note:  The tumor shows prominent mucinous differentiation, raising the strong possibility of invasive mucinous carcinoma, though this designation is possible only complete excision with evaluation of the entirety of the tumor mass  12/27/2018 -  Hormone Therapy     Tamoxifen 20 mg daily (Dr Armando Mccoy)      2018 Genetic Testing     BRCA negative       5/2/2019 Surgery     A  Breast, Left, Mastectomy and Axillary Contents:  - 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), Jim Grade II (3 + 2 + 1 = 6), 51 mm in greatest dimension, involving dermis  See Tumor Synoptic Report (below) and Note  - Margins negative for carcinoma in this specimen (see separately submitted additional margins, Parts C and D)  - Intradermal nevi      B  Lymph Node, Left Periaxillary Vein, Lymphadenectomy:  - Metastatic carcinoma present in one lymph node (1/1), with extranodal extension   - Metastatic focus measures at least 1 5 cm in greatest dimension      C   Breast, Left Inferior Medial Margin, Excision:  - Benign fibroadipose tissue and skeletal muscle      D  Breast, Left Inferior Medial Margin Skin, Excision:  - Invasive mammary carcinoma, underlying benign skin  - Anterior margin is multifocally positive for carcinoma     (Dr Jackie De Dios)      10/9/2019 Surgery     Left breast, re-excision scar mastectomy site medial and inferior margins:     - Residual invasive breast carcinoma of no special type (ductal NST/invasive ductal carcinoma), multifocal        -- Four foci identified ranging in size from 1 5 to approximately 12 mm        -- Tumor foci are present in dermis and adjacent subcutaneous tissues  - All margins are negative for tumor with closest margin as follows:       -- One focus of tumor comes to within 0 7 mm of the superior margin (A16)  -- Largest focus comes to within 1 5 mm of the superior margin (A37)  - Cicatricial fibrosis of skin and subcutaneous tissues      (Dr Jackie De Dios)           Past Medical History:   Diagnosis Date    Acute gallstone pancreatitis     Arthritis     Atrial fibrillation ([address] )     Breast cancer ([address] )     Cancer ([address] )     breast - left    Hypertension     Irregular heartbeat     PAC (premature atrial contraction)     PVC (premature ventricular contraction)     RBBB     Wears glasses      Past Surgical History:   Procedure Laterality Date    APPENDECTOMY      CHOLECYSTECTOMY      FRACTURE SURGERY Left     arm    IN EXC SKIN BENIG 3 1-4 CM TRUNK,ARM,LEG Left 10/9/2019    Procedure: BREAST RE-EXCISION OF SKIN MARGINS;  Surgeon: Jackie De Dios MD;  [facility];  Service: General    IN LAP,CHOLECYSTECTOMY N/A 12/18/2018    Procedure: LAP CHOLECYSTECTOMY; LYSIS OF ADHESIONS; ATTEMPTED CHOLANGIOGRAM;  Surgeon: Jackie De Dios MD;  [facility];  Service: General    IN MASTECTOMY, MODIFIED RADICAL Left 5/2/2019    Procedure: MASTECTOMY MODIFIED RADICAL; AXILLARY DISSECTION;  Surgeon: Jackie De Dios MD;  [facility];  Service: General    TONSILLECTOMY Family History   Problem Relation Age of Onset    Cancer Mother     Heart disease Father        Social History   Social History     Substance and Sexual Activity   Alcohol Use No     Social History     Substance and Sexual Activity   Drug Use No     Social History     Tobacco Use   Smoking Status Never Smoker   Smokeless Tobacco Never Used         Meds/Allergies     Current Outpatient Medications:     furosemide (LASIX) 40 mg tablet, Take 40 mg by mouth daily, Disp: , Rfl: 0    losartan (COZAAR) 50 mg tablet, Take 50 mg by mouth daily , Disp: , Rfl: 0    Multiple Vitamins-Minerals (CENTRUM SILVER 50+MEN PO), Take by mouth, Disp: , Rfl:     polyethylene glycol (MIRALAX) 17 g packet, Take 17 g by mouth daily as needed , Disp: , Rfl:     tamoxifen (NOLVADEX) 20 mg tablet, Take 1 tablet (20 mg total) by mouth daily, Disp: 90 tablet, Rfl: 1    acetaminophen (TYLENOL) 500 mg tablet, Take 500 mg by mouth every 6 (six) hours as needed, Disp: , Rfl:   No Known Allergies      Review of Systems Constitutional: Negative  HENT: Negative  Eyes: Negative  Respiratory: Negative  Cardiovascular: Positive for leg swelling (wears compression hose and taking lasix daily)  Gastrointestinal: Positive for constipation  Endocrine: Positive for heat intolerance (hot flashes since taking tamoxifen)  Genitourinary: Positive for frequency (nocturia x2-3)  Musculoskeletal: Negative  Skin: Negative  Healed excision at left chest wall, mild numbness at times, denies pain   Allergic/Immunologic: Negative  Neurological: Negative  Hematological: Negative      Psychiatric/Behavioral: Negative      OBJECTIVE:   /74 (BP Location: Right arm)   Pulse (!) 107   Temp 98 2 °F (36 8 °C) (Temporal)   Resp 16   Ht 5' 5" (1 651 m)   Wt 87 3 kg (192 lb 7 4 oz)   SpO2 97%   BMI 32 03 kg/m²   Pain Assessment:  0  Performance Status: ECOG/Zubrod/WHO: 1 - Symptomatic but completely ambulatory    Physical Exam GENERAL:  Appears stated age, in no apparent distress  Alert and oriented  HEENT:  Normocephalic, atraumatic   extraocular muscles intact  Oral mucosa moist   LYMPHATICS:  No cervical, supraclavicular, infraclavicular, or axillary lymphadenopathy noted bilaterally  PULMONARY:  Respirations unlabored  CARDIOVASCULAR:  Regular rate  ABDOMEN:  Soft, nondistended, nontender to palpation  No hepatosplenomegaly  NEUROLOGIC: Moving all extremities, No focal deficits noted  EXTREMITIES: no clubbing, cyanosis, or edema  PSYCHIATRIC: normal mood and affect  Appropriate thought content and judgement  CHEST WALL: Left chestwall with incision site well healed, no surrounding nodules palpable, contralateral breast without palpable abnormality    RESULTS  Lab Results    Chemistry        Component Value Date/Time    K 4 6 12/03/2019 1035     12/03/2019 1035    CO2 29 12/03/2019 1035    BUN 29 (H) 12/03/2019 1035    CREATININE 1 34 (H) 12/03/2019 1035        Component Value Date/Time    CALCIUM 9 8 12/03/2019 1035    ALKPHOS 103 12/03/2019 1035    AST 23 12/03/2019 1035    ALT 31 12/03/2019 1035            Lab Results   Component Value Date    WBC 11 40 (H) 12/03/2019    HGB 12 0 12/03/2019    HCT 38 2 12/03/2019    MCV 98 12/03/2019     (H) 12/03/2019         Imaging Studies  No results found  Pathology:invasive mammary carcinoma NST       ASSESSMENT  1  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, estrogen receptor positive ([facility] )       Cancer Staging  No matching staging information was found for the patient  PLAN/DISCUSSION  No orders of the defined types were placed in this encounter           Manjit Guzmán is a 66y o  year old male with [id number] 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 status post neoadjuvant tamoxifen followed by left mastectomy +ALND, with pathology showing 5 cm tumor extending to margins, and involvement of dermis with 10/10 LN positive, followed by re-excision showing residual tumor, with final margins negative    He declined adjuvant chemotherapy and declined repeat imaging with Dr Yong Hadley  We discussed that NCCN guidelines, in the setting of 4 or more lymph nodes positive, recommend  chest wall and regional shahriar irradiation, as it reduces the risk of locoregional recurrence and improves overall survival     We discussed the mechanism of action of irradiation, logistics of treatment including CT simulation and treatment with deep inspiration breath hold, and expected side effects including but not limited to fatigue, skin hyperpigmentation, skin desquamation, rib fracture,  long term risk of lymphedema of approximately 15%-20%, thyroid dysfunction, cardiovascular toxicity and secondary malignancy  After this discussion, we agreed to the following:    PLAN:  Left chestwall + regional shahriar irradiation with 50 Gy in 25 fractions followed by 10 Gy/5 fraction boost to mastectomy scar  Informed consent was obtained with plan for patient to return for CT simulation  Dauna Landau, MD  1/20/2020,3:34 PM      Portions of the record may have been created with voice recognition software   Occasional wrong word or "sound a like" substitutions may have occurred due to the inherent limitations of voice recognition software   Read the chart carefully and recognize, using context, where substitutions have occurred

## 2020-01-20 NOTE — PROGRESS NOTES
Yong Rogel 1945 is a 66 y o  male    Oncology History    Patient presents today for radiation consult for adjuvant radiation to the left chest wall and axilla, referred by Dr Rhina Alvarez  History of left breast cancer  66year old male with a history of gynecomastia since his teen years  He noted an increase in the size of his left breast over the past few years  Bilateral diagnostic mammogram in November 2018 revealed a 6 cm mass in the central and upper outer quadrant of the left breast and 2 3 cm left axillary node  He had a biopsy at [facility] on 11/14/18 of the left subareolar breast mass which revealed invasive mammary carcinoma with mucinous features  He consulted with Dr Rhina Alvarez in December of 2018 and started neoadjuvant Tamoxifen daily in December 2018  BRCA genetic testing was negative  5/2/19 Left modified radical mastectomy and axillary lymph node dissection at [facility], Dr Clark Short   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), grade 2, 51 mm in greatest dimension involving dermis, ER 90-95%, HI 65-70%, HER2 negative (0 by IHC), metastatic carcinoma in 1 lymph left periaxillary node, 1 5 cm, invasive mammary carcinoma focally positive at the left inferior medial medial skin margin, ypT3 N3a, anatomic stage IIIC, pathologic prognostic stage IIIA      Patient resumed Tamoxifen post operatively  9/19/19 Dr Rhina Alvarez follow-up  All gross left breast cancer has been surgically removed, however, the left inferior medial medial skin margin is focally positive  The patient refused further evaluation with contrast enhanced CT scan of the chest, abdomen and pelvis to re-stage the left breast cancer  He is now agreeable  to undergo re-excision of the medial and inferior medial left mastectomy margin to reduce risk of local recurrence of breast cancer  Afterwards, radiation therapy to the left chest wall and axilla is recommended  10/9/19 Left Breast Re-excision scar mastectomy site medial and inferior margins    He resumed Tamoxifen daily postoperatively  Adjuvant chemo was recommended but pt declined, he also declined anastrozole but agreed to continue Tamoxifen  10/21/19 Dr Sofiya Perez, Gen Surgery  Pathology is benign  Interestingly, the pathology showed multiple breast cancer lesions closest 1 was 0 7 millimeters from the margin but the margins are negative  Patient may require or benefit from radiation to the chest wall considering we do not know if he has other skip lesions, will leave this to the discretion of his oncologist     12/17/19 Dr Kya Samayoa follow-up  Continues on Tamoxifen with daily hot flashes  All gross left breast cancer has been surgically removed, and subsequently, upon re-excision skin margins are negative  The patient refuses further evaluation with contrast enhanced CT scan of the chest, abdomen and pelvis to re-stage the left breast cancer   He is agreeable to adjuvant radiation therapy to the left chest wall and axilla as recommended  3/24/20 Dr Kya Samayoa f/u        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, estrogen receptor positive ([facility] [address] )    2018 Initial Diagnosis     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, estrogen receptor positive ([facility] [address] )      11/14/2018 Biopsy     A  Breast, left subareolar mass, core biopsy (11 slides, [address], collected on 11/14/2018): - Invasive mammary carcinoma with mucinous features (see note)  -- Jim grade 1 of 3 (total score: 5 of 9)        * Tubule formation < 10%, score 3        * Nuclear grade 1 of 3, score 1        * Mitoses < 3/mm2, (</= 7 mitoses/10HPF), score 1     -- Invasive carcinoma involves 4 of 4 submitted core biopsy fragments, max   Dimension= 22 mm     -- Estrogen, Progesterone & HER2 receptor studies performed at the referring institution show the tumor to be positive for ER (>95% strong), positive for VT (75% moderate), and negative for HER2 IHC (score 0)  - Ductal carcinoma in-situ (DCIS): Not identified  - Lymph-vascular invasion: Not identified  - Microcalcifications: Absent  - Best representative tumor block:  A1    -- Sufficient tumor present for        Agendia Mammaprint/Blueprint (1 cm2 of invasive tumor in aggregate): No         MI Profile/Foundation One (at least 5 x 5 mm of tumor): Yes  Note:  The tumor shows prominent mucinous differentiation, raising the strong possibility of invasive mucinous carcinoma, though this designation is possible only complete excision with evaluation of the entirety of the tumor mass  12/27/2018 -  Hormone Therapy     Tamoxifen 20 mg daily (Dr Steve Guzmán)      2018 Genetic Testing     BRCA negative       5/2/2019 Surgery     A  Breast, Left, Mastectomy and Axillary Contents:  - 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), [place] Grade II (3 + 2 + 1 = 6), 51 mm in greatest dimension, involving dermis  See Tumor Synoptic Report (below) and Note  - Margins negative for carcinoma in this specimen (see separately submitted additional margins, Parts C and D)  - Intradermal nevi      B  Lymph Node, Left Periaxillary Vein, Lymphadenectomy:  - Metastatic carcinoma present in one lymph node (1/1), with extranodal extension   - Metastatic focus measures at least 1 5 cm in greatest dimension      C  Breast, Left Inferior Medial Margin, Excision:  - Benign fibroadipose tissue and skeletal muscle      D  Breast, Left Inferior Medial Margin Skin, Excision:  - Invasive mammary carcinoma, underlying benign skin    - Anterior margin is multifocally positive for carcinoma     (Dr Francisco Gagnon)      10/9/2019 Surgery     Left breast, re-excision scar mastectomy site medial and inferior margins:     - Residual invasive breast carcinoma of no special type (ductal NST/invasive ductal carcinoma), multifocal        -- Four foci identified ranging in size from 1 5 to approximately 12 mm        -- Tumor foci are present in dermis and adjacent subcutaneous tissues  - All margins are negative for tumor with closest margin as follows:       -- One focus of tumor comes to within 0 7 mm of the superior margin (A16)  -- Largest focus comes to within 1 5 mm of the superior margin (A37)  - Cicatricial fibrosis of skin and subcutaneous tissues      (Dr Cecille Valle)         Clinical Trial: No    Health Maintenance   Topic Date Due    Medicare Annual Wellness Visit (AWV)  1945    CRC Screening: Colonoscopy  1945    DTaP,Tdap,and Td Vaccines (1 - Tdap) 03/05/1954    BMI: Followup Plan  03/05/1961    Pneumococcal Vaccine: 65+ Years (2 of 2 - PPSV23) 03/05/2009    Influenza Vaccine  07/01/2019    Fall Risk  05/20/2020    Depression Screening PHQ  05/20/2020    BMI: Adult  12/17/2020    Pneumococcal Vaccine: Pediatrics (0 to 5 Years) and At-Risk Patients (6 to 59 Years)  Aged Out    HIB Vaccine  Aged Out    Hepatitis B Vaccine  Aged Out    IPV Vaccine  Aged Out    Hepatitis A Vaccine  Aged Out    Meningococcal ACWY Vaccine  Aged Out    HPV Vaccine  Aged Out       Past Medical History:   Diagnosis Date    Acute gallstone pancreatitis     Arthritis     Atrial fibrillation ([address]     Breast cancer ([address] )     Cancer ([address] )     breast - left    Hypertension     Irregular heartbeat     PAC (premature atrial contraction)     PVC (premature ventricular contraction)     RBBB     Wears glasses        Past Surgical History:   Procedure Laterality Date    APPENDECTOMY      CHOLECYSTECTOMY      FRACTURE SURGERY Left     arm    FL EXC SKIN BENIG 3 1-4 CM TRUNK,ARM,LEG Left 10/9/2019    Procedure: BREAST RE-EXCISION OF SKIN MARGINS;  Surgeon: Cecille Valle MD;  [facility]: [facility];  Service: General    FL LAP,CHOLECYSTECTOMY N/A 12/18/2018    Procedure: LAP CHOLECYSTECTOMY; LYSIS OF ADHESIONS; ATTEMPTED CHOLANGIOGRAM;  Surgeon: Shawadna Grimaldo MD;  Location: [facility];  Service: General    PA MASTECTOMY, MODIFIED RADICAL Left 5/2/2019    Procedure: MASTECTOMY MODIFIED RADICAL; AXILLARY DISSECTION;  Surgeon: Shawanda Grimaldo MD;  Location: [facility];  Service: General    TONSILLECTOMY         Family History   Problem Relation Age of Onset    Cancer Mother     Heart disease Father        Social History     Tobacco Use    Smoking status: Never Smoker    Smokeless tobacco: Never Used   Substance Use Topics    Alcohol use: No    Drug use: No          Current Outpatient Medications:     furosemide (LASIX) 40 mg tablet, Take 40 mg by mouth daily, Disp: , Rfl: 0    losartan (COZAAR) 50 mg tablet, Take 50 mg by mouth daily , Disp: , Rfl: 0    Multiple Vitamins-Minerals (CENTRUM SILVER 50+MEN PO), Take by mouth, Disp: , Rfl:     polyethylene glycol (MIRALAX) 17 g packet, Take 17 g by mouth daily as needed , Disp: , Rfl:     tamoxifen (NOLVADEX) 20 mg tablet, Take 1 tablet (20 mg total) by mouth daily, Disp: 90 tablet, Rfl: 1    acetaminophen (TYLENOL) 500 mg tablet, Take 500 mg by mouth every 6 (six) hours as needed, Disp: , Rfl:     No Known Allergies     Review of Systems:  Review of Systems   Constitutional: Negative  HENT: Negative  Eyes: Negative  Respiratory: Negative  Cardiovascular: Positive for leg swelling (wears compression hose and taking lasix daily)  Gastrointestinal: Positive for constipation  Endocrine: Positive for heat intolerance (hot flashes since taking tamoxifen)  Genitourinary: Positive for frequency (nocturia x2-3)  Musculoskeletal: Negative  Skin: Negative  Healed excision at left chest wall, mild numbness at times, denies pain   Allergic/Immunologic: Negative  Neurological: Negative  Hematological: Negative  Psychiatric/Behavioral: Negative  Vitals:    01/20/20 1232   BP: 138/74   BP Location: Right arm   Pulse: (!) 107   Resp: 16   Temp: 98 2 °F (36 8 °C)   TempSrc: Temporal   SpO2: 97%   Weight: 87 3 kg (192 lb 7 4 oz)   Height: 5' 5" (1 651 m)            Pain assessment: 0    Imaging:No images are attached to the encounter       Teaching: NCI RT packet provided, discussed possible s/e of radiation

## 2020-01-23 ENCOUNTER — TELEPHONE (OUTPATIENT)
Dept: HEMATOLOGY ONCOLOGY | Facility: CLINIC | Age: 75
End: 2020-01-23

## 2020-01-23 NOTE — TELEPHONE ENCOUNTER
Patient is calling in to inform that he will not be getting the bloodwork done that Dr Josephine Lynos has ordered due to already getting his bloodwork done from other orders he has

## 2020-01-27 ENCOUNTER — APPOINTMENT (OUTPATIENT)
Dept: RADIATION ONCOLOGY | Facility: CLINIC | Age: 75
End: 2020-01-27
Attending: STUDENT IN AN ORGANIZED HEALTH CARE EDUCATION/TRAINING PROGRAM
Payer: MEDICARE

## 2020-01-27 PROCEDURE — 77290 THER RAD SIMULAJ FIELD CPLX: CPT | Performed by: STUDENT IN AN ORGANIZED HEALTH CARE EDUCATION/TRAINING PROGRAM

## 2020-01-27 PROCEDURE — 77334 RADIATION TREATMENT AID(S): CPT | Performed by: STUDENT IN AN ORGANIZED HEALTH CARE EDUCATION/TRAINING PROGRAM

## 2020-01-29 ENCOUNTER — DOCUMENTATION (OUTPATIENT)
Dept: INFUSION CENTER | Facility: CLINIC | Age: 75
End: 2020-01-29

## 2020-01-29 DIAGNOSIS — C50.919 METASTATIC BREAST CANCER (HCC): Primary | ICD-10-CM

## 2020-01-29 NOTE — SOCIAL WORK
MSW received call from reception in regard to communications with pt's wife  During appt scheduling, wife supposedly felt inconvenienced by 's diagnosis and potential treatment as she has to work, and was heard to be unsupportive by telling pt that he shouldn't do treatment and just stay home to die  Reception was unsettled by this communication and alerted MSW of upcoming appointments to see if MSW would be available  MSW will be available for pt's radiation start this coming Monday 2/3

## 2020-01-30 ENCOUNTER — TELEPHONE (OUTPATIENT)
Dept: SURGERY | Facility: CLINIC | Age: 75
End: 2020-01-30

## 2020-01-30 ENCOUNTER — APPOINTMENT (OUTPATIENT)
Dept: LAB | Facility: MEDICAL CENTER | Age: 75
End: 2020-01-30
Payer: MEDICARE

## 2020-01-30 DIAGNOSIS — C50.919 METASTATIC BREAST CANCER (HCC): ICD-10-CM

## 2020-01-30 LAB
BUN SERPL-MCNC: 40 MG/DL (ref 5–25)
CREAT SERPL-MCNC: 1.61 MG/DL (ref 0.6–1.3)
GFR SERPL CREATININE-BSD FRML MDRD: 41 ML/MIN/1.73SQ M

## 2020-01-30 PROCEDURE — 84520 ASSAY OF UREA NITROGEN: CPT

## 2020-01-30 PROCEDURE — 36415 COLL VENOUS BLD VENIPUNCTURE: CPT

## 2020-01-30 PROCEDURE — 82565 ASSAY OF CREATININE: CPT

## 2020-01-30 NOTE — TELEPHONE ENCOUNTER
Please have Dr Teja Scott see call from patient to our office for an Lorin Ballesteros for tomorrows appt with patient

## 2020-01-30 NOTE — TELEPHONE ENCOUNTER
Spoke with Dr Jaswinder Rico  She reviewed info from patient call  Will await nortes from tomorrows visit with Dr Srinivas Huston office visit

## 2020-01-31 ENCOUNTER — OFFICE VISIT (OUTPATIENT)
Dept: HEMATOLOGY ONCOLOGY | Facility: CLINIC | Age: 75
End: 2020-01-31
Payer: MEDICARE

## 2020-01-31 VITALS
DIASTOLIC BLOOD PRESSURE: 72 MMHG | TEMPERATURE: 98.1 F | HEIGHT: 65 IN | OXYGEN SATURATION: 98 % | WEIGHT: 198 LBS | RESPIRATION RATE: 16 BRPM | BODY MASS INDEX: 32.99 KG/M2 | HEART RATE: 84 BPM | SYSTOLIC BLOOD PRESSURE: 120 MMHG

## 2020-01-31 DIAGNOSIS — N28.9 RENAL INSUFFICIENCY: ICD-10-CM

## 2020-01-31 DIAGNOSIS — C50.022: Primary | ICD-10-CM

## 2020-01-31 PROCEDURE — 99213 OFFICE O/P EST LOW 20 MIN: CPT | Performed by: INTERNAL MEDICINE

## 2020-01-31 NOTE — PROGRESS NOTES
1/31/2020    Jose Raul Whitley    He has been feeling well  He has noted new onset of left lower lateral chest pain aggravated by lying on his left side or coughing  He experiences hot flashes about 5 minutes 3-4 times per day  Metastasis to left chest wall and liver was suggested on nondiagnostic imaging done on January 27, 2020 radiation planning, and accordingly, adjuvant radiation therapy was withheld and further evaluation with MRI of the brain and CT scan of chest, abdomen and pelvis was scheduled for February 8, 2020  Hematology/Oncology History:    Bilateral diagnostic mammogram November 12, 2018: Andrea Mohamudchure is a suspicious 6 cm mass of the central and upper outer quadrant of the left breast and a 2 3 cm left axillary node      Core biopsy of left subareolar mass on November 14, 2018: Invasive mucinous carcinoma, Jim grade 1, ER >95%, WI 75%, HER2 negative (0 by IHC)     Neoadjuvant tamoxifen 20 mg daily from December 27, 2018 until May 2, 2019      Left modified radical mastectomy and axillary LN D was done on May 2, 2019 by Dr Caroline Frost was invasive mixed mucinous carcinoma (invasive mammary carcinoma NOS and mucinous carcinoma), grade 2, 51 mm in greatest dimension involving dermis, ER 90-95%, WI 65-70%, HER2 negative (0 by IHC), metastatic carcinoma in 1 lymph left periaxillary node, 1 5 cm, invasive mammary carcinoma focally positive at the left inferior medial medial skin margin, ypT3 N3a, anatomic stage IIIC, pathologic prognostic stage IIIA      Left breast re-excision of medial and inferior skin margins was done on October 9, 2019, there was residual invasive breast carcinoma, multifocal, for foci ranging from 1 5 to 12 mm, tumor foci in dermis and adjacent subcutaneous tissue, all margins are negative for tumor      Tamoxifen 20 mg daily was resumed postoperatively at the patient's preference (although alternative treatment was recommended )    Review of systems:      Review of systems:  General: Feels well, no chills or swaets  Head and Neck: No nosebleeds, no oral cavity or throat soreness  Cardiovascular: No chest pain  He has been taking furosemide daily to control lower extremity edema in addition to utilizing knee-high graduated compression stockings  Respriatory: No cough  He notes mild chronic dyspnea on exertion with 1 flight of steps  GI:  Appetite is good, no abdominal pain  He has been frequently constipated  : No urinary frequency    He occasionally has nocturia x1  Musculoskeletal:  He has chronic low back pain aggravated by bending  He denies joint pain  Skin: No skin rash  Neurological: No headache, no numbness, no weakness  Hematologic: No easy bruising  Psychiatric: No emotional problems      Physical Examination:    Blood pressure 120/72, pulse 84, temperature 98 1 °F (36 7 °C), temperature source Tympanic, resp  rate 16, height 5' 5" (1 651 m), weight 89 8 kg (198 lb), SpO2 98 %  Body surface area is 1 97 meters squared  General appearance: Appears well  HEENT:  EOMI   Oropharynx clear   No lymphadenopathy of the neck  Chest: No axillary adenopathy   No recurrent tumor over the left chest wall, no right breast mass  Lungs: Clear to auscultation bilaterally  Heart: Regular rate and rhythm  Abdomen:   Liver and spleen are difficult to assess due to body habitus; No inguinal  lymphadenopathy  Extremities:   He is wearing knee-high graduated compression stockings bilaterally, no lower extremity edema  Skin: No rashes  There is mild venous stasis dermatitis of the distal lower extremities left greater than right  Neurologic: Grossly intact, no focal neurological deficit  Psychiatric: Oriented to person, place and time, normal mood and affect    ECOG 1    Laboratory:    From January 30, 2020:  Creatinine 1 61    Assessment/Plan:    As noted above MRI of the brain and CT scan of chest, abdomen and pelvis was scheduled for February 8, 2020   In view of azotemia I have recommended that the studies be done without IV contrast to reduce risk of associated kidney dysfunction  CBC/diff and CMP and CA 27 29 was ordered  The patient is aware that in the case of documented metastatic disease a change in the treatment program will be recommended such as an aromatase inhibitor given in conjunction with goserilin and possibly a CDK 4/6 inhibitor      The patient is aware seek medical attention for hot flashes, mood alteration, joint pain or muscle aches, lower extremity edema, persistent or progressive left chest wall pain, excessive fatigue, or if other new problems arise   Otherwise, plan to see him again after the evaluation above has been completed

## 2020-02-04 NOTE — TELEPHONE ENCOUNTER
Spoke with Dr Francheska Humphrey, she reviewed Dr Irena Omalley office visit  There is no need for surgical intervention at this time  He should follow through with upcoming studies ordered and follow up with hematology/oncology  Tried reaching patient, no answer  Left message for patient to return call

## 2020-02-04 NOTE — TELEPHONE ENCOUNTER
Patient returned call  He and his wife are aware that he should have his studies completed and follow up with Dr Lauryn Clay for further plan of care

## 2020-02-08 ENCOUNTER — HOSPITAL ENCOUNTER (OUTPATIENT)
Dept: CT IMAGING | Facility: HOSPITAL | Age: 75
Discharge: HOME | End: 2020-02-08
Attending: STUDENT IN AN ORGANIZED HEALTH CARE EDUCATION/TRAINING PROGRAM
Payer: MEDICARE

## 2020-02-08 ENCOUNTER — HOSPITAL ENCOUNTER (OUTPATIENT)
Dept: MRI IMAGING | Facility: HOSPITAL | Age: 75
Discharge: HOME | End: 2020-02-08
Attending: STUDENT IN AN ORGANIZED HEALTH CARE EDUCATION/TRAINING PROGRAM
Payer: MEDICARE

## 2020-02-08 DIAGNOSIS — N28.9 RENAL INSUFFICIENCY: ICD-10-CM

## 2020-02-08 DIAGNOSIS — C50.022: ICD-10-CM

## 2020-02-08 PROCEDURE — 71250 CT THORAX DX C-: CPT

## 2020-02-08 PROCEDURE — 74176 CT ABD & PELVIS W/O CONTRAST: CPT

## 2020-02-08 PROCEDURE — 70551 MRI BRAIN STEM W/O DYE: CPT

## 2020-02-10 ENCOUNTER — TELEPHONE (OUTPATIENT)
Dept: HEMATOLOGY ONCOLOGY | Facility: CLINIC | Age: 75
End: 2020-02-10

## 2020-02-10 NOTE — TELEPHONE ENCOUNTER
Mahnaz Mauro at [facility] called , pt has significant findings on CT Scan from 2/8/2020

## 2020-02-11 ENCOUNTER — APPOINTMENT (OUTPATIENT)
Dept: LAB | Facility: MEDICAL CENTER | Age: 75
End: 2020-02-11
Payer: MEDICARE

## 2020-02-11 DIAGNOSIS — N28.9 RENAL INSUFFICIENCY: ICD-10-CM

## 2020-02-11 DIAGNOSIS — C50.022: ICD-10-CM

## 2020-02-11 LAB
ALBUMIN SERPL BCP-MCNC: 3.6 G/DL (ref 3.5–5)
ALP SERPL-CCNC: 93 U/L (ref 46–116)
ALT SERPL W P-5'-P-CCNC: 61 U/L (ref 12–78)
ANION GAP SERPL CALCULATED.3IONS-SCNC: 5 MMOL/L (ref 4–13)
AST SERPL W P-5'-P-CCNC: 53 U/L (ref 5–45)
BASOPHILS # BLD AUTO: 0.07 THOUSANDS/ΜL (ref 0–0.1)
BASOPHILS NFR BLD AUTO: 1 % (ref 0–1)
BILIRUB SERPL-MCNC: 0.38 MG/DL (ref 0.2–1)
BUN SERPL-MCNC: 36 MG/DL (ref 5–25)
CALCIUM SERPL-MCNC: 10.1 MG/DL (ref 8.3–10.1)
CANCER AG27-29 SERPL-ACNC: 364.7 U/ML (ref 0–42.3)
CHLORIDE SERPL-SCNC: 106 MMOL/L (ref 100–108)
CO2 SERPL-SCNC: 27 MMOL/L (ref 21–32)
CREAT SERPL-MCNC: 1.58 MG/DL (ref 0.6–1.3)
EOSINOPHIL # BLD AUTO: 0.33 THOUSAND/ΜL (ref 0–0.61)
EOSINOPHIL NFR BLD AUTO: 3 % (ref 0–6)
ERYTHROCYTE [DISTWIDTH] IN BLOOD BY AUTOMATED COUNT: 13.4 % (ref 11.6–15.1)
GFR SERPL CREATININE-BSD FRML MDRD: 42 ML/MIN/1.73SQ M
GLUCOSE P FAST SERPL-MCNC: 99 MG/DL (ref 65–99)
HCT VFR BLD AUTO: 36.4 % (ref 36.5–49.3)
HGB BLD-MCNC: 11.7 G/DL (ref 12–17)
IMM GRANULOCYTES # BLD AUTO: 0.06 THOUSAND/UL (ref 0–0.2)
IMM GRANULOCYTES NFR BLD AUTO: 1 % (ref 0–2)
LYMPHOCYTES # BLD AUTO: 3.15 THOUSANDS/ΜL (ref 0.6–4.47)
LYMPHOCYTES NFR BLD AUTO: 25 % (ref 14–44)
MCH RBC QN AUTO: 31.4 PG (ref 26.8–34.3)
MCHC RBC AUTO-ENTMCNC: 32.1 G/DL (ref 31.4–37.4)
MCV RBC AUTO: 98 FL (ref 82–98)
MONOCYTES # BLD AUTO: 0.95 THOUSAND/ΜL (ref 0.17–1.22)
MONOCYTES NFR BLD AUTO: 8 % (ref 4–12)
NEUTROPHILS # BLD AUTO: 7.87 THOUSANDS/ΜL (ref 1.85–7.62)
NEUTS SEG NFR BLD AUTO: 62 % (ref 43–75)
NRBC BLD AUTO-RTO: 0 /100 WBCS
PLATELET # BLD AUTO: 388 THOUSANDS/UL (ref 149–390)
PMV BLD AUTO: 9.7 FL (ref 8.9–12.7)
POTASSIUM SERPL-SCNC: 4.3 MMOL/L (ref 3.5–5.3)
PROT SERPL-MCNC: 7.6 G/DL (ref 6.4–8.2)
RBC # BLD AUTO: 3.73 MILLION/UL (ref 3.88–5.62)
SODIUM SERPL-SCNC: 138 MMOL/L (ref 136–145)
WBC # BLD AUTO: 12.43 THOUSAND/UL (ref 4.31–10.16)

## 2020-02-11 PROCEDURE — 80053 COMPREHEN METABOLIC PANEL: CPT

## 2020-02-11 PROCEDURE — 86300 IMMUNOASSAY TUMOR CA 15-3: CPT

## 2020-02-11 PROCEDURE — 36415 COLL VENOUS BLD VENIPUNCTURE: CPT

## 2020-02-11 PROCEDURE — 85025 COMPLETE CBC W/AUTO DIFF WBC: CPT

## 2020-02-14 ENCOUNTER — OFFICE VISIT (OUTPATIENT)
Dept: HEMATOLOGY ONCOLOGY | Facility: CLINIC | Age: 75
End: 2020-02-14
Payer: MEDICARE

## 2020-02-14 ENCOUNTER — DOCUMENTATION (OUTPATIENT)
Dept: HEMATOLOGY ONCOLOGY | Facility: CLINIC | Age: 75
End: 2020-02-14

## 2020-02-14 VITALS
BODY MASS INDEX: 31.65 KG/M2 | SYSTOLIC BLOOD PRESSURE: 120 MMHG | WEIGHT: 190 LBS | RESPIRATION RATE: 14 BRPM | HEIGHT: 65 IN | HEART RATE: 91 BPM | DIASTOLIC BLOOD PRESSURE: 76 MMHG | TEMPERATURE: 98.7 F

## 2020-02-14 DIAGNOSIS — C79.51 OSSEOUS METASTASIS (HCC): ICD-10-CM

## 2020-02-14 DIAGNOSIS — Z17.0 MALIGNANT NEOPLASM INVOLVING BOTH NIPPLE AND AREOLA OF LEFT BREAST IN MALE, ESTROGEN RECEPTOR POSITIVE (HCC): Primary | ICD-10-CM

## 2020-02-14 DIAGNOSIS — C50.022 MALIGNANT NEOPLASM INVOLVING BOTH NIPPLE AND AREOLA OF LEFT BREAST IN MALE, ESTROGEN RECEPTOR POSITIVE (HCC): Primary | ICD-10-CM

## 2020-02-14 PROCEDURE — 3078F DIAST BP <80 MM HG: CPT | Performed by: INTERNAL MEDICINE

## 2020-02-14 PROCEDURE — 1036F TOBACCO NON-USER: CPT | Performed by: INTERNAL MEDICINE

## 2020-02-14 PROCEDURE — 3074F SYST BP LT 130 MM HG: CPT | Performed by: INTERNAL MEDICINE

## 2020-02-14 PROCEDURE — 99214 OFFICE O/P EST MOD 30 MIN: CPT | Performed by: INTERNAL MEDICINE

## 2020-02-14 PROCEDURE — 4040F PNEUMOC VAC/ADMIN/RCVD: CPT | Performed by: INTERNAL MEDICINE

## 2020-02-14 PROCEDURE — 1160F RVW MEDS BY RX/DR IN RCRD: CPT | Performed by: INTERNAL MEDICINE

## 2020-02-14 NOTE — PROGRESS NOTES
2/14/2020    Lilian Still    He has been feeling well  He continues to note left lower lateral chest discomfort aggravated by lying on his left side or coughing  He has not been using any analgesia in this regard  Hematology/Oncology History:     Bilateral diagnostic mammogram November 12, 2018: Cesar Ordonez is a suspicious 6 cm mass of the central and upper outer quadrant of the left breast and a 2 3 cm left axillary node      Core biopsy of left subareolar mass on November 14, 2018: Invasive mucinous carcinoma, [place] grade 1, ER >95%, TN 75%, HER2 negative (0 by IHC)     Neoadjuvant tamoxifen 20 mg daily from December 27, 2018 until May 2, 2019      Left modified radical mastectomy and axillary LN D was done on May 2, 2019 by Dr Messina Huge was invasive mixed mucinous carcinoma (invasive mammary carcinoma NOS and mucinous carcinoma), grade 2, 51 mm in greatest dimension involving dermis, ER 90-95%, TN 65-70%, HER2 negative (0 by IHC), metastatic carcinoma in 1 lymph left periaxillary node, 1 5 cm, invasive mammary carcinoma focally positive at the left inferior medial medial skin margin, ypT3 N3a, anatomic stage IIIC, pathologic prognostic stage IIIA     Left breast re-excision of medial and inferior skin margins was done on October 9, 2019, there was residual invasive breast carcinoma, multifocal, for foci ranging from 1 5 to 12 mm, tumor foci in dermis and adjacent subcutaneous tissue, all margins are negative for tumor      Tamoxifen 20 mg daily was resumed postoperatively at the patient's preference (although alternative treatment was recommended )    Review of systems:      General: Feels well, no chills or swaets  Head and Neck: No nosebleeds, no oral cavity or throat soreness  Cardiovascular: No chest pain  He has been taking furosemide daily to control lower extremity edema in addition to utilizing knee-high graduated compression stockings  Respriatory: No cough   He notes mild chronic dyspnea on exertion with 1 flight of steps  GI:  Appetite is good, no abdominal pain  He has been frequently constipated  : No urinary frequency    He occasionally has nocturia x1  Musculoskeletal:  He has chronic low back pain aggravated by bending  He denies joint pain  Skin: No skin rash  Neurological: No headache, no numbness, no weakness  Hematologic: No easy bruising  Psychiatric: No emotional problems    Physical Examination:    Blood pressure 120/76, pulse 91, temperature 98 7 °F (37 1 °C), resp  rate 14, height 5' 5" (1 651 m), weight 86 2 kg (190 lb)  Body surface area is 1 94 meters squared      General appearance: Appears well  HEENT:  EOMI   Oropharynx clear   No lymphadenopathy of the neck  Chest: No axillary adenopathy   No recurrent tumor over the left chest wall, no right breast mass  Lungs: Clear to auscultation bilaterally  Heart: Regular rate and rhythm  Abdomen:   Liver and spleen are difficult to assess due to body habitus; No inguinal  lymphadenopathy  Extremities:   He is wearing knee-high graduated compression stockings bilaterally, no lower extremity edema  Skin: No rashes  There is mild venous stasis dermatitis of the distal lower extremities left greater than right  Neurologic: Grossly intact, no focal neurological deficit  Psychiatric: Oriented to person, place and time, normal mood and affect    ECOG 1    Laboratory:    From February 11, 2020:  Creatinine 1 58, calcium 10 1, AST 53, ALT 61, alk-phos 93, bili 0 38, WBC 12 43, hemoglobin 11 7, platelet 783, CA 22 89 is 364 7    Noncontrast MRI of the brain from February 8, 2020: There are mild chronic microangiopathic ischemic changes, no acute ischemic changes or mass lesion      Noncontrast enhanced CT of chest, abdomen and pelvis from February 8, 2020:  Enlargement of 9 mm RML nodule with satellite nodules and a few additional pulmonary nodules, multiple new liver lesions are highly suspicious for metastasis, hepatic dome 3 1 x 2 9 cm, segment 7/8 at the dome 3 3 x 3 3 cm, indeterminate stable 2 1 x 2 2 cm pancreatic nodule likely represents an intrahepatic splenule, stable left adrenal nodule 2 2 x 1 6 cm, solid 3 2 x 3 1 x 2 4 cm nodule in the LUQ mesenteric fat is indeterminate and may represent an additional spleen you will verses tumor implant, numerous metastatic osseous lesions including right posterior 6th rib with 4 8 x 3 0 cm extrapleural soft tissue mass, right posterior 10th rib, T12 vertebral body, right acetabulum     Assessment/Plan:    There is now evidence of widespread metastatic disease including multiple lung nodules, liver metastasis, and osseous metastasis and possibly and LUQ mesenteric nodule  A change in the treatment program is recommended to that of an aromatase inhibitor given in conjunction with a gonadotropin-releasing hormone agonist   and preferably a CDK 4/6 inhibitor as well  The alternative is chemotherapy such paclitaxel  The purpose, means extraction and potential risks of letrozole and leuprolide and palbociclib were reviewed  The patient was given information regarding these agents  He will think these options over and let us know his decision within the next several weeks  Also, denosumab is recommended to reduce risk of bone pain and fracture  Denosumab is to be started in the near future after the hormonal therapy is implemented and is sufficiently tolerated  PIK3CA mutation testing is recommended as to potential subsequent treatment with fulvestrant and alpelisib      The patient is aware seek medical attention for hot flashes, mood alteration, joint pain or muscle aches, lower extremity edema, persistent or progressive left chest wall pain, excessive fatigue, or if other new problems arise   Otherwise, plan to see him again in 1 month or sooner if he decides in favor of the proposed treatment      Today's office visit required 25 minutes, over 50% of the time was utilized to review diagnostic tests, impressions and recommendations

## 2020-02-14 NOTE — PROGRESS NOTES
Written ibrance, femara, lupron given to pt  Pt stated he is waiting to make decision about getting any tx at this time  No other concerns

## 2020-02-14 NOTE — PATIENT INSTRUCTIONS
The patient is aware seek medical attention for hot flashes, mood alteration, joint pain or muscle aches, lower extremity edema, persistent or progressive left chest wall pain, excessive fatigue, or if other new problems arise

## 2020-02-16 PROBLEM — C79.51 OSSEOUS METASTASIS (HCC): Status: ACTIVE | Noted: 2020-02-16

## 2020-02-16 PROBLEM — C78.00 LUNG METASTASIS (HCC): Status: ACTIVE | Noted: 2020-02-16

## 2020-02-16 PROBLEM — C78.7 LIVER METASTASIS (HCC): Status: ACTIVE | Noted: 2020-02-16

## 2020-02-18 ENCOUNTER — TELEPHONE (OUTPATIENT)
Dept: INFUSION CENTER | Facility: HOSPITAL | Age: 75
End: 2020-02-18

## 2020-02-18 ENCOUNTER — TELEPHONE (OUTPATIENT)
Dept: HEMATOLOGY ONCOLOGY | Facility: CLINIC | Age: 75
End: 2020-02-18

## 2020-02-19 ENCOUNTER — TELEPHONE (OUTPATIENT)
Dept: INFUSION CENTER | Facility: HOSPITAL | Age: 75
End: 2020-02-19

## 2020-02-19 ENCOUNTER — OFFICE VISIT (OUTPATIENT)
Dept: NUTRITION | Facility: CLINIC | Age: 75
End: 2020-02-19

## 2020-02-19 ENCOUNTER — TELEPHONE (OUTPATIENT)
Dept: HEMATOLOGY ONCOLOGY | Facility: MEDICAL CENTER | Age: 75
End: 2020-02-19

## 2020-02-19 DIAGNOSIS — Z71.3 NUTRITIONAL COUNSELING: Primary | ICD-10-CM

## 2020-02-19 PROCEDURE — NC001 PR NO CHARGE

## 2020-02-19 NOTE — TELEPHONE ENCOUNTER
Reviewed message via phone call w/ Dr Geremias Truong  Scheduling for consent signing on 2 21 20 2 1015  Pt  Called and aware of the appt

## 2020-02-19 NOTE — TELEPHONE ENCOUNTER
Patient called in stated that he would like to speak Dr Tal Kumar, in regards to the mediation Letrozole and the shot Leuprolides   A good call back number is [phone]

## 2020-02-19 NOTE — PATIENT INSTRUCTIONS
Specific Nutrition Recommendations:   · Diet: High Protein, Low-Fat, Colorful, Plant Based Diet  · 4-5 servings of vegetables  · 2-3 servings of fruits per day  · choose high fiber carbohydrates: fruits, vegetables, beans, legumes, whole grains  · Aim for >25 grams of fiber daily  · Avoid sugar-sweetened beverages  · Water is the best choice  · Stay hydrated by sipping fluids of choice/tolerance throughout the day  · Focus on healthy fats from whole foods: nuts, seeds, avocado, fish  · >8 oz fish per week  · Limit red meat, avoid processed meats  · Avoid greasy, fried foods  Limit dining out  · Small, frequent meals/snacks may be easier to tolerate than 3 large daily meals  Aim for 5-6 small meals per day (every 2-3 hours)  · Include protein at all meals/snacks  · Include a variety of foods (as tolerated/allowed by diet)  · Alter food choices and eating patterns to accommodate changing needs  · Refer to Eating Hints book for other meal/snack ideas and symptom management  · Check out [facility] About Herbs website to review safety of supplements  Always ask your healthcare team before starting any supplement  Supplements are not recommended for the prevention or treatment of cancer  · Maintain a healthy weight with a BMI between 18 5-24  4  Avoid weight gain  · Physical activity is encouraged, as able

## 2020-02-19 NOTE — PROGRESS NOTES
Outpatient Oncology Nutrition Education  Type of Consult:  Nutrition Education  Care Location: Telephone Call  - met with patient and wife  Reason for referral: RN (Danielle Anderson) request due to pt has nutrition questions 2/19/20     PMH:afib, HTN, pancreatitis   Oncology Diagnosis & Treatments:Left breast carcinoma diagnosed 11/2018  Neoadjuvant tamoxifen 20 mg daily from December 27, 2018 until May 2, 2019  Left modified radical mastectomy May 2, 2019  Tamoxifen was resumed postoperatively  Recent findings of widespread metastatic disease including multiple lung nodules, liver metastasis, and osseous metastasis  Planned to start hormone therapy with letrozole, lupron and ibrance  Oncology History    Patient returns today for discuss results of CT and MRI  Initial consult with Dr Lieutenant Bentley on 320/20   66year old male with Durwin Drifton 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 status post neoadjuvant tamoxifen followed by left mastectomy +ALND, with pathology showing 5 cm tumor extending to margins, and involvement of dermis with 10/10 LN positive, followed by re-excision showing residual tumor, with final margins negative  He declined adjuvant chemotherapy and declined repeat imaging with Dr Suni Alex  Plan at time of consult was radiation to left chestwall and regional lymph nodes  1/27/20 CT simulation for radiation  Dr Lieutenant Bentley discussed with patient findings of CT sim which show metastatic disease  She also discussed with Dr Suni Alex and radiology to confirm  Plan to order diagnostic CT c/A/P with contrast, and MRI brain  forego PMRT    1/31/20 Med Onc follow-up with Dr Suni Alex  Pt is scheduled for MRI brain and CT 2/8/20, in view of azotemia,  recommend imaging without IV contrast to reduce risk of kidney dysfunction       2/8/20 CT chest abd pelvis  IMPRESSION:   1   Progressively enlarging 9 mm right middle lobe nodule with Satellite nodules and few additional new pulmonary nodules, suspicious for progressive metastatic disease      2   Multiple hypoattenuating liver lesions, new from December 2018, and progressed from January 2020, highly suspicious for metastasis      3   Osseous metastatic disease as described, new from December 2018, and slightly progressed from January 2020  Right acetabular lytic lesion was not previously imaged      4  Indeterminate 3 2 cm nodular lesion in the left upper quadrant of the abdomen, which was only partially imaged on prior studies and was not adequately evaluated  This may represent a splenule, tumor implant or metastatic lymph node  Further   characterization with PET CT can be performed if clinically warranted      2/8/20 MRI brain - pending      2/14/20 Med Onc f/u with Dr Heber Fernandez          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, estrogen receptor positive ([facility] [address] )    2018 Initial Diagnosis     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, estrogen receptor positive ([facility] [address] )      11/14/2018 Biopsy     A  Breast, left subareolar mass, core biopsy (11 slides, [address], collected on 11/14/2018): - Invasive mammary carcinoma with mucinous features (see note)  -- Jim grade 1 of 3 (total score: 5 of 9)        * Tubule formation < 10%, score 3        * Nuclear grade 1 of 3, score 1        * Mitoses < 3/mm2, (</= 7 mitoses/10HPF), score 1     -- Invasive carcinoma involves 4 of 4 submitted core biopsy fragments, max  Dimension= 22 mm     -- Estrogen, Progesterone & HER2 receptor studies performed at the referring institution show the tumor to be positive for ER (>95% strong), positive for IA (75% moderate), and negative for HER2 IHC (score 0)  - Ductal carcinoma in-situ (DCIS): Not identified  - Lymph-vascular invasion: Not identified  - Microcalcifications: Absent    - Best representative tumor block:  A1    -- Sufficient tumor present for        Agendia Mammaprint/Blueprint (1 cm2 of invasive tumor in aggregate): No         MI Profile/Foundation One (at least 5 x 5 mm of tumor): Yes  Note:  The tumor shows prominent mucinous differentiation, raising the strong possibility of invasive mucinous carcinoma, though this designation is possible only complete excision with evaluation of the entirety of the tumor mass  12/27/2018 -  Hormone Therapy     Tamoxifen 20 mg daily (Dr Yong Hadley)      2018 Genetic Testing     BRCA negative       5/2/2019 Surgery     A  Breast, Left, Mastectomy and Axillary Contents:  - 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), Hayden Grade II (3 + 2 + 1 = 6), 51 mm in greatest dimension, involving dermis  See Tumor Synoptic Report (below) and Note  - Margins negative for carcinoma in this specimen (see separately submitted additional margins, Parts C and D)  - Intradermal nevi      B  Lymph Node, Left Periaxillary Vein, Lymphadenectomy:  - Metastatic carcinoma present in one lymph node (1/1), with extranodal extension   - Metastatic focus measures at least 1 5 cm in greatest dimension      C  Breast, Left Inferior Medial Margin, Excision:  - Benign fibroadipose tissue and skeletal muscle      D  Breast, Left Inferior Medial Margin Skin, Excision:  - Invasive mammary carcinoma, underlying benign skin  - Anterior margin is multifocally positive for carcinoma     (Dr Jacinda Jackson)      10/9/2019 Surgery     Left breast, re-excision scar mastectomy site medial and inferior margins:     - Residual invasive breast carcinoma of no special type (ductal NST/invasive ductal carcinoma), multifocal        -- Four foci identified ranging in size from 1 5 to approximately 12 mm        -- Tumor foci are present in dermis and adjacent subcutaneous tissues       - All margins are negative for tumor with closest margin as follows:       -- One focus of tumor comes to within 0 7 mm of the superior margin (A16)  -- Largest focus comes to within 1 5 mm of the superior margin (A37)  - Cicatricial fibrosis of skin and subcutaneous tissues      (Dr Chely Del Rosario)       Past Medical History:   Diagnosis Date    Acute gallstone pancreatitis     Arthritis     [facility]rial fibrillation [facility] [address] [facility]     Breast cancer [facility] [address] [facility]     Cancer ([facility] [address] )     breast - left    Hypertension     Irregular heartbeat     PAC (premature atrial contraction)     PVC (premature ventricular contraction)     RBBB     Wears glasses      Past Surgical History:   Procedure Laterality Date    APPENDECTOMY      CHOLECYSTECTOMY      FRACTURE SURGERY Left     arm    ND EXC SKIN BENIG 3 1-4 CM TRUNK,ARM,LEG Left 10/9/2019    Procedure: BREAST RE-EXCISION OF SKIN MARGINS;  Surgeon: Chely Del Rosario MD;  Location: [facility];  Service: General    ND LAP,CHOLECYSTECTOMY N/A 12/18/2018    Procedure: LAP CHOLECYSTECTOMY; LYSIS OF ADHESIONS; ATTEMPTED CHOLANGIOGRAM;  Surgeon: Chely Del Rosario MD;  Location: [facility];  Service: General    ND MASTECTOMY, MODIFIED RADICAL Left 5/2/2019    Procedure: MASTECTOMY MODIFIED RADICAL; AXILLARY DISSECTION;  Surgeon: Chely Del Rosario MD;  Location: [facility];  Service: General    TONSILLECTOMY         Review of Medications:     Current Outpatient Medications:     acetaminophen (TYLENOL) 500 mg tablet, Take 500 mg by mouth every 6 (six) hours as needed, Disp: , Rfl:     furosemide (LASIX) 40 mg tablet, Take 40 mg by mouth daily, Disp: , Rfl: 0    losartan (COZAAR) 50 mg tablet, Take 50 mg by mouth daily , Disp: , Rfl: 0    Multiple Vitamins-Minerals (CENTRUM SILVER 50+MEN PO), Take by mouth, Disp: , Rfl:     polyethylene glycol (MIRALAX) 17 g packet, Take 17 g by mouth daily as needed , Disp: , Rfl:     tamoxifen (NOLVADEX) 20 mg tablet, Take 1 tablet (20 mg total) by mouth daily, Disp: 90 tablet, Rfl: 1    Most Recent Lab Results:   Lab Results   Component Value Date    WBC 12 43 (H) 02/11/2020    IRON 71 06/26/2019    TIBC 311 06/26/2019    FERRITIN 89 06/26/2019    ALT 61 02/11/2020    AST 53 (H) 02/11/2020    K 4 3 02/11/2020    K 4 6 12/03/2019    BUN 36 (H) 02/11/2020    BUN 40 (H) 01/30/2020    CREATININE 1 58 (H) 02/11/2020    CREATININE 1 61 (H) 01/30/2020    CALCIUM 10 1 02/11/2020       Anthropometric Measurements:   Ht Readings from Last 1 Encounters:   02/14/20 5' 5" (1 651 m)     -Weight History: Wt Readings from Last 15 Encounters:   02/14/20 86 2 kg (190 lb)   01/31/20 89 8 kg (198 lb)   01/20/20 87 3 kg (192 lb 7 4 oz)   12/17/19 89 4 kg (197 lb)   10/21/19 89 8 kg (198 lb)   10/14/19 89 2 kg (196 lb 9 6 oz)   10/09/19 90 7 kg (200 lb)   09/17/19 90 8 kg (200 lb 3 2 oz)   09/12/19 90 1 kg (198 lb 9 6 oz)   07/29/19 87 1 kg (192 lb)   06/25/19 88 1 kg (194 lb 3 2 oz)   05/30/19 87 1 kg (192 lb)   05/13/19 86 2 kg (190 lb)   05/06/19 88 kg (194 lb)   05/02/19 85 7 kg (189 lb)     Estimated body mass index is 31 62 kg/m² as calculated from the following:    Height as of 2/14/20: 5' 5" (1 651 m)  Weight as of 2/14/20: 86 2 kg (190 lb)  Discussion/Summary: Talon was contacted for nutrition education  Talon and his wife Crista Gould have questions about what he should eat to help reduce cancer risk  Provided education on a cancer preventative eating pattern  This diet consists of: high fiber (<25g daily), healthy fat intake (fish, nuts/seeds, avocado, olive oil, low fat dairy), lean protein (chicken, fish, beans, nuts/seeds), adequate hydration (at least 64 oz non caffeine beverages daily), whole grains  This diet avoids foods that are high in added sugars (sugar sweetened beverages, cookies/cakes, candies), fried/greasy foods, limiting processed meats  Talon and Crista Gould had questions about specific foods and if Talon can eat them, addressed each food and answered questions   Talon seemed slightly hesitant to make some changes to his diet, but overall agreed to try  Discussed the potential interactions of high dose antioxidants with cancer tx and recommended exercising caution when taking these supplements  Discussed reading supplement labels for supplements with <100% of the daily value for all nutrients  Discussed:   · high protein foods to include at all meals and snacks  · encouraged eating every 2-3 hours (5-6 small meals/day)  · adequate hydation & tips to increase overall fluid intake  · a cancer preventative eating pattern as a long-term nutrition goal  · mindful eating concepts      All questions and concerns addressed during todays visit  Chantel santillan RD contact information  Specific Nutrition Recommendations:   · Diet: High Protein, Low-Fat, Colorful, Plant Based Diet  · 4-5 servings of vegetables  · 2-3 servings of fruits per day  · choose high fiber carbohydrates: fruits, vegetables, beans, legumes, whole grains  · Aim for >25 grams of fiber daily  · Avoid sugar-sweetened beverages  · Water is the best choice  · Stay hydrated by sipping fluids of choice/tolerance throughout the day  · Focus on healthy fats from whole foods: nuts, seeds, avocado, fish  · >8 oz fish per week  · Limit red meat, avoid processed meats  · Avoid greasy, fried foods  Limit dining out  · Small, frequent meals/snacks may be easier to tolerate than 3 large daily meals  Aim for 5-6 small meals per day (every 2-3 hours)  · Include protein at all meals/snacks  · Include a variety of foods (as tolerated/allowed by diet)  · Alter food choices and eating patterns to accommodate changing needs  · Refer to Eating Hints book for other meal/snack ideas and symptom management  · Check out [facility] About Herbs website to review safety of supplements  Always ask your healthcare team before starting any supplement  Supplements are not recommended for the prevention or treatment of cancer  · Maintain a healthy weight with a BMI between 18 5-24  4    Avoid weight gain   · Physical activity is encouraged, as able      Barriers: Readiness to Change  Readiness to change: contemplation  Comprehension: verbalizes understanding  Expected Compliance: fair    Materials Provided: mailed to home per pt request: Nutrition for Cancer Survivors, Flavored Water Recipes handout, Plant Based Diet handout, [employer] Content of Foods, Lean Protein Sources, Oncology Nutrition Class Flyer, Oncology Nutrition Brochure, RD contact information  , Eating Hints Book     Follow Up Plan: PRN per pt request   Recommend Referral to Other Providers: none at this time

## 2020-02-19 NOTE — TELEPHONE ENCOUNTER
Patient called this AM to make Dr Ballard Schwab aware of his choice for treatment  Patient is calling now to see if "meds were called in"  I informed patient that Dr Ballard Schwab is not in office until Friday and will be reviewing his case with the RN and patient may not hear from office until next week  Patient verbalizes understanding

## 2020-02-19 NOTE — TELEPHONE ENCOUNTER
Patient and wife concerned about dietary concerns they read on the internet regarding barbecued foods, spicy foods, etc and cancer  I will make a referral to the dietician  Patient in agreemnt with plan

## 2020-02-19 NOTE — TELEPHONE ENCOUNTER
Patient called and has decided to pursue letrozole, lupron and ibrance per DrG's OV note of 2/14/2020: There is now evidence of widespread metastatic disease including multiple lung nodules, liver metastasis, and osseous metastasis and possibly and LUQ mesenteric nodule  A change in the treatment program is recommended to that of an aromatase inhibitor given in conjunction with a gonadotropin-releasing hormone agonist   and preferably a CDK 4/6 inhibitor as well  The alternative is chemotherapy such paclitaxel  The purpose, means extraction and potential risks of letrozole and leuprolide and palbociclib were reviewed  The patient was given information regarding these agents  He will think these options over and let us know his decision within the next several weeks  Will advise Dr Holly Barrientos and his RN  Please call patient back with timeline

## 2020-02-21 ENCOUNTER — DOCUMENTATION (OUTPATIENT)
Dept: HEMATOLOGY ONCOLOGY | Facility: CLINIC | Age: 75
End: 2020-02-21

## 2020-02-21 DIAGNOSIS — Z17.0 MALIGNANT NEOPLASM INVOLVING BOTH NIPPLE AND AREOLA OF LEFT BREAST IN MALE, ESTROGEN RECEPTOR POSITIVE (HCC): Primary | ICD-10-CM

## 2020-02-21 DIAGNOSIS — C50.022 MALIGNANT NEOPLASM INVOLVING BOTH NIPPLE AND AREOLA OF LEFT BREAST IN MALE, ESTROGEN RECEPTOR POSITIVE (HCC): Primary | ICD-10-CM

## 2020-02-21 RX ORDER — LETROZOLE 2.5 MG/1
2.5 TABLET, FILM COATED ORAL DAILY
Qty: 30 TABLET | Refills: 1 | Status: SHIPPED | OUTPATIENT
Start: 2020-02-21 | End: 2020-02-26 | Stop reason: ALTCHOICE

## 2020-02-21 NOTE — PROGRESS NOTES
Pt was seen for OV 2 14 20  Pt was given treatment options to review  Pt in the office today  Signed consents for Letrozole and Lupron  To call the pharmacy tomorrow-Dr Deborah Lai to escribe the script later today  Will call the pt w/Lupron date post order being entered into [facility]  Reviewed above w/Dr Tessa Alvarado understanding information above and in agreement with the plan

## 2020-02-24 ENCOUNTER — TELEPHONE (OUTPATIENT)
Dept: INFUSION CENTER | Facility: HOSPITAL | Age: 75
End: 2020-02-24

## 2020-02-24 ENCOUNTER — TELEPHONE (OUTPATIENT)
Dept: HEMATOLOGY ONCOLOGY | Facility: CLINIC | Age: 75
End: 2020-02-24

## 2020-02-24 NOTE — TELEPHONE ENCOUNTER
Patient's wife, Jah David, is calling in inquiring when Stephani Rivas will start a certain medication, Please call her back at [phone]

## 2020-02-24 NOTE — TELEPHONE ENCOUNTER
Patient and wife concerned about when and if  ibrance will be started  Please advise   (Patient has started letrozole and is scheduled for lupron)

## 2020-02-25 ENCOUNTER — DOCUMENTATION (OUTPATIENT)
Dept: HEMATOLOGY ONCOLOGY | Facility: CLINIC | Age: 75
End: 2020-02-25

## 2020-02-25 NOTE — PROGRESS NOTES
2/25/2020 received notification from clinical pt will be starting Ibrance 100 mg qd x 21 days followed by 7 days off    Pt has [employer]  ID # [id number]  BIN #[id number]  PCN # [id number]  GRP # [id number]    Submitted for auth through cover my meds

## 2020-02-25 NOTE — PROGRESS NOTES
Pt in office to sign consent for Ibrance  Pt  Was given educational material at an earlier date for review  Pt 's questions concerning the txmt plan were answered by Dr Rohini Jackson at time of signing today

## 2020-02-26 ENCOUNTER — DOCUMENTATION (OUTPATIENT)
Dept: HEMATOLOGY ONCOLOGY | Facility: CLINIC | Age: 75
End: 2020-02-26

## 2020-02-26 ENCOUNTER — TELEPHONE (OUTPATIENT)
Dept: HEMATOLOGY ONCOLOGY | Facility: CLINIC | Age: 75
End: 2020-02-26

## 2020-02-26 ENCOUNTER — HOSPITAL ENCOUNTER (OUTPATIENT)
Dept: INFUSION CENTER | Facility: CLINIC | Age: 75
Discharge: HOME | End: 2020-02-26
Payer: MEDICARE

## 2020-02-26 ENCOUNTER — TELEPHONE (OUTPATIENT)
Dept: INFUSION CENTER | Facility: HOSPITAL | Age: 75
End: 2020-02-26

## 2020-02-26 ENCOUNTER — TRANSCRIBE ORDERS (OUTPATIENT)
Dept: ADMINISTRATIVE | Facility: HOSPITAL | Age: 75
End: 2020-02-26

## 2020-02-26 ENCOUNTER — APPOINTMENT (OUTPATIENT)
Dept: LAB | Facility: MEDICAL CENTER | Age: 75
End: 2020-02-26
Payer: MEDICARE

## 2020-02-26 VITALS
DIASTOLIC BLOOD PRESSURE: 75 MMHG | RESPIRATION RATE: 18 BRPM | TEMPERATURE: 96.9 F | SYSTOLIC BLOOD PRESSURE: 157 MMHG | HEART RATE: 88 BPM

## 2020-02-26 DIAGNOSIS — Z17.0 MALIGNANT NEOPLASM OF LEFT BREAST IN MALE, ESTROGEN RECEPTOR POSITIVE, UNSPECIFIED SITE OF BREAST (HCC): ICD-10-CM

## 2020-02-26 DIAGNOSIS — C78.7 LIVER METASTASES (HCC): ICD-10-CM

## 2020-02-26 DIAGNOSIS — C78.00 MALIGNANT NEOPLASM METASTATIC TO LUNG, UNSPECIFIED LATERALITY (HCC): ICD-10-CM

## 2020-02-26 DIAGNOSIS — C79.51 OSSEOUS METASTASIS (HCC): Primary | ICD-10-CM

## 2020-02-26 DIAGNOSIS — C50.922 MALIGNANT NEOPLASM OF LEFT BREAST IN MALE, ESTROGEN RECEPTOR POSITIVE, UNSPECIFIED SITE OF BREAST (HCC): Primary | ICD-10-CM

## 2020-02-26 DIAGNOSIS — C79.51 BONE METASTASIS (HCC): ICD-10-CM

## 2020-02-26 DIAGNOSIS — C78.7 LIVER METASTASIS (HCC): ICD-10-CM

## 2020-02-26 DIAGNOSIS — C50.922 MALIGNANT NEOPLASM OF LEFT BREAST IN MALE, ESTROGEN RECEPTOR POSITIVE, UNSPECIFIED SITE OF BREAST (HCC): ICD-10-CM

## 2020-02-26 DIAGNOSIS — Z17.0 MALIGNANT NEOPLASM INVOLVING BOTH NIPPLE AND AREOLA OF LEFT BREAST IN MALE, ESTROGEN RECEPTOR POSITIVE (HCC): ICD-10-CM

## 2020-02-26 DIAGNOSIS — C50.022 MALIGNANT NEOPLASM INVOLVING BOTH NIPPLE AND AREOLA OF LEFT BREAST IN MALE, ESTROGEN RECEPTOR POSITIVE (HCC): ICD-10-CM

## 2020-02-26 DIAGNOSIS — Z17.0 MALIGNANT NEOPLASM OF LEFT BREAST IN MALE, ESTROGEN RECEPTOR POSITIVE, UNSPECIFIED SITE OF BREAST (HCC): Primary | ICD-10-CM

## 2020-02-26 LAB
ALBUMIN SERPL BCP-MCNC: 3.6 G/DL (ref 3.5–5)
ALP SERPL-CCNC: 114 U/L (ref 46–116)
ALT SERPL W P-5'-P-CCNC: 49 U/L (ref 12–78)
ANION GAP SERPL CALCULATED.3IONS-SCNC: 6 MMOL/L (ref 4–13)
AST SERPL W P-5'-P-CCNC: 46 U/L (ref 5–45)
BASOPHILS # BLD AUTO: 0.06 THOUSANDS/ΜL (ref 0–0.1)
BASOPHILS NFR BLD AUTO: 1 % (ref 0–1)
BILIRUB SERPL-MCNC: 0.41 MG/DL (ref 0.2–1)
BUN SERPL-MCNC: 50 MG/DL (ref 5–25)
CALCIUM SERPL-MCNC: 9.5 MG/DL (ref 8.3–10.1)
CANCER AG27-29 SERPL-ACNC: 368.3 U/ML (ref 0–42.3)
CEA SERPL-MCNC: 2.2 NG/ML (ref 0–3)
CHLORIDE SERPL-SCNC: 105 MMOL/L (ref 100–108)
CO2 SERPL-SCNC: 28 MMOL/L (ref 21–32)
CREAT SERPL-MCNC: 2.11 MG/DL (ref 0.6–1.3)
EOSINOPHIL # BLD AUTO: 0.21 THOUSAND/ΜL (ref 0–0.61)
EOSINOPHIL NFR BLD AUTO: 2 % (ref 0–6)
ERYTHROCYTE [DISTWIDTH] IN BLOOD BY AUTOMATED COUNT: 13.2 % (ref 11.6–15.1)
GFR SERPL CREATININE-BSD FRML MDRD: 30 ML/MIN/1.73SQ M
GLUCOSE P FAST SERPL-MCNC: 106 MG/DL (ref 65–99)
HCT VFR BLD AUTO: 36.1 % (ref 36.5–49.3)
HGB BLD-MCNC: 11.3 G/DL (ref 12–17)
IMM GRANULOCYTES # BLD AUTO: 0.04 THOUSAND/UL (ref 0–0.2)
IMM GRANULOCYTES NFR BLD AUTO: 0 % (ref 0–2)
LYMPHOCYTES # BLD AUTO: 2.58 THOUSANDS/ΜL (ref 0.6–4.47)
LYMPHOCYTES NFR BLD AUTO: 23 % (ref 14–44)
MCH RBC QN AUTO: 30.5 PG (ref 26.8–34.3)
MCHC RBC AUTO-ENTMCNC: 31.3 G/DL (ref 31.4–37.4)
MCV RBC AUTO: 98 FL (ref 82–98)
MONOCYTES # BLD AUTO: 0.97 THOUSAND/ΜL (ref 0.17–1.22)
MONOCYTES NFR BLD AUTO: 9 % (ref 4–12)
NEUTROPHILS # BLD AUTO: 7.23 THOUSANDS/ΜL (ref 1.85–7.62)
NEUTS SEG NFR BLD AUTO: 65 % (ref 43–75)
NRBC BLD AUTO-RTO: 0 /100 WBCS
PLATELET # BLD AUTO: 488 THOUSANDS/UL (ref 149–390)
PMV BLD AUTO: 9.3 FL (ref 8.9–12.7)
POTASSIUM SERPL-SCNC: 4.2 MMOL/L (ref 3.5–5.3)
PROT SERPL-MCNC: 7.8 G/DL (ref 6.4–8.2)
PSA SERPL-MCNC: <0.1 NG/ML (ref 0–4)
RBC # BLD AUTO: 3.7 MILLION/UL (ref 3.88–5.62)
SODIUM SERPL-SCNC: 139 MMOL/L (ref 136–145)
WBC # BLD AUTO: 11.09 THOUSAND/UL (ref 4.31–10.16)

## 2020-02-26 PROCEDURE — 86300 IMMUNOASSAY TUMOR CA 15-3: CPT

## 2020-02-26 PROCEDURE — 83615 LACTATE (LD) (LDH) ENZYME: CPT

## 2020-02-26 PROCEDURE — 36415 COLL VENOUS BLD VENIPUNCTURE: CPT

## 2020-02-26 PROCEDURE — 83625 ASSAY OF LDH ENZYMES: CPT

## 2020-02-26 PROCEDURE — 80053 COMPREHEN METABOLIC PANEL: CPT

## 2020-02-26 PROCEDURE — 82378 CARCINOEMBRYONIC ANTIGEN: CPT

## 2020-02-26 PROCEDURE — 85025 COMPLETE CBC W/AUTO DIFF WBC: CPT

## 2020-02-26 PROCEDURE — 96402 CHEMO HORMON ANTINEOPL SQ/IM: CPT

## 2020-02-26 PROCEDURE — 84153 ASSAY OF PSA TOTAL: CPT

## 2020-02-26 RX ORDER — IBUPROFEN 200 MG
400 TABLET ORAL EVERY 6 HOURS PRN
COMMUNITY
End: 2020-04-29 | Stop reason: CLARIF

## 2020-02-26 RX ORDER — COVID-19 ANTIGEN TEST
KIT MISCELLANEOUS DAILY
COMMUNITY
End: 2020-04-29 | Stop reason: CLARIF

## 2020-02-26 RX ADMIN — LEUPROLIDE ACETATE 7.5 MG: KIT at 14:03

## 2020-02-26 NOTE — TELEPHONE ENCOUNTER
Patient's wife is filling out forms from [employer] that she printed on line for assistance for ibrance   Will send message to financial counselors to call patient's wife to assist

## 2020-02-26 NOTE — PROGRESS NOTES
2/25/2020 received notification from clinical stating the pt will be starting Ibrance 100 mg q d  X 21 days followed by 7 days off    Pt has [employer]  ID # [id number]  BIN # [id number]  N # [id number]  GRP # [id number]    Submitted for auth through cover my meds  2/26/2020 received approval letter from [employer] [facility]  Per the approval letter vianey Walker #[id number] is valid from 2/25/2020 through 12/31/2020    Notified clinical, [employer], and finance  3/11/2020 received notification from finance:     For patient Perla Snellen  [birth year]    Received notification from Anant Lau  Per Tabitha Severance, patient application has been approved for free drug program Eff 3-11-20 Thru 12-31-20 [employer] has reached out to the patient to set up shipment    Email to team, Epic noted, Patient notified

## 2020-02-26 NOTE — PROGRESS NOTES
2-25-20  Received new oral start email from provider    2-26-20  Completed pfizer application  Place call to patient to advise of copay and funding options  Mr  Xavier Rodriguez stated he was busy at the time however he would like a return call after noon today  Copay received       2-27-20  Spoke with patient, Will meet me on Monday at [facility] office at 10am    3-2-20  Met with Mr Xavier Rodriguez  Signed off on application  Obtained income information  Waiting on provider sig then will fax martha    3-4-20  Follow up email to RN, form is required for submission of application  Received response from RN:  Carson Garcia is still out of the office through Friday    [place] Frans[place] he is off this week  will forward on Friday  Edison Fransisco [place] Fr[place]isco [place] I think I left an email w/this message when I got the martha for sig   if I didn't   my apology   th    3-6-20  Received form back from providers office  Faxed application to [employer]        3-11-20  Received notification from [employer]   Per Kadi Olmstead, patient application has been approved for free drug program  Eff 3-11-20  Thru 12-31-20  [employer] has reached out to the patient to set up shipment    Email to team, Epic noted, Patient notified

## 2020-02-26 NOTE — PROGRESS NOTES
Pt arrived to unit without complaint, received first Lupron injection as ordered, tolerated well  Pt aware of next scheduled injection in one month  Pt has written educational materials from office visit, this education was reviewed  Pt without question or concern

## 2020-02-26 NOTE — TELEPHONE ENCOUNTER
Patient is calling in because he wants to stop the order for his medication Ibrance due to his insurance  He states that [employer] sent him forms which will help him get the medication for free   Please call him back at [phone]

## 2020-02-27 LAB
CANCER AG15-3 SERPL-ACNC: 264.1 U/ML (ref 0–25)
LDH SERPL-CCNC: 227 IU/L (ref 121–224)
LDH1 CFR SERPL ELPH: 22 % (ref 17–32)
LDH2 CFR SERPL ELPH: 32 % (ref 25–40)
LDH3 CFR SERPL ELPH: 21 % (ref 17–27)
LDH4 CFR SERPL ELPH: 7 % (ref 5–13)
LDH5 CFR SERPL ELPH: 18 % (ref 4–20)

## 2020-02-28 ENCOUNTER — OFFICE VISIT (OUTPATIENT)
Dept: PHYSICAL THERAPY | Facility: CLINIC | Age: 75
End: 2020-02-28
Payer: MEDICARE

## 2020-02-28 DIAGNOSIS — N63.20 MASS OF LEFT BREAST: ICD-10-CM

## 2020-02-28 DIAGNOSIS — M54.6 BILATERAL THORACIC BACK PAIN, UNSPECIFIED CHRONICITY: ICD-10-CM

## 2020-02-28 DIAGNOSIS — I89.0 LYMPHEDEMA: Primary | ICD-10-CM

## 2020-02-28 PROCEDURE — 97163 PT EVAL HIGH COMPLEX 45 MIN: CPT | Performed by: PHYSICAL THERAPIST

## 2020-02-28 NOTE — PROGRESS NOTES
PT Evaluation     Today's date: 2020  Patient name: Perla Snellen  : 1945  MRN: [id number]  Referring provider: Homero Sagastume MD  Dx:   Encounter Diagnosis     ICD-10-CM    1  Lymphedema I89 0    2  Bilateral thoracic back pain, unspecified chronicity M54 6    3  Mass of left breast N63 20                   Assessment  Assessment details: Pt presents for skilled PT assessment this day w/ primary c/o thoracic PN which radiates t/o thoracic spine to inferior borders of B scapula and also at times referring to anterior aspect of chest wall/abdomen  Moderate ttp is noted along R rib cage w/ notable mass, moveable and softer in nature, more superficial than ribs but subdermal, questionable if musculoskeletal in nature  Marked ttp is noted directly along spinous process to transverse process mid to lower t/s which may benefit from further imaging/spie evaluation to r/o potential DDD vs consideration of potential spinal metastases given pt's current presentation and imaging confirming lung and liver mets  Pt does have significant, trunk, spine, and B shldr ROM deficits along w/ significant postural strength deficits + presence of L UE edema/lymphedema w/ measurements this day slightly larger than previous measurements obtained Fall of   Skilled PT is advised to optimize posturing, strength, and ROM in attempt to aid in PN reduction, however conversation is had w/ pt regarding high likelihood of PN sx's referring to mid back and thru trunk being associated w/ CA presence w/ mets affecting liver and lungs    Will initiate skilled CLT utilizing MLD to address L UE edema and address ROM/strength deficits highlighting Strength after breast CA program   Should PN sx's persist or worsen pt advised to discuss w/ PCP or oncologist     Impairments: abnormal or restricted ROM, abnormal movement, activity intolerance, impaired physical strength, pain with function and poor posture   Other impairment: L UE edema  Understanding of Dx/Px/POC: good   Prognosis: good    Goals  ST  Improve B shldr ROM >10 deg within 3 wks  2  Increase trunk/spine ROM >10% within 3 wks  3  Reduce L UE edema >10% within 3 wks  4  Improve postural strength +1 grade within 3 wks  LT  I in HEP within 6 wks  2  I in self MLD within 6 wks  3  Postural strength 5/5 t/o within 6 wks  4  ROM of trunk and B shldrs WNL within 6 wks    Plan  Patient would benefit from: skilled physical therapy  Planned therapy interventions: manual therapy, massage, stretching, strengthening, home exercise program, flexibility, functional ROM exercises and patient education  Frequency: 2x week  Duration in visits: 12  Duration in weeks: 6  Plan of Care beginning date: 2020  Plan of Care expiration date: 4/10/2020  Treatment plan discussed with: patient        Subjective Evaluation    History of Present Illness  Mechanism of injury: Pt presents w/ c/o B trunk PN, L shldr PN, and limited motion, marked tightness t/o mid to upper body following most recent surgery to address ongoing CA  Pt admits recent imaging + for mets to liver and lungs  Pt reports reduced endurance and less strength thru UE w/ greater difficulty managing household chores and home projects  Pt expresses concern regarding level of PN but admits minimal desire to rely on heavier strength PN Rx  Pt has preference to trial PT to achieve more desired results  Pt does admit cont'd edema L posterior shldr and feeling a bit heavier in L arm     Quality of life: fair    Pain  Current pain ratin  At best pain rating: 3  At worst pain ratin  Quality: dull ache, discomfort, sharp, tight and pulling    Treatments  Previous treatment: physical therapy  Patient Goals  Patient goals for therapy: decreased edema, decreased pain, increased motion, independence with ADLs/IADLs, return to sport/leisure activities and increased strength          Objective     Palpation     Additional Palpation Details  Moderate ttp noted R distal rib cage w/ mild intercostalis muscle restriction noted, but also presence of numerous soft tissue cyst-like structures, subdermal, resting superficial to rib cage  Moderate to severe ttp directly along spinous process to transverse process T7-T10    Active Range of Motion   Left Shoulder   Flexion: 160 degrees   Abduction: 156 degrees   External rotation 90°: 68 degrees   Internal rotation 45°: 54 degrees     Right Shoulder   Flexion: 164 degrees   Abduction: 162 degrees   External rotation 90°: 76 degrees  Internal rotation 45°: 60 degrees     Additional Active Range of Motion Details  Poor trunk mobility L and R w/ limited spinal rotation   L trunk rotation limited 25%, R trunk rotation limited 30%  L trunk lateral SB limited 40%, R trunk lateral SB limited 35%    Scapular Mobility   Left Shoulder   Scapular mobility: fair  Scapular Mobility with Shoulder to 90° FF   Scapular winging: moderate  Scapular elevation: moderate    Additional Scapular Mobility Details  B rounded shldr, slumped fwd posturing w/ excessive t/s kyphosis and poor periscapular/postural muscle activation, worse L than R      Strength/Myotome Testing     Left Shoulder     Isolated Muscles   Middle trapezius: 3   Pectoralis major: 2+   Pectoralis minor: 2+   Rhomboids: 3   Serratus anterior: 3-   Upper trapezius: 3+     General Comments:       Ankle/Foot Comments   Physical assessment:  Palpation: Mild edema presence noted LUE hand, forearm and most significant L posterior shldr/periscapular region w/ bulbous pocket just lateral and inferior of scapula to just posterior of axilla  Skin Mobility: Fair, mild fibrosis L forearm, L posterior shldr  Skin color: WNL  Pitting: trace to +1 L forearm to hand + L posterior scapula  Wound presence: None  Wound size: n/a  Wound color: n/a  Stemmer's Sign: -  Gait assessment: WNL  Transfer status: B UE A  Ability to don/doff clothing/garments: I         Flowsheet Rows Most Recent Value   Thumb   L Thumb Initial Girth  6 75 cm   Palmar Crease   L Palmar Crease Initial Girth  22 75 cm   Wrist   L Wrist Initial Girth  17 5 cm   W+10cm   L W+10cm Initial Girth  24 9 cm   W+20cm   L W+20cm Initial Girth  28 5 cm   W+30cm   L W+30cm Initial Girth  31 5 cm   W+40cm   L W+40cm Initial Girth  34 5 cm               Precautions: CA - Liver, Lung, Metastases of L breast      Manual  2-28            MLD L UE + L posterior shldr NV                                                                    Exercise Diary  2-28            Seated LTR 5s x5            Stand t/s SB 5s x5            Rhomboid str 10s x3            Seated cross arm str 10s x3                                                                                                                                                                                                                                Modalities

## 2020-03-03 ENCOUNTER — APPOINTMENT (OUTPATIENT)
Dept: PHYSICAL THERAPY | Facility: CLINIC | Age: 75
End: 2020-03-03
Payer: MEDICARE

## 2020-03-06 ENCOUNTER — OFFICE VISIT (OUTPATIENT)
Dept: PHYSICAL THERAPY | Facility: CLINIC | Age: 75
End: 2020-03-06
Payer: MEDICARE

## 2020-03-06 DIAGNOSIS — M54.6 BILATERAL THORACIC BACK PAIN, UNSPECIFIED CHRONICITY: ICD-10-CM

## 2020-03-06 DIAGNOSIS — N63.20 MASS OF LEFT BREAST: ICD-10-CM

## 2020-03-06 DIAGNOSIS — I89.0 LYMPHEDEMA: Primary | ICD-10-CM

## 2020-03-06 PROCEDURE — 97110 THERAPEUTIC EXERCISES: CPT | Performed by: PHYSICAL THERAPIST

## 2020-03-06 PROCEDURE — 97140 MANUAL THERAPY 1/> REGIONS: CPT | Performed by: PHYSICAL THERAPIST

## 2020-03-06 NOTE — PROGRESS NOTES
Daily Note     Today's date: 3/6/2020  Patient name: Hyacinth Street  : 1945  MRN: [id number]  Referring provider: Leilani Han MD  Dx:   Encounter Diagnosis     ICD-10-CM    1  Lymphedema I89 0    2  Bilateral thoracic back pain, unspecified chronicity M54 6    3  Mass of left breast N63 20                   Subjective: Pt offers report this day of general tightness, fair nemo to HEP routine  Objective: See treatment diary below      Assessment: Tolerated treatment well  Patient exhibited good technique with therapeutic exercises and would benefit from continued PT Initiated MLD w/ pt this day, cont'd bulbous region just posterior and lateral to shldr within region of post deltoid  PROM L shldr good, slight tightness near end range  3-4/10 mid back PN upon waking this AM, but 0-1/10 by end of tx session  Plan: Continue per plan of care        Precautions: CA - Liver, Lung, Metastases of L breast      Manual  2-28 3-6           MLD L UE + L posterior shldr NV jph           PROM L shldr  jph                                                      Exercise Diary  2-28 3-6           Seated LTR 5s x5 5s x5           Stand t/s SB 5s x5 10s x3 w/ table support           Rhomboid str 10s x3 10s x3           Seated cross arm str 10s x3 10s x3           Pulleys  x3'           Wand shldr flexion  3s x10           Seated LB flexion str w/ ball: fwd/L/R  10s x3e           Corner wall str  10s x3                                                                                                                                                                           Modalities

## 2020-03-12 ENCOUNTER — TELEPHONE (OUTPATIENT)
Dept: HEMATOLOGY ONCOLOGY | Facility: CLINIC | Age: 75
End: 2020-03-12

## 2020-03-12 ENCOUNTER — OFFICE VISIT (OUTPATIENT)
Dept: PHYSICAL THERAPY | Facility: CLINIC | Age: 75
End: 2020-03-12
Payer: MEDICARE

## 2020-03-12 DIAGNOSIS — C50.022 MALIGNANT NEOPLASM INVOLVING BOTH NIPPLE AND AREOLA OF LEFT BREAST IN MALE, ESTROGEN RECEPTOR POSITIVE (HCC): Primary | ICD-10-CM

## 2020-03-12 DIAGNOSIS — M54.6 BILATERAL THORACIC BACK PAIN, UNSPECIFIED CHRONICITY: ICD-10-CM

## 2020-03-12 DIAGNOSIS — N63.20 MASS OF LEFT BREAST: ICD-10-CM

## 2020-03-12 DIAGNOSIS — Z17.0 MALIGNANT NEOPLASM INVOLVING BOTH NIPPLE AND AREOLA OF LEFT BREAST IN MALE, ESTROGEN RECEPTOR POSITIVE (HCC): Primary | ICD-10-CM

## 2020-03-12 DIAGNOSIS — I89.0 LYMPHEDEMA: Primary | ICD-10-CM

## 2020-03-12 PROCEDURE — 97140 MANUAL THERAPY 1/> REGIONS: CPT | Performed by: PHYSICAL THERAPIST

## 2020-03-12 PROCEDURE — 97110 THERAPEUTIC EXERCISES: CPT | Performed by: PHYSICAL THERAPIST

## 2020-03-12 NOTE — PROGRESS NOTES
Daily Note     Today's date: 3/12/2020  Patient name: Melonie Guerrero  : 1945  MRN: [id number]  Referring provider: Bony Diaz MD  Dx:   Encounter Diagnosis     ICD-10-CM    1  Lymphedema I89 0    2  Bilateral thoracic back pain, unspecified chronicity M54 6    3  Mass of left breast N63 20                   Subjective: Pt reports still feeling some tightness in his trunk to mid back, not as significant PN this day  Objective: See treatment diary below      Assessment: Tolerated treatment well  Patient exhibited good technique with therapeutic exercises and would benefit from continued PT Minimal PN c/o thru session but reports tightness and pulling sensation w/ standing SB str, also corner wall str  Resumed postural strengthening TE this day w/ resistance TB  Plan: Continue per plan of care        Precautions: CA - Liver, Lung, Metastases of L breast      Manual  - 3-6 3-12          MLD L UE + L posterior shldr NV jph jph          PROM L shldr  jph jph                                                     Exercise Diary   3-6 3-12          Seated LTR 5s x5 5s x5 5s x5          Stand t/s SB 5s x5 10s x3 w/ table support 15s x3          Rhomboid str 10s x3 10s x3 15s x3          Seated cross arm str 10s x3 10s x3 15s x3          Pulleys  x3' x4'          Wand shldr flexion  3s x10 3s x15          Seated LB flexion str w/ ball: fwd/L/R  10s x3e 10s x5e          Corner wall str  10s x3 10s x5          B shldr ER TB   PnkTB x30          TB scap retraction   GrnTB 3s x30                                                                                                                                                Modalities

## 2020-03-13 ENCOUNTER — TELEPHONE (OUTPATIENT)
Dept: HEMATOLOGY ONCOLOGY | Facility: CLINIC | Age: 75
End: 2020-03-13

## 2020-03-13 NOTE — TELEPHONE ENCOUNTER
Patient arranged to get his Arvie Pa  from [employer] and was concerned that 100mg was prescribed but Dr Emile Darling told him it would be 50 mg  Attempted to reassure patient that 100mg is standard dosing but patient would like to wait for appt with Dr Aniya Nathan before starting medicine

## 2020-03-17 ENCOUNTER — OFFICE VISIT (OUTPATIENT)
Dept: PHYSICAL THERAPY | Facility: CLINIC | Age: 75
End: 2020-03-17
Payer: MEDICARE

## 2020-03-17 DIAGNOSIS — M54.6 BILATERAL THORACIC BACK PAIN, UNSPECIFIED CHRONICITY: ICD-10-CM

## 2020-03-17 DIAGNOSIS — N63.20 MASS OF LEFT BREAST: ICD-10-CM

## 2020-03-17 DIAGNOSIS — I89.0 LYMPHEDEMA: Primary | ICD-10-CM

## 2020-03-17 PROCEDURE — 97140 MANUAL THERAPY 1/> REGIONS: CPT | Performed by: PHYSICAL THERAPIST

## 2020-03-17 PROCEDURE — 97110 THERAPEUTIC EXERCISES: CPT | Performed by: PHYSICAL THERAPIST

## 2020-03-17 NOTE — PROGRESS NOTES
Daily Note     Today's date: 3/17/2020  Patient name: Teja Little  : 1945  MRN: [id number]  Referring provider: Nirav Cano MD  Dx:   Encounter Diagnosis     ICD-10-CM    1  Lymphedema I89 0    2  Bilateral thoracic back pain, unspecified chronicity M54 6    3  Mass of left breast N63 20                   Subjective: Pt reports PN that persists at times in his mid back, worse when washing dishes at the sink and also more notable when upright  Pt also reports recent development of L breast region PN accompanied by several small "bluish spots that are slightly hard "      Objective: See treatment diary below      Assessment: Tolerated treatment well  Patient exhibited good technique with therapeutic exercises and would benefit from continued PT Assessment this day reveals several small regions ttp along L breast region/anterior chest just superior to original incision site w/ slightly hard but moveable presentation - recommend f/u w/ oncology/surgeon which pt reports he has an appt scheduled within a wk  Will cont to monitor closely  Plan: Continue per plan of care        Precautions: CA - Liver, Lung, Metastases of L breast      Manual  2-28 3-6 3-12 3-17         MLD L UE + L posterior shldr NV jph jph jph         PROM L shldr  jph jph jph                                                    Exercise Diary  2-28 3-6 3-12 3-17         Seated LTR 5s x5 5s x5 5s x5 5s x5         Stand t/s SB 5s x5 10s x3 w/ table support 15s x3 15s x3         Rhomboid str 10s x3 10s x3 15s x3 15s x3         Seated cross arm str 10s x3 10s x3 15s x3 15s x3         Pulleys  x3' x4' x4'         Wand shldr flexion  3s x10 3s x15 5s x15         Seated LB flexion str w/ ball: fwd/L/R  10s x3e 10s x5e 10s x5e         Corner wall str  10s x3 10s x5 15s x5         B shldr ER TB   PnkTB x30 PnkTB x30         TB scap retraction   GrnTB 3s x30 GrnTB 3s x30         Wand shldr abd    5s x15 Modalities

## 2020-03-19 ENCOUNTER — TELEPHONE (OUTPATIENT)
Dept: SURGERY | Facility: CLINIC | Age: 75
End: 2020-03-19

## 2020-03-19 NOTE — TELEPHONE ENCOUNTER
Called and spoke to patient and his wife  They understand that Mondays appt is cancelled and as soon as scheduling restrictions are lifted, the office will be contacting him to r/s  He states the "bumps" are not painful unless he pushes on them     He's had them for a little while - not urgent, he states

## 2020-03-24 ENCOUNTER — OFFICE VISIT (OUTPATIENT)
Dept: PHYSICAL THERAPY | Facility: CLINIC | Age: 75
End: 2020-03-24
Payer: MEDICARE

## 2020-03-24 ENCOUNTER — APPOINTMENT (OUTPATIENT)
Dept: LAB | Facility: MEDICAL CENTER | Age: 75
End: 2020-03-24
Payer: MEDICARE

## 2020-03-24 DIAGNOSIS — N63.20 MASS OF LEFT BREAST: ICD-10-CM

## 2020-03-24 DIAGNOSIS — C50.022 MALIGNANT NEOPLASM INVOLVING BOTH NIPPLE AND AREOLA OF LEFT BREAST IN MALE, ESTROGEN RECEPTOR POSITIVE (HCC): ICD-10-CM

## 2020-03-24 DIAGNOSIS — M54.6 BILATERAL THORACIC BACK PAIN, UNSPECIFIED CHRONICITY: ICD-10-CM

## 2020-03-24 DIAGNOSIS — I89.0 LYMPHEDEMA: Primary | ICD-10-CM

## 2020-03-24 DIAGNOSIS — Z17.0 MALIGNANT NEOPLASM INVOLVING BOTH NIPPLE AND AREOLA OF LEFT BREAST IN MALE, ESTROGEN RECEPTOR POSITIVE (HCC): ICD-10-CM

## 2020-03-24 LAB
ALBUMIN SERPL BCP-MCNC: 3.3 G/DL (ref 3.5–5)
ALP SERPL-CCNC: 153 U/L (ref 46–116)
ALT SERPL W P-5'-P-CCNC: 60 U/L (ref 12–78)
ANION GAP SERPL CALCULATED.3IONS-SCNC: 5 MMOL/L (ref 4–13)
AST SERPL W P-5'-P-CCNC: 79 U/L (ref 5–45)
BASOPHILS # BLD AUTO: 0.06 THOUSANDS/ΜL (ref 0–0.1)
BASOPHILS NFR BLD AUTO: 1 % (ref 0–1)
BILIRUB SERPL-MCNC: 0.46 MG/DL (ref 0.2–1)
BUN SERPL-MCNC: 33 MG/DL (ref 5–25)
CALCIUM SERPL-MCNC: 9.6 MG/DL (ref 8.3–10.1)
CHLORIDE SERPL-SCNC: 104 MMOL/L (ref 100–108)
CO2 SERPL-SCNC: 30 MMOL/L (ref 21–32)
CREAT SERPL-MCNC: 1.77 MG/DL (ref 0.6–1.3)
EOSINOPHIL # BLD AUTO: 0.34 THOUSAND/ΜL (ref 0–0.61)
EOSINOPHIL NFR BLD AUTO: 3 % (ref 0–6)
ERYTHROCYTE [DISTWIDTH] IN BLOOD BY AUTOMATED COUNT: 13.6 % (ref 11.6–15.1)
GFR SERPL CREATININE-BSD FRML MDRD: 36 ML/MIN/1.73SQ M
GLUCOSE P FAST SERPL-MCNC: 94 MG/DL (ref 65–99)
HCT VFR BLD AUTO: 36.2 % (ref 36.5–49.3)
HGB BLD-MCNC: 11.4 G/DL (ref 12–17)
IMM GRANULOCYTES # BLD AUTO: 0.03 THOUSAND/UL (ref 0–0.2)
IMM GRANULOCYTES NFR BLD AUTO: 0 % (ref 0–2)
LYMPHOCYTES # BLD AUTO: 4.12 THOUSANDS/ΜL (ref 0.6–4.47)
LYMPHOCYTES NFR BLD AUTO: 31 % (ref 14–44)
MCH RBC QN AUTO: 30.5 PG (ref 26.8–34.3)
MCHC RBC AUTO-ENTMCNC: 31.5 G/DL (ref 31.4–37.4)
MCV RBC AUTO: 97 FL (ref 82–98)
MONOCYTES # BLD AUTO: 1.21 THOUSAND/ΜL (ref 0.17–1.22)
MONOCYTES NFR BLD AUTO: 9 % (ref 4–12)
NEUTROPHILS # BLD AUTO: 7.4 THOUSANDS/ΜL (ref 1.85–7.62)
NEUTS SEG NFR BLD AUTO: 56 % (ref 43–75)
NRBC BLD AUTO-RTO: 0 /100 WBCS
PLATELET # BLD AUTO: 349 THOUSANDS/UL (ref 149–390)
PMV BLD AUTO: 9.7 FL (ref 8.9–12.7)
POTASSIUM SERPL-SCNC: 4.3 MMOL/L (ref 3.5–5.3)
PROT SERPL-MCNC: 7.3 G/DL (ref 6.4–8.2)
RBC # BLD AUTO: 3.74 MILLION/UL (ref 3.88–5.62)
SODIUM SERPL-SCNC: 139 MMOL/L (ref 136–145)
WBC # BLD AUTO: 13.16 THOUSAND/UL (ref 4.31–10.16)

## 2020-03-24 PROCEDURE — 97110 THERAPEUTIC EXERCISES: CPT | Performed by: PHYSICAL THERAPIST

## 2020-03-24 PROCEDURE — 36415 COLL VENOUS BLD VENIPUNCTURE: CPT

## 2020-03-24 PROCEDURE — 97140 MANUAL THERAPY 1/> REGIONS: CPT | Performed by: PHYSICAL THERAPIST

## 2020-03-24 PROCEDURE — 80053 COMPREHEN METABOLIC PANEL: CPT

## 2020-03-24 PROCEDURE — 85025 COMPLETE CBC W/AUTO DIFF WBC: CPT

## 2020-03-24 NOTE — PROGRESS NOTES
Daily Note     Today's date: 3/24/2020  Patient name: Emmanuelle Hernandez  : 1945  MRN: [id number]  Referring provider: Jcarlos Lagos MD  Dx:   Encounter Diagnosis     ICD-10-CM    1  Lymphedema I89 0    2  Bilateral thoracic back pain, unspecified chronicity M54 6    3  Mass of left breast N63 20                   Subjective: Pt reports PN continues in L periscapular region to spine  Objective: See treatment diary below      Assessment: Tolerated treatment well  Patient exhibited good technique with therapeutic exercises and would benefit from continued PT Responds well to cont'd TE progressions but marked PN response to CFMS in R thorax just off mid t/s along posterior aspect of ribs to medial border of scapula  Plan: Continue per plan of care        Precautions: CA - Liver, Lung, Metastases of L breast      Manual  - 3-6 3-12 3-17 3-24        MLD L UE + L posterior shldr NV jph jph jph jph        PROM L shldr  jph jph jph jph        CFMS R t/s     jph                                      Exercise Diary  - 3-6 3-12 3-17 3-24        Seated LTR 5s x5 5s x5 5s x5 5s x5 5s x10        Stand t/s SB 5s x5 10s x3 w/ table support 15s x3 15s x3 15s x3        Rhomboid str 10s x3 10s x3 15s x3 15s x3 15s x3        Seated cross arm str 10s x3 10s x3 15s x3 15s x3 15s x3        Pulleys  x3' x4' x4' x5'        Wand shldr flexion  3s x10 3s x15 5s x15 5s x15        Seated LB flexion str w/ ball: fwd/L/R  10s x3e 10s x5e 10s x5e 10s x5e        Corner wall str  10s x3 10s x5 15s x5 15s x5        B shldr ER TB   PnkTB x30 PnkTB x30 PnkTB x30        TB scap retraction   GrnTB 3s x30 GrnTB 3s x30 GrnTB 3s x30        Wand shldr abd    5s x15 5s x15                                                                                                                                 Modalities

## 2020-03-25 ENCOUNTER — APPOINTMENT (OUTPATIENT)
Dept: PHYSICAL THERAPY | Facility: CLINIC | Age: 75
End: 2020-03-25
Payer: MEDICARE

## 2020-03-25 ENCOUNTER — HOSPITAL ENCOUNTER (OUTPATIENT)
Dept: INFUSION CENTER | Facility: CLINIC | Age: 75
Discharge: HOME | End: 2020-03-25
Payer: MEDICARE

## 2020-03-25 VITALS
RESPIRATION RATE: 18 BRPM | HEART RATE: 103 BPM | DIASTOLIC BLOOD PRESSURE: 77 MMHG | TEMPERATURE: 98.1 F | SYSTOLIC BLOOD PRESSURE: 158 MMHG

## 2020-03-25 DIAGNOSIS — C79.51 OSSEOUS METASTASIS (HCC): Primary | ICD-10-CM

## 2020-03-25 DIAGNOSIS — C78.7 LIVER METASTASIS (HCC): ICD-10-CM

## 2020-03-25 DIAGNOSIS — Z17.0 MALIGNANT NEOPLASM INVOLVING BOTH NIPPLE AND AREOLA OF LEFT BREAST IN MALE, ESTROGEN RECEPTOR POSITIVE (HCC): ICD-10-CM

## 2020-03-25 DIAGNOSIS — C78.00 MALIGNANT NEOPLASM METASTATIC TO LUNG, UNSPECIFIED LATERALITY (HCC): ICD-10-CM

## 2020-03-25 DIAGNOSIS — C50.022 MALIGNANT NEOPLASM INVOLVING BOTH NIPPLE AND AREOLA OF LEFT BREAST IN MALE, ESTROGEN RECEPTOR POSITIVE (HCC): ICD-10-CM

## 2020-03-25 PROCEDURE — 96402 CHEMO HORMON ANTINEOPL SQ/IM: CPT

## 2020-03-25 RX ADMIN — LEUPROLIDE ACETATE 7.5 MG: KIT at 11:51

## 2020-03-26 ENCOUNTER — OFFICE VISIT (OUTPATIENT)
Dept: PHYSICAL THERAPY | Facility: CLINIC | Age: 75
End: 2020-03-26
Payer: MEDICARE

## 2020-03-26 ENCOUNTER — TELEPHONE (OUTPATIENT)
Dept: HEMATOLOGY ONCOLOGY | Facility: CLINIC | Age: 75
End: 2020-03-26

## 2020-03-26 DIAGNOSIS — N63.20 MASS OF LEFT BREAST: ICD-10-CM

## 2020-03-26 DIAGNOSIS — M54.6 BILATERAL THORACIC BACK PAIN, UNSPECIFIED CHRONICITY: ICD-10-CM

## 2020-03-26 DIAGNOSIS — I89.0 LYMPHEDEMA: Primary | ICD-10-CM

## 2020-03-26 PROCEDURE — 97110 THERAPEUTIC EXERCISES: CPT | Performed by: PHYSICAL THERAPIST

## 2020-03-26 PROCEDURE — 97140 MANUAL THERAPY 1/> REGIONS: CPT | Performed by: PHYSICAL THERAPIST

## 2020-03-26 NOTE — PROGRESS NOTES
Daily Note     Today's date: 3/26/2020  Patient name: Anjel Shen  : 1945  MRN: [id number]  Referring provider: Ky Orellana MD  Dx:   Encounter Diagnosis     ICD-10-CM    1  Lymphedema I89 0    2  Bilateral thoracic back pain, unspecified chronicity M54 6    3  Mass of left breast N63 20                   Subjective: Pt reports PN after last session reduced after about an hr but continues still in R t/s to R posterior shldr      Objective: See treatment diary below    Assessment: Tolerated treatment well  Patient exhibited good technique with therapeutic exercises and would benefit from continued PT Held CFMS to R t/s this day due to persistent PN and minimal response LV  Plan: Continue per plan of care        Precautions: CA - Liver, Lung, Metastases of L breast      Manual  - 3- 3- 3- 3-24 3-26       MLD L UE + L posterior shldr NV jph jph jph jph jph       PROM L shldr  jph jph jph jph jph       CFMS R t/s     jph jph                                     Exercise Diary  - 3- 3- 3- 3-24 3-26       Seated LTR 5s x5 5s x5 5s x5 5s x5 5s x10 5s x10       Stand t/s SB 5s x5 10s x3 w/ table support 15s x3 15s x3 15s x3 15s x3       Rhomboid str 10s x3 10s x3 15s x3 15s x3 15s x3 15s x3       Seated cross arm str 10s x3 10s x3 15s x3 15s x3 15s x3 15s x3       Pulleys  x3' x4' x4' x5' x5'       Wand shldr flexion  3s x10 3s x15 5s x15 5s x15 5s x15       Seated LB flexion str w/ ball: fwd/L/R  10s x3e 10s x5e 10s x5e 10s x5e 10s x5e       Corner wall str  10s x3 10s x5 15s x5 15s x5 15s x5       B shldr ER TB   PnkTB x30 PnkTB x30 PnkTB x30 GrnTB x30       TB scap retraction   GrnTB 3s x30 GrnTB 3s x30 GrnTB 3s x30 GrnTB 3s x30       Wand shldr abd    5s x15 5s x15 5s x15                                                                                                                                Modalities

## 2020-03-26 NOTE — TELEPHONE ENCOUNTER
Called patient to review the screening questions for the COVID-19  I called to confirm the appointment for tomorrow and he stated "that's why I have a calender " I explained that I needed to call the patient to ask a few questions regarding the covid-19 virus in order to protect other patients and himself  Before I asked the first question, patient stated "I don't have any [place] " I started to ask patient if he has an symptoms of SOB, fever, or cough  He cut me off by saying no  His wife was also in the background saying no  I was unable to get a word in so I said thank you and ended the call

## 2020-03-27 ENCOUNTER — APPOINTMENT (OUTPATIENT)
Dept: PHYSICAL THERAPY | Facility: CLINIC | Age: 75
End: 2020-03-27
Payer: MEDICARE

## 2020-03-27 ENCOUNTER — TELEPHONE (OUTPATIENT)
Dept: HEMATOLOGY ONCOLOGY | Facility: CLINIC | Age: 75
End: 2020-03-27

## 2020-03-27 ENCOUNTER — OFFICE VISIT (OUTPATIENT)
Dept: HEMATOLOGY ONCOLOGY | Facility: CLINIC | Age: 75
End: 2020-03-27
Payer: MEDICARE

## 2020-03-27 VITALS
BODY MASS INDEX: 30.66 KG/M2 | DIASTOLIC BLOOD PRESSURE: 82 MMHG | TEMPERATURE: 98 F | WEIGHT: 184 LBS | OXYGEN SATURATION: 97 % | HEIGHT: 65 IN | RESPIRATION RATE: 18 BRPM | HEART RATE: 67 BPM | SYSTOLIC BLOOD PRESSURE: 138 MMHG

## 2020-03-27 DIAGNOSIS — Z17.0 MALIGNANT NEOPLASM INVOLVING BOTH NIPPLE AND AREOLA OF LEFT BREAST IN MALE, ESTROGEN RECEPTOR POSITIVE (HCC): ICD-10-CM

## 2020-03-27 DIAGNOSIS — C50.022 MALIGNANT NEOPLASM INVOLVING BOTH NIPPLE AND AREOLA OF LEFT BREAST IN MALE, ESTROGEN RECEPTOR POSITIVE (HCC): ICD-10-CM

## 2020-03-27 DIAGNOSIS — C50.022 MALIGNANT NEOPLASM INVOLVING BOTH NIPPLE AND AREOLA OF LEFT BREAST IN MALE, ESTROGEN RECEPTOR POSITIVE (HCC): Primary | ICD-10-CM

## 2020-03-27 DIAGNOSIS — C78.7 LIVER METASTASIS (HCC): Primary | ICD-10-CM

## 2020-03-27 DIAGNOSIS — Z17.0 MALIGNANT NEOPLASM INVOLVING BOTH NIPPLE AND AREOLA OF LEFT BREAST IN MALE, ESTROGEN RECEPTOR POSITIVE (HCC): Primary | ICD-10-CM

## 2020-03-27 DIAGNOSIS — C78.00 MALIGNANT NEOPLASM METASTATIC TO LUNG, UNSPECIFIED LATERALITY (HCC): ICD-10-CM

## 2020-03-27 PROCEDURE — 1160F RVW MEDS BY RX/DR IN RCRD: CPT | Performed by: INTERNAL MEDICINE

## 2020-03-27 PROCEDURE — 3075F SYST BP GE 130 - 139MM HG: CPT | Performed by: INTERNAL MEDICINE

## 2020-03-27 PROCEDURE — 4040F PNEUMOC VAC/ADMIN/RCVD: CPT | Performed by: INTERNAL MEDICINE

## 2020-03-27 PROCEDURE — 1036F TOBACCO NON-USER: CPT | Performed by: INTERNAL MEDICINE

## 2020-03-27 PROCEDURE — 3008F BODY MASS INDEX DOCD: CPT | Performed by: INTERNAL MEDICINE

## 2020-03-27 PROCEDURE — 99215 OFFICE O/P EST HI 40 MIN: CPT | Performed by: INTERNAL MEDICINE

## 2020-03-27 PROCEDURE — 3079F DIAST BP 80-89 MM HG: CPT | Performed by: INTERNAL MEDICINE

## 2020-03-27 RX ORDER — LETROZOLE 2.5 MG/1
2.5 TABLET, FILM COATED ORAL DAILY
Qty: 90 TABLET | Refills: 1 | Status: SHIPPED | OUTPATIENT
Start: 2020-03-27 | End: 2020-08-03

## 2020-03-27 NOTE — TELEPHONE ENCOUNTER
Call from patient, ibrance was decreased to 75mg daily 3 weeks on/ 1 week off per OV note: Therefore, I recommended him to take 75 mg of palbociclib 3weeks on followed by 1 week off  Per Anant Lau, unable to call in dose change  Script must be escribed to [facility] or faxed to [phone]

## 2020-03-27 NOTE — PROGRESS NOTES
Hematology / Oncology Outpatient Follow Up Note    Emmanuelle Hernandez 66 y o  male :1945 CP:[id number]         Date:  3/27/2020    Assessment / Plan:  A 57-year-old gentleman who has history of locally advanced left breast cancer, grade 1, ER 95% positive, PA 75% positive, HER2 negative disease, diagnosed in 2018  He was treated with neoadjuvant tamoxifen followed by mastectomy and lymph node dissection which showed 10 positive lymph nodes  He continued with adjuvant tamoxifen  Unfortunately, he was found to have widespread metastatic disease in his liver and bone  For some reason, he is currently on Lupron by self  He has not started letrozole or palbociclib which was previously recommended  I recommended him to switch Lupron to every 3 months preparation  I recommended him to start letrozole 2 5 mg daily  He has somewhat resistant to regular does palbociclib  Therefore, I recommended him to take 75 mg of palbociclib 3 weeks on followed by 1 week off  He is agreeable with my recommendation  In addition, due to his extensive bone metastasis, denosumab should be started every 3 months  Side effects of denosumab was thoroughly discussed and agreed  He is aware that we have to monitor his CBC every 2 weeks on palbociclib  I will refer him to palliative care physician for pain control  I will see him again in 2 months for routine follow-up  He is in agreement  Subjective:     HPI:          Interval History:  A 57-year-old gentleman who has metastatic breast cancer, ER 95% positive, PA 75% positive, HER2 negative disease  He was initially diagnosed with breast cancer with invasive mucinous histology in 2018  He appeared to have had locally advanced disease  He was on neoadjuvant hormonal therapy with tamoxifen from 2018 through May 2019   Subsequently, he underwent left mastectomy and axillary lymph node dissection which showed 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  10/10 axillary lymph node were positive for metastatic disease with largest tumor measuring 2 5 cm  Tamoxifen was continued as adjuvant setting  Unfortunately, he developed liver, bone metastasis  Dr Mary Ortiz recommended him to start Lupron which he had 2 monthly shot  For some reason, he has not been on letrozole as well as palbociclib which were both recommended by Dr Mary Ortiz as well as by oncologist at [facility]  He presents today to discuss further treatment  He has some mild hot flashes  He also have complaint of back pain  He does not wish to take narcotics  He has chronic kidney disease with creatinine 2 1  He has no respiratory symptoms  He has no recent weight loss  His performance status is 0 to 1/4 on the ECOG scale  Objective:     Primary Diagnosis:    1  Left breast cancer, stage IIIC (pT3, pN3, M0) grade 1, invasive mucinous histology, ER 95% positive, IA 75% positive, HER2 negative disease  Diagnosed in November 2018  2  Metastatic breast cancer in his liver and multiple bone, diagnosed in February 2020  Cancer Staging:  Cancer Staging  No matching staging information was found for the patient  Previous Hematologic/ Oncologic Treatment:     1  Neoadjuvant and adjuvant hormonal therapy with tamoxifen from December 2018 through February 2020  Current Hematologic/ Oncologic Treatment:      1  Lupron, since February 2020      2  Letrozole and palbociclib to be added in March 2020  Disease Status:     Not evaluated at this time  Test Results:    Pathology:        Radiology:    CT scan of chest abdomen pelvis in February 2020 showed liver and extensive bone metastasis  Laboratory:    See below  I have reviewed his tumor markers      Physical Exam:      General Appearance:    Alert, oriented        Eyes:    PERRL   Ears:    Normal external ear canals, both ears   Nose:   Nares normal, septum midline   Throat:   Mucosa moist  Pharynx without injection  Neck:   Supple       Lungs:     Clear to auscultation bilaterally   Chest Wall:    No tenderness or deformity    Heart:    Regular rate and rhythm       Abdomen:     Soft, non-tender, bowel sounds +, no organomegaly           Extremities:   Extremities no cyanosis or edema       Skin:   no rash or icterus  Lymph nodes:   Cervical, supraclavicular, and axillary nodes normal   Neurologic:   CNII-XII intact, normal strength, sensation and reflexes     Throughout          Breast exam:   Status post left mastectomy with no palpable abnormality on his left chest wall  Right showed gynecomastia  ROS: Review of Systems   Musculoskeletal:        Back pain   All other systems reviewed and are negative  Imaging: No results found  Labs:   Lab Results   Component Value Date    WBC 13 16 (H) 03/24/2020    HGB 11 4 (L) 03/24/2020    HCT 36 2 (L) 03/24/2020    MCV 97 03/24/2020     03/24/2020     Lab Results   Component Value Date    K 4 3 03/24/2020     03/24/2020    CO2 30 03/24/2020    BUN 33 (H) 03/24/2020    CREATININE 1 77 (H) 03/24/2020    GLUF 94 03/24/2020    CALCIUM 9 6 03/24/2020    AST 79 (H) 03/24/2020    ALT 60 03/24/2020    ALKPHOS 153 (H) 03/24/2020    EGFR 36 03/24/2020         Lab Results   Component Value Date    PSA <0 1 02/26/2020       Lab Results   Component Value Date    CEA 2 2 02/26/2020         Lab Results   Component Value Date    IRON 71 06/26/2019    TIBC 311 06/26/2019    FERRITIN 89 06/26/2019       Lab Results   Component Value Date    [id number] 488 12/03/2019         Current Medications: Reviewed  Allergies: Reviewed  PMH/FH/SH:  Reviewed      Vital Sign:    Body surface area is 1 91 meters squared      Wt Readings from Last 3 Encounters:   03/27/20 83 5 kg (184 lb)   02/14/20 86 2 kg (190 lb)   01/31/20 89 8 kg (198 lb)        Temp Readings from Last 3 Encounters:   03/27/20 98 °F (36 7 °C) (Tympanic Core)   03/25/20 98 1 °F (36 7 °C) (Tympanic)   02/26/20 (!) 96 9 °F (36 1 °C) (Tympanic)        BP Readings from Last 3 Encounters:   03/27/20 138/82   03/25/20 158/77   02/26/20 157/75         Pulse Readings from Last 3 Encounters:   03/27/20 67   03/25/20 103   02/26/20 88     @LASTSAO2(3)@

## 2020-03-30 ENCOUNTER — OFFICE VISIT (OUTPATIENT)
Dept: PHYSICAL THERAPY | Facility: CLINIC | Age: 75
End: 2020-03-30
Payer: MEDICARE

## 2020-03-30 DIAGNOSIS — I89.0 LYMPHEDEMA: Primary | ICD-10-CM

## 2020-03-30 DIAGNOSIS — N63.20 MASS OF LEFT BREAST: ICD-10-CM

## 2020-03-30 DIAGNOSIS — M54.6 BILATERAL THORACIC BACK PAIN, UNSPECIFIED CHRONICITY: ICD-10-CM

## 2020-03-30 PROCEDURE — 97140 MANUAL THERAPY 1/> REGIONS: CPT | Performed by: PHYSICAL THERAPIST

## 2020-03-30 PROCEDURE — 97110 THERAPEUTIC EXERCISES: CPT | Performed by: PHYSICAL THERAPIST

## 2020-03-30 NOTE — PROGRESS NOTES
Daily Note     Today's date: 3/30/2020  Patient name: Reyna Estrada  : 1945  MRN: [id number]  Referring provider: Jayla Kimbrough MD  Dx:   Encounter Diagnosis     ICD-10-CM    1  Lymphedema I89 0    2  Bilateral thoracic back pain, unspecified chronicity M54 6    3  Mass of left breast N63 20                   Subjective: Pt reports PN that remains in mid back to R shldr region - posteriorly  To consult w/ PN management over the phone, referred following Friday's oncologist visit  Objective: See treatment diary below    Assessment: Tolerated treatment well  Patient exhibited good technique with therapeutic exercises and would benefit from continued PT Palpable and audible crepitus w/ movement of L shldr this day, performed scapular mobs B shldr w mod restriction noted  Plan: Continue per plan of care        Precautions: CA - Liver, Lung, Metastases of L breast      Manual  2-28 3-6 3-12 3-17 3-24 3-26 3-30      MLD L UE + L posterior shldr NV jph jph jph jph jph jph      PROM L shldr  jph jph jph jph jph jph      CFMS R t/s     jph jph jph                                    Exercise Diary  2-28 3-6 3-12 3-17 3-24 3-26 3-30      Seated LTR 5s x5 5s x5 5s x5 5s x5 5s x10 5s x10 5s x10      Stand t/s SB 5s x5 10s x3 w/ table support 15s x3 15s x3 15s x3 15s x3 15s x3      Rhomboid str 10s x3 10s x3 15s x3 15s x3 15s x3 15s x3 15s x3      Seated cross arm str 10s x3 10s x3 15s x3 15s x3 15s x3 15s x3 15s x3      Pulleys  x3' x4' x4' x5' x5' x5'      Wand shldr flexion  3s x10 3s x15 5s x15 5s x15 5s x15 5s x20      Seated LB flexion str w/ ball: fwd/L/R  10s x3e 10s x5e 10s x5e 10s x5e 10s x5e 10s x5e      Corner wall str  10s x3 10s x5 15s x5 15s x5 15s x5 15s x5      B shldr ER TB   PnkTB x30 PnkTB x30 PnkTB x30 GrnTB x30 GrnTB x30      TB scap retraction   GrnTB 3s x30 GrnTB 3s x30 GrnTB 3s x30 GrnTB 3s x30 GrnTB 3s x30      Wand shldr abd    5s x15 5s x15 5s x15 5s x20      Wand IR       5s x20 Modalities

## 2020-04-01 ENCOUNTER — TELEPHONE (OUTPATIENT)
Dept: HEMATOLOGY ONCOLOGY | Facility: CLINIC | Age: 75
End: 2020-04-01

## 2020-04-01 DIAGNOSIS — Z17.0 MALIGNANT NEOPLASM INVOLVING BOTH NIPPLE AND AREOLA OF LEFT BREAST IN MALE, ESTROGEN RECEPTOR POSITIVE (HCC): ICD-10-CM

## 2020-04-01 DIAGNOSIS — C50.022 MALIGNANT NEOPLASM INVOLVING BOTH NIPPLE AND AREOLA OF LEFT BREAST IN MALE, ESTROGEN RECEPTOR POSITIVE (HCC): ICD-10-CM

## 2020-04-02 ENCOUNTER — OFFICE VISIT (OUTPATIENT)
Dept: PHYSICAL THERAPY | Facility: CLINIC | Age: 75
End: 2020-04-02
Payer: MEDICARE

## 2020-04-02 DIAGNOSIS — I89.0 LYMPHEDEMA: Primary | ICD-10-CM

## 2020-04-02 DIAGNOSIS — M54.6 BILATERAL THORACIC BACK PAIN, UNSPECIFIED CHRONICITY: ICD-10-CM

## 2020-04-02 DIAGNOSIS — N63.20 MASS OF LEFT BREAST: ICD-10-CM

## 2020-04-02 PROCEDURE — 97140 MANUAL THERAPY 1/> REGIONS: CPT | Performed by: PHYSICAL THERAPIST

## 2020-04-02 PROCEDURE — 97110 THERAPEUTIC EXERCISES: CPT | Performed by: PHYSICAL THERAPIST

## 2020-04-08 ENCOUNTER — APPOINTMENT (OUTPATIENT)
Dept: LAB | Facility: MEDICAL CENTER | Age: 75
End: 2020-04-08
Payer: MEDICARE

## 2020-04-08 LAB
ALBUMIN SERPL BCP-MCNC: 3.2 G/DL (ref 3.5–5)
ALP SERPL-CCNC: 185 U/L (ref 46–116)
ALT SERPL W P-5'-P-CCNC: 55 U/L (ref 12–78)
ANION GAP SERPL CALCULATED.3IONS-SCNC: 4 MMOL/L (ref 4–13)
AST SERPL W P-5'-P-CCNC: 77 U/L (ref 5–45)
BASOPHILS # BLD AUTO: 0.09 THOUSANDS/ΜL (ref 0–0.1)
BASOPHILS NFR BLD AUTO: 1 % (ref 0–1)
BILIRUB SERPL-MCNC: 0.49 MG/DL (ref 0.2–1)
BUN SERPL-MCNC: 32 MG/DL (ref 5–25)
CALCIUM SERPL-MCNC: 9.4 MG/DL (ref 8.3–10.1)
CHLORIDE SERPL-SCNC: 106 MMOL/L (ref 100–108)
CO2 SERPL-SCNC: 32 MMOL/L (ref 21–32)
CREAT SERPL-MCNC: 2.05 MG/DL (ref 0.6–1.3)
EOSINOPHIL # BLD AUTO: 0.34 THOUSAND/ΜL (ref 0–0.61)
EOSINOPHIL NFR BLD AUTO: 3 % (ref 0–6)
ERYTHROCYTE [DISTWIDTH] IN BLOOD BY AUTOMATED COUNT: 14.3 % (ref 11.6–15.1)
GFR SERPL CREATININE-BSD FRML MDRD: 30 ML/MIN/1.73SQ M
GLUCOSE P FAST SERPL-MCNC: 94 MG/DL (ref 65–99)
HCT VFR BLD AUTO: 36.8 % (ref 36.5–49.3)
HGB BLD-MCNC: 11.6 G/DL (ref 12–17)
IMM GRANULOCYTES # BLD AUTO: 0.04 THOUSAND/UL (ref 0–0.2)
IMM GRANULOCYTES NFR BLD AUTO: 0 % (ref 0–2)
LYMPHOCYTES # BLD AUTO: 3.81 THOUSANDS/ΜL (ref 0.6–4.47)
LYMPHOCYTES NFR BLD AUTO: 33 % (ref 14–44)
MCH RBC QN AUTO: 31.2 PG (ref 26.8–34.3)
MCHC RBC AUTO-ENTMCNC: 31.5 G/DL (ref 31.4–37.4)
MCV RBC AUTO: 99 FL (ref 82–98)
MONOCYTES # BLD AUTO: 0.72 THOUSAND/ΜL (ref 0.17–1.22)
MONOCYTES NFR BLD AUTO: 6 % (ref 4–12)
NEUTROPHILS # BLD AUTO: 6.54 THOUSANDS/ΜL (ref 1.85–7.62)
NEUTS SEG NFR BLD AUTO: 57 % (ref 43–75)
NRBC BLD AUTO-RTO: 0 /100 WBCS
PLATELET # BLD AUTO: 330 THOUSANDS/UL (ref 149–390)
PMV BLD AUTO: 10.3 FL (ref 8.9–12.7)
POTASSIUM SERPL-SCNC: 4.5 MMOL/L (ref 3.5–5.3)
PROT SERPL-MCNC: 7.3 G/DL (ref 6.4–8.2)
RBC # BLD AUTO: 3.72 MILLION/UL (ref 3.88–5.62)
SODIUM SERPL-SCNC: 142 MMOL/L (ref 136–145)
WBC # BLD AUTO: 11.54 THOUSAND/UL (ref 4.31–10.16)

## 2020-04-08 PROCEDURE — 85025 COMPLETE CBC W/AUTO DIFF WBC: CPT | Performed by: INTERNAL MEDICINE

## 2020-04-08 PROCEDURE — 80053 COMPREHEN METABOLIC PANEL: CPT | Performed by: INTERNAL MEDICINE

## 2020-04-08 PROCEDURE — 36415 COLL VENOUS BLD VENIPUNCTURE: CPT | Performed by: INTERNAL MEDICINE

## 2020-04-13 ENCOUNTER — TELEMEDICINE (OUTPATIENT)
Dept: PAIN MEDICINE | Facility: MEDICAL CENTER | Age: 75
End: 2020-04-13
Payer: MEDICARE

## 2020-04-13 DIAGNOSIS — G89.4 CHRONIC PAIN SYNDROME: Primary | ICD-10-CM

## 2020-04-13 DIAGNOSIS — C78.7 LIVER METASTASIS (HCC): ICD-10-CM

## 2020-04-13 DIAGNOSIS — C79.51 OSSEOUS METASTASIS (HCC): ICD-10-CM

## 2020-04-13 DIAGNOSIS — C78.00 MALIGNANT NEOPLASM METASTATIC TO LUNG, UNSPECIFIED LATERALITY (HCC): ICD-10-CM

## 2020-04-13 DIAGNOSIS — G54.8 INTERCOSTAL NEUROPATHIC PAIN: ICD-10-CM

## 2020-04-13 PROCEDURE — 99203 OFFICE O/P NEW LOW 30 MIN: CPT | Performed by: PHYSICAL MEDICINE & REHABILITATION

## 2020-04-13 RX ORDER — GABAPENTIN 300 MG/1
300 CAPSULE ORAL 3 TIMES DAILY
Qty: 90 CAPSULE | Refills: 1 | Status: SHIPPED | OUTPATIENT
Start: 2020-04-13 | End: 2020-05-18 | Stop reason: ALTCHOICE

## 2020-04-14 ENCOUNTER — OFFICE VISIT (OUTPATIENT)
Dept: PHYSICAL THERAPY | Facility: CLINIC | Age: 75
End: 2020-04-14
Payer: MEDICARE

## 2020-04-14 DIAGNOSIS — N63.20 MASS OF LEFT BREAST: ICD-10-CM

## 2020-04-14 DIAGNOSIS — I89.0 LYMPHEDEMA: Primary | ICD-10-CM

## 2020-04-14 DIAGNOSIS — M54.6 BILATERAL THORACIC BACK PAIN, UNSPECIFIED CHRONICITY: ICD-10-CM

## 2020-04-14 PROCEDURE — 97140 MANUAL THERAPY 1/> REGIONS: CPT | Performed by: PHYSICAL THERAPIST

## 2020-04-14 PROCEDURE — 97110 THERAPEUTIC EXERCISES: CPT | Performed by: PHYSICAL THERAPIST

## 2020-04-16 ENCOUNTER — OFFICE VISIT (OUTPATIENT)
Dept: PHYSICAL THERAPY | Facility: CLINIC | Age: 75
End: 2020-04-16
Payer: MEDICARE

## 2020-04-16 DIAGNOSIS — M54.6 BILATERAL THORACIC BACK PAIN, UNSPECIFIED CHRONICITY: ICD-10-CM

## 2020-04-16 DIAGNOSIS — I89.0 LYMPHEDEMA: Primary | ICD-10-CM

## 2020-04-16 DIAGNOSIS — N63.20 MASS OF LEFT BREAST: ICD-10-CM

## 2020-04-16 PROCEDURE — 97140 MANUAL THERAPY 1/> REGIONS: CPT | Performed by: PHYSICAL THERAPIST

## 2020-04-16 PROCEDURE — 97110 THERAPEUTIC EXERCISES: CPT | Performed by: PHYSICAL THERAPIST

## 2020-04-20 ENCOUNTER — OFFICE VISIT (OUTPATIENT)
Dept: PHYSICAL THERAPY | Facility: CLINIC | Age: 75
End: 2020-04-20
Payer: MEDICARE

## 2020-04-20 DIAGNOSIS — N63.20 MASS OF LEFT BREAST: ICD-10-CM

## 2020-04-20 DIAGNOSIS — M54.6 BILATERAL THORACIC BACK PAIN, UNSPECIFIED CHRONICITY: ICD-10-CM

## 2020-04-20 DIAGNOSIS — I89.0 LYMPHEDEMA: Primary | ICD-10-CM

## 2020-04-20 PROCEDURE — 97110 THERAPEUTIC EXERCISES: CPT | Performed by: PHYSICAL THERAPIST

## 2020-04-20 PROCEDURE — 97140 MANUAL THERAPY 1/> REGIONS: CPT | Performed by: PHYSICAL THERAPIST

## 2020-04-21 ENCOUNTER — DOCUMENTATION (OUTPATIENT)
Dept: HEMATOLOGY ONCOLOGY | Facility: CLINIC | Age: 75
End: 2020-04-21

## 2020-04-21 ENCOUNTER — APPOINTMENT (OUTPATIENT)
Dept: LAB | Facility: MEDICAL CENTER | Age: 75
End: 2020-04-21
Payer: MEDICARE

## 2020-04-21 ENCOUNTER — TELEPHONE (OUTPATIENT)
Dept: PAIN MEDICINE | Facility: CLINIC | Age: 75
End: 2020-04-21

## 2020-04-21 ENCOUNTER — TELEPHONE (OUTPATIENT)
Dept: HEMATOLOGY ONCOLOGY | Facility: CLINIC | Age: 75
End: 2020-04-21

## 2020-04-21 ENCOUNTER — TELEPHONE (OUTPATIENT)
Dept: HEMATOLOGY ONCOLOGY | Facility: MEDICAL CENTER | Age: 75
End: 2020-04-21

## 2020-04-21 DIAGNOSIS — C50.022 MALIGNANT NEOPLASM INVOLVING BOTH NIPPLE AND AREOLA OF LEFT BREAST IN MALE, ESTROGEN RECEPTOR POSITIVE (HCC): ICD-10-CM

## 2020-04-21 DIAGNOSIS — N13.39 OTHER HYDRONEPHROSIS: ICD-10-CM

## 2020-04-21 DIAGNOSIS — Z17.0 MALIGNANT NEOPLASM INVOLVING BOTH NIPPLE AND AREOLA OF LEFT BREAST IN MALE, ESTROGEN RECEPTOR POSITIVE (HCC): ICD-10-CM

## 2020-04-21 DIAGNOSIS — N28.9 RENAL INSUFFICIENCY: Primary | ICD-10-CM

## 2020-04-21 DIAGNOSIS — R79.89 CREATININE ELEVATION: ICD-10-CM

## 2020-04-21 DIAGNOSIS — C79.51 OSSEOUS METASTASIS (HCC): ICD-10-CM

## 2020-04-22 ENCOUNTER — TELEPHONE (OUTPATIENT)
Dept: HEMATOLOGY ONCOLOGY | Facility: MEDICAL CENTER | Age: 75
End: 2020-04-22

## 2020-04-22 ENCOUNTER — HOSPITAL ENCOUNTER (OUTPATIENT)
Dept: INFUSION CENTER | Facility: CLINIC | Age: 75
Discharge: HOME | End: 2020-04-22
Payer: MEDICARE

## 2020-04-22 VITALS
DIASTOLIC BLOOD PRESSURE: 81 MMHG | TEMPERATURE: 98 F | HEART RATE: 91 BPM | RESPIRATION RATE: 18 BRPM | SYSTOLIC BLOOD PRESSURE: 130 MMHG

## 2020-04-22 DIAGNOSIS — Z17.0 MALIGNANT NEOPLASM INVOLVING BOTH NIPPLE AND AREOLA OF LEFT BREAST IN MALE, ESTROGEN RECEPTOR POSITIVE (HCC): ICD-10-CM

## 2020-04-22 DIAGNOSIS — C78.7 LIVER METASTASIS (HCC): ICD-10-CM

## 2020-04-22 DIAGNOSIS — C79.51 OSSEOUS METASTASIS (HCC): Primary | ICD-10-CM

## 2020-04-22 DIAGNOSIS — C50.022 MALIGNANT NEOPLASM INVOLVING BOTH NIPPLE AND AREOLA OF LEFT BREAST IN MALE, ESTROGEN RECEPTOR POSITIVE (HCC): ICD-10-CM

## 2020-04-22 DIAGNOSIS — C78.00 MALIGNANT NEOPLASM METASTATIC TO LUNG, UNSPECIFIED LATERALITY (HCC): ICD-10-CM

## 2020-04-22 PROCEDURE — 96402 CHEMO HORMON ANTINEOPL SQ/IM: CPT

## 2020-04-22 PROCEDURE — 96401 CHEMO ANTI-NEOPL SQ/IM: CPT

## 2020-04-22 RX ADMIN — LEUPROLIDE ACETATE 22.5 MG: KIT at 11:56

## 2020-04-22 RX ADMIN — DENOSUMAB 120 MG: 120 INJECTION SUBCUTANEOUS at 11:54

## 2020-04-23 ENCOUNTER — OFFICE VISIT (OUTPATIENT)
Dept: PHYSICAL THERAPY | Facility: CLINIC | Age: 75
End: 2020-04-23
Payer: MEDICARE

## 2020-04-23 ENCOUNTER — TELEPHONE (OUTPATIENT)
Dept: HEMATOLOGY ONCOLOGY | Facility: CLINIC | Age: 75
End: 2020-04-23

## 2020-04-23 DIAGNOSIS — M54.6 BILATERAL THORACIC BACK PAIN, UNSPECIFIED CHRONICITY: ICD-10-CM

## 2020-04-23 DIAGNOSIS — N63.20 MASS OF LEFT BREAST: ICD-10-CM

## 2020-04-23 DIAGNOSIS — I89.0 LYMPHEDEMA: Primary | ICD-10-CM

## 2020-04-23 PROCEDURE — 97140 MANUAL THERAPY 1/> REGIONS: CPT | Performed by: PHYSICAL THERAPIST

## 2020-04-23 PROCEDURE — 97110 THERAPEUTIC EXERCISES: CPT | Performed by: PHYSICAL THERAPIST

## 2020-04-24 ENCOUNTER — TELEPHONE (OUTPATIENT)
Dept: HEMATOLOGY ONCOLOGY | Facility: CLINIC | Age: 75
End: 2020-04-24

## 2020-04-24 ENCOUNTER — HOSPITAL ENCOUNTER (OUTPATIENT)
Dept: ULTRASOUND IMAGING | Facility: HOSPITAL | Age: 75
Discharge: HOME | End: 2020-04-24
Attending: INTERNAL MEDICINE
Payer: MEDICARE

## 2020-04-24 ENCOUNTER — TELEPHONE (OUTPATIENT)
Dept: PAIN MEDICINE | Facility: MEDICAL CENTER | Age: 75
End: 2020-04-24

## 2020-04-24 DIAGNOSIS — R79.89 CREATININE ELEVATION: ICD-10-CM

## 2020-04-24 DIAGNOSIS — N13.39 OTHER HYDRONEPHROSIS: ICD-10-CM

## 2020-04-24 DIAGNOSIS — N28.9 RENAL INSUFFICIENCY: ICD-10-CM

## 2020-04-24 PROCEDURE — 76770 US EXAM ABDO BACK WALL COMP: CPT

## 2020-04-27 ENCOUNTER — TELEPHONE (OUTPATIENT)
Dept: HEMATOLOGY ONCOLOGY | Facility: CLINIC | Age: 75
End: 2020-04-27

## 2020-04-27 ENCOUNTER — TELEPHONE (OUTPATIENT)
Dept: FAMILY MEDICINE CLINIC | Facility: CLINIC | Age: 75
End: 2020-04-27

## 2020-04-28 ENCOUNTER — TELEPHONE (OUTPATIENT)
Dept: HEMATOLOGY ONCOLOGY | Facility: CLINIC | Age: 75
End: 2020-04-28

## 2020-04-28 ENCOUNTER — APPOINTMENT (OUTPATIENT)
Dept: PHYSICAL THERAPY | Facility: CLINIC | Age: 75
End: 2020-04-28
Payer: MEDICARE

## 2020-04-29 ENCOUNTER — TELEMEDICINE (OUTPATIENT)
Dept: NEPHROLOGY | Facility: CLINIC | Age: 75
End: 2020-04-29
Payer: MEDICARE

## 2020-04-29 VITALS — HEART RATE: 70 BPM | SYSTOLIC BLOOD PRESSURE: 130 MMHG | DIASTOLIC BLOOD PRESSURE: 78 MMHG

## 2020-04-29 DIAGNOSIS — R79.89 CREATININE ELEVATION: ICD-10-CM

## 2020-04-29 DIAGNOSIS — N28.9 RENAL INSUFFICIENCY: ICD-10-CM

## 2020-04-29 DIAGNOSIS — N17.9 ACUTE RENAL FAILURE, UNSPECIFIED ACUTE RENAL FAILURE TYPE (HCC): Primary | ICD-10-CM

## 2020-04-29 PROCEDURE — 99215 OFFICE O/P EST HI 40 MIN: CPT | Performed by: INTERNAL MEDICINE

## 2020-05-06 ENCOUNTER — TELEPHONE (OUTPATIENT)
Dept: HEMATOLOGY ONCOLOGY | Facility: CLINIC | Age: 75
End: 2020-05-06

## 2020-05-06 ENCOUNTER — APPOINTMENT (OUTPATIENT)
Dept: LAB | Facility: MEDICAL CENTER | Age: 75
End: 2020-05-06
Payer: MEDICARE

## 2020-05-06 DIAGNOSIS — N18.9 CHRONIC RENAL IMPAIRMENT, UNSPECIFIED CKD STAGE: Primary | ICD-10-CM

## 2020-05-06 DIAGNOSIS — N17.9 ACUTE RENAL FAILURE, UNSPECIFIED ACUTE RENAL FAILURE TYPE (HCC): ICD-10-CM

## 2020-05-06 LAB
BACTERIA UR QL AUTO: ABNORMAL /HPF
BILIRUB UR QL STRIP: NEGATIVE
CLARITY UR: CLEAR
COLOR UR: YELLOW
CREAT UR-MCNC: 166 MG/DL
GLUCOSE UR STRIP-MCNC: NEGATIVE MG/DL
HGB UR QL STRIP.AUTO: NEGATIVE
HYALINE CASTS #/AREA URNS LPF: ABNORMAL /LPF
KETONES UR STRIP-MCNC: NEGATIVE MG/DL
LEUKOCYTE ESTERASE UR QL STRIP: NEGATIVE
NITRITE UR QL STRIP: NEGATIVE
NON-SQ EPI CELLS URNS QL MICRO: ABNORMAL /HPF
PH UR STRIP.AUTO: 7 [PH]
PROT UR STRIP-MCNC: ABNORMAL MG/DL
PROT UR-MCNC: 25 MG/DL
PROT/CREAT UR: 0.15 MG/G{CREAT} (ref 0–0.1)
RBC #/AREA URNS AUTO: ABNORMAL /HPF
SP GR UR STRIP.AUTO: 1.02 (ref 1–1.03)
UROBILINOGEN UR QL STRIP.AUTO: 1 E.U./DL
WBC #/AREA URNS AUTO: ABNORMAL /HPF

## 2020-05-06 PROCEDURE — 84166 PROTEIN E-PHORESIS/URINE/CSF: CPT | Performed by: PATHOLOGY

## 2020-05-06 PROCEDURE — 86334 IMMUNOFIX E-PHORESIS SERUM: CPT

## 2020-05-06 PROCEDURE — 84166 PROTEIN E-PHORESIS/URINE/CSF: CPT | Performed by: INTERNAL MEDICINE

## 2020-05-06 PROCEDURE — 86334 IMMUNOFIX E-PHORESIS SERUM: CPT | Performed by: PATHOLOGY

## 2020-05-06 PROCEDURE — 82570 ASSAY OF URINE CREATININE: CPT | Performed by: INTERNAL MEDICINE

## 2020-05-06 PROCEDURE — 84165 PROTEIN E-PHORESIS SERUM: CPT

## 2020-05-06 PROCEDURE — 84156 ASSAY OF PROTEIN URINE: CPT | Performed by: INTERNAL MEDICINE

## 2020-05-06 PROCEDURE — 84165 PROTEIN E-PHORESIS SERUM: CPT | Performed by: PATHOLOGY

## 2020-05-06 PROCEDURE — 81001 URINALYSIS AUTO W/SCOPE: CPT | Performed by: INTERNAL MEDICINE

## 2020-05-08 ENCOUNTER — TELEPHONE (OUTPATIENT)
Dept: HEMATOLOGY ONCOLOGY | Facility: MEDICAL CENTER | Age: 75
End: 2020-05-08

## 2020-05-08 ENCOUNTER — TELEPHONE (OUTPATIENT)
Dept: NEPHROLOGY | Facility: CLINIC | Age: 75
End: 2020-05-08

## 2020-05-08 LAB
ALBUMIN SERPL ELPH-MCNC: 3.59 G/DL (ref 3.5–5)
ALBUMIN SERPL ELPH-MCNC: 52.8 % (ref 52–65)
ALBUMIN UR ELPH-MCNC: 100 %
ALPHA1 GLOB MFR UR ELPH: 0 %
ALPHA1 GLOB SERPL ELPH-MCNC: 0.39 G/DL (ref 0.1–0.4)
ALPHA1 GLOB SERPL ELPH-MCNC: 5.8 % (ref 2.5–5)
ALPHA2 GLOB MFR UR ELPH: 0 %
ALPHA2 GLOB SERPL ELPH-MCNC: 0.81 G/DL (ref 0.4–1.2)
ALPHA2 GLOB SERPL ELPH-MCNC: 11.9 % (ref 7–13)
B-GLOBULIN MFR UR ELPH: 0 %
BETA GLOB ABNORMAL SERPL ELPH-MCNC: 0.41 G/DL (ref 0.4–0.8)
BETA1 GLOB SERPL ELPH-MCNC: 6.1 % (ref 5–13)
BETA2 GLOB SERPL ELPH-MCNC: 6.4 % (ref 2–8)
BETA2+GAMMA GLOB SERPL ELPH-MCNC: 0.44 G/DL (ref 0.2–0.5)
GAMMA GLOB ABNORMAL SERPL ELPH-MCNC: 1.16 G/DL (ref 0.5–1.6)
GAMMA GLOB MFR UR ELPH: 0 %
GAMMA GLOB SERPL ELPH-MCNC: 17 % (ref 12–22)
IGG/ALB SER: 1.12 {RATIO} (ref 1.1–1.8)
INTERPRETATION UR IFE-IMP: NORMAL
PROT PATTERN SERPL ELPH-IMP: ABNORMAL
PROT PATTERN UR ELPH-IMP: ABNORMAL
PROT SERPL-MCNC: 6.8 G/DL (ref 6.4–8.2)
PROT UR-MCNC: 25 MG/DL

## 2020-05-14 ENCOUNTER — TELEMEDICINE (OUTPATIENT)
Dept: PAIN MEDICINE | Facility: MEDICAL CENTER | Age: 75
End: 2020-05-14
Payer: MEDICARE

## 2020-05-14 ENCOUNTER — TELEPHONE (OUTPATIENT)
Dept: SURGERY | Facility: CLINIC | Age: 75
End: 2020-05-14

## 2020-05-14 VITALS — WEIGHT: 184 LBS | BODY MASS INDEX: 30.62 KG/M2

## 2020-05-14 DIAGNOSIS — G89.4 CHRONIC PAIN SYNDROME: ICD-10-CM

## 2020-05-14 DIAGNOSIS — G58.8 INTERCOSTAL NEURALGIA: Primary | ICD-10-CM

## 2020-05-14 DIAGNOSIS — C79.51 OSSEOUS METASTASIS (HCC): ICD-10-CM

## 2020-05-14 PROCEDURE — 99213 OFFICE O/P EST LOW 20 MIN: CPT | Performed by: PHYSICAL MEDICINE & REHABILITATION

## 2020-05-15 ENCOUNTER — TELEPHONE (OUTPATIENT)
Dept: PAIN MEDICINE | Facility: CLINIC | Age: 75
End: 2020-05-15

## 2020-05-15 DIAGNOSIS — G58.8 INTERCOSTAL NEURALGIA: Primary | ICD-10-CM

## 2020-05-15 DIAGNOSIS — G58.8 INTERCOSTAL NEURALGIA: ICD-10-CM

## 2020-05-15 RX ORDER — CAPSAICIN 0.025 %
1 CREAM (GRAM) TOPICAL 2 TIMES DAILY
Qty: 60 G | Refills: 0 | Status: ON HOLD | OUTPATIENT
Start: 2020-05-15 | End: 2021-01-01 | Stop reason: CLARIF

## 2020-05-15 RX ORDER — CAPSAICIN 0.025 %
1 CREAM (GRAM) TOPICAL 2 TIMES DAILY
Qty: 60 G | Refills: 0 | Status: SHIPPED | OUTPATIENT
Start: 2020-05-15 | End: 2020-05-15 | Stop reason: SDUPTHER

## 2020-05-18 ENCOUNTER — DOCUMENTATION (OUTPATIENT)
Dept: PAIN MEDICINE | Facility: CLINIC | Age: 75
End: 2020-05-18

## 2020-05-18 ENCOUNTER — TELEPHONE (OUTPATIENT)
Dept: PAIN MEDICINE | Facility: MEDICAL CENTER | Age: 75
End: 2020-05-18

## 2020-05-19 ENCOUNTER — APPOINTMENT (OUTPATIENT)
Dept: LAB | Facility: MEDICAL CENTER | Age: 75
End: 2020-05-19
Payer: MEDICARE

## 2020-05-22 ENCOUNTER — TELEPHONE (OUTPATIENT)
Dept: NEPHROLOGY | Facility: CLINIC | Age: 75
End: 2020-05-22

## 2020-05-26 ENCOUNTER — TELEPHONE (OUTPATIENT)
Dept: SURGICAL ONCOLOGY | Facility: CLINIC | Age: 75
End: 2020-05-26

## 2020-05-27 DIAGNOSIS — Z17.0: Primary | ICD-10-CM

## 2020-05-27 DIAGNOSIS — C50.922: Primary | ICD-10-CM

## 2020-05-27 DIAGNOSIS — Z17.0 MALIGNANT NEOPLASM INVOLVING BOTH NIPPLE AND AREOLA OF LEFT BREAST IN MALE, ESTROGEN RECEPTOR POSITIVE (HCC): Primary | ICD-10-CM

## 2020-05-27 DIAGNOSIS — C78.7 LIVER METASTASIS (HCC): ICD-10-CM

## 2020-05-27 DIAGNOSIS — C50.022 MALIGNANT NEOPLASM INVOLVING BOTH NIPPLE AND AREOLA OF LEFT BREAST IN MALE, ESTROGEN RECEPTOR POSITIVE (HCC): Primary | ICD-10-CM

## 2020-05-28 ENCOUNTER — TELEPHONE (OUTPATIENT)
Dept: HEMATOLOGY ONCOLOGY | Facility: CLINIC | Age: 75
End: 2020-05-28

## 2020-06-01 ENCOUNTER — TELEPHONE (OUTPATIENT)
Dept: NUTRITION | Facility: CLINIC | Age: 75
End: 2020-06-01

## 2020-06-03 ENCOUNTER — EVALUATION (OUTPATIENT)
Dept: PHYSICAL THERAPY | Facility: REHABILITATION | Age: 75
End: 2020-06-03
Payer: MEDICARE

## 2020-06-03 DIAGNOSIS — C50.922: ICD-10-CM

## 2020-06-03 DIAGNOSIS — Z17.0: ICD-10-CM

## 2020-06-03 DIAGNOSIS — I89.0 LYMPHEDEMA OF ARM: Primary | ICD-10-CM

## 2020-06-03 PROCEDURE — 97161 PT EVAL LOW COMPLEX 20 MIN: CPT | Performed by: PHYSICAL THERAPIST

## 2020-06-03 PROCEDURE — 97140 MANUAL THERAPY 1/> REGIONS: CPT | Performed by: PHYSICAL THERAPIST

## 2020-06-08 ENCOUNTER — OFFICE VISIT (OUTPATIENT)
Dept: SURGERY | Facility: CLINIC | Age: 75
End: 2020-06-08
Payer: MEDICARE

## 2020-06-08 ENCOUNTER — OFFICE VISIT (OUTPATIENT)
Dept: PHYSICAL THERAPY | Facility: REHABILITATION | Age: 75
End: 2020-06-08
Payer: MEDICARE

## 2020-06-08 ENCOUNTER — APPOINTMENT (OUTPATIENT)
Dept: LAB | Facility: MEDICAL CENTER | Age: 75
End: 2020-06-08
Payer: MEDICARE

## 2020-06-08 VITALS
SYSTOLIC BLOOD PRESSURE: 130 MMHG | WEIGHT: 175.6 LBS | DIASTOLIC BLOOD PRESSURE: 80 MMHG | HEART RATE: 100 BPM | TEMPERATURE: 98.2 F | BODY MASS INDEX: 29.22 KG/M2

## 2020-06-08 DIAGNOSIS — Z17.0 MALIGNANT NEOPLASM INVOLVING BOTH NIPPLE AND AREOLA OF LEFT BREAST IN MALE, ESTROGEN RECEPTOR POSITIVE (HCC): ICD-10-CM

## 2020-06-08 DIAGNOSIS — C78.7 LIVER METASTASIS (HCC): ICD-10-CM

## 2020-06-08 DIAGNOSIS — C50.022 MALIGNANT NEOPLASM INVOLVING BOTH NIPPLE AND AREOLA OF LEFT BREAST IN MALE, ESTROGEN RECEPTOR POSITIVE (HCC): ICD-10-CM

## 2020-06-08 DIAGNOSIS — C50.022: ICD-10-CM

## 2020-06-08 DIAGNOSIS — N18.9 CHRONIC RENAL IMPAIRMENT, UNSPECIFIED CKD STAGE: ICD-10-CM

## 2020-06-08 DIAGNOSIS — I89.0 LYMPHEDEMA OF ARM: Primary | ICD-10-CM

## 2020-06-08 DIAGNOSIS — C50.919 BREAST CANCER (HCC): Primary | ICD-10-CM

## 2020-06-08 LAB
ALBUMIN SERPL BCP-MCNC: 3.3 G/DL (ref 3.5–5)
ALP SERPL-CCNC: 159 U/L (ref 46–116)
ALT SERPL W P-5'-P-CCNC: 52 U/L (ref 12–78)
ANION GAP SERPL CALCULATED.3IONS-SCNC: 8 MMOL/L (ref 4–13)
AST SERPL W P-5'-P-CCNC: 81 U/L (ref 5–45)
BASOPHILS # BLD AUTO: 0.1 THOUSANDS/ΜL (ref 0–0.1)
BASOPHILS NFR BLD AUTO: 2 % (ref 0–1)
BILIRUB SERPL-MCNC: 0.51 MG/DL (ref 0.2–1)
BUN SERPL-MCNC: 48 MG/DL (ref 5–25)
CALCIUM SERPL-MCNC: 9.4 MG/DL (ref 8.3–10.1)
CANCER AG27-29 SERPL-ACNC: 362.6 U/ML (ref 0–42.3)
CHLORIDE SERPL-SCNC: 104 MMOL/L (ref 100–108)
CO2 SERPL-SCNC: 27 MMOL/L (ref 21–32)
CREAT SERPL-MCNC: 2.16 MG/DL (ref 0.6–1.3)
EOSINOPHIL # BLD AUTO: 0.08 THOUSAND/ΜL (ref 0–0.61)
EOSINOPHIL NFR BLD AUTO: 1 % (ref 0–6)
ERYTHROCYTE [DISTWIDTH] IN BLOOD BY AUTOMATED COUNT: 17.5 % (ref 11.6–15.1)
GFR SERPL CREATININE-BSD FRML MDRD: 28 ML/MIN/1.73SQ M
GLUCOSE P FAST SERPL-MCNC: 92 MG/DL (ref 65–99)
HCT VFR BLD AUTO: 35.3 % (ref 36.5–49.3)
HGB BLD-MCNC: 11.5 G/DL (ref 12–17)
IMM GRANULOCYTES # BLD AUTO: 0.02 THOUSAND/UL (ref 0–0.2)
IMM GRANULOCYTES NFR BLD AUTO: 0 % (ref 0–2)
LYMPHOCYTES # BLD AUTO: 1.57 THOUSANDS/ΜL (ref 0.6–4.47)
LYMPHOCYTES NFR BLD AUTO: 25 % (ref 14–44)
MCH RBC QN AUTO: 34.2 PG (ref 26.8–34.3)
MCHC RBC AUTO-ENTMCNC: 32.6 G/DL (ref 31.4–37.4)
MCV RBC AUTO: 105 FL (ref 82–98)
MONOCYTES # BLD AUTO: 0.37 THOUSAND/ΜL (ref 0.17–1.22)
MONOCYTES NFR BLD AUTO: 6 % (ref 4–12)
NEUTROPHILS # BLD AUTO: 4.05 THOUSANDS/ΜL (ref 1.85–7.62)
NEUTS SEG NFR BLD AUTO: 66 % (ref 43–75)
NRBC BLD AUTO-RTO: 0 /100 WBCS
PLATELET # BLD AUTO: 405 THOUSANDS/UL (ref 149–390)
PMV BLD AUTO: 9.7 FL (ref 8.9–12.7)
POTASSIUM SERPL-SCNC: 4.7 MMOL/L (ref 3.5–5.3)
PROT SERPL-MCNC: 7.6 G/DL (ref 6.4–8.2)
RBC # BLD AUTO: 3.36 MILLION/UL (ref 3.88–5.62)
SODIUM SERPL-SCNC: 139 MMOL/L (ref 136–145)
WBC # BLD AUTO: 6.19 THOUSAND/UL (ref 4.31–10.16)

## 2020-06-08 PROCEDURE — 85025 COMPLETE CBC W/AUTO DIFF WBC: CPT

## 2020-06-08 PROCEDURE — 99213 OFFICE O/P EST LOW 20 MIN: CPT | Performed by: SURGERY

## 2020-06-08 PROCEDURE — 97140 MANUAL THERAPY 1/> REGIONS: CPT | Performed by: PHYSICAL THERAPIST

## 2020-06-08 PROCEDURE — 1160F RVW MEDS BY RX/DR IN RCRD: CPT | Performed by: SURGERY

## 2020-06-08 PROCEDURE — 1124F ACP DISCUSS-NO DSCNMKR DOCD: CPT | Performed by: SURGERY

## 2020-06-08 PROCEDURE — 3075F SYST BP GE 130 - 139MM HG: CPT | Performed by: SURGERY

## 2020-06-08 PROCEDURE — 86300 IMMUNOASSAY TUMOR CA 15-3: CPT

## 2020-06-08 PROCEDURE — 1036F TOBACCO NON-USER: CPT | Performed by: SURGERY

## 2020-06-08 PROCEDURE — 4040F PNEUMOC VAC/ADMIN/RCVD: CPT | Performed by: SURGERY

## 2020-06-08 PROCEDURE — 80053 COMPREHEN METABOLIC PANEL: CPT

## 2020-06-08 PROCEDURE — 3079F DIAST BP 80-89 MM HG: CPT | Performed by: SURGERY

## 2020-06-08 PROCEDURE — 36415 COLL VENOUS BLD VENIPUNCTURE: CPT

## 2020-06-08 RX ORDER — TRAMADOL HYDROCHLORIDE 50 MG/1
50 TABLET ORAL EVERY 6 HOURS PRN
Qty: 30 TABLET | Refills: 2 | Status: SHIPPED | OUTPATIENT
Start: 2020-06-08 | End: 2020-07-08

## 2020-06-09 LAB — CANCER AG15-3 SERPL-ACNC: 284 U/ML (ref 0–25)

## 2020-06-10 ENCOUNTER — TELEPHONE (OUTPATIENT)
Dept: NUTRITION | Facility: CLINIC | Age: 75
End: 2020-06-10

## 2020-06-10 ENCOUNTER — OFFICE VISIT (OUTPATIENT)
Dept: PHYSICAL THERAPY | Facility: REHABILITATION | Age: 75
End: 2020-06-10
Payer: MEDICARE

## 2020-06-10 DIAGNOSIS — I89.0 LYMPHEDEMA OF ARM: Primary | ICD-10-CM

## 2020-06-10 PROCEDURE — 97140 MANUAL THERAPY 1/> REGIONS: CPT | Performed by: PHYSICAL THERAPIST

## 2020-06-10 PROCEDURE — 97110 THERAPEUTIC EXERCISES: CPT | Performed by: PHYSICAL THERAPIST

## 2020-06-11 ENCOUNTER — TELEPHONE (OUTPATIENT)
Dept: HEMATOLOGY ONCOLOGY | Facility: CLINIC | Age: 75
End: 2020-06-11

## 2020-06-11 ENCOUNTER — TELEPHONE (OUTPATIENT)
Dept: NEPHROLOGY | Facility: CLINIC | Age: 75
End: 2020-06-11

## 2020-06-15 ENCOUNTER — OFFICE VISIT (OUTPATIENT)
Dept: HEMATOLOGY ONCOLOGY | Facility: CLINIC | Age: 75
End: 2020-06-15
Payer: MEDICARE

## 2020-06-15 ENCOUNTER — NUTRITION (OUTPATIENT)
Dept: NUTRITION | Facility: CLINIC | Age: 75
End: 2020-06-15

## 2020-06-15 ENCOUNTER — OFFICE VISIT (OUTPATIENT)
Dept: PHYSICAL THERAPY | Facility: REHABILITATION | Age: 75
End: 2020-06-15
Payer: MEDICARE

## 2020-06-15 VITALS
SYSTOLIC BLOOD PRESSURE: 138 MMHG | OXYGEN SATURATION: 95 % | BODY MASS INDEX: 30.66 KG/M2 | RESPIRATION RATE: 18 BRPM | WEIGHT: 184 LBS | TEMPERATURE: 98.2 F | HEIGHT: 65 IN | DIASTOLIC BLOOD PRESSURE: 76 MMHG | HEART RATE: 102 BPM

## 2020-06-15 DIAGNOSIS — C79.51 OSSEOUS METASTASIS (HCC): ICD-10-CM

## 2020-06-15 DIAGNOSIS — C50.022 MALIGNANT NEOPLASM INVOLVING BOTH NIPPLE AND AREOLA OF LEFT BREAST IN MALE, ESTROGEN RECEPTOR POSITIVE (HCC): ICD-10-CM

## 2020-06-15 DIAGNOSIS — C78.7 LIVER METASTASIS (HCC): Primary | ICD-10-CM

## 2020-06-15 DIAGNOSIS — I89.0 LYMPHEDEMA OF ARM: Primary | ICD-10-CM

## 2020-06-15 DIAGNOSIS — Z17.0 MALIGNANT NEOPLASM INVOLVING BOTH NIPPLE AND AREOLA OF LEFT BREAST IN MALE, ESTROGEN RECEPTOR POSITIVE (HCC): ICD-10-CM

## 2020-06-15 DIAGNOSIS — Z71.3 NUTRITIONAL COUNSELING: Primary | ICD-10-CM

## 2020-06-15 PROCEDURE — 3078F DIAST BP <80 MM HG: CPT | Performed by: INTERNAL MEDICINE

## 2020-06-15 PROCEDURE — 97110 THERAPEUTIC EXERCISES: CPT | Performed by: PHYSICAL THERAPIST

## 2020-06-15 PROCEDURE — 4040F PNEUMOC VAC/ADMIN/RCVD: CPT | Performed by: INTERNAL MEDICINE

## 2020-06-15 PROCEDURE — 1036F TOBACCO NON-USER: CPT | Performed by: INTERNAL MEDICINE

## 2020-06-15 PROCEDURE — 1160F RVW MEDS BY RX/DR IN RCRD: CPT | Performed by: INTERNAL MEDICINE

## 2020-06-15 PROCEDURE — 99214 OFFICE O/P EST MOD 30 MIN: CPT | Performed by: INTERNAL MEDICINE

## 2020-06-15 PROCEDURE — 3008F BODY MASS INDEX DOCD: CPT | Performed by: INTERNAL MEDICINE

## 2020-06-15 PROCEDURE — 3075F SYST BP GE 130 - 139MM HG: CPT | Performed by: INTERNAL MEDICINE

## 2020-06-15 PROCEDURE — 97140 MANUAL THERAPY 1/> REGIONS: CPT | Performed by: PHYSICAL THERAPIST

## 2020-06-17 ENCOUNTER — OFFICE VISIT (OUTPATIENT)
Dept: PHYSICAL THERAPY | Facility: REHABILITATION | Age: 75
End: 2020-06-17
Payer: MEDICARE

## 2020-06-17 DIAGNOSIS — I89.0 LYMPHEDEMA OF ARM: Primary | ICD-10-CM

## 2020-06-17 PROCEDURE — 97140 MANUAL THERAPY 1/> REGIONS: CPT | Performed by: PHYSICAL THERAPIST

## 2020-06-17 PROCEDURE — 97110 THERAPEUTIC EXERCISES: CPT | Performed by: PHYSICAL THERAPIST

## 2020-06-22 ENCOUNTER — OFFICE VISIT (OUTPATIENT)
Dept: PHYSICAL THERAPY | Facility: REHABILITATION | Age: 75
End: 2020-06-22
Payer: MEDICARE

## 2020-06-22 DIAGNOSIS — I89.0 LYMPHEDEMA OF ARM: Primary | ICD-10-CM

## 2020-06-22 PROCEDURE — 97140 MANUAL THERAPY 1/> REGIONS: CPT | Performed by: PHYSICAL THERAPIST

## 2020-06-22 PROCEDURE — 97110 THERAPEUTIC EXERCISES: CPT | Performed by: PHYSICAL THERAPIST

## 2020-06-23 ENCOUNTER — TELEPHONE (OUTPATIENT)
Dept: NUTRITION | Facility: CLINIC | Age: 75
End: 2020-06-23

## 2020-06-24 ENCOUNTER — OFFICE VISIT (OUTPATIENT)
Dept: PHYSICAL THERAPY | Facility: REHABILITATION | Age: 75
End: 2020-06-24
Payer: MEDICARE

## 2020-06-24 DIAGNOSIS — I89.0 LYMPHEDEMA OF ARM: Primary | ICD-10-CM

## 2020-06-24 PROCEDURE — 97140 MANUAL THERAPY 1/> REGIONS: CPT | Performed by: PHYSICAL THERAPIST

## 2020-06-24 PROCEDURE — 97110 THERAPEUTIC EXERCISES: CPT | Performed by: PHYSICAL THERAPIST

## 2020-06-29 ENCOUNTER — OFFICE VISIT (OUTPATIENT)
Dept: PHYSICAL THERAPY | Facility: REHABILITATION | Age: 75
End: 2020-06-29
Payer: MEDICARE

## 2020-06-29 DIAGNOSIS — I89.0 LYMPHEDEMA OF ARM: Primary | ICD-10-CM

## 2020-06-29 PROCEDURE — 97140 MANUAL THERAPY 1/> REGIONS: CPT | Performed by: PHYSICAL THERAPIST

## 2020-06-29 PROCEDURE — 97110 THERAPEUTIC EXERCISES: CPT | Performed by: PHYSICAL THERAPIST

## 2020-07-01 ENCOUNTER — TELEPHONE (OUTPATIENT)
Dept: NUTRITION | Facility: CLINIC | Age: 75
End: 2020-07-01

## 2020-07-01 ENCOUNTER — OFFICE VISIT (OUTPATIENT)
Dept: PHYSICAL THERAPY | Facility: REHABILITATION | Age: 75
End: 2020-07-01
Payer: MEDICARE

## 2020-07-01 DIAGNOSIS — I89.0 LYMPHEDEMA OF ARM: Primary | ICD-10-CM

## 2020-07-01 PROCEDURE — 97110 THERAPEUTIC EXERCISES: CPT | Performed by: PHYSICAL THERAPIST

## 2020-07-01 PROCEDURE — 97140 MANUAL THERAPY 1/> REGIONS: CPT | Performed by: PHYSICAL THERAPIST

## 2020-07-01 NOTE — PROGRESS NOTES
Daily Note     Today's date: 2020  Patient name: Lynda Lynn  : 1945  MRN: [id number]  Referring provider: Matthew Austin MD  Dx:   Encounter Diagnosis     ICD-10-CM    1  Lymphedema of arm I89 0        Start Time: 1515          Subjective: no new complaints  Objective: See treatment diary below  Swelling   Left shoulder swelling details:   14 cm above olecranon 32 7 cm  7 cm above 31 cm  Olecranon 27 cm  14 cm above ulnar styloid 27 5 cm  7 cm above 23 1 cm  Ulnar styloid 16 5  mcp 20 5 cm     Right shoulder swelling details:   14 cm above olecranon 32  7 cm above 29 cm  Olecranon 27 2 cm  14 cm above ulnar styloid 25 5 cm  7 cm above 21 7 cm  Ulnar styloid 17 5 cm  mcp 21 cm    Assessment: Tolerated treatment well  Patient demonstrated fatigue post treatment and would benefit from continued PT  Significant decrease in edema      Plan: Continue per plan of care        Precautions: back pain, CA, HBP, kidney      Manuals 6/3 6/8 6/10 6/15 6/17 6/22 6/24 6/29 7    Manual drainage 16 min 12 min 17 min 15 min 17 min 15 min 17 min 14 min 16 min                                           Neuro Re-Ed                                                                                                        Ther Ex             Shoulder flexion nv X 20 1# x 20 2# x 18 2# x 20 3# x 15 3# x 15 3# x 15 3# x 15    Shoulder abd nv X 20 1# x 20 2# x 14 2# x 20 3# x 15 3# x 15 3#x 15 3# x 15    Biceps curls nv 3# x 20 4# x 20 5# x 15 5# x 20 6# x 10 6#x 15 6# x 15 6# x 15    UBE standing  nv 1 min ea 2 min ea 2 5 min ea 2 5 min ea 2 5 min ea 3 min ea 3 min ea                                                        Ther Activity                                       Gait Training                                       Modalities

## 2020-07-01 NOTE — TELEPHONE ENCOUNTER
Received call from Lisandro Aguiar today  She stated that Irina Gupta has been experiencing nausea after he takes on of his medications and would like to know what he should do to help decrease nausea  Suggested that they first make sure the medication is being taken according to directions on the medication bottle (with food, without food, at the correct time of day)  Medical nutrition therapy for nausea also provided: suggested eating smaller/more frequent meals, eat foods easy on the stomach such as white toast/plain yogurt/clear broth, sip liquids throughout the day  Also suggested notifying physician if nausea persists

## 2020-07-06 ENCOUNTER — OFFICE VISIT (OUTPATIENT)
Dept: PHYSICAL THERAPY | Facility: REHABILITATION | Age: 75
End: 2020-07-06
Payer: MEDICARE

## 2020-07-06 DIAGNOSIS — I89.0 LYMPHEDEMA OF ARM: Primary | ICD-10-CM

## 2020-07-06 PROCEDURE — 97140 MANUAL THERAPY 1/> REGIONS: CPT | Performed by: PHYSICAL THERAPIST

## 2020-07-06 PROCEDURE — 97110 THERAPEUTIC EXERCISES: CPT | Performed by: PHYSICAL THERAPIST

## 2020-07-06 NOTE — PROGRESS NOTES
Outpatient Oncology Nutrition Consult   Type of Consult: Follow Up  Care Location: Telephone Call-pt and wife Mireya Mckeon    Reason for referral: RN (Curly Abbott) request due to pt has nutrition questions (Date of referral: 2/19/20)    Nutrition Assessment:   Oncology Diagnosis & Treatments: Left breast carcinoma diagnosed 11/2018, BRCA negative  Neoadjuvant tamoxifen 20 mg daily from December 27, 2018 until May 2, 2019  Left modified radical mastectomy May 2, 2019  Tamoxifen was resumed postoperatively  Widespread metastatic disease including multiple lung nodules, liver metastasis, and osseous metastasis found in Feb 2020  Began Lupron in Feb 2020, and Letrozole and Rita Zac March 2020  Oncology History    Patient returns today for discuss results of CT and MRI  Initial consult with Dr Nila Davila on 320/20   66year old male with Jayla Ramirez 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 status post neoadjuvant tamoxifen followed by left mastectomy +ALND, with pathology showing 5 cm tumor extending to margins, and involvement of dermis with 10/10 LN positive, followed by re-excision showing residual tumor, with final margins negative  He declined adjuvant chemotherapy and declined repeat imaging with Dr Jeanella Fabry  Plan at time of consult was radiation to left chestwall and regional lymph nodes  1/27/20 CT simulation for radiation  Dr Nila Davila discussed with patient findings of CT sim which show metastatic disease  She also discussed with Dr Jeanella Fabry and radiology to confirm  Plan to order diagnostic CT c/A/P with contrast, and MRI brain  forego PMRT    1/31/20 Med Onc follow-up with Dr Jeanella Fabry  Pt is scheduled for MRI brain and CT 2/8/20, in view of azotemia,  recommend imaging without IV contrast to reduce risk of kidney dysfunction       2/8/20 CT chest abd pelvis  IMPRESSION:   1   Progressively enlarging 9 mm right middle lobe nodule with Satellite nodules and few additional new pulmonary nodules, suspicious for progressive metastatic disease      2   Multiple hypoattenuating liver lesions, new from December 2018, and progressed from January 2020, highly suspicious for metastasis      3   Osseous metastatic disease as described, new from December 2018, and slightly progressed from January 2020  Right acetabular lytic lesion was not previously imaged      4  Indeterminate 3 2 cm nodular lesion in the left upper quadrant of the abdomen, which was only partially imaged on prior studies and was not adequately evaluated  This may represent a splenule, tumor implant or metastatic lymph node  Further   characterization with PET CT can be performed if clinically warranted      2/8/20 MRI brain - pending      2/14/20 Med Onc f/u with Dr Tawana Borrego          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, estrogen receptor positive ([facility] [address] )    2018 Initial Diagnosis     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, estrogen receptor positive ([facility] [address] )      11/14/2018 Biopsy     A  Breast, left subareolar mass, core biopsy (11 slides, [address], collected on 11/14/2018): - Invasive mammary carcinoma with mucinous features (see note)  -- [place] grade 1 of 3 (total score: 5 of 9)        * Tubule formation < 10%, score 3        * Nuclear grade 1 of 3, score 1        * Mitoses < 3/mm2, (</= 7 mitoses/10HPF), score 1     -- Invasive carcinoma involves 4 of 4 submitted core biopsy fragments, max  Dimension= 22 mm     -- Estrogen, Progesterone & HER2 receptor studies performed at the referring institution show the tumor to be positive for ER (>95% strong), positive for SD (75% moderate), and negative for HER2 IHC (score 0)  - Ductal carcinoma in-situ (DCIS): Not identified  - Lymph-vascular invasion: Not identified  - Microcalcifications: Absent    - Best representative tumor block:  A1    -- Sufficient tumor present for        Agendia Mammaprint/Blueprint (1 cm2 of invasive tumor in aggregate): No         MI Profile/Foundation One (at least 5 x 5 mm of tumor): Yes  Note:  The tumor shows prominent mucinous differentiation, raising the strong possibility of invasive mucinous carcinoma, though this designation is possible only complete excision with evaluation of the entirety of the tumor mass  12/27/2018 -  Hormone Therapy     Tamoxifen 20 mg daily (Dr Kb Rizvi)      2018 Genetic Testing     BRCA negative       5/2/2019 Surgery     A  Breast, Left, Mastectomy and Axillary Contents:  - 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), [place] Grade II (3 + 2 + 1 = 6), 51 mm in greatest dimension, involving dermis  See Tumor Synoptic Report (below) and Note  - Margins negative for carcinoma in this specimen (see separately submitted additional margins, Parts C and D)  - Intradermal nevi      B  Lymph Node, Left Periaxillary Vein, Lymphadenectomy:  - Metastatic carcinoma present in one lymph node (1/1), with extranodal extension   - Metastatic focus measures at least 1 5 cm in greatest dimension      C  Breast, Left Inferior Medial Margin, Excision:  - Benign fibroadipose tissue and skeletal muscle      D  Breast, Left Inferior Medial Margin Skin, Excision:  - Invasive mammary carcinoma, underlying benign skin  - Anterior margin is multifocally positive for carcinoma     (Dr Kyler Tuttle)      10/9/2019 Surgery     Left breast, re-excision scar mastectomy site medial and inferior margins:     - Residual invasive breast carcinoma of no special type (ductal NST/invasive ductal carcinoma), multifocal        -- Four foci identified ranging in size from 1 5 to approximately 12 mm        -- Tumor foci are present in dermis and adjacent subcutaneous tissues       - All margins are negative for tumor with closest margin as follows:       -- One focus of tumor comes to within 0 7 mm of the superior margin (A16)  -- Largest focus comes to within 1 5 mm of the superior margin (A37)  - Cicatricial fibrosis of skin and subcutaneous tissues      (Dr Mare Brittle)       Past Medical & Surgical Hx: HTN, pancreatitis, anemia, afib  Patient Active Problem List   Diagnosis    Mass of breast, left    Hypertension    Leukocytosis    PAC (premature atrial contraction)    Pancreatic mass    Pancreatitis    RBBB (right bundle branch block with left anterior fascicular block)    Transaminitis    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, estrogen receptor positive ([facility] [address] )    Anemia, unspecified    Prerenal azotemia    Liver metastasis ([facility] [address] )    Lung metastasis ([facility] [address] )    Osseous metastasis ([facility] [address] )    ARF (acute renal failure) ([facility] [address] )     Past Medical History:   Diagnosis Date    Acute gallstone pancreatitis     Arthritis     Atrial fibrillation ([address]     Breast cancer ([facility] [address] )     Cancer (HCC)     breast - left    Chronic pain disorder     Extremity pain     Hypertension     Irregular heartbeat     PAC (premature atrial contraction)     PVC (premature ventricular contraction)     RBBB     Wears glasses      Past Surgical History:   Procedure Laterality Date    APPENDECTOMY      CHOLECYSTECTOMY      FRACTURE SURGERY Left     arm    MO EXC SKIN BENIG 3 1-4 CM TRUNK,ARM,LEG Left 10/9/2019    Procedure: BREAST RE-EXCISION OF SKIN MARGINS;  Surgeon: Mare Brittle, MD;  Location: [facility];  Service: General    MO LAP,CHOLECYSTECTOMY N/A 12/18/2018    Procedure: LAP CHOLECYSTECTOMY; LYSIS OF ADHESIONS; ATTEMPTED CHOLANGIOGRAM;  Surgeon: Mare Brittle, MD;  [facility];  Service: General    MO MASTECTOMY, MODIFIED RADICAL Left 5/2/2019    Procedure: MASTECTOMY MODIFIED RADICAL; AXILLARY DISSECTION;  Surgeon: Mare Brittle, MD;  Location: [facility];  Service: General    TONSILLECTOMY         Review of Medications: Vitamins, Supplements and Herbals: no     Current Outpatient Medications:     acetaminophen (TYLENOL) 500 mg tablet, Take 500 mg by mouth every 6 (six) hours as needed, Disp: , Rfl:     capsaicin (ZOSTRIX) 0 025 % cream, Apply 1 application topically 2 (two) times a day, Disp: 60 g, Rfl: 0    furosemide (LASIX) 40 mg tablet, Take 40 mg by mouth daily, Disp: , Rfl: 0    letrozole (FEMARA) 2 5 mg tablet, Take 1 tablet (2 5 mg total) by mouth daily, Disp: 90 tablet, Rfl: 1    losartan (COZAAR) 50 mg tablet, Take 50 mg by mouth daily , Disp: , Rfl: 0    Multiple Vitamins-Minerals (CENTRUM SILVER 50+MEN PO), Take by mouth, Disp: , Rfl:     Palbociclib (Ibrance) 75 MG capsule, Take 1 capsule (75 mg total) by mouth daily with breakfast Take with food  , Disp: 21 capsule, Rfl: 0    polyethylene glycol (MIRALAX) 17 g packet, Take 17 g by mouth daily as needed , Disp: , Rfl:     traMADol (ULTRAM) 50 mg tablet, Take 1 tablet (50 mg total) by mouth every 6 (six) hours as needed for moderate pain, Disp: 30 tablet, Rfl: 2    Most Recent Lab Results:   Lab Results   Component Value Date    WBC 6 19 06/08/2020    IRON 71 06/26/2019    TIBC 311 06/26/2019    FERRITIN 89 06/26/2019    ALT 52 06/08/2020    AST 81 (H) 06/08/2020    ALB 3 3 (L) 06/08/2020    SODIUM 139 06/08/2020    SODIUM 137 05/19/2020    K 4 7 06/08/2020    K 4 6 05/19/2020     06/08/2020    BUN 48 (H) 06/08/2020    BUN 37 (H) 05/19/2020    CREATININE 2 16 (H) 06/08/2020    CREATININE 2 23 (H) 05/19/2020    EGFR 28 06/08/2020    GLUF 92 06/08/2020    GLUF 95 05/19/2020    GLUC 105 05/03/2019    CALCIUM 9 4 06/08/2020       Anthropometric Measurements:   Height: 65"  Ht Readings from Last 1 Encounters:   06/15/20 5' 5" (1 651 m)     -Weight History:   Usual Weight: 198#  Ideal Body Weight: 136#  Wt Readings from Last 20 Encounters:   06/15/20 83 5 kg (184 lb)   06/08/20 79 7 kg (175 lb 9 6 oz)   05/14/20 83 5 kg (184 lb)   03/27/20 83 5 kg (184 lb) 02/14/20 86 2 kg (190 lb)   01/31/20 89 8 kg (198 lb)   01/20/20 87 3 kg (192 lb 7 4 oz)   12/17/19 89 4 kg (197 lb)   10/21/19 89 8 kg (198 lb)   10/14/19 89 2 kg (196 lb 9 6 oz)   10/09/19 90 7 kg (200 lb)   09/17/19 90 8 kg (200 lb 3 2 oz)   09/12/19 90 1 kg (198 lb 9 6 oz)   07/29/19 87 1 kg (192 lb)   06/25/19 88 1 kg (194 lb 3 2 oz)   05/30/19 87 1 kg (192 lb)   05/13/19 86 2 kg (190 lb)   05/06/19 88 kg (194 lb)   05/02/19 85 7 kg (189 lb)   04/19/19 88 5 kg (195 lb)     -Varian:n/a  -Home weights: (6/15/20)177#, (7/6/20) 178 2# "ranging 178-180#"      Weight Changes: Stable in 1 month  and gain of 8 4#/ (4 8%) in 1 week (significant)    Estimated body mass index is 30 62 kg/m² as calculated from the following:    Height as of 6/15/20: 5' 5" (1 651 m)  Weight as of 6/15/20: 83 5 kg (184 lb)      Nutrition-Focused Physical Findings: n/a due to telephone call    Food/Nutrition-Related History & Client/Social History:    Current Nutrition Impact Symptoms:  [x] Nausea -when he has chocolate [] Reduced Appetite  [] Acid Reflux    [] Vomiting  [] Unintended Wt Loss- has been stable x1 month  [] Malabsorption    [] Diarrhea  [] Unintended Wt Gain  [] Dumping Syndrome    [x] Constipation -"sometimes" takes Miralax [] Thick Mucous/Secretions  [x] Abdominal Pain -"ghost pain" after gall bladder removal, takes tylenol    [] Dysgeusia (Altered Taste)  [x] Xerostomia (Dry Mouth) -in the morning  [] Gas    [] Dysosmia (Altered Smell)  [] Thrush  [] Difficulty Chewing    [] Oral Mucositis (Sore Mouth)  [] Fatigue   [] Other:    [] Odynophagia   [] Esophagitis  [] Other:    [] Dysphagia  [] Early Satiety  [] No Problems Eating      Food Allergies: no  Food Intolerances: no    Current Diet: Regular Diet, No Restrictions  Current Nutrition Intake: More than usual  Appetite: Fair -but improving   Nutrition Route: PO  Meal planning/preparation mainly done by: Self  Oral Care: brushes BID  Activity level: Goes to PT 2x/week and gaining strength  Lifts 2#wts overhead and curls 3# wts at PT  Easily tires, out of breath  Likes to [employer]  Social Hx: Lives with wife Melania Donald who cooks and helps take care of him  24 Hr Diet Recall:   Breakfast: 2 eggs, 1 slice boiled ham, cheese, 1 slice toast with jelly, 8oz OJ  Lunch: 1/2 turkey sandwich with 1 slice cheese, lettuce, 2 slices turkey   Dinner: Ethiopian  [place] ([facility]) breast 3-4 oz, peas/carrots   Snack: [employer], cottage cheese with apple sauce, pop sicles, watermelon       Beverages: water (8oz x1), lemonade (8oz x2), [place] Spr[place] CranRaspberry or CranMango (8oz x1-2), tea (12oz x1)   Supplements:    [employer] Essentials (9gbm=838 kcal, 5 g pro) mixed with vanilla with milk; 1x daily       Estimated Nutrition Needs: (based on 79 8kg/ 175 6# actual wt)  Energy Needs: 7572-5026 kcal/day (30-35 kcal/kg)  Protein Needs:  grams/day (1 2-1 5 g/kg)- monitor kidney function, BUN/creat elevated at this time   Fluid Needs: 0952-9436 mL/day (1 mL/kcal) 80-93oz     Discussion/Summary: Donfauzia Jacqueszaheer and wife Melania Donald were contacted today for an f/u RD consultation  Donfauzia Jacobson reports that his appetite has slowly been improving and he is making an effort to eat more at each meal  Melania Donald keeps track of Anthony's intkaes and reports that he has been consuming, on average, 1900kcal and 112g protein daily  She states that he was consuming more calories but their refrigerator broke recently and they are waiting for it to get fixed  Because of this, they have limited ability to keep fresh foods in the house  He continues to drink [employer] Essentials at least 1x daily  He recently switched from chocolate to vanilla because he states the chocolate flavor would cause nausea  His home weight has been stable at about 178-180#, he weighs himself daily at the same time  His activity has increased slightly as well, he has been lifting small weights at PT 2x weekly   Overall Vanetta Mealing reports that he is feeling better  Discussed/Reviewed:   · the importance of weight maintenance during CA tx  · high protein foods to include at all meals and snacks  · ways to increase overall calorie intake  · encouraged eating every 2-3 hours (5-6 small meals/day)  · adequate hydation & tips to increase overall fluid intake  · MNT for: weight management   · recipe suggestions/resources  · individualized calorie, protein and fluid daily goals  Individualized nutrition recommendations and interventions listed below  All questions and concerns addressed during todays visit  Wade Carrillo has RD contact information  Nutrition Diagnosis:    Increased Nutrient Needs (kcal & pro) related to increased demand for nutrients and disease state as evidenced by cancer dx and pt undergoing tx for cancer  Intervention & Recommendations:   Topics addressed: Nutrition education, Balance/Variety, Meals & Snacks, Meal planning, Choosing high protein meals/snacks, Meal pattern: eating small/frequent meals (every 2-3 hours), Nutrition Symptom Management, Adequate Hydration, Nutrition-Related Medication Management and Weight Management    Barriers: None  Readiness to change: action  Comprehension: verbalizes understanding  Expected Compliance: good    Materials Provided: not applicable  Monitoring & Evaluation:   Dietitian to Monitor: Food and Nutrition Intake, Nutrtion Impact Symptoms, Body Weight and Biochemical Data     Goals:  · weight maintenance/stabilization  · pt to meet >/=75% estimated nutrition needs daily  · Meet hydration goal: 80-93oz daily     · Progress Towards Goals: Progressing    Nutrition Rx & Recommendations:  · Diet: High Calorie, High Protein (for high calorie foods see pages 52-53, and for high protein foods see pages 49-51 in your Eating Hints book)  · Keep a daily food journal (pen/paper, martha such as [employer])  · Small, frequent meals/snacks may be easier to tolerate than 3 large daily meals    Aim for 5-6 small meals per day (every 2-3 hours)  · Include protein at all meals/snacks  · Include a variety of foods (as tolerated/allowed by diet)  · Incorporate physical activity as able/allowed  · Stay hydrated by sipping fluids of choice/tolerance throughout the day  · Alter food choices and eating patterns to accommodate changing needs  · Refer to Eating Hints book for other meal/snack ideas and symptom management  · For constipation: drink plenty of fluids (>64 oz/day); drink hot liquids; eat high-fiber foods as tolerated (whole grains, beans, peas, nuts, seeds, fruits, vegetables, etc); increase physical activity as tolerated  Avoid increasing fiber intake too quickly, add fiber into your diet slowly; keep a record of your bowel movements (see pages 13-14 in you Eating Hints book)  · Weigh yourself regularly  If you notice weight loss, make an effort to increase your daily food/calorie intake  If you continue to notice loss after these efforts, reach out to your dietitian to establish a plan to stabilize weight  · [place] Instant Breakfast shake 1-2x daily  · Try increasing calories and protein by: harsh sauce, making mashed potatoes with butter and whole milk, 2 tbsp peanut butter as a snack, adding beans to salad at lunch  · Try to increase fluid intake, goal: 80-93 oz daily       Follow Up Plan: 7/28/20 phone follow up 10am  Recommend Referral to Other Providers: none at this time

## 2020-07-06 NOTE — PROGRESS NOTES
Daily Note     Today's date: 2020  Patient name: Christ Matthews  : 1945  MRN: [id number]  Referring provider: Luci Kim MD  Dx:   Encounter Diagnosis     ICD-10-CM    1  Lymphedema of arm I89 0                   Subjective: no new complaints  Objective: See treatment diary below  Swelling   Left shoulder swelling details:   14 cm above olecranon 32 7 cm  7 cm above 31 cm  Olecranon 27 cm  14 cm above ulnar styloid 27 5 cm  7 cm above 23 1 cm  Ulnar styloid 16 5  mcp 20 5 cm     Right shoulder swelling details:   14 cm above olecranon 32  7 cm above 29 cm  Olecranon 27 2 cm  14 cm above ulnar styloid 25 5 cm  7 cm above 21 7 cm  Ulnar styloid 17 5 cm  mcp 21 cm    Assessment: Tolerated treatment well  Patient demonstrated fatigue post treatment and would benefit from continued PT  Plan: Continue per plan of care        Precautions: back pain, CA, HBP, kidney      Manuals 6/3 6/8 6/10 6/15 6/17 6/22 6/24 6/29 7/1 7/6   Manual drainage 16 min 12 min 17 min 15 min 17 min 15 min 17 min 14 min 16 min 15 min                                          Neuro Re-Ed                                                                                                        Ther Ex             Shoulder flexion nv X 20 1# x 20 2# x 18 2# x 20 3# x 15 3# x 15 3# x 15 3# x 15 3# x 15   Shoulder abd nv X 20 1# x 20 2# x 14 2# x 20 3# x 15 3# x 15 3#x 15 3# x 15 3# x 15   Biceps curls nv 3# x 20 4# x 20 5# x 15 5# x 20 6# x 10 6#x 15 6# x 15 6# x 15 6# x 15   UBE standing  nv 1 min ea 2 min ea 2 5 min ea 2 5 min ea 2 5 min ea 3 min ea 3 min ea 3 min ea                                                       Ther Activity                                       Gait Training                                       Modalities

## 2020-07-07 ENCOUNTER — NUTRITION (OUTPATIENT)
Dept: NUTRITION | Facility: CLINIC | Age: 75
End: 2020-07-07

## 2020-07-07 DIAGNOSIS — Z71.3 NUTRITIONAL COUNSELING: Primary | ICD-10-CM

## 2020-07-07 NOTE — PATIENT INSTRUCTIONS
Nutrition Rx & Recommendations:  · Diet: High Calorie, High Protein (for high calorie foods see pages 52-53, and for high protein foods see pages 49-51 in your Eating Hints book)  · Keep a daily food journal (pen/paper, martha such as [email or web address])  · Small, frequent meals/snacks may be easier to tolerate than 3 large daily meals  Aim for 5-6 small meals per day (every 2-3 hours)  · Include protein at all meals/snacks  · Include a variety of foods (as tolerated/allowed by diet)  · Incorporate physical activity as able/allowed  · Stay hydrated by sipping fluids of choice/tolerance throughout the day  · Alter food choices and eating patterns to accommodate changing needs  · Refer to Eating Hints book for other meal/snack ideas and symptom management  · For constipation: drink plenty of fluids (>64 oz/day); drink hot liquids; eat high-fiber foods as tolerated (whole grains, beans, peas, nuts, seeds, fruits, vegetables, etc); increase physical activity as tolerated  Avoid increasing fiber intake too quickly, add fiber into your diet slowly; keep a record of your bowel movements (see pages 13-14 in you Eating Hints book)  · Weigh yourself regularly  If you notice weight loss, make an effort to increase your daily food/calorie intake  If you continue to notice loss after these efforts, reach out to your dietitian to establish a plan to stabilize weight  · [place] Instant Breakfast shake 1-2x daily  · Try increasing calories and protein by: harsh sauce, making mashed potatoes with butter and whole milk, 2 tbsp peanut butter as a snack, adding beans to salad at lunch  · Try to increase fluid intake, goal: 80-93 oz daily       Follow Up Plan: 7/28/20 phone follow up 10am  Recommend Referral to Other Providers: none at this time

## 2020-07-08 ENCOUNTER — EVALUATION (OUTPATIENT)
Dept: PHYSICAL THERAPY | Facility: REHABILITATION | Age: 75
End: 2020-07-08
Payer: MEDICARE

## 2020-07-08 DIAGNOSIS — I89.0 LYMPHEDEMA OF ARM: Primary | ICD-10-CM

## 2020-07-08 PROCEDURE — 97140 MANUAL THERAPY 1/> REGIONS: CPT | Performed by: PHYSICAL THERAPIST

## 2020-07-08 PROCEDURE — 97110 THERAPEUTIC EXERCISES: CPT | Performed by: PHYSICAL THERAPIST

## 2020-07-08 NOTE — PROGRESS NOTES
PT Evaluation     Today's date: 2020  Patient name: Pa Arauz  : 1945  MRN: [id number]  Referring provider: Nancy Baker MD  Dx:   Encounter Diagnosis     ICD-10-CM    1  Lymphedema of arm I89 0        Start Time: 0          Assessment  Assessment details: Patient with Stage 0 edema LUE that would benefit from manual drainage and strengthening exercise  Impairments: impaired physical strength  Other impairment: edema LUE    Goals  STG decrease pain 0-1/0  Increase strength 3+/5  Decrease edema   5 cm    LTG I ADLs   reduce edema to within   5 cm of the right     Plan  Plan details: Continue manual drainage with strengthening as tolerated    Patient would benefit from: skilled speech therapy  Planned therapy interventions: manual therapy  Frequency: 2x week  Duration in weeks: 5  Treatment plan discussed with: patient        Subjective Evaluation    History of Present Illness  Mechanism of injury: Patient had left mastectomy  with 10 lymphnodes removed with prior treatment  with treatment by John Nguyen  Pain  No pain reported    Treatments  Previous treatment: physical therapy  Patient Goals  Patient goals for therapy: decreased edema          Objective     Active Range of Motion   Left Shoulder   Normal active range of motion    Right Shoulder   Normal active range of motion    Swelling   Left shoulder swelling details:   14 cm above olecranon 33 cm  7 cm above 30 5 cm  Olecranon 27 4 cm  14 cm above ulnar styloid 27 1 cm  7 cm above 21 5 cm  Ulnar styloid 17  mcp 21 cm    Right shoulder swelling details:   14 cm above olecranon 32  7 cm above 29 cm  Olecranon 27 2 cm  14 cm above ulnar styloid 25 5 cm  7 cm above 21 7 cm  Ulnar styloid 17 5 cm  mcp 21 cm             Precautions: back pain, CA, HBP, kidney          Manuals 7/8 6/8 6/10 6/15 6/17 6/22 6/24 6/29 7/1 7/6   Manual drainage  15 min 12 min 17 min 15 min 17 min 15 min 17 min 14 min 16 min 15 min                                                                           Neuro Re-Ed                                                                                                                                                                                               Ther Ex                       Shoulder flexion 3# x 15 X 20 1# x 20 2# x 18 2# x 20 3# x 15 3# x 15 3# x 15 3# x 15 3# x 15   Shoulder abd 3# x 15 X 20 1# x 20 2# x 14 2# x 20 3# x 15 3# x 15 3#x 15 3# x 15 3# x 15   Biceps curls 6# x 16 3# x 20 4# x 20 5# x 15 5# x 20 6# x 10 6#x 15 6# x 15 6# x 15 6# x 15   UBE standing  3 min ea nv 1 min ea 2 min ea 2 5 min ea 2 5 min ea 2 5 min ea 3 min ea 3 min ea 3 min ea                                                                                                   Ther Activity                                                                       Gait Training                                                                       Modalities

## 2020-07-13 ENCOUNTER — OFFICE VISIT (OUTPATIENT)
Dept: PHYSICAL THERAPY | Facility: REHABILITATION | Age: 75
End: 2020-07-13
Payer: MEDICARE

## 2020-07-13 DIAGNOSIS — I89.0 LYMPHEDEMA OF ARM: Primary | ICD-10-CM

## 2020-07-13 PROCEDURE — 97140 MANUAL THERAPY 1/> REGIONS: CPT | Performed by: PHYSICAL THERAPIST

## 2020-07-13 PROCEDURE — 97110 THERAPEUTIC EXERCISES: CPT | Performed by: PHYSICAL THERAPIST

## 2020-07-13 NOTE — PROGRESS NOTES
Daily Note     Today's date: 2020  Patient name: Jaycob Pope  : 1945  MRN: [id number]  Referring provider: Yecenia Portillo MD  Dx:   Encounter Diagnosis     ICD-10-CM    1  Lymphedema of arm I89 0                   Subjective: I'm tired today  The dog had me up in the middle of the night  Objective: See treatment diary below      Assessment: Tolerated treatment fair  Patient demonstrated fatigue post treatment      Plan: Continue per plan of care        Precautions: back pain, CA, HBP, kidney          Manuals 7/8 7/13 6/10 6/15 6/17 6/22 6/24 6/29 7/1 7/6   Manual drainage  15 min 14 min 17 min 15 min 17 min 15 min 17 min 14 min 16 min 15 min                                                                           Neuro Re-Ed                                                                                                                                                                                               Ther Ex                       Shoulder flexion 3# x 15 3# x 15 1# x 20 2# x 18 2# x 20 3# x 15 3# x 15 3# x 15 3# x 15 3# x 15   Shoulder abd 3# x 15 3# x 15 1# x 20 2# x 14 2# x 20 3# x 15 3# x 15 3#x 15 3# x 15 3# x 15   Biceps curls 6# x 16 6# x 20 4# x 20 5# x 15 5# x 20 6# x 10 6#x 15 6# x 15 6# x 15 6# x 15   UBE standing  3 min ea 3 min ea 1 min ea 2 min ea 2 5 min ea 2 5 min ea 2 5 min ea 3 min ea 3 min ea 3 min ea    wrist flexion    1# x 20                    wrist extension    1# x 20                                                                   Ther Activity                                                                       Gait Training                                                                       Modalities

## 2020-07-15 ENCOUNTER — TELEPHONE (OUTPATIENT)
Dept: NUTRITION | Facility: CLINIC | Age: 75
End: 2020-07-15

## 2020-07-15 ENCOUNTER — HOSPITAL ENCOUNTER (OUTPATIENT)
Dept: INFUSION CENTER | Facility: CLINIC | Age: 75
Discharge: HOME | End: 2020-07-15
Payer: MEDICARE

## 2020-07-15 VITALS
SYSTOLIC BLOOD PRESSURE: 145 MMHG | TEMPERATURE: 97.5 F | RESPIRATION RATE: 18 BRPM | HEIGHT: 65 IN | BODY MASS INDEX: 29.97 KG/M2 | WEIGHT: 179.9 LBS | HEART RATE: 89 BPM | DIASTOLIC BLOOD PRESSURE: 73 MMHG

## 2020-07-15 DIAGNOSIS — C50.022 MALIGNANT NEOPLASM INVOLVING BOTH NIPPLE AND AREOLA OF LEFT BREAST IN MALE, ESTROGEN RECEPTOR POSITIVE (HCC): ICD-10-CM

## 2020-07-15 DIAGNOSIS — C79.51 OSSEOUS METASTASIS (HCC): Primary | ICD-10-CM

## 2020-07-15 DIAGNOSIS — Z17.0 MALIGNANT NEOPLASM INVOLVING BOTH NIPPLE AND AREOLA OF LEFT BREAST IN MALE, ESTROGEN RECEPTOR POSITIVE (HCC): ICD-10-CM

## 2020-07-15 DIAGNOSIS — C78.7 LIVER METASTASIS (HCC): ICD-10-CM

## 2020-07-15 DIAGNOSIS — C78.00 MALIGNANT NEOPLASM METASTATIC TO LUNG, UNSPECIFIED LATERALITY (HCC): ICD-10-CM

## 2020-07-15 PROCEDURE — 96402 CHEMO HORMON ANTINEOPL SQ/IM: CPT

## 2020-07-15 PROCEDURE — 96401 CHEMO ANTI-NEOPL SQ/IM: CPT

## 2020-07-15 RX ADMIN — LEUPROLIDE ACETATE 22.5 MG: KIT at 15:14

## 2020-07-15 RX ADMIN — DENOSUMAB 120 MG: 120 INJECTION SUBCUTANEOUS at 15:16

## 2020-07-15 NOTE — PROGRESS NOTES
Patient to the unit for Lupron and Xgeva injections  Denies any recent dental work or upcoming dental appointments  Voices no new concerns  Lupron IM given in the right upper outer gluteus  Called and spoke with Javier Malloy RN from Dr Myron Scanlon office OK to give [facility] with CrCl of 30 8 and Ca+ of 9 4 from [address] done 6/8/2020  Xgeva given SQ in the right upper arm  AVS given, aware of future appintments  Voices no questions or concerns at discharge

## 2020-07-15 NOTE — TELEPHONE ENCOUNTER
Brief Visit:      Received call from Lloyd Bradshaw today stating that Bora Barreto has been vomiting 1x / day for the past few days  She stated that he will eat breakfast, and then a few hours later vomit, and then feel okay and eat for the rest of the day  She reports that it is not associated with any type of food intake, and she believes it might be christian to his sinus congestion  She also called to ask if Bora Barreto is eating too much sugar because he consumes lemonade, OJ, and popsicles  Suggested that she can cut lemonade with water to decrease sugar content  She reports that Anthony's weight has been 177-181# this week  His 24hr diet recall is below  His appetite is good  Lloyd Bradshaw has no other questions/concerns  24 Hr Diet Recall:  Breakfast: cereal and milk  Lunch: turkey sandwich with cheese  Dinner: homemade mac and cheese   Snacks: cottage cheese with apple butter   Beverages: OJ (8oz x1), Lemonade (8oz x2), water (Melania Melida x2)     Follow Up Plan: f/u phone visit scheduled for 7/28/20 10am        Oncology Diagnosis & Treatments: Left breast carcinoma diagnosed 11/2018, BRCA negative  Neoadjuvant tamoxifen 20 mg daily from December 27, 2018 until May 2, 2019  Left modified radical mastectomy May 2, 2019  Tamoxifen was resumed postoperatively  Widespread metastatic disease including multiple lung nodules, liver metastasis, and osseous metastasis found in Feb 2020  Began Lupron in Feb 2020, and Letrozole and Simran Burkitt March 2020  Oncology History    Patient returns today for discuss results of CT and MRI  Initial consult with Dr Vinicio Munroe on 320/20   66year old male with Castillo Lo 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 status post neoadjuvant tamoxifen followed by left mastectomy +ALND, with pathology showing 5 cm tumor extending to margins, and involvement of dermis with 10/10 LN positive, followed by re-excision showing residual tumor, with final margins negative    He declined adjuvant chemotherapy and declined repeat imaging with Dr Isela Khoury  Plan at time of consult was radiation to left chestwall and regional lymph nodes  1/27/20 CT simulation for radiation  Dr Vanessa Pimentel discussed with patient findings of CT sim which show metastatic disease  She also discussed with Dr Isela Khoury and radiology to confirm  Plan to order diagnostic CT c/A/P with contrast, and MRI brain  forego PMRT    1/31/20 Med Onc follow-up with Dr Isela Khoury  Pt is scheduled for MRI brain and CT 2/8/20, in view of azotemia,  recommend imaging without IV contrast to reduce risk of kidney dysfunction  2/8/20 CT chest abd pelvis  IMPRESSION:   1   Progressively enlarging 9 mm right middle lobe nodule with Satellite nodules and few additional new pulmonary nodules, suspicious for progressive metastatic disease      2   Multiple hypoattenuating liver lesions, new from December 2018, and progressed from January 2020, highly suspicious for metastasis      3   Osseous metastatic disease as described, new from December 2018, and slightly progressed from January 2020  Right acetabular lytic lesion was not previously imaged      4  Indeterminate 3 2 cm nodular lesion in the left upper quadrant of the abdomen, which was only partially imaged on prior studies and was not adequately evaluated  This may represent a splenule, tumor implant or metastatic lymph node  Further   characterization with PET CT can be performed if clinically warranted      2/8/20 MRI brain - pending      2/14/20 Med Onc f/u with Dr Isela Khoury          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, estrogen receptor positive ([address] )    2018 Initial Diagnosis     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, estrogen receptor positive ([address] )      11/14/2018 Biopsy     A  Breast, left subareolar mass, core biopsy (11 slides, 40 Nicky Amin, collected on 11/14/2018):   - Invasive mammary carcinoma with mucinous features (see note)  -- Jim grade 1 of 3 (total score: 5 of 9)        * Tubule formation < 10%, score 3        * Nuclear grade 1 of 3, score 1        * Mitoses < 3/mm2, (</= 7 mitoses/10HPF), score 1     -- Invasive carcinoma involves 4 of 4 submitted core biopsy fragments, max  Dimension= 22 mm     -- Estrogen, Progesterone & HER2 receptor studies performed at the referring institution show the tumor to be positive for ER (>95% strong), positive for MT (75% moderate), and negative for HER2 IHC (score 0)  - Ductal carcinoma in-situ (DCIS): Not identified  - Lymph-vascular invasion: Not identified  - Microcalcifications: Absent  - Best representative tumor block:  A1    -- Sufficient tumor present for        Agendia Mammaprint/Blueprint (1 cm2 of invasive tumor in aggregate): No         MI Profile/Foundation One (at least 5 x 5 mm of tumor): Yes  Note:  The tumor shows prominent mucinous differentiation, raising the strong possibility of invasive mucinous carcinoma, though this designation is possible only complete excision with evaluation of the entirety of the tumor mass  12/27/2018 -  Hormone Therapy     Tamoxifen 20 mg daily (Dr Marlena Purvis)      2018 Genetic Testing     BRCA negative       5/2/2019 Surgery     A  Breast, Left, Mastectomy and Axillary Contents:  - 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), Jim Grade II (3 + 2 + 1 = 6), 51 mm in greatest dimension, involving dermis  See Tumor Synoptic Report (below) and Note  - Margins negative for carcinoma in this specimen (see separately submitted additional margins, Parts C and D)  - Intradermal nevi      B  Lymph Node, Left Periaxillary Vein, Lymphadenectomy:  - Metastatic carcinoma present in one lymph node (1/1), with extranodal extension   - Metastatic focus measures at least 1 5 cm in greatest dimension      C   Breast, Left Inferior Medial Margin, Excision:  - Benign fibroadipose tissue and skeletal muscle      D  Breast, Left Inferior Medial Margin Skin, Excision:  - Invasive mammary carcinoma, underlying benign skin  - Anterior margin is multifocally positive for carcinoma     (Dr Emely Hermosillo)      10/9/2019 Surgery     Left breast, re-excision scar mastectomy site medial and inferior margins:     - Residual invasive breast carcinoma of no special type (ductal NST/invasive ductal carcinoma), multifocal        -- Four foci identified ranging in size from 1 5 to approximately 12 mm        -- Tumor foci are present in dermis and adjacent subcutaneous tissues  - All margins are negative for tumor with closest margin as follows:       -- One focus of tumor comes to within 0 7 mm of the superior margin (A16)  -- Largest focus comes to within 1 5 mm of the superior margin (A37)  - Cicatricial fibrosis of skin and subcutaneous tissues      (Dr Emely Hermosillo)       Patient Active Problem List   Diagnosis    Mass of breast, left    Hypertension    Leukocytosis    PAC (premature atrial contraction)    Pancreatic mass    Pancreatitis    RBBB (right bundle branch block with left anterior fascicular block)    Transaminitis    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, estrogen receptor positive ([address] )    Anemia, unspecified    Prerenal azotemia    Liver metastasis ([address] )    Lung metastasis ([address] )    Osseous metastasis ([address] )    ARF (acute renal failure) ([address] )     Past Medical History:   Diagnosis Date    Acute gallstone pancreatitis     Arthritis     Atrial fibrillation ([address] )     Breast cancer ([address] )     Cancer (HCC)     breast - left    Chronic pain disorder     Extremity pain     Hypertension     Irregular heartbeat     PAC (premature atrial contraction)     PVC (premature ventricular contraction)     RBBB     Wears glasses      Past Surgical History:   Procedure Laterality Date    APPENDECTOMY      CHOLECYSTECTOMY      FRACTURE SURGERY Left     arm    NE EXC SKIN BENIG 3 1-4 CM TRUNK,ARM,LEG Left 10/9/2019    Procedure: BREAST RE-EXCISION OF SKIN MARGINS;  Surgeon: Oscar Cardenas MD;  Location: [facility];  Service: General    NE LAP,CHOLECYSTECTOMY N/A 12/18/2018    Procedure: LAP CHOLECYSTECTOMY; LYSIS OF ADHESIONS; ATTEMPTED CHOLANGIOGRAM;  Surgeon: Oscar Cardenas MD;  Location[facility];  Service: General    NE MASTECTOMY, MODIFIED RADICAL Left 5/2/2019    Procedure: MASTECTOMY MODIFIED RADICAL; AXILLARY DISSECTION;  Surgeon: Oscar Cardenas MD;  Location: [facility];  Service: General    TONSILLECTOMY         Review of Medications:     Current Outpatient Medications:     acetaminophen (TYLENOL) 500 mg tablet, Take 500 mg by mouth every 6 (six) hours as needed, Disp: , Rfl:     capsaicin (ZOSTRIX) 0 025 % cream, Apply 1 application topically 2 (two) times a day, Disp: 60 g, Rfl: 0    furosemide (LASIX) 40 mg tablet, Take 40 mg by mouth daily, Disp: , Rfl: 0    letrozole (FEMARA) 2 5 mg tablet, Take 1 tablet (2 5 mg total) by mouth daily, Disp: 90 tablet, Rfl: 1    losartan (COZAAR) 50 mg tablet, Take 50 mg by mouth daily , Disp: , Rfl: 0    Multiple Vitamins-Minerals (CENTRUM SILVER 50+MEN PO), Take by mouth, Disp: , Rfl:     Palbociclib (Ibrance) 75 MG capsule, Take 1 capsule (75 mg total) by mouth daily with breakfast Take with food  , Disp: 21 capsule, Rfl: 0    polyethylene glycol (MIRALAX) 17 g packet, Take 17 g by mouth daily as needed , Disp: , Rfl:     Most Recent Lab Results:   Lab Results   Component Value Date    WBC 6 19 06/08/2020    IRON 71 06/26/2019    TIBC 311 06/26/2019    FERRITIN 89 06/26/2019    ALT 52 06/08/2020    AST 81 (H) 06/08/2020    ALB 3 3 (L) 06/08/2020    SODIUM 139 06/08/2020    SODIUM 137 05/19/2020    K 4 7 06/08/2020    K 4 6 05/19/2020     06/08/2020    BUN 48 (H) 06/08/2020    BUN 37 (H) 05/19/2020    CREATININE 2 16 (H) 06/08/2020    CREATININE 2 23 (H) 05/19/2020    EGFR 28 06/08/2020    GLUF 92 06/08/2020    GLUF 95 05/19/2020    GLUC 105 05/03/2019    CALCIUM 9 4 06/08/2020       Anthropometric Measurements:   Ht Readings from Last 1 Encounters:   06/15/20 5' 5" (1 651 m)     -Weight History: Wt Readings from Last 15 Encounters:   06/15/20 83 5 kg (184 lb)   06/08/20 79 7 kg (175 lb 9 6 oz)   05/14/20 83 5 kg (184 lb)   03/27/20 83 5 kg (184 lb)   02/14/20 86 2 kg (190 lb)   01/31/20 89 8 kg (198 lb)   01/20/20 87 3 kg (192 lb 7 4 oz)   12/17/19 89 4 kg (197 lb)   10/21/19 89 8 kg (198 lb)   10/14/19 89 2 kg (196 lb 9 6 oz)   10/09/19 90 7 kg (200 lb)   09/17/19 90 8 kg (200 lb 3 2 oz)   09/12/19 90 1 kg (198 lb 9 6 oz)   07/29/19 87 1 kg (192 lb)   06/25/19 88 1 kg (194 lb 3 2 oz)     Estimated body mass index is 30 62 kg/m² as calculated from the following:    Height as of 6/15/20: 5' 5" (1 651 m)  Weight as of 6/15/20: 83 5 kg (184 lb)

## 2020-07-15 NOTE — PLAN OF CARE
Problem: PAIN - ADULT  Goal: Verbalizes/displays adequate comfort level or baseline comfort level  Description  Interventions:  - Encourage patient to monitor pain and request assistance  - Assess pain using appropriate pain scale  - Administer analgesics based on type and severity of pain and evaluate response  - Implement non-pharmacological measures as appropriate and evaluate response  - Consider cultural and social influences on pain and pain management  - Notify physician/advanced practitioner if interventions unsuccessful or patient reports new pain  Outcome: Progressing     Problem: SAFETY ADULT  Goal: Patient will remain free of falls  Description  INTERVENTIONS:  - Assess patient frequently for physical needs  -  Identify cognitive and physical deficits and behaviors that affect risk of falls    -  [place] fall precautions as indicated by assessment   - Educate patient/family on patient safety including physical limitations  - Instruct patient to call for assistance with activity based on assessment  - Modify environment to reduce risk of injury  - Consider OT/PT consult to assist with strengthening/mobility  Outcome: Progressing     Problem: DISCHARGE PLANNING  Goal: Discharge to home or other facility with appropriate resources  Description  INTERVENTIONS:  - Identify barriers to discharge w/patient and caregiver  - Arrange for needed discharge resources and transportation as appropriate  - Identify discharge learning needs (meds, wound care, etc )  - Arrange for interpretive services to assist at discharge as needed  - Refer to Case Management Department for coordinating discharge planning if the patient needs post-hospital services based on physician/advanced practitioner order or complex needs related to functional status, cognitive ability, or social support system  Outcome: Progressing     Problem: Knowledge Deficit  Goal: Patient/family/caregiver demonstrates understanding of disease process, treatment plan, medications, and discharge instructions  Description  Complete learning assessment and assess knowledge base    Interventions:  - Provide teaching at level of understanding  - Provide teaching via preferred learning methods  Outcome: Progressing

## 2020-07-16 ENCOUNTER — TELEPHONE (OUTPATIENT)
Dept: HEMATOLOGY ONCOLOGY | Facility: CLINIC | Age: 75
End: 2020-07-16

## 2020-07-16 NOTE — TELEPHONE ENCOUNTER
Patient called, [facility]  Advised no record in the chart  Patient voiced understanding

## 2020-07-20 DIAGNOSIS — Z17.0: Primary | ICD-10-CM

## 2020-07-20 DIAGNOSIS — R53.1 LACK OF STRENGTH: Primary | ICD-10-CM

## 2020-07-20 DIAGNOSIS — C50.922: Primary | ICD-10-CM

## 2020-07-20 DIAGNOSIS — R53.1 STRENGTH LOSS OF: ICD-10-CM

## 2020-07-21 ENCOUNTER — TRANSCRIBE ORDERS (OUTPATIENT)
Dept: ADMINISTRATIVE | Facility: HOSPITAL | Age: 75
End: 2020-07-21

## 2020-07-21 ENCOUNTER — TELEPHONE (OUTPATIENT)
Dept: NUTRITION | Facility: CLINIC | Age: 75
End: 2020-07-21

## 2020-07-21 ENCOUNTER — APPOINTMENT (OUTPATIENT)
Dept: LAB | Facility: MEDICAL CENTER | Age: 75
End: 2020-07-21
Payer: MEDICARE

## 2020-07-21 DIAGNOSIS — N18.9 CHRONIC KIDNEY DISEASE, UNSPECIFIED CKD STAGE: ICD-10-CM

## 2020-07-21 DIAGNOSIS — C79.51 OSSEOUS METASTASIS (HCC): ICD-10-CM

## 2020-07-21 DIAGNOSIS — C78.7 LIVER METASTASIS (HCC): ICD-10-CM

## 2020-07-21 DIAGNOSIS — C50.022 MALIGNANT NEOPLASM INVOLVING BOTH NIPPLE AND AREOLA OF LEFT BREAST IN MALE, ESTROGEN RECEPTOR POSITIVE (HCC): ICD-10-CM

## 2020-07-21 DIAGNOSIS — N18.9 CHRONIC KIDNEY DISEASE, UNSPECIFIED CKD STAGE: Primary | ICD-10-CM

## 2020-07-21 DIAGNOSIS — Z17.0 MALIGNANT NEOPLASM INVOLVING BOTH NIPPLE AND AREOLA OF LEFT BREAST IN MALE, ESTROGEN RECEPTOR POSITIVE (HCC): ICD-10-CM

## 2020-07-21 LAB
ALBUMIN SERPL BCP-MCNC: 3.4 G/DL (ref 3.5–5)
ALP SERPL-CCNC: 110 U/L (ref 46–116)
ALT SERPL W P-5'-P-CCNC: 36 U/L (ref 12–78)
ANION GAP SERPL CALCULATED.3IONS-SCNC: 5 MMOL/L (ref 4–13)
AST SERPL W P-5'-P-CCNC: 56 U/L (ref 5–45)
BASOPHILS # BLD AUTO: 0.07 THOUSANDS/ΜL (ref 0–0.1)
BASOPHILS NFR BLD AUTO: 2 % (ref 0–1)
BILIRUB SERPL-MCNC: 0.57 MG/DL (ref 0.2–1)
BUN SERPL-MCNC: 45 MG/DL (ref 5–25)
CALCIUM SERPL-MCNC: 9.1 MG/DL (ref 8.3–10.1)
CANCER AG27-29 SERPL-ACNC: 335.2 U/ML (ref 0–42.3)
CHLORIDE SERPL-SCNC: 106 MMOL/L (ref 100–108)
CO2 SERPL-SCNC: 28 MMOL/L (ref 21–32)
CREAT SERPL-MCNC: 1.93 MG/DL (ref 0.6–1.3)
EOSINOPHIL # BLD AUTO: 0.09 THOUSAND/ΜL (ref 0–0.61)
EOSINOPHIL NFR BLD AUTO: 2 % (ref 0–6)
ERYTHROCYTE [DISTWIDTH] IN BLOOD BY AUTOMATED COUNT: 14.3 % (ref 11.6–15.1)
GFR SERPL CREATININE-BSD FRML MDRD: 33 ML/MIN/1.73SQ M
GLUCOSE P FAST SERPL-MCNC: 95 MG/DL (ref 65–99)
HCT VFR BLD AUTO: 34 % (ref 36.5–49.3)
HGB BLD-MCNC: 11.4 G/DL (ref 12–17)
IMM GRANULOCYTES # BLD AUTO: 0.02 THOUSAND/UL (ref 0–0.2)
IMM GRANULOCYTES NFR BLD AUTO: 1 % (ref 0–2)
LYMPHOCYTES # BLD AUTO: 1.6 THOUSANDS/ΜL (ref 0.6–4.47)
LYMPHOCYTES NFR BLD AUTO: 36 % (ref 14–44)
MCH RBC QN AUTO: 36.9 PG (ref 26.8–34.3)
MCHC RBC AUTO-ENTMCNC: 33.5 G/DL (ref 31.4–37.4)
MCV RBC AUTO: 110 FL (ref 82–98)
MONOCYTES # BLD AUTO: 0.28 THOUSAND/ΜL (ref 0.17–1.22)
MONOCYTES NFR BLD AUTO: 6 % (ref 4–12)
NEUTROPHILS # BLD AUTO: 2.34 THOUSANDS/ΜL (ref 1.85–7.62)
NEUTS SEG NFR BLD AUTO: 53 % (ref 43–75)
NRBC BLD AUTO-RTO: 0 /100 WBCS
PLATELET # BLD AUTO: 224 THOUSANDS/UL (ref 149–390)
PMV BLD AUTO: 10.3 FL (ref 8.9–12.7)
POTASSIUM SERPL-SCNC: 4.3 MMOL/L (ref 3.5–5.3)
PROT SERPL-MCNC: 7.5 G/DL (ref 6.4–8.2)
RBC # BLD AUTO: 3.09 MILLION/UL (ref 3.88–5.62)
SODIUM SERPL-SCNC: 139 MMOL/L (ref 136–145)
WBC # BLD AUTO: 4.4 THOUSAND/UL (ref 4.31–10.16)

## 2020-07-21 PROCEDURE — 86300 IMMUNOASSAY TUMOR CA 15-3: CPT

## 2020-07-21 PROCEDURE — 80053 COMPREHEN METABOLIC PANEL: CPT

## 2020-07-21 PROCEDURE — 85025 COMPLETE CBC W/AUTO DIFF WBC: CPT

## 2020-07-21 PROCEDURE — 36415 COLL VENOUS BLD VENIPUNCTURE: CPT

## 2020-07-21 NOTE — TELEPHONE ENCOUNTER
Received call from Saad ware asking if Narcisa  the foods that Narcisa  consumes are adequate iron sources  Reviewed iron containing foods including: chicken, turkey, beans/lentils, potatoes, dark leafy greens, whole grains, cream of wheat, bran  No other questions/concerns at this time   Follow up nutrition phone visit scheduled for 7/28/20 10am

## 2020-07-22 ENCOUNTER — OFFICE VISIT (OUTPATIENT)
Dept: PHYSICAL THERAPY | Facility: REHABILITATION | Age: 75
End: 2020-07-22
Payer: MEDICARE

## 2020-07-22 DIAGNOSIS — I89.0 LYMPHEDEMA OF ARM: Primary | ICD-10-CM

## 2020-07-22 LAB — CANCER AG15-3 SERPL-ACNC: 259 U/ML (ref 0–25)

## 2020-07-22 PROCEDURE — 97140 MANUAL THERAPY 1/> REGIONS: CPT | Performed by: PHYSICAL THERAPIST

## 2020-07-22 PROCEDURE — 97110 THERAPEUTIC EXERCISES: CPT | Performed by: PHYSICAL THERAPIST

## 2020-07-22 NOTE — PROGRESS NOTES
Daily Note     Today's date: 2020  Patient name: Kim Mejia  : 1945  MRN: [id number]  Referring provider: Kit Koch MD  Dx:   Encounter Diagnosis     ICD-10-CM    1  Lymphedema of arm I89 0                   Subjective: No new complaints      Objective: See treatment diary below  Swelling   Left shoulder swelling details:   14 cm above olecranon 33 cm  7 cm above 30 5 cm  Olecranon 27 2 cm  14 cm above ulnar styloid 26 3 cm  7 cm above 22 1 cm  Ulnar styloid 17 1  mcp 20 8 cm     Right shoulder swelling details:   14 cm above olecranon 32  7 cm above 29 cm  Olecranon 27 2 cm  14 cm above ulnar styloid 25 5 cm  7 cm above 21 7 cm  Ulnar styloid 17 5 cm  mcp 21 cm    Assessment: Tolerated treatment fair  Patient demonstrated fatigue post treatment  Further reduction in forearm edema      Plan:  At end of treatment while waiting for his wife he decided he was done with therapy and has met all his goals other then improving his breathing which was not on  original plan of care     Precautions: back pain, CA, HBP, kidney          Manuals 7/8 7/13 7/22 6/15 6/17 6/22 6/24 6/29 7/1 7/6   Manual drainage  15 min 14 min 16 min 15 min 17 min 15 min 17 min 14 min 16 min 15 min                                                                           Neuro Re-Ed                                                                                                                                                                                               Ther Ex                       Shoulder flexion 3# x 15 3# x 15 3# x 15 2# x 18 2# x 20 3# x 15 3# x 15 3# x 15 3# x 15 3# x 15   Shoulder abd 3# x 15 3# x 15 3# x 15 2# x 14 2# x 20 3# x 15 3# x 15 3#x 15 3# x 15 3# x 15   Biceps curls 6# x 16 6# x 20 6# x 20 5# x 15 5# x 20 6# x 10 6#x 15 6# x 15 6# x 15 6# x 15   UBE standing  3 min ea 3 min ea 3 min ea 2 min ea 2 5 min ea 2 5 min ea 2 5 min ea 3 min ea 3 min ea 3 min ea    wrist flexion    1# x 20  2# x 20                  wrist extension    1# x 20  2# x 20                                                                 Ther Activity                                                                       Gait Training                                                                       Modalities

## 2020-07-28 ENCOUNTER — NUTRITION (OUTPATIENT)
Dept: NUTRITION | Facility: CLINIC | Age: 75
End: 2020-07-28

## 2020-07-28 DIAGNOSIS — Z71.3 NUTRITIONAL COUNSELING: Primary | ICD-10-CM

## 2020-07-28 NOTE — PROGRESS NOTES
Outpatient Oncology Nutrition Consult   Type of Consult: Follow Up  Care Location: Telephone Call-pt and wife Prabha Mckinney    Reason for referral: RN (Tony Mcdaniel) request due to pt has nutrition questions (Date of referral: 2/19/20)    Nutrition Assessment:   Oncology Diagnosis & Treatments: Left breast carcinoma diagnosed 11/2018, BRCA negative  Neoadjuvant tamoxifen 20 mg daily from December 27, 2018 until May 2, 2019  Left modified radical mastectomy May 2, 2019  Tamoxifen was resumed postoperatively  Widespread metastatic disease including multiple lung nodules, liver metastasis, and osseous metastasis found in Feb 2020  Began Lupron in Feb 2020, and Letrozole and Lenette Mathis March 2020  Oncology History    Patient returns today for discuss results of CT and MRI  Initial consult with Dr Ogden Later on 320/20   66year old male with Lauro Henry 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 status post neoadjuvant tamoxifen followed by left mastectomy +ALND, with pathology showing 5 cm tumor extending to margins, and involvement of dermis with 10/10 LN positive, followed by re-excision showing residual tumor, with final margins negative  He declined adjuvant chemotherapy and declined repeat imaging with Dr Rosmery Obrien  Plan at time of consult was radiation to left chestwall and regional lymph nodes  1/27/20 CT simulation for radiation  Dr Ogden Later discussed with patient findings of CT sim which show metastatic disease  She also discussed with Dr Rosmery Obrien and radiology to confirm  Plan to order diagnostic CT c/A/P with contrast, and MRI brain  forego PMRT    1/31/20 Med Onc follow-up with Dr Rosmery Obrien  Pt is scheduled for MRI brain and CT 2/8/20, in view of azotemia,  recommend imaging without IV contrast to reduce risk of kidney dysfunction       2/8/20 CT chest abd pelvis  IMPRESSION:   1   Progressively enlarging 9 mm right middle lobe nodule with Satellite nodules and few additional new pulmonary nodules, suspicious for progressive metastatic disease      2   Multiple hypoattenuating liver lesions, new from December 2018, and progressed from January 2020, highly suspicious for metastasis      3   Osseous metastatic disease as described, new from December 2018, and slightly progressed from January 2020  Right acetabular lytic lesion was not previously imaged      4  Indeterminate 3 2 cm nodular lesion in the left upper quadrant of the abdomen, which was only partially imaged on prior studies and was not adequately evaluated  This may represent a splenule, tumor implant or metastatic lymph node  Further   characterization with PET CT can be performed if clinically warranted      2/8/20 MRI brain - pending      2/14/20 Med Onc f/u with Dr Nikkie Troy          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, estrogen receptor positive ([facility] [place] [address] )    2018 Initial Diagnosis     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, estrogen receptor positive ([facility] [place] [address] )      11/14/2018 Biopsy     A  Breast, left subareolar mass, core biopsy (11 slides, [address], collected on 11/14/2018): - Invasive mammary carcinoma with mucinous features (see note)  -- Jim grade 1 of 3 (total score: 5 of 9)        * Tubule formation < 10%, score 3        * Nuclear grade 1 of 3, score 1        * Mitoses < 3/mm2, (</= 7 mitoses/10HPF), score 1     -- Invasive carcinoma involves 4 of 4 submitted core biopsy fragments, max  Dimension= 22 mm     -- Estrogen, Progesterone & HER2 receptor studies performed at the referring institution show the tumor to be positive for ER (>95% strong), positive for NV (75% moderate), and negative for HER2 IHC (score 0)  - Ductal carcinoma in-situ (DCIS): Not identified  - Lymph-vascular invasion: Not identified  - Microcalcifications: Absent    - Best representative tumor block:  A1    -- Sufficient tumor present for        Agendia Mammaprint/Blueprint (1 cm2 of invasive tumor in aggregate): No         MI Profile/Foundation One (at least 5 x 5 mm of tumor): Yes  Note:  The tumor shows prominent mucinous differentiation, raising the strong possibility of invasive mucinous carcinoma, though this designation is possible only complete excision with evaluation of the entirety of the tumor mass  12/27/2018 -  Hormone Therapy     Tamoxifen 20 mg daily (Dr Ama Almaraz)      2018 Genetic Testing     BRCA negative       5/2/2019 Surgery     A  Breast, Left, Mastectomy and Axillary Contents:  - 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), Koppel Grade II (3 + 2 + 1 = 6), 51 mm in greatest dimension, involving dermis  See Tumor Synoptic Report (below) and Note  - Margins negative for carcinoma in this specimen (see separately submitted additional margins, Parts C and D)  - Intradermal nevi      B  Lymph Node, Left Periaxillary Vein, Lymphadenectomy:  - Metastatic carcinoma present in one lymph node (1/1), with extranodal extension   - Metastatic focus measures at least 1 5 cm in greatest dimension      C  Breast, Left Inferior Medial Margin, Excision:  - Benign fibroadipose tissue and skeletal muscle      D  Breast, Left Inferior Medial Margin Skin, Excision:  - Invasive mammary carcinoma, underlying benign skin  - Anterior margin is multifocally positive for carcinoma     (Dr Marilou Coffey)      10/9/2019 Surgery     Left breast, re-excision scar mastectomy site medial and inferior margins:     - Residual invasive breast carcinoma of no special type (ductal NST/invasive ductal carcinoma), multifocal        -- Four foci identified ranging in size from 1 5 to approximately 12 mm        -- Tumor foci are present in dermis and adjacent subcutaneous tissues       - All margins are negative for tumor with closest margin as follows:       -- One focus of tumor comes to within 0 7 mm of the superior margin (A16)  -- Largest focus comes to within 1 5 mm of the superior margin (A37)  - Cicatricial fibrosis of skin and subcutaneous tissues      (Dr Yohana Coates)       Past Medical & Surgical Hx: HTN, pancreatitis, anemia, afib  Patient Active Problem List   Diagnosis    Mass of breast, left    Hypertension    Leukocytosis    PAC (premature atrial contraction)    Pancreatic mass    Pancreatitis    RBBB (right bundle branch block with left anterior fascicular block)    Transaminitis    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, estrogen receptor positive ([address] )    Anemia, unspecified    Prerenal azotemia    Liver metastasis ([facility] [address]    Lung metastasis ([facility] [address] )    Osseous metastasis ([facility] [address] )    ARF (acute renal failure) ([facility] [address] )     Past Medical History:   Diagnosis Date    Acute gallstone pancreatitis     Arthritis     Atrial fibrillation ([address] )     Breast cancer ([facility] [address] )     Cancer (HCC)     breast - left    Chronic pain disorder     Extremity pain     Hypertension     Irregular heartbeat     PAC (premature atrial contraction)     PVC (premature ventricular contraction)     RBBB     Wears glasses      Past Surgical History:   Procedure Laterality Date    APPENDECTOMY      CHOLECYSTECTOMY      FRACTURE SURGERY Left     arm    NC EXC SKIN BENIG 3 1-4 CM TRUNK,ARM,LEG Left 10/9/2019    Procedure: BREAST RE-EXCISION OF SKIN MARGINS;  Surgeon: Yohana Coates MD;  [facility]: [facility];  Service: General    NC LAP,CHOLECYSTECTOMY N/A 12/18/2018    Procedure: LAP CHOLECYSTECTOMY; LYSIS OF ADHESIONS; ATTEMPTED CHOLANGIOGRAM;  Surgeon: Yohana Coates MD;  [facility];  Service: General    NC MASTECTOMY, MODIFIED RADICAL Left 5/2/2019    Procedure: MASTECTOMY MODIFIED RADICAL; AXILLARY DISSECTION;  Surgeon: Yohana Coates MD;  Location: [facility];  Service: General    TONSILLECTOMY         Review of Medications: Vitamins, Supplements and Herbals: no     Current Outpatient Medications:     acetaminophen (TYLENOL) 500 mg tablet, Take 500 mg by mouth every 6 (six) hours as needed, Disp: , Rfl:     capsaicin (ZOSTRIX) 0 025 % cream, Apply 1 application topically 2 (two) times a day, Disp: 60 g, Rfl: 0    furosemide (LASIX) 40 mg tablet, Take 40 mg by mouth daily, Disp: , Rfl: 0    letrozole (FEMARA) 2 5 mg tablet, Take 1 tablet (2 5 mg total) by mouth daily, Disp: 90 tablet, Rfl: 1    losartan (COZAAR) 50 mg tablet, Take 50 mg by mouth daily , Disp: , Rfl: 0    Multiple Vitamins-Minerals (CENTRUM SILVER 50+MEN PO), Take by mouth, Disp: , Rfl:     Palbociclib (Ibrance) 75 MG capsule, Take 1 capsule (75 mg total) by mouth daily with breakfast Take with food  , Disp: 21 capsule, Rfl: 0    polyethylene glycol (MIRALAX) 17 g packet, Take 17 g by mouth daily as needed , Disp: , Rfl:     Most Recent Lab Results:   Lab Results   Component Value Date    WBC 4 40 07/21/2020    IRON 71 06/26/2019    TIBC 311 06/26/2019    FERRITIN 89 06/26/2019    ALT 36 07/21/2020    AST 56 (H) 07/21/2020    ALB 3 4 (L) 07/21/2020    SODIUM 139 07/21/2020    SODIUM 139 06/08/2020    K 4 3 07/21/2020    K 4 7 06/08/2020     07/21/2020    BUN 45 (H) 07/21/2020    BUN 48 (H) 06/08/2020    CREATININE 1 93 (H) 07/21/2020    CREATININE 2 16 (H) 06/08/2020    EGFR 33 07/21/2020    GLUF 95 07/21/2020    GLUF 92 06/08/2020    GLUC 105 05/03/2019    CALCIUM 9 1 07/21/2020       Anthropometric Measurements:   Height: 65"  Ht Readings from Last 1 Encounters:   07/15/20 5' 5" (1 651 m)     -Weight History:   Usual Weight: 198#  Ideal Body Weight: 136#  Wt Readings from Last 20 Encounters:   07/15/20 81 6 kg (179 lb 14 3 oz)   06/15/20 83 5 kg (184 lb)   06/08/20 79 7 kg (175 lb 9 6 oz)   05/14/20 83 5 kg (184 lb)   03/27/20 83 5 kg (184 lb)   02/14/20 86 2 kg (190 lb)   01/31/20 89 8 kg (198 lb)   01/20/20 87 3 kg (192 lb 7 4 oz)   12/17/19 89 4 kg (197 lb)   10/21/19 89 8 kg (198 lb)   10/14/19 89 2 kg (196 lb 9 6 oz)   10/09/19 90 7 kg (200 lb)   09/17/19 90 8 kg (200 lb 3 2 oz)   09/12/19 90 1 kg (198 lb 9 6 oz)   07/29/19 87 1 kg (192 lb)   06/25/19 88 1 kg (194 lb 3 2 oz)   05/30/19 87 1 kg (192 lb)   05/13/19 86 2 kg (190 lb)   05/06/19 88 kg (194 lb)   05/02/19 85 7 kg (189 lb)     -Varian:n/a  -Home weights: (6/15/20)177#, (7/6/20) 178 2#, (7/27/20) 180#    Weight Changes: loss of 3 5# (1 9%) in 1 month (not significant) and loss of 11 9# (6 2%) in 6 months (not significant)      Estimated body mass index is 29 94 kg/m² as calculated from the following:    Height as of 7/15/20: 5' 5" (1 651 m)  Weight as of 7/15/20: 81 6 kg (179 lb 14 3 oz)  Nutrition-Focused Physical Findings: n/a due to telephone call    Food/Nutrition-Related History & Client/Social History:    Current Nutrition Impact Symptoms:  [x] Nausea -when he has chocolate [] Reduced Appetite  [] Acid Reflux    [] Vomiting  [x] Unintended Wt Loss [] Malabsorption    [] Diarrhea  [] Unintended Wt Gain  [] Dumping Syndrome    [x] Constipation -"sometimes" takes Miralax [] Thick Mucous/Secretions  [] Abdominal Pain    [] Dysgeusia (Altered Taste)  [x] Xerostomia (Dry Mouth) -in the morning  [] Gas    [] Dysosmia (Altered Smell)  [] Thrush  [] Difficulty Chewing    [] Oral Mucositis (Sore Mouth)  [] Fatigue   [] Other:    [] Odynophagia   [] Esophagitis  [] Other:    [] Dysphagia  [] Early Satiety  [] No Problems Eating      Food Allergies: no  Food Intolerances: no    Current Diet: Regular Diet, No Restrictions  Current Nutrition Intake: More than usual  Appetite: Good, Fair ; improving   Nutrition Route: PO  Meal planning/preparation mainly done by: Self  Oral Care: brushes BID  Activity level: Goes to PT 2x/week and gaining strength  Lifts 2#wts overhead and curls 3# wts at PT  Easily tires, out of breath  Likes to [employer]    Social Hx: Lives with wife Ashley Gray who cooks and helps take care of him  24 Hr Diet Recall: average:  2084kcal, 106g pro, 222CHO   Breakfast: 2 eggs, ham, cheese, 1 slice toast   Lunch: [place] and cheese sandwich   Dinner: hamburger OR chicken with baked potato/mashed potato  Snack: [employer] bar with peanuts, applesauce         Beverages: water (8oz x2-3), Ocean Spray CranRaspberry or CranMango (Mikayla Sorrel x1-2), tea (12oz x1), milk (8oz x1)   Supplements:    [employer] (6tcj=528 kcal, 5 g pro) mixed with mixed in pudding; 1x daily       Estimated Nutrition Needs: (based on 79 8kg/ 175 6# actual wt)  Energy Needs: 3667-2850 kcal/day (30-35 kcal/kg)  Protein Needs:  grams/day (1 2-1 5 g/kg)- monitor kidney function, BUN/creat elevated at this time   Fluid Needs: 6413-7165 mL/day (1 mL/kcal) 80-93oz     Discussion/Summary: Rogerio Yamileth and wife Sarah Gomez were contacted today for an f/u RD consultation  Deannaoris Yamileth states that he is doing well  His appetite is improving, he reports eating larger portions at dinnertime  Sarah Gomez reports that his average intakes are: 2084 kcal, 106 g pro, 222 g CHO  Deannaoris Yamileth also reports that his home weight has increased from 178# to 180#  Sarah Gomez reports that she is concerned about Anthony's iron and B12 intake  She requested foods higher in iron and B12 so that she can incorporate more of them into Anthony's diet  Reviewed foods and will mail lists to Sarah Gomez and Rogerio Carson  Discussed/Reviewed:   · the importance of weight maintenance during CA tx  · high protein foods to include at all meals & snacks  · ways to increase overall calorie intake  · encouraged eating every 2-3 hours (5-6 small meals/day)  · adequate hydation & tips to increase overall fluid intake  · MNT for: weight management   · recipe suggestions/resources  · individualized calorie, protein and fluid daily goals  Individualized nutrition recommendations and interventions listed below  All questions and concerns addressed during todays visit    Rogerio Carson has RD contact information  Nutrition Diagnosis:    Increased Nutrient Needs (kcal & pro) related to increased demand for nutrients and disease state as evidenced by cancer dx and pt undergoing tx for cancer  Intervention & Recommendations:   Topics addressed: Nutrition education, Balance/Variety, Meals & Snacks, Meal planning, Choosing high protein meals/snacks, Meal pattern: eating small/frequent meals (every 2-3 hours), Nutrition Symptom Management, Adequate Hydration, Nutrition-Related Medication Management and Weight Management    Barriers: None  Readiness to change: action  Comprehension: verbalizes understanding  Expected Compliance: good    Materials Provided: Iron Foods List, Vitamin B12 Foods List-mailed to pts home  Monitoring & Evaluation:   Dietitian to Monitor: Food and Nutrition Intake, Nutrtion Impact Symptoms, Body Weight and Biochemical Data     Goals:  · weight maintenance/stabilization  · pt to meet >/=75% estimated nutrition needs daily  · Meet hydration goal: 80-93oz daily     · Progress Towards Goals: Progressing    Nutrition Rx & Recommendations:  · Diet: High Calorie, High Protein (for high calorie foods see pages 52-53, and for high protein foods see pages 49-51 in your Eating Hints book)  · Keep a daily food journal (pen/paper, martha such as [employer])  · Small, frequent meals/snacks may be easier to tolerate than 3 large daily meals  Aim for 5-6 small meals per day (every 2-3 hours)  · Include protein at all meals/snacks  · Include a variety of foods (as tolerated/allowed by diet)  · Incorporate physical activity as able/allowed  · Stay hydrated by sipping fluids of choice/tolerance throughout the day  · Alter food choices and eating patterns to accommodate changing needs  · Refer to Eating Hints book for other meal/snack ideas and symptom management    · For constipation: drink plenty of fluids (>64 oz/day); drink hot liquids; eat high-fiber foods as tolerated (whole grains, beans, peas, nuts, seeds, fruits, vegetables, etc); increase physical activity as tolerated  Avoid increasing fiber intake too quickly, add fiber into your diet slowly; keep a record of your bowel movements (see pages 13-14 in you Eating Hints book)  · Weigh yourself regularly  If you notice weight loss, make an effort to increase your daily food/calorie intake  If you continue to notice loss after these efforts, reach out to your dietitian to establish a plan to stabilize weight  · [place] Instant Breakfast shake 1-2x daily  · Try increasing calories and protein by: harsh sauce, making mashed potatoes with butter and whole milk, 2 tbsp peanut butter as a snack, adding beans to salad at lunch  · Try to increase fluid intake, goal: 80-93 oz daily       Follow Up Plan: 8/11/20 phone follow up 9:30am  Recommend Referral to Other Providers: none at this time

## 2020-07-28 NOTE — PATIENT INSTRUCTIONS
Nutrition Rx & Recommendations:  · Diet: High Calorie, High Protein (for high calorie foods see pages 52-53, and for high protein foods see pages 49-51 in your Eating Hints book)  · Keep a daily food journal (pen/paper, martha such as [employer])  · Small, frequent meals/snacks may be easier to tolerate than 3 large daily meals  Aim for 5-6 small meals per day (every 2-3 hours)  · Include protein at all meals/snacks  · Include a variety of foods (as tolerated/allowed by diet)  · Incorporate physical activity as able/allowed  · Stay hydrated by sipping fluids of choice/tolerance throughout the day  · Alter food choices and eating patterns to accommodate changing needs  · Refer to Eating Hints book for other meal/snack ideas and symptom management  · For constipation: drink plenty of fluids (>64 oz/day); drink hot liquids; eat high-fiber foods as tolerated (whole grains, beans, peas, nuts, seeds, fruits, vegetables, etc); increase physical activity as tolerated  Avoid increasing fiber intake too quickly, add fiber into your diet slowly; keep a record of your bowel movements (see pages 13-14 in you Eating Hints book)  · Weigh yourself regularly  If you notice weight loss, make an effort to increase your daily food/calorie intake  If you continue to notice loss after these efforts, reach out to your dietitian to establish a plan to stabilize weight  · [place] Instant Breakfast shake 1-2x daily  · Try increasing calories and protein by: harsh sauce, making mashed potatoes with butter and whole milk, 2 tbsp peanut butter as a snack, adding beans to salad at lunch  · Try to increase fluid intake, goal: 80-93 oz daily       Follow Up Plan: 8/11/20 phone follow up 9:30am  Recommend Referral to Other Providers: none at this time

## 2020-07-30 ENCOUNTER — OFFICE VISIT (OUTPATIENT)
Dept: NEPHROLOGY | Facility: CLINIC | Age: 75
End: 2020-07-30
Payer: MEDICARE

## 2020-07-30 VITALS
DIASTOLIC BLOOD PRESSURE: 64 MMHG | WEIGHT: 178.4 LBS | HEART RATE: 80 BPM | BODY MASS INDEX: 29.72 KG/M2 | SYSTOLIC BLOOD PRESSURE: 122 MMHG | TEMPERATURE: 97.9 F | HEIGHT: 65 IN

## 2020-07-30 DIAGNOSIS — N18.9 CHRONIC RENAL IMPAIRMENT, UNSPECIFIED CKD STAGE: Primary | ICD-10-CM

## 2020-07-30 PROCEDURE — 3008F BODY MASS INDEX DOCD: CPT | Performed by: INTERNAL MEDICINE

## 2020-07-30 PROCEDURE — 4040F PNEUMOC VAC/ADMIN/RCVD: CPT | Performed by: INTERNAL MEDICINE

## 2020-07-30 PROCEDURE — 99214 OFFICE O/P EST MOD 30 MIN: CPT | Performed by: INTERNAL MEDICINE

## 2020-07-30 PROCEDURE — 3074F SYST BP LT 130 MM HG: CPT | Performed by: INTERNAL MEDICINE

## 2020-07-30 PROCEDURE — 1036F TOBACCO NON-USER: CPT | Performed by: INTERNAL MEDICINE

## 2020-07-30 PROCEDURE — 3078F DIAST BP <80 MM HG: CPT | Performed by: INTERNAL MEDICINE

## 2020-07-30 PROCEDURE — 1160F RVW MEDS BY RX/DR IN RCRD: CPT | Performed by: INTERNAL MEDICINE

## 2020-07-30 NOTE — PROGRESS NOTES
NEPHROLOGY PROGRESS NOTE    Christ Matthews 66 y o  male MRN: [id number]  Unit/Bed#:  Encounter: [id number]  Reason for Consult:  Chronic kidney disease    The patient is here for follow-up her 1st in person visit  He actually looks well states that time she has a little fatigued with exertion but other than that reports no intercurrent illnesses  He also states that he is going to be starting physical therapy in the near future and is lymphedema therapy went well as there is reduction in swelling of his left arm  Medications were reviewed  ASSESSMENT/PLAN:  1  Renal    Patient had acute renal failure and I met him on a tele visit and felt that he was taking anti-inflammatories and also was on diuretics so likely had acute changes kidney function due to the effects of these medications  After stopping the anti-inflammatories prior to me seeing him his creatinine declined to 1 7 so that confirmed the cause of the change  At this point his creatinine is fluctuating around 2 the latest 1 is 1 9 and remained stable  Will continue with current medications continue diuretics as he has lower extremity edema and will monitor renal function and electrolytes  Renal function is stable  Continue current medications  SUBJECTIVE:  Review of Systems   Constitution: Negative for chills, fever and night sweats  Occasional fatigue  HENT: Negative  Eyes: Negative  Cardiovascular: Positive for leg swelling  Negative for chest pain, dyspnea on exertion, orthopnea and palpitations  Wears compression stockings  Respiratory: Negative  Negative for cough, hemoptysis, shortness of breath and wheezing  Gastrointestinal: Negative  Negative for abdominal pain, diarrhea, nausea and vomiting  Genitourinary: Negative for bladder incontinence, dysuria, flank pain, hematuria and incomplete emptying  Neurological: Negative  Psychiatric/Behavioral: Negative    Negative for altered mental status, depression, hallucinations and hypervigilance  OBJECTIVE:  Current Weight: Weight - Scale: 80 9 kg (178 lb 6 4 oz)  [email or web address]: Temperature: 97 9 °F (36 6 °C)   Blood pressure 122/64, pulse 80, temperature 97 9 °F (36 6 °C), temperature source Oral, height 5' 5" (1 651 m), weight 80 9 kg (178 lb 6 4 oz)  , Body mass index is 29 69 kg/m²  [unfilled]    Physical Exam: /64 (BP Location: Right arm, Patient Position: Sitting, Cuff Size: Standard)   Pulse 80   Temp 97 9 °F (36 6 °C) (Oral)   Ht 5' 5" (1 651 m)   Wt 80 9 kg (178 lb 6 4 oz)   BMI 29 69 kg/m²   Physical Exam   Constitutional: He is oriented to person, place, and time  No distress  HENT:   Head: Atraumatic  Mouth/Throat: Oropharynx is clear and moist    Eyes: EOM are normal  No scleral icterus  Neck: Neck supple  No JVD present  Cardiovascular: Normal rate and regular rhythm  Exam reveals no gallop and no friction rub  Pulmonary/Chest: Effort normal and breath sounds normal  No respiratory distress  He has no wheezes  He has no rales  Abdominal: Soft  Bowel sounds are normal  He exhibits no distension  There is no tenderness  There is no rebound  Neurological: He is alert and oriented to person, place, and time  Skin: He is not diaphoretic  Psychiatric: He has a normal mood and affect   His behavior is normal  Judgment and thought content normal        Medications:    Current Outpatient Medications:     acetaminophen (TYLENOL) 500 mg tablet, Take 500 mg by mouth every 6 (six) hours as needed, Disp: , Rfl:     capsaicin (ZOSTRIX) 0 025 % cream, Apply 1 application topically 2 (two) times a day, Disp: 60 g, Rfl: 0    furosemide (LASIX) 40 mg tablet, Take 40 mg by mouth daily, Disp: , Rfl: 0    letrozole (FEMARA) 2 5 mg tablet, Take 1 tablet (2 5 mg total) by mouth daily, Disp: 90 tablet, Rfl: 1    losartan (COZAAR) 50 mg tablet, Take 50 mg by mouth daily , Disp: , Rfl: 0    Multiple Vitamins-Minerals (CENTRUM SILVER 50+MEN PO), Take by mouth, Disp: , Rfl:     Palbociclib (Ibrance) 75 MG capsule, Take 1 capsule (75 mg total) by mouth daily with breakfast Take with food  , Disp: 21 capsule, Rfl: 0    polyethylene glycol (MIRALAX) 17 g packet, Take 17 g by mouth daily as needed , Disp: , Rfl:     Laboratory Results:  Lab Results   Component Value Date    WBC 4 40 07/21/2020    HGB 11 4 (L) 07/21/2020    HCT 34 0 (L) 07/21/2020     (H) 07/21/2020     07/21/2020     Lab Results   Component Value Date    SODIUM 139 07/21/2020    K 4 3 07/21/2020     07/21/2020    CO2 28 07/21/2020    BUN 45 (H) 07/21/2020    CREATININE 1 93 (H) 07/21/2020    GLUC 105 05/03/2019    CALCIUM 9 1 07/21/2020     Lab Results   Component Value Date    CALCIUM 9 1 07/21/2020     No results found for: LABPROT

## 2020-07-30 NOTE — PATIENT INSTRUCTIONS
You are here for follow-up and this is our 1st in person visit as we met through telemedicine  I was happy to see that your kidney function had improved and your creatinine which is the blood test for the kidney function now is 1 9  The slight fluctuations could be due to changes in your volume status meeting your on a diuretic there might be days that your little dehydrated but overall it is improved and stable  For now continue all your current medications and physical therapy and we will follow-up as scheduled  Labs and follow-up as scheduled

## 2020-07-30 NOTE — LETTER
July 30, 2020     Chema Avendano, [address] [address] [address]  [address] [place] [address]    Patient: Reshma Sanford   YOB: 1945   Date of Visit: 7/30/2020       Dear Dr Mark Robbins:    Thank you for referring Reshma Sanford to me for evaluation  Below are my notes for this consultation  If you have questions, please do not hesitate to call me  I look forward to following your patient along with you  Sincerely,        Tanya Miller MD        CC: No Recipients  Tanya Miller MD  7/30/2020 12:23 PM  Sign at close encounter  NEPHROLOGY PROGRESS NOTE    Reshma Sanford 66 y o  male MRN: [id number]  Unit/Bed#:  Encounter: [id number]  Reason for Consult:  Chronic kidney disease    The patient is here for follow-up her 1st in person visit  He actually looks well states that time she has a little fatigued with exertion but other than that reports no intercurrent illnesses  He also states that he is going to be starting physical therapy in the near future and is lymphedema therapy went well as there is reduction in swelling of his left arm  Medications were reviewed  ASSESSMENT/PLAN:  1  Renal    Patient had acute renal failure and I met him on a tele visit and felt that he was taking anti-inflammatories and also was on diuretics so likely had acute changes kidney function due to the effects of these medications  After stopping the anti-inflammatories prior to me seeing him his creatinine declined to 1 7 so that confirmed the cause of the change  At this point his creatinine is fluctuating around 2 the latest 1 is 1 9 and remained stable  Will continue with current medications continue diuretics as he has lower extremity edema and will monitor renal function and electrolytes  Renal function is stable  Continue current medications  SUBJECTIVE:  Review of Systems   Constitution: Negative for chills, fever and night sweats  Occasional fatigue  HENT: Negative  Eyes: Negative      Cardiovascular: Positive for leg swelling  Negative for chest pain, dyspnea on exertion, orthopnea and palpitations  Wears compression stockings  Respiratory: Negative  Negative for cough, hemoptysis, shortness of breath and wheezing  Gastrointestinal: Negative  Negative for abdominal pain, diarrhea, nausea and vomiting  Genitourinary: Negative for bladder incontinence, dysuria, flank pain, hematuria and incomplete emptying  Neurological: Negative  Psychiatric/Behavioral: Negative  Negative for altered mental status, depression, hallucinations and hypervigilance  OBJECTIVE:  Current Weight: Weight - Scale: 80 9 kg (178 lb 6 4 oz)  [email or web address]: Temperature: 97 9 °F (36 6 °C)   Blood pressure 122/64, pulse 80, temperature 97 9 °F (36 6 °C), temperature source Oral, height 5' 5" (1 651 m), weight 80 9 kg (178 lb 6 4 oz)  , Body mass index is 29 69 kg/m²  @[facility]@    Physical Exam: /64 (BP Location: Right arm, Patient Position: Sitting, Cuff Size: Standard)   Pulse 80   Temp 97 9 °F (36 6 °C) (Oral)   Ht 5' 5" (1 651 m)   Wt 80 9 kg (178 lb 6 4 oz)   BMI 29 69 kg/m²    Physical Exam   Constitutional: He is oriented to person, place, and time  No distress  HENT:   Head: Atraumatic  Mouth/Throat: Oropharynx is clear and moist    Eyes: EOM are normal  No scleral icterus  Neck: Neck supple  No JVD present  Cardiovascular: Normal rate and regular rhythm  Exam reveals no gallop and no friction rub  Pulmonary/Chest: Effort normal and breath sounds normal  No respiratory distress  He has no wheezes  He has no rales  Abdominal: Soft  Bowel sounds are normal  He exhibits no distension  There is no tenderness  There is no rebound  Neurological: He is alert and oriented to person, place, and time  Skin: He is not diaphoretic  Psychiatric: He has a normal mood and affect   His behavior is normal  Judgment and thought content normal        Medications:    Current Outpatient Medications:     acetaminophen (TYLENOL) 500 mg tablet, Take 500 mg by mouth every 6 (six) hours as needed, Disp: , Rfl:     capsaicin (ZOSTRIX) 0 025 % cream, Apply 1 application topically 2 (two) times a day, Disp: 60 g, Rfl: 0    furosemide (LASIX) 40 mg tablet, Take 40 mg by mouth daily, Disp: , Rfl: 0    letrozole (FEMARA) 2 5 mg tablet, Take 1 tablet (2 5 mg total) by mouth daily, Disp: 90 tablet, Rfl: 1    losartan (COZAAR) 50 mg tablet, Take 50 mg by mouth daily , Disp: , Rfl: 0    Multiple Vitamins-Minerals (CENTRUM SILVER 50+MEN PO), Take by mouth, Disp: , Rfl:     Palbociclib (Ibrance) 75 MG capsule, Take 1 capsule (75 mg total) by mouth daily with breakfast Take with food  , Disp: 21 capsule, Rfl: 0    polyethylene glycol (MIRALAX) 17 g packet, Take 17 g by mouth daily as needed , Disp: , Rfl:     Laboratory Results:  Lab Results   Component Value Date    WBC 4 40 07/21/2020    HGB 11 4 (L) 07/21/2020    HCT 34 0 (L) 07/21/2020     (H) 07/21/2020     07/21/2020     Lab Results   Component Value Date    SODIUM 139 07/21/2020    K 4 3 07/21/2020     07/21/2020    CO2 28 07/21/2020    BUN 45 (H) 07/21/2020    CREATININE 1 93 (H) 07/21/2020    GLUC 105 05/03/2019    CALCIUM 9 1 07/21/2020     Lab Results   Component Value Date    CALCIUM 9 1 07/21/2020     No results found for: LABPROT

## 2020-08-02 DIAGNOSIS — C78.7 LIVER METASTASIS (HCC): ICD-10-CM

## 2020-08-02 DIAGNOSIS — C50.022 MALIGNANT NEOPLASM INVOLVING BOTH NIPPLE AND AREOLA OF LEFT BREAST IN MALE, ESTROGEN RECEPTOR POSITIVE (HCC): ICD-10-CM

## 2020-08-02 DIAGNOSIS — Z17.0 MALIGNANT NEOPLASM INVOLVING BOTH NIPPLE AND AREOLA OF LEFT BREAST IN MALE, ESTROGEN RECEPTOR POSITIVE (HCC): ICD-10-CM

## 2020-08-02 DIAGNOSIS — C78.00 MALIGNANT NEOPLASM METASTATIC TO LUNG, UNSPECIFIED LATERALITY (HCC): ICD-10-CM

## 2020-08-03 ENCOUNTER — EVALUATION (OUTPATIENT)
Dept: PHYSICAL THERAPY | Facility: CLINIC | Age: 75
End: 2020-08-03
Payer: MEDICARE

## 2020-08-03 DIAGNOSIS — R53.1 LACK OF STRENGTH: ICD-10-CM

## 2020-08-03 DIAGNOSIS — R29.898 WEAKNESS OF BOTH LOWER EXTREMITIES: Primary | ICD-10-CM

## 2020-08-03 DIAGNOSIS — R53.81 PHYSICAL DECONDITIONING: ICD-10-CM

## 2020-08-03 PROCEDURE — 97162 PT EVAL MOD COMPLEX 30 MIN: CPT

## 2020-08-03 RX ORDER — LETROZOLE 2.5 MG/1
TABLET, FILM COATED ORAL
Qty: 90 TABLET | Refills: 1 | Status: SHIPPED | OUTPATIENT
Start: 2020-08-03 | End: 2021-01-01 | Stop reason: SDUPTHER

## 2020-08-03 NOTE — PROGRESS NOTES
PT Evaluation     Today's date: 8/3/2020  Patient name: Viry Lucero  : 1945  MRN: [id number]  Referring provider: Estephania Wallace MD  Dx:   Encounter Diagnosis     ICD-10-CM    1  Weakness of both lower extremities  R29 898    2  Physical deconditioning  R53 81    3  Lack of strength  R53 1        Start Time: 1115  Stop Time: 1215  Total time in clinic (min): 60 minutes    Assessment  Assessment details: Patient is a 65 yo male presenting to physical therapy with symptoms consistent with LE weakness and physical deconditioning that started about 1-2 years ago after medical surgeries  Patient presents with decreased LE strength, poor endurance and poor posture  Patient has increased difficulty with prolonged standing and walking, lifting groceries, carrying objects and mowing the lawn  PT will address the noted impairments by performing hip and knee strengthening, stretching, balance, functional activities and manual techniques to allow the patient to return to his PLOF  PT recommended 2x/week for 4-6 weeks c a guarded prognosis 2* PMHx  Impairments: activity intolerance, impaired balance, impaired physical strength, lacks appropriate home exercise program, safety issue, poor posture  and poor body mechanics    Symptom irritability: moderateUnderstanding of Dx/Px/POC: good   Prognosis: fair    Goals  STG: In four weeks the patient will:    1  Be (I) with his HEP  2  Increase hip and knee strength to 4+/5 MMT score to assist c ADLs  3  Perform 16-20 sit to stands in 30 seconds with a JESUSITA scale of 7-8 to demonstrate improvements with strength and endurance  LTG: In six weeks, the patient will:    1  Increase FOTO score to 69 to demonstrate improvements in symptoms and function  2  Carry 5 grocery bags into the house with a JESUSITA scale of 7-8   3  Walk in the grocery store with a JESUSITA scale reading of 6-7    4  Increase hip and knee strength to 5/5 MMT score to assist c Prolonged activities  Plan  Patient would benefit from: skilled physical therapy  Referral necessary: No  Planned modality interventions: cryotherapy and thermotherapy: hydrocollator packs  Planned therapy interventions: abdominal trunk stabilization, joint mobilization, manual therapy, massage, Bee taping, neuromuscular re-education, patient education, postural training, strengthening, stretching, therapeutic activities, therapeutic exercise, transfer training, home exercise program, gait training, functional ROM exercises, flexibility, balance and body mechanics training  Frequency: 2x week  Duration in visits: 12  Duration in weeks: 6  Plan of Care beginning date: 8/3/2020  Plan of Care expiration date: 9/14/2020  Treatment plan discussed with: patient        Subjective Evaluation    History of Present Illness  Mechanism of injury: Within the past few years the patient has gone through a cholecystectomy and cancer  Patient noted he was also on gabapentin for a little, however felt funny and in a fog  Patient noted since a few years ago he has felt out of breath when bringing in the groceries or when walking for a period of time  Patient also notes increased difficulty with mowing the lawn, lifting and performing transfers such as a supine to sit             Recurrent probem    Quality of life: fair    Pain  No pain reported  Aggravating factors: stair climbing, standing, walking and lifting  Progression: worsening    Social Support  Steps to enter house: yes (8 BRANDI)  Stairs in house: yes (2 Steps)   Lives in: multiple-level home  Lives with: spouse    Employment status: not working (Retired )  Hand dominance: right    Patient Goals  Patient goals for therapy: increased strength, independence with ADLs/IADLs, return to sport/leisure activities, increased motion and improved balance  Patient goal: "to get more endurance to bring in the groceries without sitting down "        Objective     Strength/Myotome Testing Left Hip   Planes of Motion   Flexion: 4  Extension: 4-  Abduction: 4  Adduction: 4    Right Hip   Planes of Motion   Flexion: 4  Extension: 4-  Abduction: 4  Adduction: 4    Left Knee   Flexion: 3+  Extension: 4    Right Knee   Flexion: 3+  Extension: 4    Left Ankle/Foot   Dorsiflexion: 4  Plantar flexion: 4    Right Ankle/Foot   Dorsiflexion: 4  Plantar flexion: 4    Functional Assessment        Comments  30 second sit to stand: 14, JESUSITA scale 13    General Comments:      Lumbar Comments  BP at IE: 128/70 mmHg      Flowsheet Rows      Most Recent Value   PT/OT G-Codes   Current Score  58   Projected Score  69   FOTO information reviewed  Yes   Assessment Type  Evaluation   G code set  Mobility: Walking & Moving Around             Precautions: hx of CA, hx of HTN, Hx of PAC      Manuals 8/3                                                                Neuro Re-Ed             Sciatic nerve glides nv            Quad set nv            Sit to stands nv                                                                Ther Ex             X-ride nv            Lower trunk rotations nv            glute squeeze nv            Hip add nv            Hip abd nv                                                   Ther Activity                                       Gait Training                                       Modalities

## 2020-08-07 ENCOUNTER — OFFICE VISIT (OUTPATIENT)
Dept: PHYSICAL THERAPY | Facility: CLINIC | Age: 75
End: 2020-08-07
Payer: MEDICARE

## 2020-08-07 ENCOUNTER — TELEPHONE (OUTPATIENT)
Dept: NUTRITION | Facility: CLINIC | Age: 75
End: 2020-08-07

## 2020-08-07 DIAGNOSIS — R29.898 WEAKNESS OF BOTH LOWER EXTREMITIES: Primary | ICD-10-CM

## 2020-08-07 DIAGNOSIS — R53.1 LACK OF STRENGTH: ICD-10-CM

## 2020-08-07 DIAGNOSIS — R53.81 PHYSICAL DECONDITIONING: ICD-10-CM

## 2020-08-07 PROCEDURE — 97110 THERAPEUTIC EXERCISES: CPT

## 2020-08-07 PROCEDURE — 97112 NEUROMUSCULAR REEDUCATION: CPT

## 2020-08-07 NOTE — PROGRESS NOTES
Daily Note     Today's date: 2020  Patient name: Luda Brower  : 1945  MRN: [id number]  Referring provider: Gordo Blank MD  Dx:   Encounter Diagnosis     ICD-10-CM    1  Weakness of both lower extremities  R29 898    2  Physical deconditioning  R53 81    3  Lack of strength  R53 1        Start Time: 1030  Stop Time: 1110  Total time in clinic (min): 40 minutes    Subjective: Patient noted he has some difficulty with constipation last night  Objective: See treatment diary below      Assessment: Patient performed various strengthening exercises to assist c gait and transfers  Patient required mod VCs throughout the session for form  Patient continues to be limited in endurance 2* increased breaks throughout the session  Patient would benefit from continued PT to allow the patient to return to his PLOF  Plan: Continue per plan of care        Precautions: hx of CA, hx of HTN, Hx of PAC      Manuals 8/3 8/7                                                               Neuro Re-Ed             Sciatic nerve glides nv nv           Quad set nv x10 ea  3" hold           Sit to stands nv nv                                                               Ther Ex             X-ride nv 5'           Lower trunk rotations nv 5x15" ea           glute squeeze nv 2x10  5" hold           Hip add nv 2x10  5" hold           Hip abd nv 2x10  5" hold  GTB           LAQ  2x10 3" hold           Seated marching  2x10 ea                        Ther Activity                                       Gait Training                                       Modalities

## 2020-08-07 NOTE — TELEPHONE ENCOUNTER
Received call from Edward today  She is requesting RD opinion on whey protein because she read an article on the Internet stating that "whey protein is not healthy for us to consume "  Explained to Edward that whey is one of the proteins found naturally in dairy products and is commonly the form of protein used in protein powders  According to the [facility], when taken in appropriate amounts whey protein appears to be safe for humans to consume  Whey could be potentially harmful if an individual has an allergy to dairy, Gianfranco Jean-Baptiste does not have a known dairy allergy  Edward verbalized understanding  All questions/concerns addressed at this time, Gianfranco Jean-Baptiste and Edward have RD contact info   Next oncology nutrition follow up is scheduled for 8/11/20 9:30am

## 2020-08-10 ENCOUNTER — APPOINTMENT (OUTPATIENT)
Dept: PHYSICAL THERAPY | Facility: CLINIC | Age: 75
End: 2020-08-10
Payer: MEDICARE

## 2020-08-11 ENCOUNTER — NUTRITION (OUTPATIENT)
Dept: NUTRITION | Facility: CLINIC | Age: 75
End: 2020-08-11

## 2020-08-11 DIAGNOSIS — Z71.3 NUTRITIONAL COUNSELING: Primary | ICD-10-CM

## 2020-08-11 NOTE — PROGRESS NOTES
Outpatient Oncology Nutrition Consult   Type of Consult: Follow Up  Care Location: Telephone Call-pt and wife Noel [place]    Reason for referral: RN (Jack Galo) request due to pt has nutrition questions (Date of referral: 2/19/20)    Nutrition Assessment:   Oncology Diagnosis & Treatments: Left breast carcinoma diagnosed 11/2018, BRCA negative  Neoadjuvant tamoxifen 20 mg daily from December 27, 2018 until May 2, 2019  Left modified radical mastectomy May 2, 2019  Tamoxifen was resumed postoperatively  Widespread metastatic disease including multiple lung nodules, liver metastasis, and osseous metastasis found in Feb 2020  Began Lupron in Feb 2020, and Letrozole and Elva Antoine March 2020  Oncology History   Patient returns today for discuss results of CT and MRI  Initial consult with Dr Mario Nazario on 320/20   66year old male with Jaz Ysabell 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 status post neoadjuvant tamoxifen followed by left mastectomy +ALND, with pathology showing 5 cm tumor extending to margins, and involvement of dermis with 10/10 LN positive, followed by re-excision showing residual tumor, with final margins negative  He declined adjuvant chemotherapy and declined repeat imaging with Dr Vianney Cross  Plan at time of consult was radiation to left chestwall and regional lymph nodes  1/27/20 CT simulation for radiation  Dr Mario Nazario discussed with patient findings of CT sim which show metastatic disease  She also discussed with Dr Vianney Cross and radiology to confirm  Plan to order diagnostic CT c/A/P with contrast, and MRI brain  forego PMRT    1/31/20 Med Onc follow-up with Dr Vianney Cross  Pt is scheduled for MRI brain and CT 2/8/20, in view of azotemia,  recommend imaging without IV contrast to reduce risk of kidney dysfunction       2/8/20 CT chest abd pelvis  IMPRESSION:   1   Progressively enlarging 9 mm right middle lobe nodule with Satellite nodules and few additional new pulmonary nodules, suspicious for progressive metastatic disease      2   Multiple hypoattenuating liver lesions, new from December 2018, and progressed from January 2020, highly suspicious for metastasis      3   Osseous metastatic disease as described, new from December 2018, and slightly progressed from January 2020  Right acetabular lytic lesion was not previously imaged      4  Indeterminate 3 2 cm nodular lesion in the left upper quadrant of the abdomen, which was only partially imaged on prior studies and was not adequately evaluated  This may represent a splenule, tumor implant or metastatic lymph node  Further   characterization with PET CT can be performed if clinically warranted      2/8/20 MRI brain - pending      2/14/20 Med Onc f/u with Dr Kristen Dodson       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, estrogen receptor positive ([facility] [address] )   2018 Initial Diagnosis    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, estrogen receptor positive ([facility] [address] )     11/14/2018 Biopsy    A  Breast, left subareolar mass, core biopsy (11 slides, [address], collected on 11/14/2018): - Invasive mammary carcinoma with mucinous features (see note)  -- Jim grade 1 of 3 (total score: 5 of 9)        * Tubule formation < 10%, score 3        * Nuclear grade 1 of 3, score 1        * Mitoses < 3/mm2, (</= 7 mitoses/10HPF), score 1     -- Invasive carcinoma involves 4 of 4 submitted core biopsy fragments, max  Dimension= 22 mm     -- Estrogen, Progesterone & HER2 receptor studies performed at the referring institution show the tumor to be positive for ER (>95% strong), positive for WA (75% moderate), and negative for HER2 IHC (score 0)  - Ductal carcinoma in-situ (DCIS): Not identified  - Lymph-vascular invasion: Not identified  - Microcalcifications: Absent    - Best representative tumor block:  A1    -- Sufficient tumor present for        Agendia Mammaprint/Blueprint (1 cm2 of invasive tumor in aggregate): No         MI Profile/Foundation One (at least 5 x 5 mm of tumor): Yes  Note:  The tumor shows prominent mucinous differentiation, raising the strong possibility of invasive mucinous carcinoma, though this designation is possible only complete excision with evaluation of the entirety of the tumor mass  12/27/2018 -  Hormone Therapy    Tamoxifen 20 mg daily (Dr Kristen Dodson)     2018 Genetic Testing    BRCA negative      5/2/2019 Surgery    A  Breast, Left, Mastectomy and Axillary Contents:  - 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), Jim Grade II (3 + 2 + 1 = 6), 51 mm in greatest dimension, involving dermis  See Tumor Synoptic Report (below) and Note  - Margins negative for carcinoma in this specimen (see separately submitted additional margins, Parts C and D)  - Intradermal nevi      B  Lymph Node, Left Periaxillary Vein, Lymphadenectomy:  - Metastatic carcinoma present in one lymph node (1/1), with extranodal extension   - Metastatic focus measures at least 1 5 cm in greatest dimension      C  Breast, Left Inferior Medial Margin, Excision:  - Benign fibroadipose tissue and skeletal muscle      D  Breast, Left Inferior Medial Margin Skin, Excision:  - Invasive mammary carcinoma, underlying benign skin  - Anterior margin is multifocally positive for carcinoma     (Dr Reshma Dennis)     10/9/2019 Surgery    Left breast, re-excision scar mastectomy site medial and inferior margins:     - Residual invasive breast carcinoma of no special type (ductal NST/invasive ductal carcinoma), multifocal        -- Four foci identified ranging in size from 1 5 to approximately 12 mm        -- Tumor foci are present in dermis and adjacent subcutaneous tissues       - All margins are negative for tumor with closest margin as follows:       -- One focus of tumor comes to within 0 7 mm of the superior margin (A16)  -- Largest focus comes to within 1 5 mm of the superior margin (A37)  - Cicatricial fibrosis of skin and subcutaneous tissues      (Dr Bailey Bowman)       Past Medical & Surgical Hx: HTN, pancreatitis, anemia, afib  Patient Active Problem List   Diagnosis    Mass of breast, left    Hypertension    Leukocytosis    PAC (premature atrial contraction)    Pancreatic mass    Pancreatitis    RBBB (right bundle branch block with left anterior fascicular block)    Transaminitis    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, estrogen receptor positive ([address] )    Anemia, unspecified    Prerenal azotemia    Liver metastasis ([facility] [address] )    Lung metastasis ([facility] [address] )    Osseous metastasis ([facility] [address] )    ARF (acute renal failure) (HCC)    CRI (chronic renal insufficiency)     Past Medical History:   Diagnosis Date    Acute gallstone pancreatitis     Arthritis     Atrial fibrillation (HCC)     Breast cancer ([facility] [address] )     Cancer (HCC)     breast - left    Chronic pain disorder     Extremity pain     Hypertension     Irregular heartbeat     PAC (premature atrial contraction)     PVC (premature ventricular contraction)     RBBB     Wears glasses      Past Surgical History:   Procedure Laterality Date    APPENDECTOMY      CHOLECYSTECTOMY      FRACTURE SURGERY Left     arm    CO EXC SKIN BENIG 3 1-4 CM TRUNK,ARM,LEG Left 10/9/2019    Procedure: BREAST RE-EXCISION OF SKIN MARGINS;  Surgeon: Bailey Bowman MD;  Location: [facility];  Service: General    CO LAP,CHOLECYSTECTOMY N/A 12/18/2018    Procedure: LAP CHOLECYSTECTOMY; LYSIS OF ADHESIONS; ATTEMPTED CHOLANGIOGRAM;  Surgeon: Bailey Bowman MD;  Location: [facility];  Service: General    CO MASTECTOMY, MODIFIED RADICAL Left 5/2/2019    Procedure: MASTECTOMY MODIFIED RADICAL; AXILLARY DISSECTION;  Surgeon: Bailey Bowman MD;  Location: [facility];  Service: General    TONSILLECTOMY         Review of Medications:   Vitamins, Supplements and Herbals: no     Current Outpatient Medications:     acetaminophen (TYLENOL) 500 mg tablet, Take 500 mg by mouth every 6 (six) hours as needed, Disp: , Rfl:     capsaicin (ZOSTRIX) 0 025 % cream, Apply 1 application topically 2 (two) times a day, Disp: 60 g, Rfl: 0    furosemide (LASIX) 40 mg tablet, Take 40 mg by mouth daily, Disp: , Rfl: 0    letrozole (FEMARA) 2 5 mg tablet, TAKE ONE TABLET BY MOUTH EVERY DAY, Disp: 90 tablet, Rfl: 1    losartan (COZAAR) 50 mg tablet, Take 50 mg by mouth daily , Disp: , Rfl: 0    Multiple Vitamins-Minerals (CENTRUM SILVER 50+MEN PO), Take by mouth, Disp: , Rfl:     Palbociclib (Ibrance) 75 MG capsule, Take 1 capsule (75 mg total) by mouth daily with breakfast Take with food  , Disp: 21 capsule, Rfl: 0    polyethylene glycol (MIRALAX) 17 g packet, Take 17 g by mouth daily as needed , Disp: , Rfl:     Most Recent Lab Results:   Lab Results   Component Value Date    WBC 4 40 07/21/2020    IRON 71 06/26/2019    TIBC 311 06/26/2019    FERRITIN 89 06/26/2019    ALT 36 07/21/2020    AST 56 (H) 07/21/2020    ALB 3 4 (L) 07/21/2020    SODIUM 139 07/21/2020    SODIUM 139 06/08/2020    K 4 3 07/21/2020    K 4 7 06/08/2020     07/21/2020    BUN 45 (H) 07/21/2020    BUN 48 (H) 06/08/2020    CREATININE 1 93 (H) 07/21/2020    CREATININE 2 16 (H) 06/08/2020    EGFR 33 07/21/2020    GLUF 95 07/21/2020    GLUF 92 06/08/2020    GLUC 105 05/03/2019    CALCIUM 9 1 07/21/2020       Anthropometric Measurements:   Height: 65"  Ht Readings from Last 1 Encounters:   07/30/20 5' 5" (1 651 m)     -Weight History:   Usual Weight: 198#  Ideal Body Weight: 136#  Wt Readings from Last 20 Encounters:   07/30/20 80 9 kg (178 lb 6 4 oz)   07/15/20 81 6 kg (179 lb 14 3 oz)   06/15/20 83 5 kg (184 lb)   06/08/20 79 7 kg (175 lb 9 6 oz)   05/14/20 83 5 kg (184 lb)   03/27/20 83 5 kg (184 lb)   02/14/20 86 2 kg (190 lb)   01/31/20 89 8 kg (198 lb)   01/20/20 87 3 kg (192 lb 7 4 oz)   12/17/19 89 4 kg (197 lb)   10/21/19 89 8 kg (198 lb)   10/14/19 89 2 kg (196 lb 9 6 oz)   10/09/19 90 7 kg (200 lb)   09/17/19 90 8 kg (200 lb 3 2 oz)   09/12/19 90 1 kg (198 lb 9 6 oz)   07/29/19 87 1 kg (192 lb)   06/25/19 88 1 kg (194 lb 3 2 oz)   05/30/19 87 1 kg (192 lb)   05/13/19 86 2 kg (190 lb)   05/06/19 88 kg (194 lb)     -Varian:n/a  -Home weights: (6/15/20)177#, (7/6/20) 178 2#, (7/27/20) 180#, (8/10/20)179#     Weight Changes: loss of 1 5# (0 8%) in 2 weeks (not significant) and loss of 19 6# (9 9%) in 6 months (significant)      Estimated body mass index is 29 69 kg/m² as calculated from the following:    Height as of 7/30/20: 5' 5" (1 651 m)  Weight as of 7/30/20: 80 9 kg (178 lb 6 4 oz)  Nutrition-Focused Physical Findings: n/a due to telephone call    Food/Nutrition-Related History & Client/Social History:    Current Nutrition Impact Symptoms:  [] Nausea -when he has chocolate [] Reduced Appetite -fluctuates [] Acid Reflux    [] Vomiting  [x] Unintended Wt Loss [] Malabsorption    [] Diarrhea  [] Unintended Wt Gain  [] Dumping Syndrome    [x] Constipation -"sometimes" takes Miralax [] Thick Mucous/Secretions  [] Abdominal Pain    [] Dysgeusia (Altered Taste)  [x] Xerostomia (Dry Mouth) -in the morning  [] Gas    [] Dysosmia (Altered Smell)  [] Thrush  [] Difficulty Chewing    [] Oral Mucositis (Sore Mouth)  [] Fatigue   [] Other:    [] Odynophagia   [] Esophagitis  [] Other:    [] Dysphagia  [] Early Satiety  [] No Problems Eating      Food Allergies: no  Food Intolerances: no    Current Diet: Regular Diet, No Restrictions  Current Nutrition Intake: Unchanged from last visit  Appetite: Good, Fair ; improving   Nutrition Route: PO  Meal planning/preparation mainly done by: Self  Oral Care: brushes BID  Activity level: Goes to PT 2x/week and gaining strength  Lifts 2#wts overhead and curls 3# wts at PT  Easily tires, out of breath  Likes to [employer]    Social Hx: Retired school   Lives with wife Edward who cooks and helps take care of him  24 Hr Diet Recall: average:  2034kcal, 107g pro, 240CHO   Breakfast: scrambled eggs with cheese, toast with butter and jelly   Lunch: sandwich with turkey and cheese  Dinner:hamburger, carnation with milk   Snack: [employer] bar    Beverages: water (8oz x2-3), [employer] CranRaspberry or CranMango ([place] [facility] x1-2), tea (12oz x1), milk (8oz x1)   Supplements:    [employer] (0plo=736 kcal, 5 g pro) mixed with mixed in milk; 1x daily       Estimated Nutrition Needs: (based on 79 8kg/ 175 6# actual wt)  Energy Needs: 0185-1140 kcal/day (30-35 kcal/kg)  Protein Needs:  grams/day (1 2-1 5 g/kg)- monitor kidney function, BUN/creat elevated at this time   Fluid Needs: 0836-7520 mL/day (1 mL/kcal) 80-93oz     Discussion/Summary: Fracisco Guerrero and wife Edward were contacted today for an f/u RD consultation  Fracisco Guerrero states that he continues to eat the best that he can, his appetite remains fair to good  He continues to drink [place] daily he has been mixing it with milk or with pudding mix  His home weight remains stable ~179-180#  Reviewed individualized calorie, protein, and fluid needs  Suggested trying cottage cheese or apple with peanut butter as a snack for extra protein/calorie intake  Will follow up x2 weeks        Discussed/Reviewed:   · the importance of weight maintenance during CA tx  · indications & use of oral nutrition supplements  · high protein foods to include at all meals & snacks  · ways to increase overall calorie intake  · encouraged eating every 2-3 hours (5-6 small meals/day)  · adequate hydation & tips to increase overall fluid intake  · MNT for: weight management, decreased appetite  · recipe suggestions/resources  · a high calorie/high protein diet & food choices  · individualized calorie, protein and fluid daily goals  Individualized nutrition recommendations and interventions listed below  All questions and concerns addressed during todays visit  Scott Chocal has RD contact information  Nutrition Diagnosis:    Increased Nutrient Needs (kcal & pro) related to increased demand for nutrients and disease state as evidenced by cancer dx and pt undergoing tx for cancer  Intervention & Recommendations:   Topics addressed: Nutrition education, Balance/Variety, Meals & Snacks, Meal planning, Choosing high protein meals/snacks, Meal pattern: eating small/frequent meals (every 2-3 hours), Nutrition Symptom Management, Adequate Hydration, Nutrition-Related Medication Management and Weight Management    Barriers: None  Readiness to change: action  Comprehension: verbalizes understanding  Expected Compliance: good    Materials Provided: not applicable  Monitoring & Evaluation:   Dietitian to Monitor: Food and Nutrition Intake, Nutrtion Impact Symptoms, Body Weight and Biochemical Data     Goals:  · weight maintenance/stabilization  · pt to meet >/=75% estimated nutrition needs daily  · Meet hydration goal: 80-93oz daily     · Progress Towards Goals: Progressing    Nutrition Rx & Recommendations:  · Diet: High Calorie, High Protein (for high calorie foods see pages 52-53, and for high protein foods see pages 49-51 in your Eating Hints book)  · Keep a daily food journal (pen/paper, martha such as [employer])  · Small, frequent meals/snacks may be easier to tolerate than 3 large daily meals  Aim for 5-6 small meals per day (every 2-3 hours)  · Include protein at all meals/snacks  · Include a variety of foods (as tolerated/allowed by diet)  · Incorporate physical activity as able/allowed  · Stay hydrated by sipping fluids of choice/tolerance throughout the day  · Alter food choices and eating patterns to accommodate changing needs  · Refer to Eating Hints book for other meal/snack ideas and symptom management    · For appetite loss: try powdered or liquid nutrition supplements; eating by the clock every 2-3 hours; set a timer to remind yourself to eat, keep snacks nearby; add extra protein & calories to your diet; drink liquids in between meals, choose liquids that add calories; eat a bedtime snack; eat soft, cool or frozen foods; eat a larger meal when you feel well & rested; only sip small amounts of liquids during meals (see pages 10-12 in your Eating Hints book)  · For constipation: drink plenty of fluids (>64 oz/day); drink hot liquids; eat high-fiber foods as tolerated (whole grains, beans, peas, nuts, seeds, fruits, vegetables, etc); increase physical activity as tolerated  Avoid increasing fiber intake too quickly, add fiber into your diet slowly; keep a record of your bowel movements (see pages 13-14 in you Eating Hints book)  · Weigh yourself regularly  If you notice weight loss, make an effort to increase your daily food/calorie intake  If you continue to notice loss after these efforts, reach out to your dietitian to establish a plan to stabilize weight  · [place] Instant Breakfast mixed with whole milk or pudding mix 1-2x daily  · Try increasing calories and protein by: having a snack of apple with peanut butter or cottage cheese  · Try to increase fluid intake, goal: 80-93 oz daily       Follow Up Plan: 8/25/20 phone follow up 9:30am  Recommend Referral to Other Providers: none at this time

## 2020-08-11 NOTE — PATIENT INSTRUCTIONS
Nutrition Rx & Recommendations:  · Diet: High Calorie, High Protein (for high calorie foods see pages 52-53, and for high protein foods see pages 49-51 in your Eating Hints book)  · Keep a daily food journal (pen/paper, martha such as [employer])  · Small, frequent meals/snacks may be easier to tolerate than 3 large daily meals  Aim for 5-6 small meals per day (every 2-3 hours)  · Include protein at all meals/snacks  · Include a variety of foods (as tolerated/allowed by diet)  · Incorporate physical activity as able/allowed  · Stay hydrated by sipping fluids of choice/tolerance throughout the day  · Alter food choices and eating patterns to accommodate changing needs  · Refer to Eating Hints book for other meal/snack ideas and symptom management  · For appetite loss: try powdered or liquid nutrition supplements; eating by the clock every 2-3 hours; set a timer to remind yourself to eat, keep snacks nearby; add extra protein & calories to your diet; drink liquids in between meals, choose liquids that add calories; eat a bedtime snack; eat soft, cool or frozen foods; eat a larger meal when you feel well & rested; only sip small amounts of liquids during meals (see pages 10-12 in your Eating Hints book)  · For constipation: drink plenty of fluids (>64 oz/day); drink hot liquids; eat high-fiber foods as tolerated (whole grains, beans, peas, nuts, seeds, fruits, vegetables, etc); increase physical activity as tolerated  Avoid increasing fiber intake too quickly, add fiber into your diet slowly; keep a record of your bowel movements (see pages 13-14 in you Eating Hints book)  · Weigh yourself regularly  If you notice weight loss, make an effort to increase your daily food/calorie intake  If you continue to notice loss after these efforts, reach out to your dietitian to establish a plan to stabilize weight  · [place] Instant Breakfast mixed with whole milk or pudding mix 1-2x daily      · Try increasing calories and protein by: having a snack of apple with peanut butter or cottage cheese  · Try to increase fluid intake, goal: 80-93 oz daily       Follow Up Plan: 8/25/20 phone follow up 9:30am  Recommend Referral to Other Providers: none at this time

## 2020-08-13 ENCOUNTER — OFFICE VISIT (OUTPATIENT)
Dept: PHYSICAL THERAPY | Facility: CLINIC | Age: 75
End: 2020-08-13
Payer: MEDICARE

## 2020-08-13 DIAGNOSIS — R53.1 LACK OF STRENGTH: ICD-10-CM

## 2020-08-13 DIAGNOSIS — R53.81 PHYSICAL DECONDITIONING: ICD-10-CM

## 2020-08-13 DIAGNOSIS — I89.0 LYMPHEDEMA OF ARM: ICD-10-CM

## 2020-08-13 DIAGNOSIS — R29.898 WEAKNESS OF BOTH LOWER EXTREMITIES: Primary | ICD-10-CM

## 2020-08-13 PROCEDURE — 97112 NEUROMUSCULAR REEDUCATION: CPT | Performed by: PHYSICAL THERAPIST

## 2020-08-13 PROCEDURE — 97110 THERAPEUTIC EXERCISES: CPT | Performed by: PHYSICAL THERAPIST

## 2020-08-13 NOTE — PROGRESS NOTES
Daily Note     Today's date: 2020  Patient name: Jaycob Pope  : 1945  MRN: [id number]  Referring provider: Yecenia Portillo MD  Dx:   Encounter Diagnosis     ICD-10-CM    1  Weakness of both lower extremities  R29 898    2  Physical deconditioning  R53 81    3  Lack of strength  R53 1    4  Lymphedema of arm  I89 0          Subjective: Patient reports no issues after previous visit, he states he feels pretty goo this morning  Objective: See treatment diary below      Assessment: Patient tolerated treatment well  He required verbal cuing throughout the session for proper technique but able to maintain corrections after cuing  He tends to lose count secondary to getting distracted in conversation/story telling  He requires 1-2 UE assistance during sit to stands, started with 1 and as he fatigued required 2  He demonstrates fatigue post treatment and would benefit from continued PT  Plan: Continue per plan of care        Precautions: hx of CA, hx of HTN, Hx of PAC      Manuals 8/3 8/7                                                               Neuro Re-Ed             Sciatic nerve glides nv nv           Quad set nv x10 ea  3" hold x15 ea  3" hold          Sit to stands nv nv 2x5                                                              Ther Ex             X-ride nv 5' 5'          Lower trunk rotations nv 5x15" ea 5x15" ea          glute squeeze nv 2x10  5" hold 2x10  5" hold          Hip add nv 2x10  5" hold 3x10 5" hold          Hip abd nv 2x10  5" hold  GTB 3x10 5" hold GTB          LAQ  2x10 3" hold 2x10 3" hold          Seated marching  2x10 ea 2x10 ea                       Ther Activity                                       Gait Training                                       Modalities

## 2020-08-17 ENCOUNTER — OFFICE VISIT (OUTPATIENT)
Dept: PHYSICAL THERAPY | Facility: CLINIC | Age: 75
End: 2020-08-17
Payer: MEDICARE

## 2020-08-17 ENCOUNTER — OFFICE VISIT (OUTPATIENT)
Dept: HEMATOLOGY ONCOLOGY | Facility: CLINIC | Age: 75
End: 2020-08-17
Payer: MEDICARE

## 2020-08-17 VITALS
TEMPERATURE: 98.2 F | BODY MASS INDEX: 29.99 KG/M2 | RESPIRATION RATE: 16 BRPM | HEART RATE: 89 BPM | OXYGEN SATURATION: 94 % | SYSTOLIC BLOOD PRESSURE: 128 MMHG | DIASTOLIC BLOOD PRESSURE: 68 MMHG | HEIGHT: 65 IN | WEIGHT: 180 LBS

## 2020-08-17 DIAGNOSIS — C50.022 MALIGNANT NEOPLASM INVOLVING BOTH NIPPLE AND AREOLA OF LEFT BREAST IN MALE, ESTROGEN RECEPTOR POSITIVE (HCC): ICD-10-CM

## 2020-08-17 DIAGNOSIS — C79.51 OSSEOUS METASTASIS (HCC): ICD-10-CM

## 2020-08-17 DIAGNOSIS — R53.81 PHYSICAL DECONDITIONING: ICD-10-CM

## 2020-08-17 DIAGNOSIS — R53.1 LACK OF STRENGTH: ICD-10-CM

## 2020-08-17 DIAGNOSIS — R29.898 WEAKNESS OF BOTH LOWER EXTREMITIES: Primary | ICD-10-CM

## 2020-08-17 DIAGNOSIS — C78.7 LIVER METASTASIS (HCC): Primary | ICD-10-CM

## 2020-08-17 DIAGNOSIS — Z17.0 MALIGNANT NEOPLASM INVOLVING BOTH NIPPLE AND AREOLA OF LEFT BREAST IN MALE, ESTROGEN RECEPTOR POSITIVE (HCC): ICD-10-CM

## 2020-08-17 PROCEDURE — 3074F SYST BP LT 130 MM HG: CPT | Performed by: INTERNAL MEDICINE

## 2020-08-17 PROCEDURE — 4040F PNEUMOC VAC/ADMIN/RCVD: CPT | Performed by: INTERNAL MEDICINE

## 2020-08-17 PROCEDURE — 97112 NEUROMUSCULAR REEDUCATION: CPT

## 2020-08-17 PROCEDURE — 1160F RVW MEDS BY RX/DR IN RCRD: CPT | Performed by: INTERNAL MEDICINE

## 2020-08-17 PROCEDURE — 3078F DIAST BP <80 MM HG: CPT | Performed by: INTERNAL MEDICINE

## 2020-08-17 PROCEDURE — 99214 OFFICE O/P EST MOD 30 MIN: CPT | Performed by: INTERNAL MEDICINE

## 2020-08-17 PROCEDURE — 97110 THERAPEUTIC EXERCISES: CPT

## 2020-08-17 PROCEDURE — 3008F BODY MASS INDEX DOCD: CPT | Performed by: INTERNAL MEDICINE

## 2020-08-17 PROCEDURE — 1036F TOBACCO NON-USER: CPT | Performed by: INTERNAL MEDICINE

## 2020-08-17 NOTE — PROGRESS NOTES
Hematology / Oncology Outpatient Follow Up Note    Marylee Pate 66 y o  male :1945 [facility]:[id number]         Date:  2020    Assessment / Plan:  A 49-year-old gentleman who has history of locally advanced left breast cancer, grade 1, ER 95% positive, [place] 75% positive, HER2 negative disease, diagnosed in 2018  He was treated with neoadjuvant tamoxifen followed by mastectomy and lymph node dissection which showed 10 positive lymph nodes   He continued with adjuvant tamoxifen   Unfortunately, he was found to have widespread metastatic disease in his liver and bone  He is currently on Lupron, palbociclib and letrozole with no toxicity  With current treatment, he has minor to partial biochemical response  I recommended him to continue with current regimen with Lupron every 3 months, palbociclib 75 mg 3 weeks on followed by 1 week off as well as letrozole 2 5 mg daily  He is in agreement with my recommendation  I will see him again in 3 months with CBC, CMP and cancer antigens        Subjective:      HPI:             Interval History:  A 49-year-old gentleman who has metastatic breast cancer, ER 95% positive, IL 75% positive, HER2 negative disease   He was initially diagnosed with breast cancer with invasive mucinous histology in 2018  He appeared to have had locally advanced disease   He was on neoadjuvant hormonal therapy with tamoxifen from 2018 through May 2019  Subsequently, he underwent left mastectomy and axillary lymph node dissection which showed 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   10/10 axillary lymph node were positive for metastatic disease with largest tumor measuring 2 5 cm   Tamoxifen was continued as adjuvant setting   Unfortunately, he developed liver, bone metastasis  He has been on Lupron shot as well as palbociclib and letrozole since 2020  He has been tolerating current treatment very well  He has no neutropenia  He has no fever  He denied any pain  He is maintaining his weight  He has some exertional shortness of breath  His performance status is 1/4 on the ECOG scale         Objective:      Primary Diagnosis:     1  Left breast cancer, stage IIIC (pT3, pN3, M0) grade 1, invasive mucinous histology, ER 95% positive, [place] 75% positive, HER2 negative disease   Diagnosed in November 2018       2  Metastatic breast cancer in his liver and multiple bone, diagnosed in February 2020       Cancer Staging:  Cancer Staging  No matching staging information was found for the patient         Previous Hematologic/ Oncologic Treatment:      1  Neoadjuvant and adjuvant hormonal therapy with tamoxifen from December 2018 through February 2020       Current Hematologic/ Oncologic Treatment:       1  Lupron, since February 2020       2  Letrozole and palbociclib since March 2020      3  Denosumab every 3 months      Disease Status:      Biochemical response      Test Results:     Pathology:           Radiology:     CT scan of chest abdomen pelvis in February 2020 showed liver and extensive bone metastasis      Laboratory:     See below  CA 27, 29 is 335  CA 15-3 is 259 in July 2020       Physical Exam:        General Appearance:    Alert, oriented          Eyes:    PERRL   Ears:    Normal external ear canals, both ears   Nose:   Nares normal, septum midline   Throat:   Mucosa moist  Pharynx without injection  Neck:   Supple         Lungs:     Clear to auscultation bilaterally   Chest Wall:    No tenderness or deformity    Heart:    Regular rate and rhythm         Abdomen:     Soft, non-tender, bowel sounds +, no organomegaly               Extremities:   Extremities no cyanosis or edema         Skin:   no rash or icterus      Lymph nodes:   Cervical, supraclavicular, and axillary nodes normal   Neurologic:   CNII-XII intact, normal strength, sensation and reflexes     Throughout             Breast exam:   Status post left mastectomy with no palpable abnormality on his left chest wall   Right showed gynecomastia             ROS: Review of Systems   Respiratory:        Exertional shortness of breath   All other systems reviewed and are negative  Imaging: No results found  Labs:   Lab Results   Component Value Date    WBC 4 40 07/21/2020    HGB 11 4 (L) 07/21/2020    HCT 34 0 (L) 07/21/2020     (H) 07/21/2020     07/21/2020     Lab Results   Component Value Date    K 4 3 07/21/2020     07/21/2020    CO2 28 07/21/2020    BUN 45 (H) 07/21/2020    CREATININE 1 93 (H) 07/21/2020    GLUF 95 07/21/2020    CALCIUM 9 1 07/21/2020    AST 56 (H) 07/21/2020    ALT 36 07/21/2020    ALKPHOS 110 07/21/2020    EGFR 33 07/21/2020         Lab Results   Component Value Date     (H) 02/26/2020       Lab Results   Component Value Date    SPEP  05/06/2020     The SPEP shows a faint possible band in the gamma region  Immunofixation to be performed  Reviewed by: Rosa M Stevenson MD ([id number]) **Electronic Signature**    UPEP  05/06/2020     The UPEP shows selective proteinuria  No monoclonal bands noted  Reviewed by: Rosa M Stevenson MD ([id number]) **Electronic Signature**       Lab Results   Component Value Date    PSA <0 1 02/26/2020       Lab Results   Component Value Date    CEA 2 2 02/26/2020         Lab Results   Component Value Date    IRON 71 06/26/2019    TIBC 311 06/26/2019    FERRITIN 89 06/26/2019       Lab Results   Component Value Date    ANBHZIVA08 488 12/03/2019         Current Medications: Reviewed  Allergies: Reviewed  PMH/FH/SH:  Reviewed      Vital Sign:    Body surface area is 1 89 meters squared      Wt Readings from Last 3 Encounters:   08/17/20 81 6 kg (180 lb)   07/30/20 80 9 kg (178 lb 6 4 oz)   07/15/20 81 6 kg (179 lb 14 3 oz)        Temp Readings from Last 3 Encounters:   08/17/20 98 2 °F (36 8 °C) (Tympanic)   07/30/20 97 9 °F (36 6 °C) (Oral)   07/15/20 97 5 °F (36 4 °C) (Tympanic)        BP Readings from Last 3 Encounters:   08/17/20 128/68   07/30/20 122/64   07/15/20 145/73         Pulse Readings from Last 3 Encounters:   08/17/20 89   07/30/20 80   07/15/20 89     @LASTSAO2(3)@

## 2020-08-17 NOTE — PROGRESS NOTES
Daily Note     Today's date: 2020  Patient name: Reshma Sanford  : 1945  MRN: [id number]  Referring provider: Saleem Mota MD  Dx:   Encounter Diagnosis     ICD-10-CM    1  Weakness of both lower extremities  R29 898    2  Physical deconditioning  R53 81    3  Lack of strength  R53 1        Start Time: 1030  Stop Time: 1115  Total time in clinic (min): 45 minutes    Subjective: Patient noted he feels okay today  Patient noted no pain since last visit  Objective: See treatment diary below      Assessment: Patient continues to require min VCs throughout the session  PT educated the patient on diaphragmatic breathing for functional activities  Patient presents with improved AP breathing, however he is limited in lateral expansion  PT introduced sciatic nerve glides to assist c pain and nerve mobility  Patient noted improvements post session  Patient would benefit from continued PT to allow the patient to return to his PLOF  Plan: Continue per plan of care        Precautions: hx of CA, hx of HTN, Hx of PAC      Manuals 8/3 8/7 8/13 8/17                                                             Neuro Re-Ed             Sciatic nerve glides nv nv  x10 ea         Quad set nv x10 ea  3" hold x15 ea  3" hold x15 ea  3" hold         Sit to stands nv nv 2x5 nv         Diaphragmatic breathing    supine  lateral resistance & 4# weight  x10 total                                                Ther Ex             X-ride nv 5' 5' 10'         Lower trunk rotations nv 5x15" ea 5x15" ea 5x15" ea         glute squeeze nv 2x10  5" hold 2x10  5" hold bridge  2x10         Hip add nv 2x10  5" hold 3x10 5" hold 3x10  5" hold         Hip abd nv 2x10  5" hold  GTB 3x10 5" hold GTB nv         LAQ  2x10 3" hold 2x10 3" hold 2x10  3" hold         Seated marching  2x10 ea 2x10 ea 2x10 ea                      Ther Activity                                       Gait Training Modalities

## 2020-08-20 ENCOUNTER — OFFICE VISIT (OUTPATIENT)
Dept: PHYSICAL THERAPY | Facility: CLINIC | Age: 75
End: 2020-08-20
Payer: MEDICARE

## 2020-08-20 DIAGNOSIS — R29.898 WEAKNESS OF BOTH LOWER EXTREMITIES: Primary | ICD-10-CM

## 2020-08-20 DIAGNOSIS — R53.81 PHYSICAL DECONDITIONING: ICD-10-CM

## 2020-08-20 DIAGNOSIS — R53.1 LACK OF STRENGTH: ICD-10-CM

## 2020-08-20 PROCEDURE — 97110 THERAPEUTIC EXERCISES: CPT

## 2020-08-20 PROCEDURE — 97112 NEUROMUSCULAR REEDUCATION: CPT

## 2020-08-20 NOTE — PROGRESS NOTES
Daily Note     Today's date: 2020  Patient name: Mir Khan  : 1945  MRN: [id number]  Referring provider: Aminata Gilmore MD  Dx:   Encounter Diagnosis     ICD-10-CM    1  Weakness of both lower extremities  R29 898    2  Physical deconditioning  R53 81    3  Lack of strength  R53 1        Start Time: 1100  Stop Time: 1140  Total time in clinic (min): 40 minutes    Subjective: Patient noted he was a little tired after last session  Objective: See treatment diary below      Assessment: Patient continues to improve with endurance 2* increased reps and time on cardio machine with less breaks  Patient continues to require min VCs for sciatic neve glides  Patient improved with lateral expansion of the rib cage during diaphragmatic breathing  Patient uses his momentum to perform sit to stands, however he is able to perform a sit to stand without HHA  Patient would benefit from continued PT to allow the patient to return to his PLOF  Plan: Continue per plan of care        Precautions: hx of CA, hx of HTN, Hx of PAC      Manuals 8/3 8/7 8/13 8/17 8/20                                                            Neuro Re-Ed             Sciatic nerve glides nv nv  x10 ea x10 ea        Quad set nv x10 ea  3" hold x15 ea  3" hold x15 ea  3" hold nv        Sit to stands nv nv 2x5 nv 3x3        Diaphragmatic breathing    supine  lateral resistance & 4# weight  x10 total 3x5  5#                                               Ther Ex             X-ride nv 5' 5' 10' 10'        Lower trunk rotations nv 5x15" ea 5x15" ea 5x15" ea 5x15" ea        glute squeeze nv 2x10  5" hold 2x10  5" hold bridge  2x10 2x10        Hip add nv 2x10  5" hold 3x10 5" hold 3x10  5" hold 3x10  5" hold        Hip abd nv 2x10  5" hold  GTB 3x10 5" hold GTB nv 3x10  5" hold  GTB        LAQ  2x10 3" hold 2x10 3" hold 2x10  3" hold 2x10  3" hold  2#        Seated marching  2x10 ea 2x10 ea 2x10 ea 2x10 ea  2#                     Ther Activity                                       Gait Training                                       Modalities

## 2020-08-24 ENCOUNTER — OFFICE VISIT (OUTPATIENT)
Dept: PHYSICAL THERAPY | Facility: CLINIC | Age: 75
End: 2020-08-24
Payer: MEDICARE

## 2020-08-24 ENCOUNTER — NUTRITION (OUTPATIENT)
Dept: NUTRITION | Facility: CLINIC | Age: 75
End: 2020-08-24

## 2020-08-24 DIAGNOSIS — R53.1 LACK OF STRENGTH: ICD-10-CM

## 2020-08-24 DIAGNOSIS — R29.898 WEAKNESS OF BOTH LOWER EXTREMITIES: Primary | ICD-10-CM

## 2020-08-24 DIAGNOSIS — R53.81 PHYSICAL DECONDITIONING: ICD-10-CM

## 2020-08-24 PROCEDURE — 97110 THERAPEUTIC EXERCISES: CPT

## 2020-08-24 PROCEDURE — 97112 NEUROMUSCULAR REEDUCATION: CPT

## 2020-08-24 NOTE — PATIENT INSTRUCTIONS
Nutrition Rx & Recommendations:  · Diet: High Calorie, High Protein (for high calorie foods see pages 52-53, and for high protein foods see pages 49-51 in your Eating Hints book)  · Keep a daily food journal (pen/paper, martha such as [employer])  · Small, frequent meals/snacks may be easier to tolerate than 3 large daily meals  Aim for 5-6 small meals per day (every 2-3 hours)  · Include protein at all meals/snacks  · Include a variety of foods (as tolerated/allowed by diet)  · Incorporate physical activity as able/allowed  · Stay hydrated by sipping fluids of choice/tolerance throughout the day  · Alter food choices and eating patterns to accommodate changing needs  · Refer to Eating Hints book for other meal/snack ideas and symptom management  · For appetite loss: try powdered or liquid nutrition supplements; eating by the clock every 2-3 hours; set a timer to remind yourself to eat, keep snacks nearby; add extra protein & calories to your diet; drink liquids in between meals, choose liquids that add calories; eat a bedtime snack; eat soft, cool or frozen foods; eat a larger meal when you feel well & rested; only sip small amounts of liquids during meals (see pages 10-12 in your Eating Hints book)  · For constipation: drink plenty of fluids (>64 oz/day); drink hot liquids; eat high-fiber foods as tolerated (whole grains, beans, peas, nuts, seeds, fruits, vegetables, etc); increase physical activity as tolerated  Avoid increasing fiber intake too quickly, add fiber into your diet slowly; keep a record of your bowel movements (see pages 13-14 in you Eating Hints book)  · Weigh yourself regularly  If you notice weight loss, make an effort to increase your daily food/calorie intake  If you continue to notice loss after these efforts, reach out to your dietitian to establish a plan to stabilize weight  · [place] Instant Breakfast mixed with whole milk or pudding mix 1-2x daily      · Try increasing calories and protein by: having a snack of apple with peanut butter or peanut butter toast    · Try to increase fluid intake, goal: 80-93 oz daily       Follow Up Plan: 9/14/20 phone follow up 10:30am  Recommend Referral to Other Providers: none at this time

## 2020-08-24 NOTE — PROGRESS NOTES
Outpatient Oncology Nutrition Consult   Type of Consult: Follow Up  Care Location: Telephone Call-pt and wife Bolivar Cano    Reason for referral: RN (Sincere Valero) request due to pt has nutrition questions (Date of referral: 2/19/20)    Nutrition Assessment:   Oncology Diagnosis & Treatments: Left breast carcinoma diagnosed 11/2018, BRCA negative  Neoadjuvant tamoxifen 20 mg daily from December 27, 2018 until May 2, 2019  Left modified radical mastectomy May 2, 2019  Tamoxifen was resumed postoperatively  Widespread metastatic disease including multiple lung nodules, liver metastasis, and osseous metastasis found in Feb 2020  Began Lupron in Feb 2020, and Letrozole and Damon Starcher March 2020  Oncology History   Patient returns today for discuss results of CT and MRI  Initial consult with Dr Silvia Ya on 320/20   66year old male with Radha Steve 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 status post neoadjuvant tamoxifen followed by left mastectomy +ALND, with pathology showing 5 cm tumor extending to margins, and involvement of dermis with 10/10 LN positive, followed by re-excision showing residual tumor, with final margins negative  He declined adjuvant chemotherapy and declined repeat imaging with Dr Jayde Mancia  Plan at time of consult was radiation to left chestwall and regional lymph nodes  1/27/20 CT simulation for radiation  Dr Silvia Ya discussed with patient findings of CT sim which show metastatic disease  She also discussed with Dr Jadye Mancia and radiology to confirm  Plan to order diagnostic CT c/A/P with contrast, and MRI brain  forego PMRT    1/31/20 Med Onc follow-up with Dr Jayde Mancia  Pt is scheduled for MRI brain and CT 2/8/20, in view of azotemia,  recommend imaging without IV contrast to reduce risk of kidney dysfunction       2/8/20 CT chest abd pelvis  IMPRESSION:   1   Progressively enlarging 9 mm right middle lobe nodule with Satellite nodules and few additional new pulmonary nodules, suspicious for progressive metastatic disease      2   Multiple hypoattenuating liver lesions, new from December 2018, and progressed from January 2020, highly suspicious for metastasis      3   Osseous metastatic disease as described, new from December 2018, and slightly progressed from January 2020  Right acetabular lytic lesion was not previously imaged      4  Indeterminate 3 2 cm nodular lesion in the left upper quadrant of the abdomen, which was only partially imaged on prior studies and was not adequately evaluated  This may represent a splenule, tumor implant or metastatic lymph node  Further   characterization with PET CT can be performed if clinically warranted      2/8/20 MRI brain - pending      2/14/20 Med Onc f/u with Dr Pierre Call       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, estrogen receptor positive ([facility] [address] )   2018 Initial Diagnosis    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, estrogen receptor positive ([facility] [address] )     11/14/2018 Biopsy    A  Breast, left subareolar mass, core biopsy (11 slides, [address], collected on 11/14/2018): - Invasive mammary carcinoma with mucinous features (see note)  -- Jim grade 1 of 3 (total score: 5 of 9)        * Tubule formation < 10%, score 3        * Nuclear grade 1 of 3, score 1        * Mitoses < 3/mm2, (</= 7 mitoses/10HPF), score 1     -- Invasive carcinoma involves 4 of 4 submitted core biopsy fragments, max  Dimension= 22 mm     -- Estrogen, Progesterone & HER2 receptor studies performed at the referring institution show the tumor to be positive for ER (>95% strong), positive for ID (75% moderate), and negative for HER2 IHC (score 0)  - Ductal carcinoma in-situ (DCIS): Not identified  - Lymph-vascular invasion: Not identified  - Microcalcifications: Absent    - Best representative tumor block:  A1    -- Sufficient tumor present for        Agendia Mammaprint/Blueprint (1 cm2 of invasive tumor in aggregate): No         MI Profile/Foundation One (at least 5 x 5 mm of tumor): Yes  Note:  The tumor shows prominent mucinous differentiation, raising the strong possibility of invasive mucinous carcinoma, though this designation is possible only complete excision with evaluation of the entirety of the tumor mass  12/27/2018 -  Hormone Therapy    Tamoxifen 20 mg daily (Dr Maco Bardales)     2018 Genetic Testing    BRCA negative      5/2/2019 Surgery    A  Breast, Left, Mastectomy and Axillary Contents:  - 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), [place] Grade II (3 + 2 + 1 = 6), 51 mm in greatest dimension, involving dermis  See Tumor Synoptic Report (below) and Note  - Margins negative for carcinoma in this specimen (see separately submitted additional margins, Parts C and D)  - Intradermal nevi      B  Lymph Node, Left Periaxillary Vein, Lymphadenectomy:  - Metastatic carcinoma present in one lymph node (1/1), with extranodal extension   - Metastatic focus measures at least 1 5 cm in greatest dimension      C  Breast, Left Inferior Medial Margin, Excision:  - Benign fibroadipose tissue and skeletal muscle      D  Breast, Left Inferior Medial Margin Skin, Excision:  - Invasive mammary carcinoma, underlying benign skin  - Anterior margin is multifocally positive for carcinoma     (Dr Karlos Richter)     10/9/2019 Surgery    Left breast, re-excision scar mastectomy site medial and inferior margins:     - Residual invasive breast carcinoma of no special type (ductal NST/invasive ductal carcinoma), multifocal        -- Four foci identified ranging in size from 1 5 to approximately 12 mm        -- Tumor foci are present in dermis and adjacent subcutaneous tissues       - All margins are negative for tumor with closest margin as follows:       -- One focus of tumor comes to within 0 7 mm of the superior margin (A16)  -- Largest focus comes to within 1 5 mm of the superior margin (A37)  - Cicatricial fibrosis of skin and subcutaneous tissues      (Dr Gen Bah)       Past Medical & Surgical Hx: HTN, pancreatitis, anemia, afib  Patient Active Problem List   Diagnosis    Mass of breast, left    Hypertension    Leukocytosis    PAC (premature atrial contraction)    Pancreatic mass    Pancreatitis    RBBB (right bundle branch block with left anterior fascicular block)    Transaminitis    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, estrogen receptor positive ([address] )    Anemia, unspecified    Prerenal azotemia    Liver metastasis ([address]    Lung metastasis ([address]    Osseous metastasis ([address] )    ARF (acute renal failure) (HCC)    CRI (chronic renal insufficiency)     Past Medical History:   Diagnosis Date    Acute gallstone pancreatitis     Arthritis     Atrial fibrillation (HCC)     Breast cancer ([address] )     Cancer (HCC)     breast - left    Chronic pain disorder     Extremity pain     Hypertension     Irregular heartbeat     PAC (premature atrial contraction)     PVC (premature ventricular contraction)     RBBB     Wears glasses      Past Surgical History:   Procedure Laterality Date    APPENDECTOMY      CHOLECYSTECTOMY      FRACTURE SURGERY Left     arm    FL EXC SKIN BENIG 3 1-4 CM TRUNK,ARM,LEG Left 10/9/2019    Procedure: BREAST RE-EXCISION OF SKIN MARGINS;  Surgeon: Gen Bah MD;  Location: [facility];  Service: General    FL LAP,CHOLECYSTECTOMY N/A 12/18/2018    Procedure: LAP CHOLECYSTECTOMY; LYSIS OF ADHESIONS; ATTEMPTED CHOLANGIOGRAM;  Surgeon: Gen Bah MD;  [facility];  Service: General    FL MASTECTOMY, MODIFIED RADICAL Left 5/2/2019    Procedure: MASTECTOMY MODIFIED RADICAL; AXILLARY DISSECTION;  Surgeon: Gen Bah MD;  Location: [facility];  Service: General    TONSILLECTOMY         Review of Medications:   Vitamins, Supplements and Herbals: no     Current Outpatient Medications:     acetaminophen (TYLENOL) 500 mg tablet, Take 500 mg by mouth every 6 (six) hours as needed, Disp: , Rfl:     capsaicin (ZOSTRIX) 0 025 % cream, Apply 1 application topically 2 (two) times a day, Disp: 60 g, Rfl: 0    furosemide (LASIX) 40 mg tablet, Take 40 mg by mouth daily, Disp: , Rfl: 0    letrozole (FEMARA) 2 5 mg tablet, TAKE ONE TABLET BY MOUTH EVERY DAY, Disp: 90 tablet, Rfl: 1    losartan (COZAAR) 50 mg tablet, Take 50 mg by mouth daily , Disp: , Rfl: 0    Multiple Vitamins-Minerals (CENTRUM SILVER 50+MEN PO), Take by mouth, Disp: , Rfl:     Palbociclib (Ibrance) 75 MG capsule, Take 1 capsule (75 mg total) by mouth daily with breakfast Take with food  , Disp: 21 capsule, Rfl: 0    polyethylene glycol (MIRALAX) 17 g packet, Take 17 g by mouth daily as needed , Disp: , Rfl:     Most Recent Lab Results:   Lab Results   Component Value Date    WBC 4 40 07/21/2020    IRON 71 06/26/2019    TIBC 311 06/26/2019    FERRITIN 89 06/26/2019    ALT 36 07/21/2020    AST 56 (H) 07/21/2020    ALB 3 4 (L) 07/21/2020    SODIUM 139 07/21/2020    SODIUM 139 06/08/2020    K 4 3 07/21/2020    K 4 7 06/08/2020     07/21/2020    BUN 45 (H) 07/21/2020    BUN 48 (H) 06/08/2020    CREATININE 1 93 (H) 07/21/2020    CREATININE 2 16 (H) 06/08/2020    EGFR 33 07/21/2020    GLUF 95 07/21/2020    GLUF 92 06/08/2020    GLUC 105 05/03/2019    CALCIUM 9 1 07/21/2020       Anthropometric Measurements:   Height: 65"  Ht Readings from Last 1 Encounters:   08/17/20 5' 5" (1 651 m)     -Weight History:   Usual Weight: 198#  Ideal Body Weight: 136#  Wt Readings from Last 20 Encounters:   08/17/20 81 6 kg (180 lb)   07/30/20 80 9 kg (178 lb 6 4 oz)   07/15/20 81 6 kg (179 lb 14 3 oz)   06/15/20 83 5 kg (184 lb)   06/08/20 79 7 kg (175 lb 9 6 oz)   05/14/20 83 5 kg (184 lb)   03/27/20 83 5 kg (184 lb)   02/14/20 86 2 kg (190 lb)   01/31/20 89 8 kg (198 lb)   01/20/20 87 3 kg (192 lb 7 4 oz)   12/17/19 89 4 kg (197 lb)   10/21/19 89 8 kg (198 lb)   10/14/19 89 2 kg (196 lb 9 6 oz)   10/09/19 90 7 kg (200 lb)   09/17/19 90 8 kg (200 lb 3 2 oz)   09/12/19 90 1 kg (198 lb 9 6 oz)   07/29/19 87 1 kg (192 lb)   06/25/19 88 1 kg (194 lb 3 2 oz)   05/30/19 87 1 kg (192 lb)   05/13/19 86 2 kg (190 lb)     -Varian:n/a  -Home weights: (6/15/20)177#, (7/6/20) 178 2#, (7/27/20) 180#, (8/10/20)179#, (8/24/20) 181#    Weight Changes: gain of 0 1#/ (0 1%) in 1 month (not significant) and loss of 4 0# (2 2%) in 3 months (not significant)      Estimated body mass index is 29 95 kg/m² as calculated from the following:    Height as of 8/17/20: 5' 5" (1 651 m)  Weight as of 8/17/20: 81 6 kg (180 lb)  Nutrition-Focused Physical Findings: n/a due to telephone call    Food/Nutrition-Related History & Client/Social History:    Current Nutrition Impact Symptoms:  [] Nausea -when he has chocolate [] Reduced Appetite -fluctuates [] Acid Reflux    [] Vomiting  [x] Unintended Wt Loss- 2 2% over 3 months  [] Malabsorption    [] Diarrhea  [] Unintended Wt Gain  [] Dumping Syndrome    [x] Constipation -"sometimes" takes Miralax [] Thick Mucous/Secretions  [] Abdominal Pain    [] Dysgeusia (Altered Taste)  [x] Xerostomia (Dry Mouth) -in the morning  [] Gas    [] Dysosmia (Altered Smell)  [] Thrush  [] Difficulty Chewing    [] Oral Mucositis (Sore Mouth)  [] Fatigue   [] Other:    [] Odynophagia   [] Esophagitis  [] Other:    [] Dysphagia  [] Early Satiety  [] No Problems Eating      Food Allergies: no  Food Intolerances: no    Current Diet: Regular Diet, No Restrictions  Current Nutrition Intake: Unchanged from last visit  Appetite: Good, Fair ; improving   Nutrition Route: PO  Meal planning/preparation mainly done by: Self  Oral Care: brushes BID  Activity level: Goes to PT 2x/week and gaining strength  Lifts 2#wts overhead and curls 3# wts at PT  Strength is increasing     Social Hx: Retired   Lives with wife Rebecca Cummings who cooks and helps take care of him  24 Hr Diet Recall: average:  1875kcal, 100g pro, 224CHO   Breakfast: scrambled eggs with cheese, toast with butter and jelly   Lunch: sandwich with turkey and cheese  Dinner:mac and cheese, carnation with milk   Snack:lemon pudding    Beverages: water (8oz x2-3), Ocean Spray CranRaspberry or CranMango (8oz x1-2), tea (12oz x1), milk (8oz x1)   Supplements:    [employer] (5ftj=912 kcal, 5 g pro) mixed with mixed in milk; 1x daily       Estimated Nutrition Needs: (based on 79 8kg/ 175 6# actual wt)  Energy Needs: 9410-4003 kcal/day (30-35 kcal/kg)  Protein Needs:  grams/day (1 2-1 5 g/kg)- monitor kidney function, BUN/creat elevated at this time   Fluid Needs: 7096-5258 mL/day (1 mL/kcal) 80-93oz     Discussion/Summary: López Kathleen and wife Rebecca Cummings were contacted today for an f/u RD consultation  F/u apt was originally scheduled for tomorrow 8/25/20 at 9:30am, but Rebecca Cummings requested to move apt to today as she has a last minute work meeting tomorrow morning  Rebecca Cummings reports that López Kathleen has been doing well  He continues to eat well and weight himself at home  His home weight has increased 2# over the past 2 weeks  He also reports that his strength has been increasing and he feels less fatigue  Encouraged him to continue to eat well and weigh himself at home  Will follow up x 3 weeks        Discussed/Reviewed:   · the importance of weight maintenance during CA tx  · indications & use of oral nutrition supplements  · high protein foods to include at all meals & snacks  · ways to increase overall calorie intake  · encouraged eating every 2-3 hours (5-6 small meals/day)  · adequate hydation & tips to increase overall fluid intake  · MNT for: weight management  · recipe suggestions/resources  · a high calorie/high protein diet & food choices  · individualized calorie, protein and fluid daily goals  Individualized nutrition recommendations and interventions listed below  All questions and concerns addressed during todays visit  Pipe Rievro has RD contact information  Nutrition Diagnosis:    Increased Nutrient Needs (kcal & pro) related to increased demand for nutrients and disease state as evidenced by cancer dx and pt undergoing tx for cancer  Intervention & Recommendations:   Topics addressed: Nutrition education, Balance/Variety, Meals & Snacks, Meal planning, Choosing high protein meals/snacks, Meal pattern: eating small/frequent meals (every 2-3 hours), Nutrition Symptom Management, Adequate Hydration, Nutrition-Related Medication Management and Weight Management    Barriers: None  Readiness to change: action  Comprehension: verbalizes understanding  Expected Compliance: good    Materials Provided: not applicable  Monitoring & Evaluation:   Dietitian to Monitor: Food and Nutrition Intake, Nutrtion Impact Symptoms, Body Weight and Biochemical Data     Goals:  · weight maintenance/stabilization  · pt to meet >/=75% estimated nutrition needs daily  · Meet hydration goal: 80-93oz daily     · Progress Towards Goals: Progressing    Nutrition Rx & Recommendations:  · Diet: High Calorie, High Protein (for high calorie foods see pages 52-53, and for high protein foods see pages 49-51 in your Eating Hints book)  · Keep a daily food journal (pen/paper, martha such as PlanG)  · Small, frequent meals/snacks may be easier to tolerate than 3 large daily meals  Aim for 5-6 small meals per day (every 2-3 hours)  · Include protein at all meals/snacks  · Include a variety of foods (as tolerated/allowed by diet)  · Incorporate physical activity as able/allowed  · Stay hydrated by sipping fluids of choice/tolerance throughout the day  · Alter food choices and eating patterns to accommodate changing needs  · Refer to Eating Hints book for other meal/snack ideas and symptom management    · For appetite loss: try powdered or liquid nutrition supplements; eating by the clock every 2-3 hours; set a timer to remind yourself to eat, keep snacks nearby; add extra protein & calories to your diet; drink liquids in between meals, choose liquids that add calories; eat a bedtime snack; eat soft, cool or frozen foods; eat a larger meal when you feel well & rested; only sip small amounts of liquids during meals (see pages 10-12 in your Eating Hints book)  · For constipation: drink plenty of fluids (>64 oz/day); drink hot liquids; eat high-fiber foods as tolerated (whole grains, beans, peas, nuts, seeds, fruits, vegetables, etc); increase physical activity as tolerated  Avoid increasing fiber intake too quickly, add fiber into your diet slowly; keep a record of your bowel movements (see pages 13-14 in you Eating Hints book)  · Weigh yourself regularly  If you notice weight loss, make an effort to increase your daily food/calorie intake  If you continue to notice loss after these efforts, reach out to your dietitian to establish a plan to stabilize weight  · [place] Instant Breakfast mixed with whole milk or pudding mix 1-2x daily  · Try increasing calories and protein by: having a snack of apple with peanut butter or peanut butter toast    · Try to increase fluid intake, goal: 80-93 oz daily       Follow Up Plan: 9/14/20 phone follow up 10:30am  Recommend Referral to Other Providers: none at this time

## 2020-08-24 NOTE — PROGRESS NOTES
Daily Note     Today's date: 2020  Patient name: Dora Thompson  : 1945  MRN: [id number]  Referring provider: Amanuel Merrill MD  Dx:   Encounter Diagnosis     ICD-10-CM    1  Weakness of both lower extremities  R29 898    2  Physical deconditioning  R53 81    3  Lack of strength  R53 1        Start Time: 1030  Stop Time: 1130  Total time in clinic (min): 60 minutes    Subjective: Patient noted he was practicing his breathing over the weekend  Objective: See treatment diary below      Assessment: Patient performed diaphragmatic breathing with 7 5# with proper form, however when it was increased to 10# the patient used apical breathing patterns  Patient performed sit to stands without hands, however required cues for breathing  Patient noted no pain during the session  Patient would benefit from continued PT to allow the patient to return to his PLOF  Plan: Continue per plan of care        Precautions: hx of CA, hx of HTN, Hx of [facility]      Manuals 8/3 8/7 8/13 8/17 8/20 8/24                                                           Neuro Re-Ed             Sciatic nerve glides nv nv  x10 ea x10 ea x15 ea       Quad set nv x10 ea  3" hold x15 ea  3" hold x15 ea  3" hold nv 3x10  5" hold ea       Sit to stands nv nv 2x5 nv 3x3 2x5       Diaphragmatic breathing    supine  lateral resistance & 4# weight  x10 total 3x5  5# 3x5  7 5#    x5  10#       Standing hip abd, ext, flex      nv                                 Ther Ex             X-ride nv 5' 5' 10' 10' 10'       Lower trunk rotations nv 5x15" ea 5x15" ea 5x15" ea 5x15" ea 5x15"        glute squeeze nv 2x10  5" hold 2x10  5" hold bridge  2x10 2x10 2x10       Hip add nv 2x10  5" hold 3x10 5" hold 3x10  5" hold 3x10  5" hold 3x10  5" hold       Hip abd nv 2x10  5" hold  GTB 3x10 5" hold GTB nv 3x10  5" hold  GTB 3x10  5" hold  GTB       LAQ  2x10 3" hold 2x10 3" hold 2x10  3" hold 2x10  3" hold  2# 3x10  3" hold  2#       Seated marching  2x10 ea 2x10 ea 2x10 ea 2x10 ea  2# 3x10  Ea  2#                    Ther Activity                                       Gait Training                                       Modalities

## 2020-08-27 ENCOUNTER — OFFICE VISIT (OUTPATIENT)
Dept: PHYSICAL THERAPY | Facility: CLINIC | Age: 75
End: 2020-08-27
Payer: MEDICARE

## 2020-08-27 DIAGNOSIS — R53.1 LACK OF STRENGTH: ICD-10-CM

## 2020-08-27 DIAGNOSIS — R53.81 PHYSICAL DECONDITIONING: ICD-10-CM

## 2020-08-27 DIAGNOSIS — R29.898 WEAKNESS OF BOTH LOWER EXTREMITIES: Primary | ICD-10-CM

## 2020-08-27 PROCEDURE — 97110 THERAPEUTIC EXERCISES: CPT

## 2020-08-27 PROCEDURE — 97112 NEUROMUSCULAR REEDUCATION: CPT

## 2020-08-27 NOTE — PROGRESS NOTES
Daily Note     Today's date: 2020  Patient name: Juice Henry  : 1945  MRN: [id number]  Referring provider: Jaiden Forrest MD  Dx:   Encounter Diagnosis     ICD-10-CM    1  Weakness of both lower extremities  R29 898    2  Physical deconditioning  R53 81    3  Lack of strength  R53 1        Start Time: 1038  Stop Time: 1115  Total time in clinic (min): 37 minutes    Subjective: Patient noted he feels okay today  Objective: See treatment diary below      Assessment: PT introduced standing marching, hip abd and ext to assist c transfers  Patient required moderate cues for form, however had no LOB  Patient required cues for diaphragmatic breathing, however his range has improved since IE  Patient would benefit from continued PT to allow the patient to return to his PLOF  Plan: Continue per plan of care        Precautions: hx of CA, hx of HTN, Hx of PAC      Manuals 8/3 8/7 8/13 8/17 8/20 8/24 8/27                                                          Neuro Re-Ed             Sciatic nerve glides nv nv  x10 ea x10 ea x15 ea 2x10 ea      Quad set nv x10 ea  3" hold x15 ea  3" hold x15 ea  3" hold nv 3x10  5" hold ea       Sit to stands nv nv 2x5 nv 3x3 2x5       Diaphragmatic breathing    supine  lateral resistance & 4# weight  x10 total 3x5  5# 3x5  7 5#    x5  10# x10  7 5#      Standing hip abd, ext, flex      nv 2x10 ea                                Ther Ex             X-ride nv 5' 5' 10' 10' 10' 10'      Lower trunk rotations nv 5x15" ea 5x15" ea 5x15" ea 5x15" ea 5x15"  5x15" ea      glute squeeze nv 2x10  5" hold 2x10  5" hold bridge  2x10 2x10 2x10 x25      Hip add nv 2x10  5" hold 3x10 5" hold 3x10  5" hold 3x10  5" hold 3x10  5" hold 3x10  5" hold      Hip abd nv 2x10  5" hold  GTB 3x10 5" hold GTB nv 3x10  5" hold  GTB 3x10  5" hold  GTB 3x10  5" hold  BTB      LAQ  2x10 3" hold 2x10 3" hold 2x10  3" hold 2x10  3" hold  2# 3x10  3" hold  2# 3x10  3" hold  2#      Seated marching 2x10 ea 2x10 ea 2x10 ea 2x10 ea  2# 3x10  Ea  2# 3x10  Ea  2#                   Ther Activity                                       Gait Training                                       Modalities

## 2020-08-31 ENCOUNTER — OFFICE VISIT (OUTPATIENT)
Dept: PHYSICAL THERAPY | Facility: CLINIC | Age: 75
End: 2020-08-31
Payer: MEDICARE

## 2020-08-31 DIAGNOSIS — R29.898 WEAKNESS OF BOTH LOWER EXTREMITIES: Primary | ICD-10-CM

## 2020-08-31 DIAGNOSIS — R53.81 PHYSICAL DECONDITIONING: ICD-10-CM

## 2020-08-31 DIAGNOSIS — R53.1 LACK OF STRENGTH: ICD-10-CM

## 2020-08-31 PROCEDURE — 97112 NEUROMUSCULAR REEDUCATION: CPT

## 2020-08-31 PROCEDURE — 97110 THERAPEUTIC EXERCISES: CPT

## 2020-08-31 NOTE — PROGRESS NOTES
Daily Note     Today's date: 2020  Patient name: Christ Matthews  : 1945  MRN: [id number]  Referring provider: Luci Kim MD  Dx:   Encounter Diagnosis     ICD-10-CM    1  Weakness of both lower extremities  R29 898    2  Physical deconditioning  R53 81    3  Lack of strength  R53 1        Start Time: 1030  Stop Time: 1115  Total time in clinic (min): 45 minutes    Subjective: Patient noted he feels okay today  Objective: See treatment diary below      Assessment: Patient increased in weight for the LAQ and continues to increase in reps with less breaks which is an improvement  Patient's progress is slow, however he is progressing  Patient required less cues for diaphragmatic breathing  Patient would benefit from continued PT to allow the patient to return to his PLOF  Plan: Continue per plan of care        Precautions: hx of CA, hx of HTN, Hx of PAC      Manuals 8/3 8/7 8/13 8/17 8/20 8/24 8/27 8/31                                                         Neuro Re-Ed             Sciatic nerve glides nv nv  x10 ea x10 ea x15 ea 2x10 ea x15 ea     Quad set nv x10 ea  3" hold x15 ea  3" hold x15 ea  3" hold nv 3x10  5" hold ea       Sit to stands nv nv 2x5 nv 3x3 2x5  3x5     Diaphragmatic breathing    supine  lateral resistance & 4# weight  x10 total 3x5  5# 3x5  7 5#    x5  10# x10  7 5# x15  7 5#     Standing hip abd, ext, flex      nv 2x10 ea 2x10 ea                               Ther Ex             X-ride nv 5' 5' 10' 10' 10' 10' 10'     Lower trunk rotations nv 5x15" ea 5x15" ea 5x15" ea 5x15" ea 5x15"  5x15" ea 5x15" ea     glute squeeze nv 2x10  5" hold 2x10  5" hold bridge  2x10 2x10 2x10 x25 3x10     Hip add nv 2x10  5" hold 3x10 5" hold 3x10  5" hold 3x10  5" hold 3x10  5" hold 3x10  5" hold 3x10  5" hold     Hip abd nv 2x10  5" hold  GTB 3x10 5" hold GTB nv 3x10  5" hold  GTB 3x10  5" hold  GTB 3x10  5" hold  BTB 3x10  5" hold  BTB     LAQ  2x10 3" hold 2x10 3" hold 2x10  3" hold 2x10  3" hold  2# 3x10  3" hold  2# 3x10  3" hold  2# 3x10  3" hold  3#     Seated marching  2x10 ea 2x10 ea 2x10 ea 2x10 ea  2# 3x10  Ea  2# 3x10  Ea  2# 3x10 ea  3#                  Ther Activity                                       Gait Training                                       Modalities

## 2020-09-03 ENCOUNTER — OFFICE VISIT (OUTPATIENT)
Dept: PHYSICAL THERAPY | Facility: CLINIC | Age: 75
End: 2020-09-03
Payer: MEDICARE

## 2020-09-03 DIAGNOSIS — R29.898 WEAKNESS OF BOTH LOWER EXTREMITIES: Primary | ICD-10-CM

## 2020-09-03 DIAGNOSIS — R53.1 LACK OF STRENGTH: ICD-10-CM

## 2020-09-03 DIAGNOSIS — R53.81 PHYSICAL DECONDITIONING: ICD-10-CM

## 2020-09-03 PROCEDURE — 97112 NEUROMUSCULAR REEDUCATION: CPT

## 2020-09-03 PROCEDURE — 97110 THERAPEUTIC EXERCISES: CPT

## 2020-09-03 NOTE — PROGRESS NOTES
Daily Note     Today's date: 9/3/2020  Patient name: Lilly Staton  : 1945  MRN: [id number]  Referring provider: Amy Shaw MD  Dx:   Encounter Diagnosis     ICD-10-CM    1  Weakness of both lower extremities  R29 898    2  Physical deconditioning  R53 81    3  Lack of strength  R53 1        Start Time: 1038  Stop Time: 1115  Total time in clinic (min): 37 minutes    Subjective: Patient noted in general he is able to perform more at home with less energy  Objective: See treatment diary below      Assessment: Patient performed increased time on the bike at today's session  Patient required only a 2-3 min break after the bike  Patient required min VCs for form throughout the session  Patient continues to improve with strength 2* the ability to perform more at home with less energy  Patient would benefit from continued PT to allow the patient to return to his PLOF  Plan: Continue per plan of care        Precautions: hx of CA, hx of HTN, Hx of PAC      Manuals 8/3 8/7 8/13 8/17 8/20 8/24 8/27 8/31 9/3                                                        Neuro Re-Ed             Sciatic nerve glides nv nv  x10 ea x10 ea x15 ea 2x10 ea x15 ea     Quad set nv x10 ea  3" hold x15 ea  3" hold x15 ea  3" hold nv 3x10  5" hold ea       Sit to stands nv nv 2x5 nv 3x3 2x5  3x5 DL squats  2x10    Diaphragmatic breathing    supine  lateral resistance & 4# weight  x10 total 3x5  5# 3x5  7 5#    x5  10# x10  7 5# x15  7 5#     Standing hip abd, ext, flex      nv 2x10 ea 2x10 ea 2x10 ea                              Ther Ex             X-ride nv 5' 5' 10' 10' 10' 10' 10' 13'    Lower trunk rotations nv 5x15" ea 5x15" ea 5x15" ea 5x15" ea 5x15"  5x15" ea 5x15" ea     glute squeeze nv 2x10  5" hold 2x10  5" hold bridge  2x10 2x10 2x10 x25 3x10 3x10    Hip add nv 2x10  5" hold 3x10 5" hold 3x10  5" hold 3x10  5" hold 3x10  5" hold 3x10  5" hold 3x10  5" hold 3x10  5" hold    Hip abd nv 2x10  5" hold  GTB 3x10 5" hold GTB nv 3x10  5" hold  GTB 3x10  5" hold  GTB 3x10  5" hold  BTB 3x10  5" hold  BTB 3x10  5" hold  BTB    LAQ  2x10 3" hold 2x10 3" hold 2x10  3" hold 2x10  3" hold  2# 3x10  3" hold  2# 3x10  3" hold  2# 3x10  3" hold  3# 3x10  3" hold  3#    Seated marching  2x10 ea 2x10 ea 2x10 ea 2x10 ea  2# 3x10  Ea  2# 3x10  Ea  2# 3x10 ea  3# 3x10 ea  3#                 Ther Activity                                       Gait Training                                       Modalities

## 2020-09-14 ENCOUNTER — NUTRITION (OUTPATIENT)
Dept: NUTRITION | Facility: CLINIC | Age: 75
End: 2020-09-14

## 2020-09-14 DIAGNOSIS — Z71.3 NUTRITIONAL COUNSELING: Primary | ICD-10-CM

## 2020-09-14 NOTE — PATIENT INSTRUCTIONS
Nutrition Rx & Recommendations:  · Diet: High Calorie, High Protein (for high calorie foods see pages 52-53, and for high protein foods see pages 49-51 in your Eating Hints book)  · Keep a daily food journal (pen/paper, martha such as [employer])  · Small, frequent meals/snacks may be easier to tolerate than 3 large daily meals  Aim for 5-6 small meals per day (every 2-3 hours)  · Include protein at all meals/snacks  · Include a variety of foods (as tolerated/allowed by diet)  · Incorporate physical activity as able/allowed  · Stay hydrated by sipping fluids of choice/tolerance throughout the day  · Alter food choices and eating patterns to accommodate changing needs  · Refer to Eating Hints book for other meal/snack ideas and symptom management  · For appetite loss: try powdered or liquid nutrition supplements; eating by the clock every 2-3 hours; set a timer to remind yourself to eat, keep snacks nearby; add extra protein & calories to your diet; drink liquids in between meals, choose liquids that add calories; eat a bedtime snack; eat soft, cool or frozen foods; eat a larger meal when you feel well & rested; only sip small amounts of liquids during meals (see pages 10-12 in your Eating Hints book)  · For constipation: drink plenty of fluids (>64 oz/day); drink hot liquids; eat high-fiber foods as tolerated (whole grains, beans, peas, nuts, seeds, fruits, vegetables, etc); increase physical activity as tolerated  Avoid increasing fiber intake too quickly, add fiber into your diet slowly; keep a record of your bowel movements (see pages 13-14 in you Eating Hints book)  · Weigh yourself regularly  If you notice weight loss, make an effort to increase your daily food/calorie intake  If you continue to notice loss after these efforts, reach out to your dietitian to establish a plan to stabilize weight  · [employer] Instant Breakfast mixed with whole milk or pudding mix 1-2x daily      · Try to increase fluid intake, goal: 80-93 oz daily       Follow Up Plan: 8/5/20 phone follow up 10:30am   Recommend Referral to Other Providers: none at this time

## 2020-09-14 NOTE — PROGRESS NOTES
Outpatient Oncology Nutrition Consult   Type of Consult: Follow Up  Care Location: Telephone Call-pt and wife Melania Donald    Reason for referral: RN (Cinthya Norton) request due to pt has nutrition questions (Date of referral: 2/19/20)    Nutrition Assessment:   Oncology Diagnosis & Treatments: Left breast carcinoma diagnosed 11/2018, BRCA negative  Neoadjuvant tamoxifen 20 mg daily from December 27, 2018 until May 2, 2019  Left modified radical mastectomy May 2, 2019  Tamoxifen was resumed postoperatively  Widespread metastatic disease including multiple lung nodules, liver metastasis, and osseous metastasis found in Feb 2020  Began Lupron in Feb 2020, and Letrozole and Radha Pac March 2020  Oncology History   Patient returns today for discuss results of CT and MRI  Initial consult with Dr Sheryl Humphrey on 320/20   66year old male with Kathy Balling 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 status post neoadjuvant tamoxifen followed by left mastectomy +ALND, with pathology showing 5 cm tumor extending to margins, and involvement of dermis with 10/10 LN positive, followed by re-excision showing residual tumor, with final margins negative  He declined adjuvant chemotherapy and declined repeat imaging with Dr Baker  Plan at time of consult was radiation to left chestwall and regional lymph nodes  1/27/20 CT simulation for radiation  Dr Sheryl Humphrey discussed with patient findings of CT sim which show metastatic disease  She also discussed with Dr Baker and radiology to confirm  Plan to order diagnostic CT c/A/P with contrast, and MRI brain  forego PMRT    1/31/20 Med Onc follow-up with Dr Baker  Pt is scheduled for MRI brain and CT 2/8/20, in view of azotemia,  recommend imaging without IV contrast to reduce risk of kidney dysfunction       2/8/20 CT chest abd pelvis  IMPRESSION:   1   Progressively enlarging 9 mm right middle lobe nodule with Satellite nodules and few additional new pulmonary nodules, suspicious for progressive metastatic disease      2   Multiple hypoattenuating liver lesions, new from December 2018, and progressed from January 2020, highly suspicious for metastasis      3   Osseous metastatic disease as described, new from December 2018, and slightly progressed from January 2020  Right acetabular lytic lesion was not previously imaged      4  Indeterminate 3 2 cm nodular lesion in the left upper quadrant of the abdomen, which was only partially imaged on prior studies and was not adequately evaluated  This may represent a splenule, tumor implant or metastatic lymph node  Further   characterization with PET CT can be performed if clinically warranted      2/8/20 MRI brain - pending      2/14/20 Med Onc f/u with Dr Rodarte Show       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, estrogen receptor positive ([facility] [address] )   2018 Initial Diagnosis    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, estrogen receptor positive ([facility] [address] )     11/14/2018 Biopsy    A  Breast, left subareolar mass, core biopsy (11 slides, [address], collected on 11/14/2018): - Invasive mammary carcinoma with mucinous features (see note)  -- Jim grade 1 of 3 (total score: 5 of 9)        * Tubule formation < 10%, score 3        * Nuclear grade 1 of 3, score 1        * Mitoses < 3/mm2, (</= 7 mitoses/10HPF), score 1     -- Invasive carcinoma involves 4 of 4 submitted core biopsy fragments, max  Dimension= 22 mm     -- Estrogen, Progesterone & HER2 receptor studies performed at the referring institution show the tumor to be positive for ER (>95% strong), positive for OR (75% moderate), and negative for HER2 IHC (score 0)  - Ductal carcinoma in-situ (DCIS): Not identified  - Lymph-vascular invasion: Not identified  - Microcalcifications: Absent    - Best representative tumor block:  A1    -- Sufficient tumor present for        Agendia Mammaprint/Blueprint (1 cm2 of invasive tumor in aggregate): No         MI Profile/Foundation One (at least 5 x 5 mm of tumor): Yes  Note:  The tumor shows prominent mucinous differentiation, raising the strong possibility of invasive mucinous carcinoma, though this designation is possible only complete excision with evaluation of the entirety of the tumor mass  12/27/2018 -  Hormone Therapy    Tamoxifen 20 mg daily (Dr Charli Pantoja)     2018 Genetic Testing    BRCA negative      5/2/2019 Surgery    A  Breast, Left, Mastectomy and Axillary Contents:  - 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), Jim Grade II (3 + 2 + 1 = 6), 51 mm in greatest dimension, involving dermis  See Tumor Synoptic Report (below) and Note  - Margins negative for carcinoma in this specimen (see separately submitted additional margins, Parts C and D)  - Intradermal nevi      B  Lymph Node, Left Periaxillary Vein, Lymphadenectomy:  - Metastatic carcinoma present in one lymph node (1/1), with extranodal extension   - Metastatic focus measures at least 1 5 cm in greatest dimension      C  Breast, Left Inferior Medial Margin, Excision:  - Benign fibroadipose tissue and skeletal muscle      D  Breast, Left Inferior Medial Margin Skin, Excision:  - Invasive mammary carcinoma, underlying benign skin  - Anterior margin is multifocally positive for carcinoma     (Dr Yohana Coates)     10/9/2019 Surgery    Left breast, re-excision scar mastectomy site medial and inferior margins:     - Residual invasive breast carcinoma of no special type (ductal NST/invasive ductal carcinoma), multifocal        -- Four foci identified ranging in size from 1 5 to approximately 12 mm        -- Tumor foci are present in dermis and adjacent subcutaneous tissues       - All margins are negative for tumor with closest margin as follows:       -- One focus of tumor comes to within 0 7 mm of the superior margin (A16)  -- Largest focus comes to within 1 5 mm of the superior margin (A37)  - Cicatricial fibrosis of skin and subcutaneous tissues      (Dr Adal Vieira)       Past Medical & Surgical Hx: HTN, pancreatitis, anemia, afib  Patient Active Problem List   Diagnosis    Mass of breast, left    Hypertension    Leukocytosis    PAC (premature atrial contraction)    Pancreatic mass    Pancreatitis    RBBB (right bundle branch block with left anterior fascicular block)    Transaminitis    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, estrogen receptor positive ([address] )    Anemia, unspecified    Prerenal azotemia    Liver metastasis ([address]    Lung metastasis ([address]    Osseous metastasis ([address] )    ARF (acute renal failure) (HCC)    CRI (chronic renal insufficiency)     Past Medical History:   Diagnosis Date    Acute gallstone pancreatitis     Arthritis     Atrial fibrillation (HCC)     Breast cancer ([address] )     Cancer (HCC)     breast - left    Chronic pain disorder     Extremity pain     Hypertension     Irregular heartbeat     PAC (premature atrial contraction)     PVC (premature ventricular contraction)     RBBB     Wears glasses      Past Surgical History:   Procedure Laterality Date    APPENDECTOMY      CHOLECYSTECTOMY      FRACTURE SURGERY Left     arm    TN EXC SKIN BENIG 3 1-4 CM TRUNK,ARM,LEG Left 10/9/2019    Procedure: BREAST RE-EXCISION OF SKIN MARGINS;  Surgeon: Adal Vieira MD;  Location: [facility];  Service: General    TN LAP,CHOLECYSTECTOMY N/A 12/18/2018    Procedure: LAP CHOLECYSTECTOMY; LYSIS OF ADHESIONS; ATTEMPTED CHOLANGIOGRAM;  Surgeon: Adal Vieira MD;  [facility];  Service: General    TN MASTECTOMY, MODIFIED RADICAL Left 5/2/2019    Procedure: MASTECTOMY MODIFIED RADICAL; AXILLARY DISSECTION;  Surgeon: Adal Vieira MD;  Location[facility];  Service: General    TONSILLECTOMY         Review of Medications:   Vitamins, Supplements and Herbals: no     Current Outpatient Medications:     acetaminophen (TYLENOL) 500 mg tablet, Take 500 mg by mouth every 6 (six) hours as needed, Disp: , Rfl:     capsaicin (ZOSTRIX) 0 025 % cream, Apply 1 application topically 2 (two) times a day, Disp: 60 g, Rfl: 0    furosemide (LASIX) 40 mg tablet, Take 40 mg by mouth daily, Disp: , Rfl: 0    letrozole (FEMARA) 2 5 mg tablet, TAKE ONE TABLET BY MOUTH EVERY DAY, Disp: 90 tablet, Rfl: 1    losartan (COZAAR) 50 mg tablet, Take 50 mg by mouth daily , Disp: , Rfl: 0    Multiple Vitamins-Minerals (CENTRUM SILVER 50+MEN PO), Take by mouth, Disp: , Rfl:     Palbociclib (Ibrance) 75 MG capsule, Take 1 capsule (75 mg total) by mouth daily with breakfast Take with food  , Disp: 21 capsule, Rfl: 0    polyethylene glycol (MIRALAX) 17 g packet, Take 17 g by mouth daily as needed , Disp: , Rfl:     Most Recent Lab Results:   Lab Results   Component Value Date    WBC 4 40 07/21/2020    IRON 71 06/26/2019    TIBC 311 06/26/2019    FERRITIN 89 06/26/2019    ALT 36 07/21/2020    AST 56 (H) 07/21/2020    ALB 3 4 (L) 07/21/2020    SODIUM 139 07/21/2020    SODIUM 139 06/08/2020    K 4 3 07/21/2020    K 4 7 06/08/2020     07/21/2020    BUN 45 (H) 07/21/2020    BUN 48 (H) 06/08/2020    CREATININE 1 93 (H) 07/21/2020    CREATININE 2 16 (H) 06/08/2020    EGFR 33 07/21/2020    GLUF 95 07/21/2020    GLUF 92 06/08/2020    GLUC 105 05/03/2019    CALCIUM 9 1 07/21/2020       Anthropometric Measurements:   Height: 65"  Ht Readings from Last 1 Encounters:   08/17/20 5' 5" (1 651 m)     -Weight History:   Usual Weight: 198#  Ideal Body Weight: 136#  Wt Readings from Last 20 Encounters:   08/17/20 81 6 kg (180 lb)   07/30/20 80 9 kg (178 lb 6 4 oz)   07/15/20 81 6 kg (179 lb 14 3 oz)   06/15/20 83 5 kg (184 lb)   06/08/20 79 7 kg (175 lb 9 6 oz)   05/14/20 83 5 kg (184 lb)   03/27/20 83 5 kg (184 lb)   02/14/20 86 2 kg (190 lb)   01/31/20 89 8 kg (198 lb)   01/20/20 87 3 kg (192 lb 7 4 oz)   12/17/19 89 4 kg (197 lb)   10/21/19 89 8 kg (198 lb)   10/14/19 89 2 kg (196 lb 9 6 oz)   10/09/19 90 7 kg (200 lb)   09/17/19 90 8 kg (200 lb 3 2 oz)   09/12/19 90 1 kg (198 lb 9 6 oz)   07/29/19 87 1 kg (192 lb)   06/25/19 88 1 kg (194 lb 3 2 oz)   05/30/19 87 1 kg (192 lb)   05/13/19 86 2 kg (190 lb)     -Varian:n/a  -Home weights: (6/15/20)177#, (7/6/20) 178 2#, (7/27/20) 180#, (8/10/20)179#, (8/24/20) 181#, (9/12/20)180#    Weight Changes: gain of 0 1#/ (0 1%) in 1 month (not significant) and loss of 4 0# (2 2%) in 3 months (not significant)- no new Epic weights since 8/17/20      Estimated body mass index is 29 95 kg/m² as calculated from the following:    Height as of 8/17/20: 5' 5" (1 651 m)  Weight as of 8/17/20: 81 6 kg (180 lb)  Nutrition-Focused Physical Findings: n/a due to telephone call    Food/Nutrition-Related History & Client/Social History:    Current Nutrition Impact Symptoms:  [] Nausea -when he has chocolate [] Reduced Appetite -fluctuates [] Acid Reflux    [] Vomiting  [x] Unintended Wt Loss- 2 2% over 3 months  [] Malabsorption    [] Diarrhea  [] Unintended Wt Gain  [] Dumping Syndrome    [x] Constipation -"sometimes" takes Miralax [] Thick Mucous/Secretions  [] Abdominal Pain    [] Dysgeusia (Altered Taste)  [x] Xerostomia (Dry Mouth) -in the morning  [] Gas    [] Dysosmia (Altered Smell)  [] Thrush  [] Difficulty Chewing    [] Oral Mucositis (Sore Mouth)  [] Fatigue   [] Other:    [] Odynophagia   [] Esophagitis  [] Other:    [] Dysphagia  [] Early Satiety  [] No Problems Eating      Food Allergies: no  Food Intolerances: no    Current Diet: Regular Diet, No Restrictions  Current Nutrition Intake: Unchanged from last visit  Appetite: Good, Fair   Nutrition Route: PO  Meal planning/preparation mainly done by: Self  Oral Care: brushes BID  Activity level: Goes to PT 2x/week and gaining strength   Lifts 2#wts overhead and curls 3# wts at PT  Strength is increasing, he was able to cut the grass this past weekend which he had not been able to do all summer  Social Hx: Retired   Lives with wife Melania Donald who cooks and helps take care of him  24 Hr Diet Recall: average:  2100kcal, 100g pro, 200CHO   Breakfast: scrambled eggs with cheese, toast with butter and jelly   Lunch:sandwich with turkey and cheese  Dinner: [employer] cheese steak   Snack: pudding     Beverages: water (8oz x2-3), tea (12oz x1), milk (8oz x1)   Supplements:    [employer] (7iso=036 kcal, 5 g pro) mixed with mixed in milk; 1x daily       Estimated Nutrition Needs: (based on 79 8kg/ 175 6# actual wt)  Energy Needs: 7142-2132 kcal/day (30-35 kcal/kg)  Protein Needs:  grams/day (1 2-1 5 g/kg)- monitor kidney function, BUN/creat elevated at this time   Fluid Needs: 1902-1701 mL/day (1 mL/kcal) 80-93oz     Discussion/Summary: Tiffani Jacobson and wife Melania Donald were contacted today for an f/u RD consultation  Tiffani Jacobson and Melania Donald are feeling sad today as they had to put their dog down this past weekend, provided emotional support  In regards to nutrition, Tiffani Jacobson reports no changes, his appetite remains fair to good, his weight is stable ~178-180# on his home scale  Melania Donald reports that Anthony's strength/energy is increasing, he was able to cut the grass this past weekend which he had not been able to do all summer  Encouraged him to continue eating well and engaging in light activity as tolerated  Will follow up x3 weeks        Discussed/Reviewed:   · the importance of weight maintenance during CA tx  · indications & use of oral nutrition supplements  · high protein foods to include at all meals & snacks  · ways to increase overall calorie intake  · encouraged eating every 2-3 hours (5-6 small meals/day)  · adequate hydation & tips to increase overall fluid intake  · MNT for: weight management  · recipe suggestions/resources  · a high calorie/high protein diet & food choices  · individualized calorie, protein and fluid daily goals  Individualized nutrition recommendations and interventions listed below  All questions and concerns addressed during todays visit  Iván Hale has RD contact information  Nutrition Diagnosis:    Increased Nutrient Needs (kcal & pro) related to increased demand for nutrients and disease state as evidenced by cancer dx and pt undergoing tx for cancer  Intervention & Recommendations:   Topics addressed: Nutrition education, Balance/Variety, Meals & Snacks, Meal planning, Choosing high protein meals/snacks, Meal pattern: eating small/frequent meals (every 2-3 hours), Nutrition Symptom Management, Adequate Hydration, Nutrition-Related Medication Management and Weight Management    Barriers: None  Readiness to change: action  Comprehension: verbalizes understanding  Expected Compliance: good    Materials Provided: not applicable  Monitoring & Evaluation:   Dietitian to Monitor: Food and Nutrition Intake, Nutrtion Impact Symptoms, Body Weight and Biochemical Data     Goals:  · weight maintenance/stabilization  · pt to meet >/=75% estimated nutrition needs daily  · Meet hydration goal: 80-93oz daily     · Progress Towards Goals: Progressing    Nutrition Rx & Recommendations:  · Diet: High Calorie, High Protein (for high calorie foods see pages 52-53, and for high protein foods see pages 49-51 in your Eating Hints book)  · Keep a daily food journal (pen/paper, martha such as [employer])  · Small, frequent meals/snacks may be easier to tolerate than 3 large daily meals  Aim for 5-6 small meals per day (every 2-3 hours)  · Include protein at all meals/snacks  · Include a variety of foods (as tolerated/allowed by diet)  · Incorporate physical activity as able/allowed  · Stay hydrated by sipping fluids of choice/tolerance throughout the day  · Alter food choices and eating patterns to accommodate changing needs    · Refer to Eating Hints book for other meal/snack ideas and symptom management  · For appetite loss: try powdered or liquid nutrition supplements; eating by the clock every 2-3 hours; set a timer to remind yourself to eat, keep snacks nearby; add extra protein & calories to your diet; drink liquids in between meals, choose liquids that add calories; eat a bedtime snack; eat soft, cool or frozen foods; eat a larger meal when you feel well & rested; only sip small amounts of liquids during meals (see pages 10-12 in your Eating Hints book)  · For constipation: drink plenty of fluids (>64 oz/day); drink hot liquids; eat high-fiber foods as tolerated (whole grains, beans, peas, nuts, seeds, fruits, vegetables, etc); increase physical activity as tolerated  Avoid increasing fiber intake too quickly, add fiber into your diet slowly; keep a record of your bowel movements (see pages 13-14 in you Eating Hints book)  · Weigh yourself regularly  If you notice weight loss, make an effort to increase your daily food/calorie intake  If you continue to notice loss after these efforts, reach out to your dietitian to establish a plan to stabilize weight  · [employer] Instant Breakfast mixed with whole milk or pudding mix 1-2x daily  · Try to increase fluid intake, goal: 80-93 oz daily       Follow Up Plan: 8/5/20 phone follow up 10:30am   Recommend Referral to Other Providers: none at this time

## 2020-09-17 ENCOUNTER — TELEPHONE (OUTPATIENT)
Dept: NUTRITION | Facility: CLINIC | Age: 75
End: 2020-09-17

## 2020-09-17 ENCOUNTER — OFFICE VISIT (OUTPATIENT)
Dept: PHYSICAL THERAPY | Facility: CLINIC | Age: 75
End: 2020-09-17
Payer: MEDICARE

## 2020-09-17 DIAGNOSIS — R53.1 LACK OF STRENGTH: ICD-10-CM

## 2020-09-17 DIAGNOSIS — R29.898 WEAKNESS OF BOTH LOWER EXTREMITIES: Primary | ICD-10-CM

## 2020-09-17 DIAGNOSIS — R53.81 PHYSICAL DECONDITIONING: ICD-10-CM

## 2020-09-17 PROCEDURE — 97110 THERAPEUTIC EXERCISES: CPT

## 2020-09-17 PROCEDURE — 97140 MANUAL THERAPY 1/> REGIONS: CPT

## 2020-09-17 PROCEDURE — 97112 NEUROMUSCULAR REEDUCATION: CPT

## 2020-09-17 NOTE — TELEPHONE ENCOUNTER
Received call from [facility] requesting to change time of Anthony's f/u nutrition appointment on 10/5/20 due to scheduling conflict  Appointment time adjusted

## 2020-09-17 NOTE — PROGRESS NOTES
PT Re-Evaluation     Today's date: 2020  Patient name: Stew Lucero  : 1945  MRN: [id number]  Referring provider: Eduin Nuñez MD  Dx:   Encounter Diagnosis     ICD-10-CM    1  Weakness of both lower extremities  R29 898    2  Physical deconditioning  R53 81    3  Lack of strength  R53 1        Start Time: 1030  Stop Time: 1115  Total time in clinic (min): 45 minutes    Assessment  Assessment details: Since starting physical therapy the patient has improved with endurance, strength and activity tolerance  Patient notes less effort with carrying groceries as well as was able to mow the lawn without taking a break  Although the patient has made improvements, he continues to be limited in endurance and strength which limits his ability to perform ADLs and the stairs  PT will continue to address the noted impairments by performing hip and trunk strengthening, stretching, balance, functional activities and manual techniques to allow the patient to return to his PLOF  PT recommended 2x/week for 4 weeks c a good prognosis 2* noted progress  Impairments: activity intolerance, impaired balance, impaired physical strength, lacks appropriate home exercise program, safety issue, poor posture  and poor body mechanics    Symptom irritability: moderateUnderstanding of Dx/Px/POC: good   Prognosis: fair    Goals  STG: In four weeks the patient will:    1  Be (I) with his HEP  (in progress)  2  Increase hip and knee strength to 4+/5 MMT score to assist c ADLs  (in progress)  3  Perform 16-20 sit to stands in 30 seconds with a JESUSITA scale of 7-8 to demonstrate improvements with strength and endurance (in progress)      LTG: In six weeks, the patient will:    1  Increase FOTO score to 69 to demonstrate improvements in symptoms and function (in progress)  2  Carry 5 grocery bags into the house with a JESUSITA scale of 7-8 (in progress)  3   Walk in the grocery store with a JESUSITA scale reading of 6-7  (in progress)  4  Increase hip and knee strength to 5/5 MMT score to assist c Prolonged activities  (in progress)      Plan  Patient would benefit from: skilled physical therapy  Referral necessary: No  Planned modality interventions: cryotherapy and thermotherapy: hydrocollator packs  Planned therapy interventions: abdominal trunk stabilization, joint mobilization, manual therapy, massage, Bee taping, neuromuscular re-education, patient education, postural training, strengthening, stretching, therapeutic activities, therapeutic exercise, transfer training, home exercise program, gait training, functional ROM exercises, flexibility, balance and body mechanics training  Frequency: 2x week  Duration in visits: 8  Duration in weeks: 4  Plan of Care beginning date: 9/17/2020  Plan of Care expiration date: 10/15/2020  Treatment plan discussed with: patient        Subjective Evaluation    History of Present Illness  Mechanism of injury: Patient noted that he feels that his strength and endurance have improved  Patient noted he was able to mow the lawn for the first time in a long time  Patient noted carrying the groceries into his house and climbing the steps has improved, however it continues to be taxing at times             Recurrent probem    Quality of life: fair    Pain  No pain reported  Aggravating factors: stair climbing, standing, walking and lifting  Progression: improved    Social Support  Steps to enter house: yes (8 BRANDI)  Stairs in house: yes (2 Steps)   Lives in: multiple-level home  Lives with: spouse    Employment status: not working (Retired )  Hand dominance: right    Patient Goals  Patient goals for therapy: increased strength, independence with ADLs/IADLs, return to sport/leisure activities, increased motion and improved balance  Patient goal: "to get more endurance to bring in the groceries without sitting down " (50% MET)        Objective     Strength/Myotome Testing     Left Hip   Planes of Motion   Flexion: 4  Extension: 4  Abduction: 4  Adduction: 4+    Right Hip   Planes of Motion   Flexion: 4  Extension: 4  Abduction: 4  Adduction: 4+    Left Knee   Flexion: 4+  Extension: 4+    Right Knee   Flexion: 4+  Extension: 4+    Left Ankle/Foot   Dorsiflexion: 4+  Plantar flexion: 4    Right Ankle/Foot   Dorsiflexion: 4+  Plantar flexion: 4    Functional Assessment        Comments  30 second sit to stand: 14 used hands, JESUSITA scale 13 (IE)    30 second sit to stand: 10 did not use hands, JESUSITA scale 13 (RE)        General Comments:      Lumbar Comments  BP at IE: 128/70 mmHg             Precautions: hx of CA, hx of HTN, Hx of PAC      Manuals 8/3 8/7 8/13 8/17 8/20 8/24 8/27 8/31 9/3 9/17   Re-eval          MW                                          Neuro Re-Ed             Sciatic nerve glides nv nv  x10 ea x10 ea x15 ea 2x10 ea x15 ea     Quad set nv x10 ea  3" hold x15 ea  3" hold x15 ea  3" hold nv 3x10  5" hold ea       Sit to stands nv nv 2x5 nv 3x3 2x5  3x5 DL squats  2x10 x10   Diaphragmatic breathing    supine  lateral resistance & 4# weight  x10 total 3x5  5# 3x5  7 5#    x5  10# x10  7 5# x15  7 5#     Standing hip abd, ext, flex      nv 2x10 ea 2x10 ea 2x10 ea    Step ups (fwd and lateral)          lvl 0  2x10 ea                Ther Ex             X-ride nv 5' 5' 10' 10' 10' 10' 10' 13' 10'   Lower trunk rotations nv 5x15" ea 5x15" ea 5x15" ea 5x15" ea 5x15"  5x15" ea 5x15" ea     glute squeeze nv 2x10  5" hold 2x10  5" hold bridge  2x10 2x10 2x10 x25 3x10 3x10 2x10   Hip add nv 2x10  5" hold 3x10 5" hold 3x10  5" hold 3x10  5" hold 3x10  5" hold 3x10  5" hold 3x10  5" hold 3x10  5" hold 3x10  5" hold   Hip abd nv 2x10  5" hold  GTB 3x10 5" hold GTB nv 3x10  5" hold  GTB 3x10  5" hold  GTB 3x10  5" hold  BTB 3x10  5" hold  BTB 3x10  5" hold  BTB 3x10  5" hold  BTB   LAQ  2x10 3" hold 2x10 3" hold 2x10  3" hold 2x10  3" hold  2# 3x10  3" hold  2# 3x10  3" hold  2# 3x10  3" hold  3# 3x10  3" hold  3#    Seated marching  2x10 ea 2x10 ea 2x10 ea 2x10 ea  2# 3x10  Ea  2# 3x10  Ea  2# 3x10 ea  3# 3x10 ea  3#                 Ther Activity                                       Gait Training                                       Modalities

## 2020-09-21 ENCOUNTER — OFFICE VISIT (OUTPATIENT)
Dept: PHYSICAL THERAPY | Facility: CLINIC | Age: 75
End: 2020-09-21
Payer: MEDICARE

## 2020-09-21 DIAGNOSIS — R53.1 LACK OF STRENGTH: ICD-10-CM

## 2020-09-21 DIAGNOSIS — R53.81 PHYSICAL DECONDITIONING: ICD-10-CM

## 2020-09-21 DIAGNOSIS — R29.898 WEAKNESS OF BOTH LOWER EXTREMITIES: Primary | ICD-10-CM

## 2020-09-21 PROCEDURE — 97110 THERAPEUTIC EXERCISES: CPT

## 2020-09-21 PROCEDURE — 97112 NEUROMUSCULAR REEDUCATION: CPT

## 2020-09-21 NOTE — PROGRESS NOTES
Daily Note     Today's date: 2020  Patient name: Allison Guzman  : 1945  MRN: [id number]  Referring provider: Katlin Rob MD  Dx:   Encounter Diagnosis     ICD-10-CM    1  Weakness of both lower extremities  R29 898    2  Physical deconditioning  R53 81    3  Lack of strength  R53 1        Start Time: 1030  Stop Time: 1115  Total time in clinic (min): 45 minutes    Subjective: Patient noted he feels okay today  Objective: See treatment diary below      Assessment: PT introduced standing marching and heel raises to assist c stairs at home  Patient progressed with the stairs 2* increased height and no LOB  Patient noted some fatigue throughout the session, however after rest it decreased  Patient would benefit from continued PT to allow the patient to return to his PLOF  Plan: Continue per plan of care        Precautions: hx of CA, hx of HTN, Hx of PAC      Manuals 9/21 8/7 8/13 8/17 8/20 8/24 8/27 8/31 9/3 9/17   Re-eval          MW                                          Neuro Re-Ed             Sciatic nerve glides  nv  x10 ea x10 ea x15 ea 2x10 ea x15 ea     Quad set  x10 ea  3" hold x15 ea  3" hold x15 ea  3" hold nv 3x10  5" hold ea       DL squats 2x10 nv 2x5 nv 3x3 2x5  3x5 DL squats  2x10 x10   Diaphragmatic breathing    supine  lateral resistance & 4# weight  x10 total 3x5  5# 3x5  7 5#    x5  10# x10  7 5# x15  7 5#     Standing hip abd, ext, flex 2x10 ea     nv 2x10 ea 2x10 ea 2x10 ea    Step ups (fwd and lateral) lvl 1  2x10 ea         lvl 0  2x10 ea                Ther Ex             X-ride 10' 5' 5' 10' 10' 10' 10' 10' 13' 10'   Lower trunk rotations nv 5x15" ea 5x15" ea 5x15" ea 5x15" ea 5x15"  5x15" ea 5x15" ea     Bridge 2x10 2x10  5" hold 2x10  5" hold bridge  2x10 2x10 2x10 x25 3x10 3x10 2x10   Hip add 3x10  5" hold 2x10  5" hold 3x10 5" hold 3x10  5" hold 3x10  5" hold 3x10  5" hold 3x10  5" hold 3x10  5" hold 3x10  5" hold 3x10  5" hold   Hip abd  2x10  5" hold  GTB 3x10 5" hold GTB nv 3x10  5" hold  GTB 3x10  5" hold  GTB 3x10  5" hold  BTB 3x10  5" hold  BTB 3x10  5" hold  BTB 3x10  5" hold  BTB   LAQ  2x10 3" hold 2x10 3" hold 2x10  3" hold 2x10  3" hold  2# 3x10  3" hold  2# 3x10  3" hold  2# 3x10  3" hold  3# 3x10  3" hold  3#    Seated marching  2x10 ea 2x10 ea 2x10 ea 2x10 ea  2# 3x10  Ea  2# 3x10  Ea  2# 3x10 ea  3# 3x10 ea  3#    Standing marching 2x10 ea            Heelraises 2x10            Ther Activity                                       Gait Training                                       Modalities

## 2020-09-24 ENCOUNTER — OFFICE VISIT (OUTPATIENT)
Dept: PHYSICAL THERAPY | Facility: CLINIC | Age: 75
End: 2020-09-24
Payer: MEDICARE

## 2020-09-24 DIAGNOSIS — R53.81 PHYSICAL DECONDITIONING: ICD-10-CM

## 2020-09-24 DIAGNOSIS — R29.898 WEAKNESS OF BOTH LOWER EXTREMITIES: Primary | ICD-10-CM

## 2020-09-24 DIAGNOSIS — R53.1 LACK OF STRENGTH: ICD-10-CM

## 2020-09-24 PROCEDURE — 97112 NEUROMUSCULAR REEDUCATION: CPT

## 2020-09-24 PROCEDURE — 97110 THERAPEUTIC EXERCISES: CPT

## 2020-09-24 NOTE — PROGRESS NOTES
Daily Note     Today's date: 2020  Patient name: Terrance Guevara  : 1945  MRN: [id number]  Referring provider: Karla Hodges MD  Dx:   Encounter Diagnosis     ICD-10-CM    1  Weakness of both lower extremities  R29 898    2  Physical deconditioning  R53 81    3  Lack of strength  R53 1        Start Time: 1030  Stop Time: 1115  Total time in clinic (min): 45 minutes    Subjective: Patient noted he is tired today  Patient noted he did not sleep last night  Objective: See treatment diary below      Assessment: Exercises were limited 2* fatigue from not sleeping  Patient required min VCs for form throughout the session  Patient noted no pain throughout the session  Patient would benefit from continued PT to allow the patient to return to his PLOF  Plan: Continue per plan of care        Precautions: hx of CA, hx of HTN, Hx of PAC      Manuals 9/21 9/24 8/13 8/17 8/20 8/24 8/27 8/31 9/3 9/17   Re-eval          MW                                          Neuro Re-Ed             Sciatic nerve glides    x10 ea x10 ea x15 ea 2x10 ea x15 ea     Quad set   x15 ea  3" hold x15 ea  3" hold nv 3x10  5" hold ea       DL squats 2x10 2x10 2x5 nv 3x3 2x5  3x5 DL squats  2x10 x10   Diaphragmatic breathing    supine  lateral resistance & 4# weight  x10 total 3x5  5# 3x5  7 5#    x5  10# x10  7 5# x15  7 5#     Standing hip abd, ext, flex 2x10 ea 2x10 ea    nv 2x10 ea 2x10 ea 2x10 ea    Step ups (fwd and lateral) lvl 1  2x10 ea         lvl 0  2x10 ea                Ther Ex             X-ride 10' 10' 5' 10' 10' 10' 10' 10' 13' 10'   Lower trunk rotations nv  5x15" ea 5x15" ea 5x15" ea 5x15"  5x15" ea 5x15" ea     Bridge 2x10 2x10  5" hold 2x10  5" hold bridge  2x10 2x10 2x10 x25 3x10 3x10 2x10   Hip add 3x10  5" hold 3x10  5" hold 3x10 5" hold 3x10  5" hold 3x10  5" hold 3x10  5" hold 3x10  5" hold 3x10  5" hold 3x10  5" hold 3x10  5" hold   Hip abd   3x10 5" hold GTB nv 3x10  5" hold  GTB 3x10  5" hold  GTB 3x10  5" hold  BTB 3x10  5" hold  BTB 3x10  5" hold  BTB 3x10  5" hold  BTB   LAQ  3x10   3# ea 2x10 3" hold 2x10  3" hold 2x10  3" hold  2# 3x10  3" hold  2# 3x10  3" hold  2# 3x10  3" hold  3# 3x10  3" hold  3#    Seated marching  3x10 ea  3# 2x10 ea 2x10 ea 2x10 ea  2# 3x10  Ea  2# 3x10  Ea  2# 3x10 ea  3# 3x10 ea  3#    Standing marching 2x10 ea 3x10 ea           Heelraises 2x10 2x10           Ther Activity                                       Gait Training                                       Modalities

## 2020-09-28 ENCOUNTER — OFFICE VISIT (OUTPATIENT)
Dept: PHYSICAL THERAPY | Facility: CLINIC | Age: 75
End: 2020-09-28
Payer: MEDICARE

## 2020-09-28 DIAGNOSIS — R53.81 PHYSICAL DECONDITIONING: ICD-10-CM

## 2020-09-28 DIAGNOSIS — R53.1 LACK OF STRENGTH: ICD-10-CM

## 2020-09-28 DIAGNOSIS — R29.898 WEAKNESS OF BOTH LOWER EXTREMITIES: Primary | ICD-10-CM

## 2020-09-28 PROCEDURE — 97112 NEUROMUSCULAR REEDUCATION: CPT

## 2020-09-28 PROCEDURE — 97110 THERAPEUTIC EXERCISES: CPT

## 2020-09-28 NOTE — PROGRESS NOTES
Daily Note     Today's date: 2020  Patient name: Lilly Staton  : 1945  MRN: [id number]  Referring provider: Amy Shaw MD  Dx:   Encounter Diagnosis     ICD-10-CM    1  Weakness of both lower extremities  R29 898    2  Physical deconditioning  R53 81    3  Lack of strength  R53 1        Start Time: 1030  Stop Time: 1110  Total time in clinic (min): 40 minutes    Subjective: Patient noted he feels good today  Objective: See treatment diary below      Assessment: PT session focused on stepping over hurdles to navigate his home and around a new potential puppy  Due to the patient's weakness in the R LE, patient has increased difficulty with stepping over over a 12" ephraim with the L LE  With cues and breaking down the task, the patient was able to step over a 12" ephraim leading with the L LE in a straight line  Patient's endurance continues to improve due to less breaks  Patient would benefit from continued PT to allow the patient to return to his PLOF  Plan: Continue per plan of care        Precautions: hx of CA, hx of HTN, Hx of PAC      Manuals 9/21 9/24 9/28 8/17 8/20 8/24 8/27 8/31 9/3 9/17   Re-eval          MW                                          Neuro Re-Ed             Sciatic nerve glides    x10 ea x10 ea x15 ea 2x10 ea x15 ea     Quad set    x15 ea  3" hold nv 3x10  5" hold ea       DL squats 2x10 2x10  nv 3x3 2x5  3x5 DL squats  2x10 x10   Diaphragmatic breathing    supine  lateral resistance & 4# weight  x10 total 3x5  5# 3x5  7 5#    x5  10# x10  7 5# x15  7 5#     Standing hip abd, ext, flex 2x10 ea 2x10 ea    nv 2x10 ea 2x10 ea 2x10 ea    Step ups (fwd and lateral) lvl 1  2x10 ea         lvl 0  2x10 ea   Hurdles 12" and 6"   6 laps          12" ephraim taps   2x10 ea          Ther Ex             X-ride 10' 10' 10' 10' 10' 10' 10' 10' 13' 10'   Lower trunk rotations nv   5x15" ea 5x15" ea 5x15"  5x15" ea 5x15" ea     Bridge 2x10 2x10  5" hold 3x10  5" hold bridge  2x10 2x10 2x10 x25 3x10 3x10 2x10   Hip add 3x10  5" hold 3x10  5" hold 3x10 5" hold 3x10  5" hold 3x10  5" hold 3x10  5" hold 3x10  5" hold 3x10  5" hold 3x10  5" hold 3x10  5" hold   Hip abd    nv 3x10  5" hold  GTB 3x10  5" hold  GTB 3x10  5" hold  BTB 3x10  5" hold  BTB 3x10  5" hold  BTB 3x10  5" hold  BTB   LAQ  3x10   3# ea 3x10 3# ea 2x10  3" hold 2x10  3" hold  2# 3x10  3" hold  2# 3x10  3" hold  2# 3x10  3" hold  3# 3x10  3" hold  3#    Seated marching  3x10 ea  3# 3x10 ea  3# 2x10 ea 2x10 ea  2# 3x10  Ea  2# 3x10  Ea  2# 3x10 ea  3# 3x10 ea  3#    Standing marching 2x10 ea 3x10 ea           Heelraises 2x10 2x10           Ther Activity                                       Gait Training                                       Modalities

## 2020-10-01 ENCOUNTER — OFFICE VISIT (OUTPATIENT)
Dept: PHYSICAL THERAPY | Facility: CLINIC | Age: 75
End: 2020-10-01
Payer: MEDICARE

## 2020-10-01 DIAGNOSIS — R53.81 PHYSICAL DECONDITIONING: ICD-10-CM

## 2020-10-01 DIAGNOSIS — R29.898 WEAKNESS OF BOTH LOWER EXTREMITIES: Primary | ICD-10-CM

## 2020-10-01 DIAGNOSIS — R53.1 LACK OF STRENGTH: ICD-10-CM

## 2020-10-01 PROCEDURE — 97112 NEUROMUSCULAR REEDUCATION: CPT

## 2020-10-01 PROCEDURE — 97110 THERAPEUTIC EXERCISES: CPT

## 2020-10-05 ENCOUNTER — NUTRITION (OUTPATIENT)
Dept: NUTRITION | Facility: CLINIC | Age: 75
End: 2020-10-05

## 2020-10-05 DIAGNOSIS — Z71.3 NUTRITIONAL COUNSELING: Primary | ICD-10-CM

## 2020-10-08 ENCOUNTER — OFFICE VISIT (OUTPATIENT)
Dept: PHYSICAL THERAPY | Facility: CLINIC | Age: 75
End: 2020-10-08
Payer: MEDICARE

## 2020-10-08 DIAGNOSIS — R53.81 PHYSICAL DECONDITIONING: ICD-10-CM

## 2020-10-08 DIAGNOSIS — R53.1 LACK OF STRENGTH: ICD-10-CM

## 2020-10-08 DIAGNOSIS — R29.898 WEAKNESS OF BOTH LOWER EXTREMITIES: Primary | ICD-10-CM

## 2020-10-08 PROCEDURE — 97110 THERAPEUTIC EXERCISES: CPT

## 2020-10-08 PROCEDURE — 97112 NEUROMUSCULAR REEDUCATION: CPT

## 2020-10-12 ENCOUNTER — TELEPHONE (OUTPATIENT)
Dept: HEMATOLOGY ONCOLOGY | Facility: CLINIC | Age: 75
End: 2020-10-12

## 2020-10-15 ENCOUNTER — HOSPITAL ENCOUNTER (OUTPATIENT)
Dept: INFUSION CENTER | Facility: CLINIC | Age: 75
Discharge: HOME | End: 2020-10-15
Payer: MEDICARE

## 2020-10-15 VITALS
RESPIRATION RATE: 18 BRPM | TEMPERATURE: 97.1 F | DIASTOLIC BLOOD PRESSURE: 74 MMHG | HEART RATE: 101 BPM | SYSTOLIC BLOOD PRESSURE: 153 MMHG

## 2020-10-15 DIAGNOSIS — C78.7 LIVER METASTASIS (HCC): ICD-10-CM

## 2020-10-15 DIAGNOSIS — C78.00 MALIGNANT NEOPLASM METASTATIC TO LUNG, UNSPECIFIED LATERALITY (HCC): ICD-10-CM

## 2020-10-15 DIAGNOSIS — Z17.0 MALIGNANT NEOPLASM INVOLVING BOTH NIPPLE AND AREOLA OF LEFT BREAST IN MALE, ESTROGEN RECEPTOR POSITIVE (HCC): ICD-10-CM

## 2020-10-15 DIAGNOSIS — C79.51 OSSEOUS METASTASIS (HCC): Primary | ICD-10-CM

## 2020-10-15 DIAGNOSIS — C50.022 MALIGNANT NEOPLASM INVOLVING BOTH NIPPLE AND AREOLA OF LEFT BREAST IN MALE, ESTROGEN RECEPTOR POSITIVE (HCC): ICD-10-CM

## 2020-10-15 PROCEDURE — 96402 CHEMO HORMON ANTINEOPL SQ/IM: CPT

## 2020-10-15 PROCEDURE — 96401 CHEMO ANTI-NEOPL SQ/IM: CPT

## 2020-10-15 RX ADMIN — LEUPROLIDE ACETATE 22.5 MG: KIT SUBCUTANEOUS at 12:03

## 2020-10-15 RX ADMIN — DENOSUMAB 120 MG: 120 INJECTION SUBCUTANEOUS at 11:59

## 2020-10-16 ENCOUNTER — OFFICE VISIT (OUTPATIENT)
Dept: PHYSICAL THERAPY | Facility: CLINIC | Age: 75
End: 2020-10-16
Payer: MEDICARE

## 2020-10-16 DIAGNOSIS — R29.898 WEAKNESS OF BOTH LOWER EXTREMITIES: Primary | ICD-10-CM

## 2020-10-16 DIAGNOSIS — R53.81 PHYSICAL DECONDITIONING: ICD-10-CM

## 2020-10-16 DIAGNOSIS — R53.1 LACK OF STRENGTH: ICD-10-CM

## 2020-10-16 PROCEDURE — 97112 NEUROMUSCULAR REEDUCATION: CPT

## 2020-10-16 PROCEDURE — 97110 THERAPEUTIC EXERCISES: CPT

## 2020-10-22 ENCOUNTER — OFFICE VISIT (OUTPATIENT)
Dept: PHYSICAL THERAPY | Facility: CLINIC | Age: 75
End: 2020-10-22
Payer: MEDICARE

## 2020-10-22 DIAGNOSIS — R53.81 PHYSICAL DECONDITIONING: ICD-10-CM

## 2020-10-22 DIAGNOSIS — R29.898 WEAKNESS OF BOTH LOWER EXTREMITIES: Primary | ICD-10-CM

## 2020-10-22 DIAGNOSIS — R53.1 LACK OF STRENGTH: ICD-10-CM

## 2020-10-22 PROCEDURE — 97112 NEUROMUSCULAR REEDUCATION: CPT

## 2020-10-22 PROCEDURE — 97110 THERAPEUTIC EXERCISES: CPT

## 2020-10-26 ENCOUNTER — NUTRITION (OUTPATIENT)
Dept: NUTRITION | Facility: CLINIC | Age: 75
End: 2020-10-26

## 2020-10-26 DIAGNOSIS — Z71.3 NUTRITIONAL COUNSELING: Primary | ICD-10-CM

## 2020-10-28 ENCOUNTER — APPOINTMENT (OUTPATIENT)
Dept: URGENT CARE | Facility: MEDICAL CENTER | Age: 75
End: 2020-10-28
Payer: MEDICARE

## 2020-10-28 DIAGNOSIS — N18.9 CHRONIC RENAL IMPAIRMENT, UNSPECIFIED CKD STAGE: ICD-10-CM

## 2020-10-28 LAB
ANION GAP SERPL CALCULATED.3IONS-SCNC: 4 MMOL/L (ref 4–13)
BUN SERPL-MCNC: 40 MG/DL (ref 5–25)
CALCIUM SERPL-MCNC: 9.2 MG/DL (ref 8.3–10.1)
CHLORIDE SERPL-SCNC: 105 MMOL/L (ref 100–108)
CO2 SERPL-SCNC: 30 MMOL/L (ref 21–32)
CREAT SERPL-MCNC: 1.72 MG/DL (ref 0.6–1.3)
GFR SERPL CREATININE-BSD FRML MDRD: 38 ML/MIN/1.73SQ M
GLUCOSE SERPL-MCNC: 92 MG/DL (ref 65–140)
POTASSIUM SERPL-SCNC: 4.5 MMOL/L (ref 3.5–5.3)
SODIUM SERPL-SCNC: 139 MMOL/L (ref 136–145)

## 2020-10-28 PROCEDURE — 80048 BASIC METABOLIC PNL TOTAL CA: CPT

## 2020-10-28 PROCEDURE — 36415 COLL VENOUS BLD VENIPUNCTURE: CPT

## 2020-10-29 ENCOUNTER — APPOINTMENT (OUTPATIENT)
Dept: PHYSICAL THERAPY | Facility: CLINIC | Age: 75
End: 2020-10-29
Payer: MEDICARE

## 2020-11-03 ENCOUNTER — TELEPHONE (OUTPATIENT)
Dept: HEMATOLOGY ONCOLOGY | Facility: CLINIC | Age: 75
End: 2020-11-03

## 2020-11-03 DIAGNOSIS — Z17.0 MALIGNANT NEOPLASM INVOLVING BOTH NIPPLE AND AREOLA OF LEFT BREAST IN MALE, ESTROGEN RECEPTOR POSITIVE (HCC): ICD-10-CM

## 2020-11-03 DIAGNOSIS — C50.022 MALIGNANT NEOPLASM INVOLVING BOTH NIPPLE AND AREOLA OF LEFT BREAST IN MALE, ESTROGEN RECEPTOR POSITIVE (HCC): ICD-10-CM

## 2020-11-04 PROBLEM — N17.9 ARF (ACUTE RENAL FAILURE) (HCC): Status: RESOLVED | Noted: 2020-04-29 | Resolved: 2020-01-01

## 2021-01-01 ENCOUNTER — TELEPHONE (OUTPATIENT)
Dept: HEMATOLOGY ONCOLOGY | Facility: CLINIC | Age: 76
End: 2021-01-01

## 2021-01-01 ENCOUNTER — APPOINTMENT (EMERGENCY)
Dept: CT IMAGING | Facility: HOSPITAL | Age: 76
DRG: 436 | End: 2021-01-01
Payer: MEDICARE

## 2021-01-01 ENCOUNTER — LAB (OUTPATIENT)
Dept: LAB | Facility: MEDICAL CENTER | Age: 76
End: 2021-01-01
Payer: MEDICARE

## 2021-01-01 ENCOUNTER — HOSPITAL ENCOUNTER (OUTPATIENT)
Dept: INFUSION CENTER | Facility: CLINIC | Age: 76
Discharge: HOME | End: 2021-01-07
Payer: MEDICARE

## 2021-01-01 ENCOUNTER — TELEPHONE (OUTPATIENT)
Dept: NUTRITION | Facility: CLINIC | Age: 76
End: 2021-01-01

## 2021-01-01 ENCOUNTER — PATIENT OUTREACH (OUTPATIENT)
Dept: CASE MANAGEMENT | Facility: OTHER | Age: 76
End: 2021-01-01

## 2021-01-01 ENCOUNTER — NUTRITION (OUTPATIENT)
Dept: NUTRITION | Facility: CLINIC | Age: 76
End: 2021-01-01

## 2021-01-01 ENCOUNTER — HOSPITAL ENCOUNTER (OUTPATIENT)
Dept: INFUSION CENTER | Facility: CLINIC | Age: 76
Discharge: HOME | End: 2021-09-20
Payer: MEDICARE

## 2021-01-01 ENCOUNTER — APPOINTMENT (OUTPATIENT)
Dept: LAB | Facility: MEDICAL CENTER | Age: 76
End: 2021-01-01
Payer: MEDICARE

## 2021-01-01 ENCOUNTER — APPOINTMENT (OUTPATIENT)
Dept: LAB | Facility: MEDICAL CENTER | Age: 76
DRG: 683 | End: 2021-01-01
Payer: MEDICARE

## 2021-01-01 ENCOUNTER — HOSPITAL ENCOUNTER (OUTPATIENT)
Dept: INFUSION CENTER | Facility: HOSPITAL | Age: 76
Discharge: HOME | End: 2021-10-21
Attending: INTERNAL MEDICINE

## 2021-01-01 ENCOUNTER — DOCUMENTATION (OUTPATIENT)
Dept: HEMATOLOGY ONCOLOGY | Facility: CLINIC | Age: 76
End: 2021-01-01

## 2021-01-01 ENCOUNTER — TELEPHONE (OUTPATIENT)
Dept: NEPHROLOGY | Facility: CLINIC | Age: 76
End: 2021-01-01

## 2021-01-01 ENCOUNTER — TRANSITIONAL CARE MANAGEMENT (OUTPATIENT)
Dept: FAMILY MEDICINE CLINIC | Facility: CLINIC | Age: 76
End: 2021-01-01

## 2021-01-01 ENCOUNTER — TELEPHONE (OUTPATIENT)
Dept: CARDIAC SURGERY | Facility: CLINIC | Age: 76
End: 2021-01-01

## 2021-01-01 ENCOUNTER — APPOINTMENT (EMERGENCY)
Dept: RADIOLOGY | Facility: HOSPITAL | Age: 76
DRG: 436 | End: 2021-01-01
Payer: MEDICARE

## 2021-01-01 ENCOUNTER — HOSPITAL ENCOUNTER (OUTPATIENT)
Dept: CT IMAGING | Facility: HOSPITAL | Age: 76
Discharge: HOME | End: 2021-10-08
Attending: INTERNAL MEDICINE
Payer: MEDICARE

## 2021-01-01 ENCOUNTER — HOSPITAL ENCOUNTER (OUTPATIENT)
Dept: INFUSION CENTER | Facility: CLINIC | Age: 76
End: 2021-01-01

## 2021-01-01 ENCOUNTER — OFFICE VISIT (OUTPATIENT)
Dept: HEMATOLOGY ONCOLOGY | Facility: CLINIC | Age: 76
End: 2021-01-01
Payer: MEDICARE

## 2021-01-01 ENCOUNTER — TELEPHONE (OUTPATIENT)
Dept: SURGICAL ONCOLOGY | Facility: CLINIC | Age: 76
End: 2021-01-01

## 2021-01-01 ENCOUNTER — HOSPITAL ENCOUNTER (INPATIENT)
Facility: HOSPITAL | Age: 76
LOS: 3 days | Discharge: HOME | DRG: 683 | End: 2021-10-22
Attending: EMERGENCY MEDICINE | Admitting: STUDENT IN AN ORGANIZED HEALTH CARE EDUCATION/TRAINING PROGRAM
Payer: MEDICARE

## 2021-01-01 ENCOUNTER — HOSPITAL ENCOUNTER (INPATIENT)
Facility: HOSPITAL | Age: 76
LOS: 2 days | DRG: 436 | End: 2021-11-04
Attending: EMERGENCY MEDICINE | Admitting: STUDENT IN AN ORGANIZED HEALTH CARE EDUCATION/TRAINING PROGRAM
Payer: MEDICARE

## 2021-01-01 ENCOUNTER — TELEPHONE (OUTPATIENT)
Dept: OTHER | Facility: OTHER | Age: 76
End: 2021-01-01

## 2021-01-01 ENCOUNTER — HOSPITAL ENCOUNTER (OUTPATIENT)
Dept: INFUSION CENTER | Facility: CLINIC | Age: 76
Discharge: HOME | End: 2021-10-28
Payer: MEDICARE

## 2021-01-01 ENCOUNTER — APPOINTMENT (EMERGENCY)
Dept: RADIOLOGY | Facility: HOSPITAL | Age: 76
DRG: 683 | End: 2021-01-01
Payer: MEDICARE

## 2021-01-01 ENCOUNTER — HOSPITAL ENCOUNTER (OUTPATIENT)
Dept: INFUSION CENTER | Facility: CLINIC | Age: 76
Discharge: HOME | End: 2021-06-28
Payer: MEDICARE

## 2021-01-01 ENCOUNTER — OFFICE VISIT (OUTPATIENT)
Dept: NEPHROLOGY | Facility: CLINIC | Age: 76
End: 2021-01-01
Payer: MEDICARE

## 2021-01-01 ENCOUNTER — HOSPITAL ENCOUNTER (OUTPATIENT)
Dept: INFUSION CENTER | Facility: CLINIC | Age: 76
Discharge: HOME | End: 2021-04-05
Payer: MEDICARE

## 2021-01-01 VITALS
RESPIRATION RATE: 18 BRPM | TEMPERATURE: 98.5 F | SYSTOLIC BLOOD PRESSURE: 160 MMHG | DIASTOLIC BLOOD PRESSURE: 75 MMHG | HEIGHT: 65 IN | WEIGHT: 196.65 LBS | BODY MASS INDEX: 32.76 KG/M2 | HEART RATE: 89 BPM

## 2021-01-01 VITALS
HEART RATE: 96 BPM | HEIGHT: 65 IN | WEIGHT: 184.4 LBS | DIASTOLIC BLOOD PRESSURE: 72 MMHG | TEMPERATURE: 98.5 F | BODY MASS INDEX: 30.72 KG/M2 | SYSTOLIC BLOOD PRESSURE: 118 MMHG | RESPIRATION RATE: 18 BRPM | OXYGEN SATURATION: 95 %

## 2021-01-01 VITALS
WEIGHT: 190.48 LBS | HEART RATE: 88 BPM | HEIGHT: 66 IN | SYSTOLIC BLOOD PRESSURE: 128 MMHG | BODY MASS INDEX: 30.61 KG/M2 | DIASTOLIC BLOOD PRESSURE: 76 MMHG

## 2021-01-01 VITALS
HEART RATE: 101 BPM | WEIGHT: 189 LBS | OXYGEN SATURATION: 95 % | RESPIRATION RATE: 18 BRPM | HEIGHT: 65 IN | SYSTOLIC BLOOD PRESSURE: 138 MMHG | DIASTOLIC BLOOD PRESSURE: 72 MMHG | BODY MASS INDEX: 31.49 KG/M2 | TEMPERATURE: 97.7 F

## 2021-01-01 VITALS
HEIGHT: 65 IN | SYSTOLIC BLOOD PRESSURE: 136 MMHG | RESPIRATION RATE: 18 BRPM | WEIGHT: 197 LBS | DIASTOLIC BLOOD PRESSURE: 64 MMHG | BODY MASS INDEX: 32.82 KG/M2 | HEART RATE: 70 BPM

## 2021-01-01 VITALS
RESPIRATION RATE: 18 BRPM | WEIGHT: 191.8 LBS | SYSTOLIC BLOOD PRESSURE: 139 MMHG | BODY MASS INDEX: 31.96 KG/M2 | HEIGHT: 65 IN | DIASTOLIC BLOOD PRESSURE: 69 MMHG | TEMPERATURE: 97.8 F | HEART RATE: 86 BPM

## 2021-01-01 VITALS
OXYGEN SATURATION: 89 % | SYSTOLIC BLOOD PRESSURE: 83 MMHG | HEART RATE: 139 BPM | TEMPERATURE: 101 F | WEIGHT: 213.19 LBS | RESPIRATION RATE: 22 BRPM | BODY MASS INDEX: 34.26 KG/M2 | HEIGHT: 66 IN | DIASTOLIC BLOOD PRESSURE: 41 MMHG

## 2021-01-01 VITALS
DIASTOLIC BLOOD PRESSURE: 70 MMHG | HEART RATE: 89 BPM | BODY MASS INDEX: 33.05 KG/M2 | TEMPERATURE: 96.6 F | WEIGHT: 198.4 LBS | HEIGHT: 65 IN | OXYGEN SATURATION: 97 % | RESPIRATION RATE: 20 BRPM | SYSTOLIC BLOOD PRESSURE: 142 MMHG

## 2021-01-01 VITALS
DIASTOLIC BLOOD PRESSURE: 76 MMHG | BODY MASS INDEX: 33.2 KG/M2 | RESPIRATION RATE: 18 BRPM | HEART RATE: 103 BPM | WEIGHT: 199.52 LBS | SYSTOLIC BLOOD PRESSURE: 133 MMHG | TEMPERATURE: 98.5 F

## 2021-01-01 VITALS
DIASTOLIC BLOOD PRESSURE: 78 MMHG | SYSTOLIC BLOOD PRESSURE: 162 MMHG | TEMPERATURE: 97.6 F | WEIGHT: 198.8 LBS | BODY MASS INDEX: 33.12 KG/M2 | RESPIRATION RATE: 18 BRPM | OXYGEN SATURATION: 97 % | HEART RATE: 108 BPM | HEIGHT: 65 IN

## 2021-01-01 VITALS
DIASTOLIC BLOOD PRESSURE: 74 MMHG | TEMPERATURE: 98.1 F | SYSTOLIC BLOOD PRESSURE: 119 MMHG | HEART RATE: 98 BPM | RESPIRATION RATE: 18 BRPM

## 2021-01-01 VITALS
TEMPERATURE: 98.7 F | DIASTOLIC BLOOD PRESSURE: 66 MMHG | OXYGEN SATURATION: 95 % | SYSTOLIC BLOOD PRESSURE: 132 MMHG | WEIGHT: 180.12 LBS | RESPIRATION RATE: 18 BRPM | BODY MASS INDEX: 28.95 KG/M2 | HEIGHT: 66 IN | HEART RATE: 103 BPM

## 2021-01-01 DIAGNOSIS — C79.51 OSSEOUS METASTASIS (HCC): ICD-10-CM

## 2021-01-01 DIAGNOSIS — Z17.0 MALIGNANT NEOPLASM INVOLVING BOTH NIPPLE AND AREOLA OF LEFT BREAST IN MALE, ESTROGEN RECEPTOR POSITIVE (HCC): ICD-10-CM

## 2021-01-01 DIAGNOSIS — C50.022 MALIGNANT NEOPLASM INVOLVING BOTH NIPPLE AND AREOLA OF LEFT BREAST IN MALE, ESTROGEN RECEPTOR POSITIVE (HCC): ICD-10-CM

## 2021-01-01 DIAGNOSIS — Z71.3 NUTRITIONAL COUNSELING: Primary | ICD-10-CM

## 2021-01-01 DIAGNOSIS — R05.9 COUGH IN ADULT PATIENT: ICD-10-CM

## 2021-01-01 DIAGNOSIS — Z78.9 NEED FOR FOLLOW-UP BY SOCIAL WORKER: Primary | ICD-10-CM

## 2021-01-01 DIAGNOSIS — C78.7 LIVER METASTASIS (HCC): ICD-10-CM

## 2021-01-01 DIAGNOSIS — C79.51 OSSEOUS METASTASIS (HCC): Primary | ICD-10-CM

## 2021-01-01 DIAGNOSIS — Z23 ENCOUNTER FOR IMMUNIZATION: ICD-10-CM

## 2021-01-01 DIAGNOSIS — C78.00 MALIGNANT NEOPLASM METASTATIC TO LUNG, UNSPECIFIED LATERALITY (HCC): ICD-10-CM

## 2021-01-01 DIAGNOSIS — T45.1X5A CHEMOTHERAPY-INDUCED NAUSEA: Primary | ICD-10-CM

## 2021-01-01 DIAGNOSIS — N18.9 CRI (CHRONIC RENAL INSUFFICIENCY): ICD-10-CM

## 2021-01-01 DIAGNOSIS — N18.9 CHRONIC RENAL IMPAIRMENT, UNSPECIFIED CKD STAGE: Primary | ICD-10-CM

## 2021-01-01 DIAGNOSIS — R06.00 DYSPNEA ON EXERTION: Primary | ICD-10-CM

## 2021-01-01 DIAGNOSIS — R74.01 TRANSAMINITIS: ICD-10-CM

## 2021-01-01 DIAGNOSIS — C50.022 MALIGNANT NEOPLASM INVOLVING BOTH NIPPLE AND AREOLA OF LEFT BREAST IN MALE, ESTROGEN RECEPTOR POSITIVE (HCC): Primary | ICD-10-CM

## 2021-01-01 DIAGNOSIS — R10.9 ABDOMINAL PAIN, UNSPECIFIED ABDOMINAL LOCATION: ICD-10-CM

## 2021-01-01 DIAGNOSIS — N18.9 CHRONIC RENAL IMPAIRMENT, UNSPECIFIED CKD STAGE: ICD-10-CM

## 2021-01-01 DIAGNOSIS — C78.7 LIVER METASTASIS (HCC): Primary | ICD-10-CM

## 2021-01-01 DIAGNOSIS — Z17.0 MALIGNANT NEOPLASM INVOLVING BOTH NIPPLE AND AREOLA OF LEFT BREAST IN MALE, ESTROGEN RECEPTOR POSITIVE (HCC): Primary | ICD-10-CM

## 2021-01-01 DIAGNOSIS — C50.919 METASTATIC BREAST CANCER (HCC): ICD-10-CM

## 2021-01-01 DIAGNOSIS — R79.89 LFT ELEVATION: ICD-10-CM

## 2021-01-01 DIAGNOSIS — N18.9 RENAL FAILURE (ARF), ACUTE ON CHRONIC (HCC): ICD-10-CM

## 2021-01-01 DIAGNOSIS — C78.00 MALIGNANT NEOPLASM METASTATIC TO LUNG, UNSPECIFIED LATERALITY (HCC): Primary | ICD-10-CM

## 2021-01-01 DIAGNOSIS — R11.0 CHEMOTHERAPY-INDUCED NAUSEA: Primary | ICD-10-CM

## 2021-01-01 DIAGNOSIS — K52.9 COLITIS: ICD-10-CM

## 2021-01-01 DIAGNOSIS — N17.9 RENAL FAILURE (ARF), ACUTE ON CHRONIC (HCC): ICD-10-CM

## 2021-01-01 DIAGNOSIS — R79.89 LFTS ABNORMAL: ICD-10-CM

## 2021-01-01 LAB
ALBUMIN SERPL BCP-MCNC: 2.2 G/DL (ref 3.5–5)
ALBUMIN SERPL BCP-MCNC: 2.3 G/DL (ref 3.5–5)
ALBUMIN SERPL BCP-MCNC: 2.4 G/DL (ref 3.5–5)
ALBUMIN SERPL BCP-MCNC: 2.4 G/DL (ref 3.5–5)
ALBUMIN SERPL BCP-MCNC: 2.5 G/DL (ref 3.5–5)
ALBUMIN SERPL BCP-MCNC: 2.8 G/DL (ref 3.5–5)
ALBUMIN SERPL BCP-MCNC: 2.8 G/DL (ref 3.5–5)
ALBUMIN SERPL BCP-MCNC: 2.9 G/DL (ref 3.5–5)
ALBUMIN SERPL BCP-MCNC: 3.1 G/DL (ref 3.5–5)
ALBUMIN SERPL BCP-MCNC: 3.7 G/DL (ref 3.5–5)
ALP SERPL-CCNC: 229 U/L (ref 46–116)
ALP SERPL-CCNC: 332 U/L (ref 46–116)
ALP SERPL-CCNC: 335 U/L (ref 46–116)
ALP SERPL-CCNC: 368 U/L (ref 46–116)
ALP SERPL-CCNC: 382 U/L (ref 46–116)
ALP SERPL-CCNC: 386 U/L (ref 46–116)
ALP SERPL-CCNC: 388 U/L (ref 46–116)
ALP SERPL-CCNC: 608 U/L (ref 46–116)
ALP SERPL-CCNC: 635 U/L (ref 46–116)
ALP SERPL-CCNC: 83 U/L (ref 46–116)
ALT SERPL W P-5'-P-CCNC: 106 U/L (ref 12–78)
ALT SERPL W P-5'-P-CCNC: 107 U/L (ref 12–78)
ALT SERPL W P-5'-P-CCNC: 147 U/L (ref 12–78)
ALT SERPL W P-5'-P-CCNC: 160 U/L (ref 12–78)
ALT SERPL W P-5'-P-CCNC: 32 U/L (ref 12–78)
ALT SERPL W P-5'-P-CCNC: 62 U/L (ref 12–78)
ALT SERPL W P-5'-P-CCNC: 84 U/L (ref 12–78)
ALT SERPL W P-5'-P-CCNC: 91 U/L (ref 12–78)
ALT SERPL W P-5'-P-CCNC: 94 U/L (ref 12–78)
ALT SERPL W P-5'-P-CCNC: 95 U/L (ref 12–78)
AMMONIA PLAS-SCNC: 32 UMOL/L (ref 11–35)
ANION GAP SERPL CALCULATED.3IONS-SCNC: 10 MMOL/L (ref 4–13)
ANION GAP SERPL CALCULATED.3IONS-SCNC: 16 MMOL/L (ref 4–13)
ANION GAP SERPL CALCULATED.3IONS-SCNC: 3 MMOL/L (ref 4–13)
ANION GAP SERPL CALCULATED.3IONS-SCNC: 4 MMOL/L (ref 4–13)
ANION GAP SERPL CALCULATED.3IONS-SCNC: 4 MMOL/L (ref 4–13)
ANION GAP SERPL CALCULATED.3IONS-SCNC: 5 MMOL/L (ref 4–13)
ANION GAP SERPL CALCULATED.3IONS-SCNC: 6 MMOL/L (ref 4–13)
ANION GAP SERPL CALCULATED.3IONS-SCNC: 6 MMOL/L (ref 4–13)
ANION GAP SERPL CALCULATED.3IONS-SCNC: 8 MMOL/L (ref 4–13)
ANION GAP SERPL CALCULATED.3IONS-SCNC: 9 MMOL/L (ref 4–13)
ANISOCYTOSIS BLD QL SMEAR: PRESENT
ARTIFACT: PRESENT
ARTIFACT: PRESENT
AST SERPL W P-5'-P-CCNC: 104 U/L (ref 5–45)
AST SERPL W P-5'-P-CCNC: 164 U/L (ref 5–45)
AST SERPL W P-5'-P-CCNC: 186 U/L (ref 5–45)
AST SERPL W P-5'-P-CCNC: 187 U/L (ref 5–45)
AST SERPL W P-5'-P-CCNC: 204 U/L (ref 5–45)
AST SERPL W P-5'-P-CCNC: 213 U/L (ref 5–45)
AST SERPL W P-5'-P-CCNC: 227 U/L (ref 5–45)
AST SERPL W P-5'-P-CCNC: 32 U/L (ref 5–45)
AST SERPL W P-5'-P-CCNC: 484 U/L (ref 5–45)
AST SERPL W P-5'-P-CCNC: 546 U/L (ref 5–45)
ATRIAL RATE: 109 BPM
ATRIAL RATE: 142 BPM
ATRIAL RATE: 96 BPM
BASOPHILS # BLD AUTO: 0.01 THOUSANDS/ΜL (ref 0–0.1)
BASOPHILS # BLD AUTO: 0.05 THOUSANDS/ΜL (ref 0–0.1)
BASOPHILS # BLD AUTO: 0.07 THOUSANDS/ΜL (ref 0–0.1)
BASOPHILS # BLD AUTO: 0.07 THOUSANDS/ΜL (ref 0–0.1)
BASOPHILS # BLD MANUAL: 0 THOUSAND/UL (ref 0–0.1)
BASOPHILS # BLD MANUAL: 0 THOUSAND/UL (ref 0–0.1)
BASOPHILS # BLD MANUAL: 0.05 THOUSAND/UL (ref 0–0.1)
BASOPHILS # BLD MANUAL: 0.06 THOUSAND/UL (ref 0–0.1)
BASOPHILS # BLD MANUAL: 0.17 THOUSAND/UL (ref 0–0.1)
BASOPHILS NFR BLD AUTO: 0 % (ref 0–1)
BASOPHILS NFR BLD AUTO: 1 % (ref 0–1)
BASOPHILS NFR BLD AUTO: 1 % (ref 0–1)
BASOPHILS NFR BLD AUTO: 2 % (ref 0–1)
BASOPHILS NFR MAR MANUAL: 0 % (ref 0–1)
BASOPHILS NFR MAR MANUAL: 0 % (ref 0–1)
BASOPHILS NFR MAR MANUAL: 1 % (ref 0–1)
BASOPHILS NFR MAR MANUAL: 1 % (ref 0–1)
BASOPHILS NFR MAR MANUAL: 4 % (ref 0–1)
BILIRUB DIRECT SERPL-MCNC: 4.24 MG/DL (ref 0–0.2)
BILIRUB SERPL-MCNC: 0.57 MG/DL (ref 0.2–1)
BILIRUB SERPL-MCNC: 0.69 MG/DL (ref 0.2–1)
BILIRUB SERPL-MCNC: 1.13 MG/DL (ref 0.2–1)
BILIRUB SERPL-MCNC: 1.28 MG/DL (ref 0.2–1)
BILIRUB SERPL-MCNC: 1.32 MG/DL (ref 0.2–1)
BILIRUB SERPL-MCNC: 1.33 MG/DL (ref 0.2–1)
BILIRUB SERPL-MCNC: 1.37 MG/DL (ref 0.2–1)
BILIRUB SERPL-MCNC: 1.49 MG/DL (ref 0.2–1)
BILIRUB SERPL-MCNC: 5.17 MG/DL (ref 0.2–1)
BILIRUB SERPL-MCNC: 5.66 MG/DL (ref 0.2–1)
BILIRUB UR QL STRIP: ABNORMAL
BILIRUB UR QL STRIP: NEGATIVE
BUN SERPL-MCNC: 30 MG/DL (ref 5–25)
BUN SERPL-MCNC: 32 MG/DL (ref 5–25)
BUN SERPL-MCNC: 33 MG/DL (ref 5–25)
BUN SERPL-MCNC: 34 MG/DL (ref 5–25)
BUN SERPL-MCNC: 35 MG/DL (ref 5–25)
BUN SERPL-MCNC: 36 MG/DL (ref 5–25)
BUN SERPL-MCNC: 39 MG/DL (ref 5–25)
BUN SERPL-MCNC: 39 MG/DL (ref 5–25)
BUN SERPL-MCNC: 41 MG/DL (ref 5–25)
BUN SERPL-MCNC: 42 MG/DL (ref 5–25)
BUN SERPL-MCNC: 43 MG/DL (ref 5–25)
BUN SERPL-MCNC: 44 MG/DL (ref 5–25)
BUN SERPL-MCNC: 47 MG/DL (ref 5–25)
BUN SERPL-MCNC: 51 MG/DL (ref 5–25)
CALCIUM ALBUM COR SERPL-MCNC: 10.1 MG/DL (ref 8.3–10.1)
CALCIUM ALBUM COR SERPL-MCNC: 10.4 MG/DL (ref 8.3–10.1)
CALCIUM ALBUM COR SERPL-MCNC: 10.5 MG/DL (ref 8.3–10.1)
CALCIUM ALBUM COR SERPL-MCNC: 10.7 MG/DL (ref 8.3–10.1)
CALCIUM ALBUM COR SERPL-MCNC: 10.7 MG/DL (ref 8.3–10.1)
CALCIUM ALBUM COR SERPL-MCNC: 11.2 MG/DL (ref 8.3–10.1)
CALCIUM ALBUM COR SERPL-MCNC: 9.5 MG/DL (ref 8.3–10.1)
CALCIUM SERPL-MCNC: 10.2 MG/DL (ref 8.3–10.1)
CALCIUM SERPL-MCNC: 8.2 MG/DL (ref 8.3–10.1)
CALCIUM SERPL-MCNC: 8.3 MG/DL (ref 8.3–10.1)
CALCIUM SERPL-MCNC: 8.6 MG/DL (ref 8.3–10.1)
CALCIUM SERPL-MCNC: 8.7 MG/DL (ref 8.3–10.1)
CALCIUM SERPL-MCNC: 8.8 MG/DL (ref 8.3–10.1)
CALCIUM SERPL-MCNC: 8.9 MG/DL (ref 8.3–10.1)
CALCIUM SERPL-MCNC: 9.1 MG/DL (ref 8.3–10.1)
CALCIUM SERPL-MCNC: 9.1 MG/DL (ref 8.3–10.1)
CALCIUM SERPL-MCNC: 9.4 MG/DL (ref 8.3–10.1)
CALCIUM SERPL-MCNC: 9.4 MG/DL (ref 8.3–10.1)
CALCIUM SERPL-MCNC: 9.5 MG/DL (ref 8.3–10.1)
CALCIUM SERPL-MCNC: 9.7 MG/DL (ref 8.3–10.1)
CALCIUM SERPL-MCNC: 9.9 MG/DL (ref 8.3–10.1)
CANCER AG27-29 SERPL-ACNC: 219.2 U/ML (ref 0–42.3)
CANCER AG27-29 SERPL-ACNC: 242.3 U/ML (ref 0–42.3)
CANCER AG27-29 SERPL-ACNC: 250.3 U/ML (ref 0–42.3)
CANCER AG27-29 SERPL-ACNC: 317.6 U/ML (ref 0–42.3)
CHLORIDE SERPL-SCNC: 103 MMOL/L (ref 100–108)
CHLORIDE SERPL-SCNC: 104 MMOL/L (ref 100–108)
CHLORIDE SERPL-SCNC: 104 MMOL/L (ref 100–108)
CHLORIDE SERPL-SCNC: 105 MMOL/L (ref 100–108)
CHLORIDE SERPL-SCNC: 106 MMOL/L (ref 100–108)
CHLORIDE SERPL-SCNC: 107 MMOL/L (ref 100–108)
CHLORIDE SERPL-SCNC: 107 MMOL/L (ref 100–108)
CLARITY UR: CLEAR
CLARITY UR: CLEAR
CO2 SERPL-SCNC: 16 MMOL/L (ref 21–32)
CO2 SERPL-SCNC: 22 MMOL/L (ref 21–32)
CO2 SERPL-SCNC: 22 MMOL/L (ref 21–32)
CO2 SERPL-SCNC: 25 MMOL/L (ref 21–32)
CO2 SERPL-SCNC: 25 MMOL/L (ref 21–32)
CO2 SERPL-SCNC: 27 MMOL/L (ref 21–32)
CO2 SERPL-SCNC: 27 MMOL/L (ref 21–32)
CO2 SERPL-SCNC: 28 MMOL/L (ref 21–32)
CO2 SERPL-SCNC: 30 MMOL/L (ref 21–32)
CO2 SERPL-SCNC: 31 MMOL/L (ref 21–32)
CO2 SERPL-SCNC: 32 MMOL/L (ref 21–32)
COLOR UR: ABNORMAL
COLOR UR: YELLOW
CREAT SERPL-MCNC: 1.74 MG/DL (ref 0.6–1.3)
CREAT SERPL-MCNC: 1.81 MG/DL (ref 0.6–1.3)
CREAT SERPL-MCNC: 1.82 MG/DL (ref 0.6–1.3)
CREAT SERPL-MCNC: 1.87 MG/DL (ref 0.6–1.3)
CREAT SERPL-MCNC: 1.89 MG/DL (ref 0.6–1.3)
CREAT SERPL-MCNC: 1.9 MG/DL (ref 0.6–1.3)
CREAT SERPL-MCNC: 2.06 MG/DL (ref 0.6–1.3)
CREAT SERPL-MCNC: 2.09 MG/DL (ref 0.6–1.3)
CREAT SERPL-MCNC: 2.09 MG/DL (ref 0.6–1.3)
CREAT SERPL-MCNC: 2.29 MG/DL (ref 0.6–1.3)
CREAT SERPL-MCNC: 2.32 MG/DL (ref 0.6–1.3)
CREAT SERPL-MCNC: 2.43 MG/DL (ref 0.6–1.3)
CREAT SERPL-MCNC: 2.52 MG/DL (ref 0.6–1.3)
CREAT SERPL-MCNC: 4.25 MG/DL (ref 0.6–1.3)
EOSINOPHIL # BLD AUTO: 0.01 THOUSAND/ΜL (ref 0–0.61)
EOSINOPHIL # BLD AUTO: 0.05 THOUSAND/ΜL (ref 0–0.61)
EOSINOPHIL # BLD AUTO: 0.07 THOUSAND/ΜL (ref 0–0.61)
EOSINOPHIL # BLD AUTO: 0.13 THOUSAND/ΜL (ref 0–0.61)
EOSINOPHIL # BLD MANUAL: 0 THOUSAND/UL (ref 0–0.4)
EOSINOPHIL # BLD MANUAL: 0.04 THOUSAND/UL (ref 0–0.4)
EOSINOPHIL # BLD MANUAL: 0.09 THOUSAND/UL (ref 0–0.4)
EOSINOPHIL # BLD MANUAL: 0.12 THOUSAND/UL (ref 0–0.4)
EOSINOPHIL # BLD MANUAL: 0.2 THOUSAND/UL (ref 0–0.4)
EOSINOPHIL NFR BLD AUTO: 0 % (ref 0–6)
EOSINOPHIL NFR BLD AUTO: 1 % (ref 0–6)
EOSINOPHIL NFR BLD AUTO: 2 % (ref 0–6)
EOSINOPHIL NFR BLD AUTO: 2 % (ref 0–6)
EOSINOPHIL NFR BLD MANUAL: 0 % (ref 0–6)
EOSINOPHIL NFR BLD MANUAL: 1 % (ref 0–6)
EOSINOPHIL NFR BLD MANUAL: 2 % (ref 0–6)
EOSINOPHIL NFR BLD MANUAL: 2 % (ref 0–6)
EOSINOPHIL NFR BLD MANUAL: 4 % (ref 0–6)
ERYTHROCYTE [DISTWIDTH] IN BLOOD BY AUTOMATED COUNT: 13.1 % (ref 11.6–15.1)
ERYTHROCYTE [DISTWIDTH] IN BLOOD BY AUTOMATED COUNT: 14.3 % (ref 11.6–15.1)
ERYTHROCYTE [DISTWIDTH] IN BLOOD BY AUTOMATED COUNT: 15.3 % (ref 11.6–15.1)
ERYTHROCYTE [DISTWIDTH] IN BLOOD BY AUTOMATED COUNT: 16.5 % (ref 11.6–15.1)
ERYTHROCYTE [DISTWIDTH] IN BLOOD BY AUTOMATED COUNT: 16.7 % (ref 11.6–15.1)
ERYTHROCYTE [DISTWIDTH] IN BLOOD BY AUTOMATED COUNT: 16.9 % (ref 11.6–15.1)
ERYTHROCYTE [DISTWIDTH] IN BLOOD BY AUTOMATED COUNT: 17 % (ref 11.6–15.1)
ERYTHROCYTE [DISTWIDTH] IN BLOOD BY AUTOMATED COUNT: 18 % (ref 11.6–15.1)
ERYTHROCYTE [DISTWIDTH] IN BLOOD BY AUTOMATED COUNT: 18.8 % (ref 11.6–15.1)
FLUAV RNA RESP QL NAA+PROBE: NEGATIVE
FLUAV RNA RESP QL NAA+PROBE: NEGATIVE
FLUBV RNA RESP QL NAA+PROBE: NEGATIVE
FLUBV RNA RESP QL NAA+PROBE: NEGATIVE
GFR SERPL CREATININE-BSD FRML MDRD: 12 ML/MIN/1.73SQ M
GFR SERPL CREATININE-BSD FRML MDRD: 23 ML/MIN/1.73SQ M
GFR SERPL CREATININE-BSD FRML MDRD: 25 ML/MIN/1.73SQ M
GFR SERPL CREATININE-BSD FRML MDRD: 26 ML/MIN/1.73SQ M
GFR SERPL CREATININE-BSD FRML MDRD: 26 ML/MIN/1.73SQ M
GFR SERPL CREATININE-BSD FRML MDRD: 29 ML/MIN/1.73SQ M
GFR SERPL CREATININE-BSD FRML MDRD: 29 ML/MIN/1.73SQ M
GFR SERPL CREATININE-BSD FRML MDRD: 30 ML/MIN/1.73SQ M
GFR SERPL CREATININE-BSD FRML MDRD: 33 ML/MIN/1.73SQ M
GFR SERPL CREATININE-BSD FRML MDRD: 33 ML/MIN/1.73SQ M
GFR SERPL CREATININE-BSD FRML MDRD: 34 ML/MIN/1.73SQ M
GFR SERPL CREATININE-BSD FRML MDRD: 35 ML/MIN/1.73SQ M
GFR SERPL CREATININE-BSD FRML MDRD: 35 ML/MIN/1.73SQ M
GFR SERPL CREATININE-BSD FRML MDRD: 37 ML/MIN/1.73SQ M
GLUCOSE P FAST SERPL-MCNC: 87 MG/DL (ref 65–99)
GLUCOSE P FAST SERPL-MCNC: 88 MG/DL (ref 65–99)
GLUCOSE P FAST SERPL-MCNC: 92 MG/DL (ref 65–99)
GLUCOSE P FAST SERPL-MCNC: 92 MG/DL (ref 65–99)
GLUCOSE P FAST SERPL-MCNC: 96 MG/DL (ref 65–99)
GLUCOSE SERPL-MCNC: 100 MG/DL (ref 65–140)
GLUCOSE SERPL-MCNC: 123 MG/DL (ref 65–140)
GLUCOSE SERPL-MCNC: 123 MG/DL (ref 65–140)
GLUCOSE SERPL-MCNC: 129 MG/DL (ref 65–140)
GLUCOSE SERPL-MCNC: 52 MG/DL (ref 65–140)
GLUCOSE SERPL-MCNC: 58 MG/DL (ref 65–140)
GLUCOSE SERPL-MCNC: 63 MG/DL (ref 65–140)
GLUCOSE SERPL-MCNC: 79 MG/DL (ref 65–140)
GLUCOSE SERPL-MCNC: 81 MG/DL (ref 65–140)
GLUCOSE SERPL-MCNC: 93 MG/DL (ref 65–140)
GLUCOSE SERPL-MCNC: 94 MG/DL (ref 65–140)
GLUCOSE SERPL-MCNC: 97 MG/DL (ref 65–140)
GLUCOSE UR STRIP-MCNC: NEGATIVE MG/DL
GLUCOSE UR STRIP-MCNC: NEGATIVE MG/DL
HCT VFR BLD AUTO: 28 % (ref 36.5–49.3)
HCT VFR BLD AUTO: 28 % (ref 36.5–49.3)
HCT VFR BLD AUTO: 28.7 % (ref 36.5–49.3)
HCT VFR BLD AUTO: 28.7 % (ref 36.5–49.3)
HCT VFR BLD AUTO: 31 % (ref 36.5–49.3)
HCT VFR BLD AUTO: 32.1 % (ref 36.5–49.3)
HCT VFR BLD AUTO: 32.8 % (ref 36.5–49.3)
HCT VFR BLD AUTO: 33.4 % (ref 36.5–49.3)
HCT VFR BLD AUTO: 33.6 % (ref 36.5–49.3)
HCT VFR BLD AUTO: 33.8 % (ref 36.5–49.3)
HCT VFR BLD AUTO: 34.6 % (ref 36.5–49.3)
HGB BLD-MCNC: 10.4 G/DL (ref 12–17)
HGB BLD-MCNC: 10.8 G/DL (ref 12–17)
HGB BLD-MCNC: 10.9 G/DL (ref 12–17)
HGB BLD-MCNC: 11.1 G/DL (ref 12–17)
HGB BLD-MCNC: 11.4 G/DL (ref 12–17)
HGB BLD-MCNC: 9.2 G/DL (ref 12–17)
HGB BLD-MCNC: 9.3 G/DL (ref 12–17)
HGB BLD-MCNC: 9.3 G/DL (ref 12–17)
HGB BLD-MCNC: 9.4 G/DL (ref 12–17)
HGB UR QL STRIP.AUTO: NEGATIVE
HGB UR QL STRIP.AUTO: NEGATIVE
IMM GRANULOCYTES # BLD AUTO: 0.01 THOUSAND/UL (ref 0–0.2)
IMM GRANULOCYTES # BLD AUTO: 0.02 THOUSAND/UL (ref 0–0.2)
IMM GRANULOCYTES # BLD AUTO: 0.02 THOUSAND/UL (ref 0–0.2)
IMM GRANULOCYTES # BLD AUTO: 0.06 THOUSAND/UL (ref 0–0.2)
IMM GRANULOCYTES NFR BLD AUTO: 0 % (ref 0–2)
IMM GRANULOCYTES NFR BLD AUTO: 0 % (ref 0–2)
IMM GRANULOCYTES NFR BLD AUTO: 1 % (ref 0–2)
IMM GRANULOCYTES NFR BLD AUTO: 1 % (ref 0–2)
KETONES UR STRIP-MCNC: NEGATIVE MG/DL
KETONES UR STRIP-MCNC: NEGATIVE MG/DL
LEUKOCYTE ESTERASE UR QL STRIP: NEGATIVE
LEUKOCYTE ESTERASE UR QL STRIP: NEGATIVE
LIPASE SERPL-CCNC: 693 U/L (ref 73–393)
LYMPHOCYTES # BLD AUTO: 0.23 THOUSAND/UL (ref 0.6–4.47)
LYMPHOCYTES # BLD AUTO: 0.36 THOUSAND/UL (ref 0.6–4.47)
LYMPHOCYTES # BLD AUTO: 0.55 THOUSANDS/ΜL (ref 0.6–4.47)
LYMPHOCYTES # BLD AUTO: 1 THOUSAND/UL (ref 0.6–4.47)
LYMPHOCYTES # BLD AUTO: 1.07 THOUSAND/UL (ref 0.6–4.47)
LYMPHOCYTES # BLD AUTO: 1.29 THOUSANDS/ΜL (ref 0.6–4.47)
LYMPHOCYTES # BLD AUTO: 1.31 THOUSAND/UL (ref 0.6–4.47)
LYMPHOCYTES # BLD AUTO: 1.33 THOUSANDS/ΜL (ref 0.6–4.47)
LYMPHOCYTES # BLD AUTO: 1.35 THOUSANDS/ΜL (ref 0.6–4.47)
LYMPHOCYTES # BLD AUTO: 21 % (ref 14–44)
LYMPHOCYTES # BLD AUTO: 23 % (ref 14–44)
LYMPHOCYTES # BLD AUTO: 28 % (ref 14–44)
LYMPHOCYTES # BLD AUTO: 5 % (ref 14–44)
LYMPHOCYTES # BLD AUTO: 6 % (ref 14–44)
LYMPHOCYTES NFR BLD AUTO: 20 % (ref 14–44)
LYMPHOCYTES NFR BLD AUTO: 20 % (ref 14–44)
LYMPHOCYTES NFR BLD AUTO: 27 % (ref 14–44)
LYMPHOCYTES NFR BLD AUTO: 32 % (ref 14–44)
MACROCYTES BLD QL AUTO: PRESENT
MCH RBC QN AUTO: 37.1 PG (ref 26.8–34.3)
MCH RBC QN AUTO: 37.8 PG (ref 26.8–34.3)
MCH RBC QN AUTO: 37.8 PG (ref 26.8–34.3)
MCH RBC QN AUTO: 38 PG (ref 26.8–34.3)
MCH RBC QN AUTO: 38.1 PG (ref 26.8–34.3)
MCH RBC QN AUTO: 38.1 PG (ref 26.8–34.3)
MCH RBC QN AUTO: 38.4 PG (ref 26.8–34.3)
MCH RBC QN AUTO: 38.4 PG (ref 26.8–34.3)
MCH RBC QN AUTO: 38.8 PG (ref 26.8–34.3)
MCH RBC QN AUTO: 39.3 PG (ref 26.8–34.3)
MCH RBC QN AUTO: 39.7 PG (ref 26.8–34.3)
MCHC RBC AUTO-ENTMCNC: 32.1 G/DL (ref 31.4–37.4)
MCHC RBC AUTO-ENTMCNC: 32.4 G/DL (ref 31.4–37.4)
MCHC RBC AUTO-ENTMCNC: 32.4 G/DL (ref 31.4–37.4)
MCHC RBC AUTO-ENTMCNC: 32.6 G/DL (ref 31.4–37.4)
MCHC RBC AUTO-ENTMCNC: 32.8 G/DL (ref 31.4–37.4)
MCHC RBC AUTO-ENTMCNC: 32.9 G/DL (ref 31.4–37.4)
MCHC RBC AUTO-ENTMCNC: 33.2 G/DL (ref 31.4–37.4)
MCHC RBC AUTO-ENTMCNC: 33.2 G/DL (ref 31.4–37.4)
MCHC RBC AUTO-ENTMCNC: 33.5 G/DL (ref 31.4–37.4)
MCHC RBC AUTO-ENTMCNC: 33.6 G/DL (ref 31.4–37.4)
MCHC RBC AUTO-ENTMCNC: 33.7 G/DL (ref 31.4–37.4)
MCV RBC AUTO: 112 FL (ref 82–98)
MCV RBC AUTO: 114 FL (ref 82–98)
MCV RBC AUTO: 114 FL (ref 82–98)
MCV RBC AUTO: 116 FL (ref 82–98)
MCV RBC AUTO: 116 FL (ref 82–98)
MCV RBC AUTO: 117 FL (ref 82–98)
MCV RBC AUTO: 117 FL (ref 82–98)
MCV RBC AUTO: 118 FL (ref 82–98)
MCV RBC AUTO: 119 FL (ref 82–98)
MCV RBC AUTO: 119 FL (ref 82–98)
MCV RBC AUTO: 120 FL (ref 82–98)
MONOCYTES # BLD AUTO: 0.05 THOUSAND/UL (ref 0–1.22)
MONOCYTES # BLD AUTO: 0.12 THOUSAND/UL (ref 0–1.22)
MONOCYTES # BLD AUTO: 0.13 THOUSAND/UL (ref 0–1.22)
MONOCYTES # BLD AUTO: 0.19 THOUSAND/UL (ref 0–1.22)
MONOCYTES # BLD AUTO: 0.2 THOUSAND/UL (ref 0–1.22)
MONOCYTES # BLD AUTO: 0.22 THOUSAND/ΜL (ref 0.17–1.22)
MONOCYTES # BLD AUTO: 0.52 THOUSAND/ΜL (ref 0.17–1.22)
MONOCYTES # BLD AUTO: 0.58 THOUSAND/ΜL (ref 0.17–1.22)
MONOCYTES # BLD AUTO: 1.12 THOUSAND/ΜL (ref 0.17–1.22)
MONOCYTES NFR BLD AUTO: 11 % (ref 4–12)
MONOCYTES NFR BLD AUTO: 14 % (ref 4–12)
MONOCYTES NFR BLD AUTO: 17 % (ref 4–12)
MONOCYTES NFR BLD AUTO: 8 % (ref 4–12)
MONOCYTES NFR BLD: 1 % (ref 4–12)
MONOCYTES NFR BLD: 2 % (ref 4–12)
MONOCYTES NFR BLD: 3 % (ref 4–12)
MONOCYTES NFR BLD: 4 % (ref 4–12)
MONOCYTES NFR BLD: 4 % (ref 4–12)
NEUTROPHILS # BLD AUTO: 1.9 THOUSANDS/ΜL (ref 1.85–7.62)
NEUTROPHILS # BLD AUTO: 2.21 THOUSANDS/ΜL (ref 1.85–7.62)
NEUTROPHILS # BLD AUTO: 2.81 THOUSANDS/ΜL (ref 1.85–7.62)
NEUTROPHILS # BLD AUTO: 3.83 THOUSANDS/ΜL (ref 1.85–7.62)
NEUTROPHILS # BLD MANUAL: 2.43 THOUSAND/UL (ref 1.85–7.62)
NEUTROPHILS # BLD MANUAL: 2.99 THOUSAND/UL (ref 1.85–7.62)
NEUTROPHILS # BLD MANUAL: 3.58 THOUSAND/UL (ref 1.85–7.62)
NEUTROPHILS # BLD MANUAL: 4.3 THOUSAND/UL (ref 1.85–7.62)
NEUTROPHILS # BLD MANUAL: 4.93 THOUSAND/UL (ref 1.85–7.62)
NEUTS BAND NFR BLD MANUAL: 1 % (ref 0–8)
NEUTS SEG NFR BLD AUTO: 52 % (ref 43–75)
NEUTS SEG NFR BLD AUTO: 56 % (ref 43–75)
NEUTS SEG NFR BLD AUTO: 58 % (ref 43–75)
NEUTS SEG NFR BLD AUTO: 59 % (ref 43–75)
NEUTS SEG NFR BLD AUTO: 63 % (ref 43–75)
NEUTS SEG NFR BLD AUTO: 70 % (ref 43–75)
NEUTS SEG NFR BLD AUTO: 71 % (ref 43–75)
NEUTS SEG NFR BLD AUTO: 82 % (ref 43–75)
NEUTS SEG NFR BLD AUTO: 94 % (ref 43–75)
NITRITE UR QL STRIP: NEGATIVE
NITRITE UR QL STRIP: NEGATIVE
NRBC BLD AUTO-RTO: 0 /100 WBCS
NT-PROBNP SERPL-MCNC: 368 PG/ML
OVALOCYTES BLD QL SMEAR: PRESENT
P AXIS: 10 DEGREES
P AXIS: 12 DEGREES
P AXIS: 8 DEGREES
PH UR STRIP.AUTO: 5 [PH]
PH UR STRIP.AUTO: 6.5 [PH] (ref 4.5–8)
PLATELET # BLD AUTO: 168 THOUSANDS/UL (ref 149–390)
PLATELET # BLD AUTO: 172 THOUSANDS/UL (ref 149–390)
PLATELET # BLD AUTO: 173 THOUSANDS/UL (ref 149–390)
PLATELET # BLD AUTO: 201 THOUSANDS/UL (ref 149–390)
PLATELET # BLD AUTO: 219 THOUSANDS/UL (ref 149–390)
PLATELET # BLD AUTO: 271 THOUSANDS/UL (ref 149–390)
PLATELET # BLD AUTO: 285 THOUSANDS/UL (ref 149–390)
PLATELET # BLD AUTO: 287 THOUSANDS/UL (ref 149–390)
PLATELET # BLD AUTO: 313 THOUSANDS/UL (ref 149–390)
PLATELET # BLD AUTO: 393 THOUSANDS/UL (ref 149–390)
PLATELET # BLD AUTO: 412 THOUSANDS/UL (ref 149–390)
PLATELET BLD QL SMEAR: ABNORMAL
PLATELET BLD QL SMEAR: ADEQUATE
PMV BLD AUTO: 10 FL (ref 8.9–12.7)
PMV BLD AUTO: 10.2 FL (ref 8.9–12.7)
PMV BLD AUTO: 10.2 FL (ref 8.9–12.7)
PMV BLD AUTO: 10.6 FL (ref 8.9–12.7)
PMV BLD AUTO: 10.6 FL (ref 8.9–12.7)
PMV BLD AUTO: 10.8 FL (ref 8.9–12.7)
PMV BLD AUTO: 11.4 FL (ref 8.9–12.7)
PMV BLD AUTO: 9.5 FL (ref 8.9–12.7)
PMV BLD AUTO: 9.7 FL (ref 8.9–12.7)
PMV BLD AUTO: 9.8 FL (ref 8.9–12.7)
PMV BLD AUTO: 9.9 FL (ref 8.9–12.7)
POIKILOCYTOSIS BLD QL SMEAR: PRESENT
POTASSIUM SERPL-SCNC: 4 MMOL/L (ref 3.5–5.3)
POTASSIUM SERPL-SCNC: 4.1 MMOL/L (ref 3.5–5.3)
POTASSIUM SERPL-SCNC: 4.2 MMOL/L (ref 3.5–5.3)
POTASSIUM SERPL-SCNC: 4.2 MMOL/L (ref 3.5–5.3)
POTASSIUM SERPL-SCNC: 4.4 MMOL/L (ref 3.5–5.3)
POTASSIUM SERPL-SCNC: 4.5 MMOL/L (ref 3.5–5.3)
POTASSIUM SERPL-SCNC: 4.5 MMOL/L (ref 3.5–5.3)
POTASSIUM SERPL-SCNC: 4.9 MMOL/L (ref 3.5–5.3)
POTASSIUM SERPL-SCNC: 5 MMOL/L (ref 3.5–5.3)
POTASSIUM SERPL-SCNC: 5.5 MMOL/L (ref 3.5–5.3)
POTASSIUM SERPL-SCNC: 5.7 MMOL/L (ref 3.5–5.3)
POTASSIUM SERPL-SCNC: 5.8 MMOL/L (ref 3.5–5.3)
PR INTERVAL: 124 MS
PR INTERVAL: 136 MS
PR INTERVAL: 150 MS
PROT SERPL-MCNC: 5.8 G/DL (ref 6.4–8.2)
PROT SERPL-MCNC: 6 G/DL (ref 6.4–8.2)
PROT SERPL-MCNC: 6.1 G/DL (ref 6.4–8.2)
PROT SERPL-MCNC: 6.1 G/DL (ref 6.4–8.2)
PROT SERPL-MCNC: 6.2 G/DL (ref 6.4–8.2)
PROT SERPL-MCNC: 7.2 G/DL (ref 6.4–8.2)
PROT SERPL-MCNC: 7.4 G/DL (ref 6.4–8.2)
PROT SERPL-MCNC: 7.4 G/DL (ref 6.4–8.2)
PROT SERPL-MCNC: 7.7 G/DL (ref 6.4–8.2)
PROT SERPL-MCNC: 7.8 G/DL (ref 6.4–8.2)
PROT UR STRIP-MCNC: NEGATIVE MG/DL
PROT UR STRIP-MCNC: NEGATIVE MG/DL
QRS AXIS: -57 DEGREES
QRS AXIS: -67 DEGREES
QRS AXIS: -70 DEGREES
QRSD INTERVAL: 104 MS
QRSD INTERVAL: 114 MS
QRSD INTERVAL: 120 MS
QT INTERVAL: 286 MS
QT INTERVAL: 344 MS
QT INTERVAL: 366 MS
QTC INTERVAL: 439 MS
QTC INTERVAL: 462 MS
QTC INTERVAL: 463 MS
RBC # BLD AUTO: 2.34 MILLION/UL (ref 3.88–5.62)
RBC # BLD AUTO: 2.42 MILLION/UL (ref 3.88–5.62)
RBC # BLD AUTO: 2.45 MILLION/UL (ref 3.88–5.62)
RBC # BLD AUTO: 2.51 MILLION/UL (ref 3.88–5.62)
RBC # BLD AUTO: 2.72 MILLION/UL (ref 3.88–5.62)
RBC # BLD AUTO: 2.73 MILLION/UL (ref 3.88–5.62)
RBC # BLD AUTO: 2.84 MILLION/UL (ref 3.88–5.62)
RBC # BLD AUTO: 2.88 MILLION/UL (ref 3.88–5.62)
RBC # BLD AUTO: 2.88 MILLION/UL (ref 3.88–5.62)
RBC # BLD AUTO: 2.91 MILLION/UL (ref 3.88–5.62)
RBC # BLD AUTO: 2.97 MILLION/UL (ref 3.88–5.62)
RBC MORPH BLD: NORMAL
RBC MORPH BLD: PRESENT
RSV RNA RESP QL NAA+PROBE: NEGATIVE
RSV RNA RESP QL NAA+PROBE: NEGATIVE
SARS-COV-2 RNA RESP QL NAA+PROBE: NEGATIVE
SARS-COV-2 RNA RESP QL NAA+PROBE: NEGATIVE
SODIUM SERPL-SCNC: 137 MMOL/L (ref 136–145)
SODIUM SERPL-SCNC: 138 MMOL/L (ref 136–145)
SODIUM SERPL-SCNC: 139 MMOL/L (ref 136–145)
SODIUM SERPL-SCNC: 140 MMOL/L (ref 136–145)
SODIUM SERPL-SCNC: 140 MMOL/L (ref 136–145)
SODIUM SERPL-SCNC: 141 MMOL/L (ref 136–145)
SODIUM SERPL-SCNC: 142 MMOL/L (ref 136–145)
SP GR UR STRIP.AUTO: 1.01 (ref 1–1.03)
SP GR UR STRIP.AUTO: 1.02 (ref 1–1.03)
T WAVE AXIS: 14 DEGREES
T WAVE AXIS: 16 DEGREES
T WAVE AXIS: 18 DEGREES
TROPONIN I SERPL-MCNC: <0.02 NG/ML
UROBILINOGEN UR QL STRIP.AUTO: 0.2 E.U./DL
UROBILINOGEN UR QL STRIP.AUTO: 0.2 E.U./DL
VARIANT LYMPHS # BLD AUTO: 13 %
VARIANT LYMPHS # BLD AUTO: 2 %
VARIANT LYMPHS # BLD AUTO: 7 %
VENTRICULAR RATE: 109 BPM
VENTRICULAR RATE: 142 BPM
VENTRICULAR RATE: 96 BPM
WBC # BLD AUTO: 2.71 THOUSAND/UL (ref 4.31–10.16)
WBC # BLD AUTO: 4.06 THOUSAND/UL (ref 4.31–10.16)
WBC # BLD AUTO: 4.25 THOUSAND/UL (ref 4.31–10.16)
WBC # BLD AUTO: 4.25 THOUSAND/UL (ref 4.31–10.16)
WBC # BLD AUTO: 4.34 THOUSAND/UL (ref 4.31–10.16)
WBC # BLD AUTO: 4.57 THOUSAND/UL (ref 4.31–10.16)
WBC # BLD AUTO: 4.67 THOUSAND/UL (ref 4.31–10.16)
WBC # BLD AUTO: 4.78 THOUSAND/UL (ref 4.31–10.16)
WBC # BLD AUTO: 5.11 THOUSAND/UL (ref 4.31–10.16)
WBC # BLD AUTO: 6.01 THOUSAND/UL (ref 4.31–10.16)
WBC # BLD AUTO: 6.54 THOUSAND/UL (ref 4.31–10.16)

## 2021-01-01 PROCEDURE — 83690 ASSAY OF LIPASE: CPT

## 2021-01-01 PROCEDURE — 99215 OFFICE O/P EST HI 40 MIN: CPT | Performed by: INTERNAL MEDICINE

## 2021-01-01 PROCEDURE — 99214 OFFICE O/P EST MOD 30 MIN: CPT | Performed by: INTERNAL MEDICINE

## 2021-01-01 PROCEDURE — 80053 COMPREHEN METABOLIC PANEL: CPT | Performed by: INTERNAL MEDICINE

## 2021-01-01 PROCEDURE — 93010 ELECTROCARDIOGRAM REPORT: CPT | Performed by: INTERNAL MEDICINE

## 2021-01-01 PROCEDURE — 36415 COLL VENOUS BLD VENIPUNCTURE: CPT

## 2021-01-01 PROCEDURE — 96401 CHEMO ANTI-NEOPL SQ/IM: CPT

## 2021-01-01 PROCEDURE — 80053 COMPREHEN METABOLIC PANEL: CPT

## 2021-01-01 PROCEDURE — 82140 ASSAY OF AMMONIA: CPT

## 2021-01-01 PROCEDURE — 0241U HB NFCT DS VIR RESP RNA 4 TRGT: CPT

## 2021-01-01 PROCEDURE — 85027 COMPLETE CBC AUTOMATED: CPT

## 2021-01-01 PROCEDURE — 96367 TX/PROPH/DG ADDL SEQ IV INF: CPT

## 2021-01-01 PROCEDURE — 81003 URINALYSIS AUTO W/O SCOPE: CPT

## 2021-01-01 PROCEDURE — 96402 CHEMO HORMON ANTINEOPL SQ/IM: CPT

## 2021-01-01 PROCEDURE — 93005 ELECTROCARDIOGRAM TRACING: CPT

## 2021-01-01 PROCEDURE — 85025 COMPLETE CBC W/AUTO DIFF WBC: CPT

## 2021-01-01 PROCEDURE — 99223 1ST HOSP IP/OBS HIGH 75: CPT | Performed by: NURSE PRACTITIONER

## 2021-01-01 PROCEDURE — 85007 BL SMEAR W/DIFF WBC COUNT: CPT

## 2021-01-01 PROCEDURE — 96360 HYDRATION IV INFUSION INIT: CPT

## 2021-01-01 PROCEDURE — 97166 OT EVAL MOD COMPLEX 45 MIN: CPT

## 2021-01-01 PROCEDURE — 97163 PT EVAL HIGH COMPLEX 45 MIN: CPT | Performed by: PHYSICAL THERAPIST

## 2021-01-01 PROCEDURE — 99232 SBSQ HOSP IP/OBS MODERATE 35: CPT | Performed by: STUDENT IN AN ORGANIZED HEALTH CARE EDUCATION/TRAINING PROGRAM

## 2021-01-01 PROCEDURE — 82948 REAGENT STRIP/BLOOD GLUCOSE: CPT

## 2021-01-01 PROCEDURE — 94644 CONT INHLJ TX 1ST HOUR: CPT

## 2021-01-01 PROCEDURE — 99239 HOSP IP/OBS DSCHRG MGMT >30: CPT | Performed by: INTERNAL MEDICINE

## 2021-01-01 PROCEDURE — 99285 EMERGENCY DEPT VISIT HI MDM: CPT

## 2021-01-01 PROCEDURE — 99222 1ST HOSP IP/OBS MODERATE 55: CPT | Performed by: INTERNAL MEDICINE

## 2021-01-01 PROCEDURE — 86300 IMMUNOASSAY TUMOR CA 15-3: CPT

## 2021-01-01 PROCEDURE — 92610 EVALUATE SWALLOWING FUNCTION: CPT

## 2021-01-01 PROCEDURE — 71045 X-RAY EXAM CHEST 1 VIEW: CPT

## 2021-01-01 PROCEDURE — 99220 PR INITIAL OBSERVATION CARE/DAY 70 MINUTES: CPT | Performed by: PHYSICIAN ASSISTANT

## 2021-01-01 PROCEDURE — 99285 EMERGENCY DEPT VISIT HI MDM: CPT | Performed by: EMERGENCY MEDICINE

## 2021-01-01 PROCEDURE — 85027 COMPLETE CBC AUTOMATED: CPT | Performed by: INTERNAL MEDICINE

## 2021-01-01 PROCEDURE — 83880 ASSAY OF NATRIURETIC PEPTIDE: CPT

## 2021-01-01 PROCEDURE — 99232 SBSQ HOSP IP/OBS MODERATE 35: CPT | Performed by: INTERNAL MEDICINE

## 2021-01-01 PROCEDURE — 82248 BILIRUBIN DIRECT: CPT | Performed by: NURSE PRACTITIONER

## 2021-01-01 PROCEDURE — 96413 CHEMO IV INFUSION 1 HR: CPT

## 2021-01-01 PROCEDURE — 85025 COMPLETE CBC W/AUTO DIFF WBC: CPT | Performed by: PHYSICIAN ASSISTANT

## 2021-01-01 PROCEDURE — 80048 BASIC METABOLIC PNL TOTAL CA: CPT | Performed by: STUDENT IN AN ORGANIZED HEALTH CARE EDUCATION/TRAINING PROGRAM

## 2021-01-01 PROCEDURE — 80053 COMPREHEN METABOLIC PANEL: CPT | Performed by: PHYSICIAN ASSISTANT

## 2021-01-01 PROCEDURE — 71250 CT THORAX DX C-: CPT

## 2021-01-01 PROCEDURE — 84484 ASSAY OF TROPONIN QUANT: CPT

## 2021-01-01 PROCEDURE — 80048 BASIC METABOLIC PNL TOTAL CA: CPT | Performed by: PHYSICIAN ASSISTANT

## 2021-01-01 PROCEDURE — 99233 SBSQ HOSP IP/OBS HIGH 50: CPT | Performed by: PHYSICIAN ASSISTANT

## 2021-01-01 PROCEDURE — 80048 BASIC METABOLIC PNL TOTAL CA: CPT

## 2021-01-01 PROCEDURE — 74176 CT ABD & PELVIS W/O CONTRAST: CPT

## 2021-01-01 PROCEDURE — 36415 COLL VENOUS BLD VENIPUNCTURE: CPT | Performed by: PHYSICIAN ASSISTANT

## 2021-01-01 PROCEDURE — 74177 CT ABD & PELVIS W/CONTRAST: CPT

## 2021-01-01 PROCEDURE — 99223 1ST HOSP IP/OBS HIGH 75: CPT | Performed by: INTERNAL MEDICINE

## 2021-01-01 PROCEDURE — 96361 HYDRATE IV INFUSION ADD-ON: CPT

## 2021-01-01 RX ORDER — FUROSEMIDE 20 MG/1
20 TABLET ORAL DAILY
Qty: 30 TABLET | Refills: 2 | Status: SHIPPED | OUTPATIENT
Start: 2021-01-01

## 2021-01-01 RX ORDER — CALCIUM CARBONATE 200(500)MG
1000 TABLET,CHEWABLE ORAL DAILY PRN
Status: DISCONTINUED | OUTPATIENT
Start: 2021-01-01 | End: 2021-01-01 | Stop reason: HOSPADM

## 2021-01-01 RX ORDER — DOCUSATE SODIUM 100 MG/1
100 CAPSULE, LIQUID FILLED ORAL 2 TIMES DAILY PRN
Status: DISCONTINUED | OUTPATIENT
Start: 2021-01-01 | End: 2021-01-01 | Stop reason: HOSPADM

## 2021-01-01 RX ORDER — DEXTROSE MONOHYDRATE 25 G/50ML
INJECTION, SOLUTION INTRAVENOUS
Status: COMPLETED
Start: 2021-01-01 | End: 2021-01-01

## 2021-01-01 RX ORDER — ACETAMINOPHEN 650 MG/1
650 SUPPOSITORY RECTAL EVERY 4 HOURS PRN
Status: DISCONTINUED | OUTPATIENT
Start: 2021-01-01 | End: 2021-01-01 | Stop reason: HOSPADM

## 2021-01-01 RX ORDER — OXYCODONE HYDROCHLORIDE 5 MG/1
2.5 TABLET ORAL EVERY 4 HOURS PRN
Status: DISCONTINUED | OUTPATIENT
Start: 2021-01-01 | End: 2021-01-01 | Stop reason: HOSPADM

## 2021-01-01 RX ORDER — DEXTROSE MONOHYDRATE 25 G/50ML
25 INJECTION, SOLUTION INTRAVENOUS ONCE
Status: COMPLETED | OUTPATIENT
Start: 2021-01-01 | End: 2021-01-01

## 2021-01-01 RX ORDER — ONDANSETRON 2 MG/ML
4 INJECTION INTRAMUSCULAR; INTRAVENOUS EVERY 6 HOURS PRN
Status: DISCONTINUED | OUTPATIENT
Start: 2021-01-01 | End: 2021-01-01

## 2021-01-01 RX ORDER — HEPARIN SODIUM 5000 [USP'U]/ML
5000 INJECTION, SOLUTION INTRAVENOUS; SUBCUTANEOUS EVERY 8 HOURS SCHEDULED
Status: DISCONTINUED | OUTPATIENT
Start: 2021-01-01 | End: 2021-01-01 | Stop reason: HOSPADM

## 2021-01-01 RX ORDER — HYDROCODONE POLISTIREX AND CHLORPHENIRAMINE POLISTIREX 10; 8 MG/5ML; MG/5ML
5 SUSPENSION, EXTENDED RELEASE ORAL ONCE
Status: COMPLETED | OUTPATIENT
Start: 2021-01-01 | End: 2021-01-01

## 2021-01-01 RX ORDER — SODIUM CHLORIDE 9 MG/ML
20 INJECTION, SOLUTION INTRAVENOUS ONCE
Status: CANCELLED | OUTPATIENT
Start: 2021-01-01

## 2021-01-01 RX ORDER — LOSARTAN POTASSIUM 50 MG/1
50 TABLET ORAL DAILY
Status: DISCONTINUED | OUTPATIENT
Start: 2021-01-01 | End: 2021-01-01

## 2021-01-01 RX ORDER — ONDANSETRON 4 MG/1
4 TABLET, FILM COATED ORAL EVERY 8 HOURS PRN
Qty: 20 TABLET | Refills: 1 | Status: SHIPPED | OUTPATIENT
Start: 2021-01-01

## 2021-01-01 RX ORDER — LOPERAMIDE HYDROCHLORIDE 2 MG/1
2 CAPSULE ORAL EVERY 4 HOURS PRN
Status: DISCONTINUED | OUTPATIENT
Start: 2021-01-01 | End: 2021-01-01 | Stop reason: HOSPADM

## 2021-01-01 RX ORDER — SODIUM CHLORIDE 9 MG/ML
20 INJECTION, SOLUTION INTRAVENOUS ONCE
Status: CANCELLED | OUTPATIENT
Start: 2021-12-09

## 2021-01-01 RX ORDER — FUROSEMIDE 10 MG/ML
20 INJECTION INTRAMUSCULAR; INTRAVENOUS ONCE
Status: COMPLETED | OUTPATIENT
Start: 2021-01-01 | End: 2021-01-01

## 2021-01-01 RX ORDER — SODIUM CHLORIDE, SODIUM GLUCONATE, SODIUM ACETATE, POTASSIUM CHLORIDE, MAGNESIUM CHLORIDE, SODIUM PHOSPHATE, DIBASIC, AND POTASSIUM PHOSPHATE .53; .5; .37; .037; .03; .012; .00082 G/100ML; G/100ML; G/100ML; G/100ML; G/100ML; G/100ML; G/100ML
75 INJECTION, SOLUTION INTRAVENOUS CONTINUOUS
Status: DISCONTINUED | OUTPATIENT
Start: 2021-01-01 | End: 2021-01-01

## 2021-01-01 RX ORDER — SODIUM CHLORIDE 9 MG/ML
20 INJECTION, SOLUTION INTRAVENOUS ONCE
Status: CANCELLED | OUTPATIENT
Start: 2021-12-02

## 2021-01-01 RX ORDER — LETROZOLE 2.5 MG/1
2.5 TABLET, FILM COATED ORAL DAILY
Qty: 90 TABLET | Refills: 1 | Status: SHIPPED | OUTPATIENT
Start: 2021-01-01 | End: 2021-01-01 | Stop reason: ALTCHOICE

## 2021-01-01 RX ORDER — HYDROCODONE POLISTIREX AND CHLORPHENIRAMINE POLISTIREX 10; 8 MG/5ML; MG/5ML
2.5 SUSPENSION, EXTENDED RELEASE ORAL EVERY 12 HOURS PRN
Status: DISCONTINUED | OUTPATIENT
Start: 2021-01-01 | End: 2021-01-01

## 2021-01-01 RX ORDER — MAGNESIUM HYDROXIDE/ALUMINUM HYDROXICE/SIMETHICONE 120; 1200; 1200 MG/30ML; MG/30ML; MG/30ML
30 SUSPENSION ORAL EVERY 6 HOURS PRN
Status: DISCONTINUED | OUTPATIENT
Start: 2021-01-01 | End: 2021-01-01 | Stop reason: HOSPADM

## 2021-01-01 RX ORDER — SODIUM CHLORIDE 9 MG/ML
20 INJECTION, SOLUTION INTRAVENOUS ONCE
Status: CANCELLED | OUTPATIENT
Start: 2021-11-11

## 2021-01-01 RX ORDER — FLUTICASONE PROPIONATE 50 MCG
1 SPRAY, SUSPENSION (ML) NASAL 2 TIMES DAILY
Qty: 16 G | Refills: 2 | Status: SHIPPED | OUTPATIENT
Start: 2021-01-01

## 2021-01-01 RX ORDER — PANTOPRAZOLE SODIUM 40 MG/1
40 TABLET, DELAYED RELEASE ORAL
Qty: 30 TABLET | Refills: 2 | Status: SHIPPED | OUTPATIENT
Start: 2021-01-01

## 2021-01-01 RX ORDER — HEPARIN SODIUM 5000 [USP'U]/ML
5000 INJECTION, SOLUTION INTRAVENOUS; SUBCUTANEOUS EVERY 8 HOURS SCHEDULED
Status: DISCONTINUED | OUTPATIENT
Start: 2021-01-01 | End: 2021-01-01

## 2021-01-01 RX ORDER — ALBUTEROL SULFATE 2.5 MG/3ML
SOLUTION RESPIRATORY (INHALATION)
Status: COMPLETED
Start: 2021-01-01 | End: 2021-01-01

## 2021-01-01 RX ORDER — OXYCODONE HCL 5 MG/5 ML
1 SOLUTION, ORAL ORAL EVERY 4 HOURS PRN
Status: DISCONTINUED | OUTPATIENT
Start: 2021-01-01 | End: 2021-01-01 | Stop reason: HOSPADM

## 2021-01-01 RX ORDER — BENZONATATE 100 MG/1
200 CAPSULE ORAL 3 TIMES DAILY
Status: DISCONTINUED | OUTPATIENT
Start: 2021-01-01 | End: 2021-01-01 | Stop reason: HOSPADM

## 2021-01-01 RX ORDER — OXYCODONE HYDROCHLORIDE 5 MG/1
5 TABLET ORAL EVERY 4 HOURS PRN
Status: DISCONTINUED | OUTPATIENT
Start: 2021-01-01 | End: 2021-01-01 | Stop reason: HOSPADM

## 2021-01-01 RX ORDER — ACETAMINOPHEN 325 MG/1
650 TABLET ORAL EVERY 6 HOURS PRN
Status: DISCONTINUED | OUTPATIENT
Start: 2021-01-01 | End: 2021-01-01 | Stop reason: HOSPADM

## 2021-01-01 RX ORDER — FLUTICASONE PROPIONATE 50 MCG
1 SPRAY, SUSPENSION (ML) NASAL 2 TIMES DAILY
Status: DISCONTINUED | OUTPATIENT
Start: 2021-01-01 | End: 2021-01-01 | Stop reason: HOSPADM

## 2021-01-01 RX ORDER — LORAZEPAM 2 MG/ML
0.5 INJECTION INTRAMUSCULAR ONCE
Status: COMPLETED | OUTPATIENT
Start: 2021-01-01 | End: 2021-01-01

## 2021-01-01 RX ORDER — HYDROMORPHONE HCL/PF 1 MG/ML
0.5 SYRINGE (ML) INJECTION
Status: DISCONTINUED | OUTPATIENT
Start: 2021-01-01 | End: 2021-01-01 | Stop reason: HOSPADM

## 2021-01-01 RX ORDER — ALBUMIN (HUMAN) 12.5 G/50ML
12.5 SOLUTION INTRAVENOUS ONCE
Status: COMPLETED | OUTPATIENT
Start: 2021-01-01 | End: 2021-01-01

## 2021-01-01 RX ORDER — ONDANSETRON 2 MG/ML
4 INJECTION INTRAMUSCULAR; INTRAVENOUS EVERY 6 HOURS PRN
Status: DISCONTINUED | OUTPATIENT
Start: 2021-01-01 | End: 2021-01-01 | Stop reason: HOSPADM

## 2021-01-01 RX ORDER — PROMETHAZINE HYDROCHLORIDE AND CODEINE PHOSPHATE 6.25; 1 MG/5ML; MG/5ML
5 SYRUP ORAL EVERY 6 HOURS PRN
Qty: 120 ML | Refills: 0 | Status: SHIPPED | OUTPATIENT
Start: 2021-01-01

## 2021-01-01 RX ORDER — SODIUM CHLORIDE 9 MG/ML
20 INJECTION, SOLUTION INTRAVENOUS ONCE
Status: CANCELLED | OUTPATIENT
Start: 2021-11-25

## 2021-01-01 RX ORDER — CALCIUM GLUCONATE 20 MG/ML
1 INJECTION, SOLUTION INTRAVENOUS ONCE
Status: COMPLETED | OUTPATIENT
Start: 2021-01-01 | End: 2021-01-01

## 2021-01-01 RX ORDER — LETROZOLE 2.5 MG/1
2.5 TABLET, FILM COATED ORAL DAILY
Qty: 90 TABLET | Refills: 1 | Status: SHIPPED | OUTPATIENT
Start: 2021-01-01 | End: 2021-01-01 | Stop reason: SDUPTHER

## 2021-01-01 RX ORDER — ALBUTEROL SULFATE 2.5 MG/3ML
10 SOLUTION RESPIRATORY (INHALATION) ONCE
Status: COMPLETED | OUTPATIENT
Start: 2021-01-01 | End: 2021-01-01

## 2021-01-01 RX ORDER — HYDROCODONE POLISTIREX AND CHLORPHENIRAMINE POLISTIREX 10; 8 MG/5ML; MG/5ML
5 SUSPENSION, EXTENDED RELEASE ORAL ONCE
Status: DISCONTINUED | OUTPATIENT
Start: 2021-01-01 | End: 2021-01-01 | Stop reason: HOSPADM

## 2021-01-01 RX ORDER — POLYETHYLENE GLYCOL 3350 17 G/17G
17 POWDER, FOR SOLUTION ORAL DAILY
Status: DISCONTINUED | OUTPATIENT
Start: 2021-01-01 | End: 2021-01-01

## 2021-01-01 RX ORDER — HYDROMORPHONE HCL/PF 1 MG/ML
0.2 SYRINGE (ML) INJECTION EVERY 4 HOURS PRN
Status: DISCONTINUED | OUTPATIENT
Start: 2021-01-01 | End: 2021-01-01

## 2021-01-01 RX ORDER — PANTOPRAZOLE SODIUM 40 MG/1
40 TABLET, DELAYED RELEASE ORAL
Status: DISCONTINUED | OUTPATIENT
Start: 2021-01-01 | End: 2021-01-01 | Stop reason: HOSPADM

## 2021-01-01 RX ORDER — TAMSULOSIN HYDROCHLORIDE 0.4 MG/1
0.4 CAPSULE ORAL ONCE
Status: DISCONTINUED | OUTPATIENT
Start: 2021-01-01 | End: 2021-01-01

## 2021-01-01 RX ORDER — LOPERAMIDE HCL 1 MG/7.5ML
2 SOLUTION ORAL EVERY 4 HOURS PRN
Status: DISCONTINUED | OUTPATIENT
Start: 2021-01-01 | End: 2021-01-01 | Stop reason: SDUPTHER

## 2021-01-01 RX ORDER — SODIUM CHLORIDE 9 MG/ML
75 INJECTION, SOLUTION INTRAVENOUS CONTINUOUS
Status: DISPENSED | OUTPATIENT
Start: 2021-01-01 | End: 2021-01-01

## 2021-01-01 RX ORDER — SODIUM CHLORIDE 9 MG/ML
20 INJECTION, SOLUTION INTRAVENOUS ONCE
Status: COMPLETED | OUTPATIENT
Start: 2021-01-01 | End: 2021-01-01

## 2021-01-01 RX ORDER — DOXYCYCLINE HYCLATE 50 MG/1
324 CAPSULE, GELATIN COATED ORAL
Status: ON HOLD | COMMUNITY
End: 2021-01-01 | Stop reason: CLARIF

## 2021-01-01 RX ORDER — CHOLESTYRAMINE LIGHT 4 G/5.7G
4 POWDER, FOR SUSPENSION ORAL 2 TIMES DAILY
Status: DISCONTINUED | OUTPATIENT
Start: 2021-01-01 | End: 2021-01-01

## 2021-01-01 RX ADMIN — DEXTROSE MONOHYDRATE 50 ML: 25 INJECTION, SOLUTION INTRAVENOUS at 23:39

## 2021-01-01 RX ADMIN — NYSTATIN 500000 UNITS: 100000 SUSPENSION ORAL at 17:17

## 2021-01-01 RX ADMIN — DENOSUMAB 120 MG: 120 INJECTION SUBCUTANEOUS at 12:29

## 2021-01-01 RX ADMIN — BENZONATATE 200 MG: 100 CAPSULE ORAL at 15:22

## 2021-01-01 RX ADMIN — SODIUM CHLORIDE 500 ML: 0.9 INJECTION, SOLUTION INTRAVENOUS at 12:42

## 2021-01-01 RX ADMIN — GLYCERIN 1 SUPPOSITORY: 2 SUPPOSITORY RECTAL at 13:01

## 2021-01-01 RX ADMIN — HYDROMORPHONE HYDROCHLORIDE 0.5 MG: 1 INJECTION, SOLUTION INTRAMUSCULAR; INTRAVENOUS; SUBCUTANEOUS at 23:45

## 2021-01-01 RX ADMIN — SODIUM CHLORIDE 20 ML/HR: 0.9 INJECTION, SOLUTION INTRAVENOUS at 12:35

## 2021-01-01 RX ADMIN — HEPARIN SODIUM 5000 UNITS: 5000 INJECTION INTRAVENOUS; SUBCUTANEOUS at 05:54

## 2021-01-01 RX ADMIN — ACETAMINOPHEN 650 MG: 650 SUPPOSITORY RECTAL at 23:39

## 2021-01-01 RX ADMIN — SODIUM CHLORIDE, SODIUM GLUCONATE, SODIUM ACETATE, POTASSIUM CHLORIDE, MAGNESIUM CHLORIDE, SODIUM PHOSPHATE, DIBASIC, AND POTASSIUM PHOSPHATE 75 ML/HR: .53; .5; .37; .037; .03; .012; .00082 INJECTION, SOLUTION INTRAVENOUS at 15:19

## 2021-01-01 RX ADMIN — DENOSUMAB 120 MG: 120 INJECTION SUBCUTANEOUS at 12:08

## 2021-01-01 RX ADMIN — HEPARIN SODIUM 5000 UNITS: 5000 INJECTION INTRAVENOUS; SUBCUTANEOUS at 05:43

## 2021-01-01 RX ADMIN — DEXTROSE MONOHYDRATE 25 ML: 500 INJECTION PARENTERAL at 23:46

## 2021-01-01 RX ADMIN — NYSTATIN 500000 UNITS: 100000 SUSPENSION ORAL at 21:58

## 2021-01-01 RX ADMIN — HEPARIN SODIUM 5000 UNITS: 5000 INJECTION INTRAVENOUS; SUBCUTANEOUS at 22:38

## 2021-01-01 RX ADMIN — FLUTICASONE PROPIONATE 1 SPRAY: 50 SPRAY, METERED NASAL at 10:36

## 2021-01-01 RX ADMIN — FAMOTIDINE 20 MG: 10 INJECTION INTRAVENOUS at 13:33

## 2021-01-01 RX ADMIN — BENZONATATE 200 MG: 100 CAPSULE ORAL at 14:20

## 2021-01-01 RX ADMIN — DEXTROSE MONOHYDRATE 25 ML: 25 INJECTION, SOLUTION INTRAVENOUS at 22:30

## 2021-01-01 RX ADMIN — LOSARTAN POTASSIUM 50 MG: 50 TABLET, FILM COATED ORAL at 08:27

## 2021-01-01 RX ADMIN — LIDOCAINE HYDROCHLORIDE 10 ML: 20 SOLUTION ORAL; TOPICAL at 15:45

## 2021-01-01 RX ADMIN — HEPARIN SODIUM 5000 UNITS: 5000 INJECTION INTRAVENOUS; SUBCUTANEOUS at 21:14

## 2021-01-01 RX ADMIN — BENZONATATE 200 MG: 100 CAPSULE ORAL at 10:36

## 2021-01-01 RX ADMIN — ALBUTEROL SULFATE 10 MG: 2.5 SOLUTION RESPIRATORY (INHALATION) at 22:41

## 2021-01-01 RX ADMIN — IODIXANOL 100 ML: 320 INJECTION, SOLUTION INTRAVASCULAR at 16:16

## 2021-01-01 RX ADMIN — HEPARIN SODIUM 5000 UNITS: 5000 INJECTION INTRAVENOUS; SUBCUTANEOUS at 22:48

## 2021-01-01 RX ADMIN — HEPARIN SODIUM 5000 UNITS: 5000 INJECTION INTRAVENOUS; SUBCUTANEOUS at 05:38

## 2021-01-01 RX ADMIN — PACLITAXEL 153 MG: 6 INJECTION, SOLUTION, CONCENTRATE INTRAVENOUS at 13:52

## 2021-01-01 RX ADMIN — SODIUM CHLORIDE 75 ML/HR: 0.9 INJECTION, SOLUTION INTRAVENOUS at 21:04

## 2021-01-01 RX ADMIN — HYDROCODONE POLISTIREX AND CHLORPHENIRAMINE POLISTIREX 5 ML: 10; 8 SUSPENSION, EXTENDED RELEASE ORAL at 17:40

## 2021-01-01 RX ADMIN — HEPARIN SODIUM 5000 UNITS: 5000 INJECTION INTRAVENOUS; SUBCUTANEOUS at 14:20

## 2021-01-01 RX ADMIN — HEPARIN SODIUM 5000 UNITS: 5000 INJECTION INTRAVENOUS; SUBCUTANEOUS at 21:45

## 2021-01-01 RX ADMIN — Medication 1 SPRAY: at 04:58

## 2021-01-01 RX ADMIN — HEPARIN SODIUM 5000 UNITS: 5000 INJECTION INTRAVENOUS; SUBCUTANEOUS at 14:55

## 2021-01-01 RX ADMIN — BENZONATATE 200 MG: 100 CAPSULE ORAL at 21:45

## 2021-01-01 RX ADMIN — DENOSUMAB 120 MG: 120 INJECTION SUBCUTANEOUS at 12:10

## 2021-01-01 RX ADMIN — LEUPROLIDE ACETATE 22.5 MG: KIT SUBCUTANEOUS at 12:33

## 2021-01-01 RX ADMIN — HEPARIN SODIUM 5000 UNITS: 5000 INJECTION INTRAVENOUS; SUBCUTANEOUS at 15:21

## 2021-01-01 RX ADMIN — LEUPROLIDE ACETATE 22.5 MG: KIT SUBCUTANEOUS at 12:13

## 2021-01-01 RX ADMIN — CALCIUM GLUCONATE 1 G: 20 INJECTION, SOLUTION INTRAVENOUS at 22:48

## 2021-01-01 RX ADMIN — NYSTATIN 500000 UNITS: 100000 SUSPENSION ORAL at 08:54

## 2021-01-01 RX ADMIN — SODIUM CHLORIDE: 9 INJECTION INTRAMUSCULAR; INTRAVENOUS; SUBCUTANEOUS at 22:40

## 2021-01-01 RX ADMIN — HEPARIN SODIUM 5000 UNITS: 5000 INJECTION INTRAVENOUS; SUBCUTANEOUS at 06:32

## 2021-01-01 RX ADMIN — HEPARIN SODIUM 5000 UNITS: 5000 INJECTION INTRAVENOUS; SUBCUTANEOUS at 21:58

## 2021-01-01 RX ADMIN — LIDOCAINE HYDROCHLORIDE 10 ML: 20 SOLUTION ORAL; TOPICAL at 04:58

## 2021-01-01 RX ADMIN — BENZONATATE 200 MG: 100 CAPSULE ORAL at 21:14

## 2021-01-01 RX ADMIN — SODIUM CHLORIDE, SODIUM GLUCONATE, SODIUM ACETATE, POTASSIUM CHLORIDE, MAGNESIUM CHLORIDE, SODIUM PHOSPHATE, DIBASIC, AND POTASSIUM PHOSPHATE 75 ML/HR: .53; .5; .37; .037; .03; .012; .00082 INJECTION, SOLUTION INTRAVENOUS at 22:38

## 2021-01-01 RX ADMIN — LIDOCAINE HYDROCHLORIDE 10 ML: 20 SOLUTION ORAL; TOPICAL at 22:00

## 2021-01-01 RX ADMIN — DEXAMETHASONE SODIUM PHOSPHATE 10 MG: 10 INJECTION, SOLUTION INTRAMUSCULAR; INTRAVENOUS at 13:11

## 2021-01-01 RX ADMIN — DIPHENHYDRAMINE HYDROCHLORIDE 25 MG: 50 INJECTION, SOLUTION INTRAMUSCULAR; INTRAVENOUS at 12:42

## 2021-01-01 RX ADMIN — MORPHINE SULFATE 2 MG: 2 INJECTION, SOLUTION INTRAMUSCULAR; INTRAVENOUS at 11:42

## 2021-01-01 RX ADMIN — FLUTICASONE PROPIONATE 1 SPRAY: 50 SPRAY, METERED NASAL at 17:58

## 2021-01-01 RX ADMIN — PANTOPRAZOLE SODIUM 40 MG: 40 TABLET, DELAYED RELEASE ORAL at 08:48

## 2021-01-01 RX ADMIN — Medication 1 SPRAY: at 13:20

## 2021-01-01 RX ADMIN — HEPARIN SODIUM 5000 UNITS: 5000 INJECTION INTRAVENOUS; SUBCUTANEOUS at 22:08

## 2021-01-01 RX ADMIN — HEPARIN SODIUM 5000 UNITS: 5000 INJECTION INTRAVENOUS; SUBCUTANEOUS at 13:20

## 2021-01-01 RX ADMIN — ALBUMIN (HUMAN) 12.5 G: 0.25 INJECTION, SOLUTION INTRAVENOUS at 23:39

## 2021-01-01 RX ADMIN — ONDANSETRON 4 MG: 2 INJECTION INTRAMUSCULAR; INTRAVENOUS at 17:29

## 2021-01-01 RX ADMIN — DENOSUMAB 120 MG: 120 INJECTION SUBCUTANEOUS at 12:16

## 2021-01-01 RX ADMIN — SODIUM CHLORIDE 75 ML/HR: 0.9 INJECTION, SOLUTION INTRAVENOUS at 11:19

## 2021-01-01 RX ADMIN — LEUPROLIDE ACETATE 22.5 MG: KIT SUBCUTANEOUS at 12:08

## 2021-01-01 RX ADMIN — SODIUM CHLORIDE 1000 ML: 0.9 INJECTION, SOLUTION INTRAVENOUS at 14:20

## 2021-01-01 RX ADMIN — FUROSEMIDE 20 MG: 10 INJECTION, SOLUTION INTRAMUSCULAR; INTRAVENOUS at 14:26

## 2021-01-01 RX ADMIN — PANTOPRAZOLE SODIUM 40 MG: 40 TABLET, DELAYED RELEASE ORAL at 05:34

## 2021-01-01 RX ADMIN — LORAZEPAM 0.5 MG: 2 INJECTION INTRAMUSCULAR; INTRAVENOUS at 23:45

## 2021-01-01 RX ADMIN — BENZONATATE 200 MG: 100 CAPSULE ORAL at 08:33

## 2021-01-01 RX ADMIN — CHOLESTYRAMINE 4 G: 4 POWDER, FOR SUSPENSION ORAL at 17:16

## 2021-01-01 RX ADMIN — Medication 1 SPRAY: at 02:42

## 2021-01-01 RX ADMIN — LEUPROLIDE ACETATE 22.5 MG: KIT SUBCUTANEOUS at 12:28

## 2021-01-11 NOTE — PROGRESS NOTES
Outpatient Oncology Nutrition Consult   Type of Consult: Follow Up  Care Location: Telephone Call-pt and wife Cathern Opitz    Reason for referral: RN (Micky Fulton) request due to pt has nutrition questions (Date of referral: 2/19/20)    Nutrition Assessment:   Oncology Diagnosis & Treatments: Left breast carcinoma diagnosed 11/2018, BRCA negative  Neoadjuvant tamoxifen 20 mg daily from December 27, 2018 until May 2, 2019  Left modified radical mastectomy May 2, 2019  Tamoxifen was resumed postoperatively  Widespread metastatic disease including multiple lung nodules, liver metastasis, and osseous metastasis found in Feb 2020  Began Lupron in Feb 2020, and Letrozole and Larisa Breed March 2020  Oncology History Overview Note   Patient returns today for discuss results of CT and MRI  Initial consult with Dr Osiel Serna on 320/20   66year old male with Xenia Jerez 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 status post neoadjuvant tamoxifen followed by left mastectomy +ALND, with pathology showing 5 cm tumor extending to margins, and involvement of dermis with 10/10 LN positive, followed by re-excision showing residual tumor, with final margins negative  He declined adjuvant chemotherapy and declined repeat imaging with Dr Mary Ortiz  Plan at time of consult was radiation to left chestwall and regional lymph nodes  1/27/20 CT simulation for radiation  Dr Osiel Serna discussed with patient findings of CT sim which show metastatic disease  She also discussed with Dr Mary Ortiz and radiology to confirm  Plan to order diagnostic CT c/A/P with contrast, and MRI brain  forego PMRT    1/31/20 Med Onc follow-up with Dr Mary Ortiz  Pt is scheduled for MRI brain and CT 2/8/20, in view of azotemia,  recommend imaging without IV contrast to reduce risk of kidney dysfunction       2/8/20 CT chest abd pelvis  IMPRESSION:   1   Progressively enlarging 9 mm right middle lobe nodule with Satellite nodules and few additional new pulmonary nodules, suspicious for progressive metastatic disease      2   Multiple hypoattenuating liver lesions, new from December 2018, and progressed from January 2020, highly suspicious for metastasis      3   Osseous metastatic disease as described, new from December 2018, and slightly progressed from January 2020  Right acetabular lytic lesion was not previously imaged      4  Indeterminate 3 2 cm nodular lesion in the left upper quadrant of the abdomen, which was only partially imaged on prior studies and was not adequately evaluated  This may represent a splenule, tumor implant or metastatic lymph node  Further   characterization with PET CT can be performed if clinically warranted      2/8/20 MRI brain - pending      2/14/20 Med Onc f/u with Dr Irasema Lopez       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, estrogen receptor positive ([facility] )   2018 Initial Diagnosis    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, estrogen receptor positive ([facility] )     11/14/2018 Biopsy    A  Breast, left subareolar mass, core biopsy (11 slides, [address], collected on 11/14/2018): - Invasive mammary carcinoma with mucinous features (see note)  -- [place] grade 1 of 3 (total score: 5 of 9)        * Tubule formation < 10%, score 3        * Nuclear grade 1 of 3, score 1        * Mitoses < 3/mm2, (</= 7 mitoses/10HPF), score 1     -- Invasive carcinoma involves 4 of 4 submitted core biopsy fragments, max  Dimension= 22 mm     -- Estrogen, Progesterone & HER2 receptor studies performed at the referring institution show the tumor to be positive for ER (>95% strong), positive for MO (75% moderate), and negative for HER2 IHC (score 0)  - Ductal carcinoma in-situ (DCIS): Not identified  - Lymph-vascular invasion: Not identified  - Microcalcifications: Absent    - Best representative tumor block:  A1    -- Sufficient tumor present for        Ania Mammaprint/Blueprint (1 cm2 of invasive tumor in aggregate): No         MI Profile/Foundation One (at least 5 x 5 mm of tumor): Yes  Note:  The tumor shows prominent mucinous differentiation, raising the strong possibility of invasive mucinous carcinoma, though this designation is possible only complete excision with evaluation of the entirety of the tumor mass  12/27/2018 -  Hormone Therapy    Tamoxifen 20 mg daily (Dr Sujit Stacy)     2018 Genetic Testing    BRCA negative      5/2/2019 Surgery    A  Breast, Left, Mastectomy and Axillary Contents:  - 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), [place] Grade II (3 + 2 + 1 = 6), 51 mm in greatest dimension, involving dermis  See Tumor Synoptic Report (below) and Note  - Margins negative for carcinoma in this specimen (see separately submitted additional margins, Parts C and D)  - Intradermal nevi      B  Lymph Node, Left Periaxillary Vein, Lymphadenectomy:  - Metastatic carcinoma present in one lymph node (1/1), with extranodal extension   - Metastatic focus measures at least 1 5 cm in greatest dimension      C  Breast, Left Inferior Medial Margin, Excision:  - Benign fibroadipose tissue and skeletal muscle      D  Breast, Left Inferior Medial Margin Skin, Excision:  - Invasive mammary carcinoma, underlying benign skin  - Anterior margin is multifocally positive for carcinoma     (Dr Delia Moe)     10/9/2019 Surgery    Left breast, re-excision scar mastectomy site medial and inferior margins:     - Residual invasive breast carcinoma of no special type (ductal NST/invasive ductal carcinoma), multifocal        -- Four foci identified ranging in size from 1 5 to approximately 12 mm        -- Tumor foci are present in dermis and adjacent subcutaneous tissues       - All margins are negative for tumor with closest margin as follows:       -- One focus of tumor comes to within 0 7 mm of the superior margin (A16)  -- Largest focus comes to within 1 5 mm of the superior margin (A37)  - Cicatricial fibrosis of skin and subcutaneous tissues      (Dr Cecille Valle)       Past Medical & Surgical Hx: HTN, pancreatitis, anemia, afib  Patient Active Problem List   Diagnosis    Mass of breast, left    Hypertension    Leukocytosis    PAC (premature atrial contraction)    Pancreatic mass    Pancreatitis    RBBB (right bundle branch block with left anterior fascicular block)    Transaminitis    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, estrogen receptor positive ([address] )    Anemia, unspecified    Prerenal azotemia    Liver metastasis ([address]    Lung metastasis ([address]    Osseous metastasis ([address] )    CRI (chronic renal insufficiency)     Past Medical History:   Diagnosis Date    Acute gallstone pancreatitis     Arthritis     Atrial fibrillation ([address]     Breast cancer ([address] )     Cancer (HCC)     breast - left    Chronic pain disorder     Extremity pain     Hypertension     Irregular heartbeat     PAC (premature atrial contraction)     PVC (premature ventricular contraction)     RBBB     Wears glasses      Past Surgical History:   Procedure Laterality Date    APPENDECTOMY      CHOLECYSTECTOMY      FRACTURE SURGERY Left     arm    MN EXC SKIN BENIG 3 1-4 CM TRUNK,ARM,LEG Left 10/9/2019    Procedure: BREAST RE-EXCISION OF SKIN MARGINS;  Surgeon: Cecille Valle MD;  [facility];  Service: General    MN LAP,CHOLECYSTECTOMY N/A 12/18/2018    Procedure: LAP CHOLECYSTECTOMY; LYSIS OF ADHESIONS; ATTEMPTED CHOLANGIOGRAM;  Surgeon: Cecille Valle MD;  [facility];  Service: General    MN MASTECTOMY, MODIFIED RADICAL Left 5/2/2019    Procedure: MASTECTOMY MODIFIED RADICAL; AXILLARY DISSECTION;  Surgeon: Cecille Valle MD;  [facility];  Service: General    TONSILLECTOMY         Review of Medications:   Vitamins, Supplements and Herbals: no     Current Outpatient Medications:     acetaminophen (TYLENOL) 500 mg tablet, Take 500 mg by mouth every 6 (six) hours as needed, Disp: , Rfl:     capsaicin (ZOSTRIX) 0 025 % cream, Apply 1 application topically 2 (two) times a day, Disp: 60 g, Rfl: 0    furosemide (LASIX) 40 mg tablet, Take 40 mg by mouth daily, Disp: , Rfl: 0    letrozole (FEMARA) 2 5 mg tablet, TAKE ONE TABLET BY MOUTH EVERY DAY, Disp: 90 tablet, Rfl: 1    losartan (COZAAR) 50 mg tablet, Take 50 mg by mouth daily , Disp: , Rfl: 0    Multiple Vitamins-Minerals (CENTRUM SILVER 50+MEN PO), Take by mouth, Disp: , Rfl:     Palbociclib (Ibrance) 75 MG capsule, Take 1 capsule (75 mg total) by mouth daily with breakfast Take with food   Take for 3 weeks on, followed by 1 week off , Disp: 21 capsule, Rfl: 4    polyethylene glycol (MIRALAX) 17 g packet, Take 17 g by mouth daily as needed , Disp: , Rfl:     Most Recent Lab Results:   Lab Results   Component Value Date    WBC 5 44 12/01/2020    IRON 71 06/26/2019    TIBC 311 06/26/2019    FERRITIN 89 06/26/2019    ALT 33 12/01/2020    AST 33 12/01/2020    ALB 3 7 12/01/2020    SODIUM 141 12/01/2020    SODIUM 139 10/28/2020    K 4 2 12/01/2020    K 4 5 10/28/2020     12/01/2020    BUN 42 (H) 12/01/2020    BUN 40 (H) 10/28/2020    CREATININE 1 66 (H) 12/01/2020    CREATININE 1 72 (H) 10/28/2020    EGFR 39 12/01/2020    GLUF 86 12/01/2020    GLUF 95 07/21/2020    GLUC 92 10/28/2020    CALCIUM 9 4 12/01/2020       Anthropometric Measurements:   Height: 65"  Ht Readings from Last 1 Encounters:   01/07/21 5' 5" (1 651 m)     -Weight History:   Usual Weight: 198#  Ideal Body Weight: 136#  Wt Readings from Last 20 Encounters:   01/07/21 87 kg (191 lb 12 8 oz)   12/07/20 85 3 kg (188 lb)   11/04/20 82 2 kg (181 lb 3 2 oz)   08/17/20 81 6 kg (180 lb)   07/30/20 80 9 kg (178 lb 6 4 oz)   07/15/20 81 6 kg (179 lb 14 3 oz)   06/15/20 83 5 kg (184 lb)   06/08/20 79 7 kg (175 lb 9 6 oz)   05/14/20 83 5 kg (184 lb)   03/27/20 83 5 kg (184 lb)   02/14/20 86 2 kg (190 lb)   01/31/20 89 8 kg (198 lb)   01/20/20 87 3 kg (192 lb 7 4 oz)   12/17/19 89 4 kg (197 lb)   10/21/19 89 8 kg (198 lb)   10/14/19 89 2 kg (196 lb 9 6 oz)   10/09/19 90 7 kg (200 lb)   09/17/19 90 8 kg (200 lb 3 2 oz)   09/12/19 90 1 kg (198 lb 9 6 oz)   07/29/19 87 1 kg (192 lb)     -Varian:n/a  -Home weights: (6/15/20)177#, (7/6/20) 178 2#, (7/27/20) 180#, (8/10/20)179#, (8/24/20) 181#, (9/12/20)180#, (10/5/20)179 8#, (10/25/20) 183#, (11/9/20) 183 2#, (12/8/20)182#  -Comments: no recent home weights    Oncology Nutrition-Anthropometrics      Most Recent Value   Patient age (years):  66 years   Patient (male) height (in):  65 in   Current weight to be used for anthropometric calculations (lbs)  191 8 lbs   Current weight to be used for anthropometric calculations (kg)  87 kg   BMI:  31 91   IBW male  136 lb   IBW (kg) male  61 8 kg   IBW % (male)  141 %   Adjusted BW (male):  150 lbs   % weight change after 1 month:  2 %   Weight change after 1 month (lbs)  4 lbs   % weight change after 6 months:  7 5 %   Weight change after 6 months (lbs)  13 lbs        Nutrition-Focused Physical Findings: n/a due to telephone call    Food/Nutrition-Related History & Client/Social History:    Current Nutrition Impact Symptoms:  [] Nausea  [] Reduced Appetite  [] Acid Reflux    [] Vomiting  [] Unintended Wt Loss  [] Malabsorption    [] Diarrhea  [] Unintended Wt Gain  [] Dumping Syndrome    [] Constipation -"sometimes" takes Miralax [] Thick Mucous/Secretions  [] Abdominal Pain    [] Dysgeusia (Altered Taste)  [x] Xerostomia (Dry Mouth) -in the morning  [] Gas    [] Dysosmia (Altered Smell)  [] Thrush  [] Difficulty Chewing    [] Oral Mucositis (Sore Mouth)  [] Fatigue   [] Other:    [] Odynophagia   [] Esophagitis  [] Other:    [] Dysphagia  [] Early Satiety  [x] No Problems Eating      Food Allergies: no  Food Intolerances: no    Current Diet: Regular Diet, No Restrictions  Current Nutrition Intake: Unchanged from last visit  Appetite: Good  Nutrition Route: PO  Meal planning/preparation mainly done by: Self and Spouse/Partner  Oral Care: brushes BID  Activity level: Strength increasing  "Energetic" recently       24 Hr Diet Recall: average:  2301kcal, 108g pro, 254CHO   Breakfast:Eggs and OJ and english muffin with jelly  Lunch:  turkey and cheese sandwich OR soup   Dinner: turkey/chicken OR hamburger   Snack: granola bar around 11am, popsicle, Special K protein (15g pro)     Beverages: water (8oz x1-2), OJ (8oz x1-2), milk (8oz x1-2), ginger ale (8oz x2-4)  Supplements:    [employer] (5lul=370 kcal, 5 g pro) mixed with mixed in milk; 1x daily       Oncology Nutrition-Estimated Needs      Nutrition from 2021 in [address] [place] [address] Oncology Dietitian Services Nutrition from 2020 in [address] [place] [address] Oncology Dietitian Services Nutrition from 2020 in [address] [place] [address] Oncology Dietitian Services   Weight type used  Adjusted weight  Actual weight  Actual weight   Weight in pounds (lbs) used for estimated needs  150 lbs  --  --   Weight in kilograms (kg) used for estimated needs  68 2 kg  --  --   Weight in pounds (lbs) used for estimated needs  --  188 lbs  181 2 lbs   Weight in kilograms (kg) used for estimated needs  --  85 5 kg  82 4 kg   Energy needs based on 30 kcal/k kcal  2564 kcal  2471 kcal   Energy needs based on 35 kcal/k kcal  2991 kcal  2883 kcal   Protein needs based on 1 g/kg  68 g  --  --   Protein needs based on 1 2 g/k g  --  --   Protein needs based on 1 2 g/kg:  --  103 g  99 g   Protein needs based on 1 5 g/kg  --  128 g  124 g   Fluid needs based on 30 mL/kg  2046 mL  2565 mL  --   Fluid needs in ounces  69 oz  87 oz  --   Fluid needs based on 35 mL/kg  2387 mL  2993 mL  --   Fluid needs in ounces  81 oz  101 oz  --        Discussion & Intervention: Go was evaluated today for an RD follow up regarding nutrition impact sx management  Go is currently undergoing hormone therapy for breast cancer  Today Betty Wong reports no changes to Anthony's PO intakes/appetite  He does not have a recent home weight, but his most recent Epic weight shows a slight increase in his weight over the past month  Reviewed the importance of wt maintenance and the role of a high kcal/ high protein diet and a cancer preventative diet in managing wt and overall health  Encouraged him to continue to eat well to support weight maintenance  Also discussed: weight management, indications & use of oral nutrition supplements, high protein foods to include at all meals & snacks, encouraged eating every 2-3 hours (5-6 small meals/day), proper oral care, adequate hydation & tips to increase overall fluid intake, recipe suggestions/resources, a cancer preventative eating pattern as a long-term nutrition goal, individualized calorie, protein and fluid daily goals and mindful eating concepts  Moving forward, Noah Talbert will continue current nutrition plan of care  We will follow up in 3 weeks  Materials Provided: not applicable  All questions and concerns addressed during todays visit  Go has RD contact information  Nutrition Diagnosis:    Increased Nutrient Needs (kcal & pro) related to increased demand for nutrients and disease state as evidenced by cancer dx and pt undergoing tx for cancer  Monitoring & Evaluation:     Barriers: None  Readiness to change: action  Comprehension: verbalizes understanding  Expected Compliance: good    Goals:  · weight maintenance/stabilization  · pt to meet >/=75% estimated nutrition needs daily  · Increase hydration to goal of 68-80oz / day     · Progress Towards Goals: Progressing and Goal(s) Met    Nutrition Rx & Recommendations:  · Keep a daily food journal (pen/paper, martha such as [place])    · Nutrition Supplements: [place] Instant Breakfast mixed with whole milk   May use homemade shakes/smoothies as desired  If using a pre-made shake/bar, choose ones with >300 calories and >10 grams protein (ex  Ensure Enlive, Ensure Plus, Boost Plus, Boost Very High Calorie, etc )  · Small, frequent meals/snacks may be easier to tolerate than 3 large daily meals  Aim for 5-6 small meals per day (every 2-3 hours)  · Include protein at all meals/snacks  · Include a variety of foods (as tolerated/allowed by diet)  · Follow a Cancer Preventative Nutrition Pattern: colorful, plant-based, low-fat, avoid added sugars, limit alcohol, include high fiber foods, limit processed meats, limit red meat, choose lean protein sources, use low-fat cooking methods, balance calories with physical activity, avoid excessive weight gain throughout life  · Incorporate physical activity as able/allowed  · Stay hydrated by sipping fluids of choice/tolerance throughout the day  · Alter food choices and eating patterns to accommodate changing needs  · Refer to Eating Hints book for other meal/snack ideas and symptom management  · Weigh yourself regularly  If you notice weight loss, make an effort to increase your daily food/calorie intake  If you continue to notice loss after these efforts, reach out to your dietitian to establish a plan to stabilize weight  · High protein foods to try: whole milk, peanut butter, beans, chicken, fish (see pages 49-51 in your Eating Hints book)  · Follow proper food safety when your risk for infection is high (see pages 4-5 in your [address])       -FOODS TO AVOID: raw fish/shellfish/sushi; raw nuts; foods/drinks/condiments past freshness date; foods from bulk bins; buffets/salad bars; moldy foods; perishable foods that have been sitting at room-temp for >2hrs; eat leftovers that have been in the fridge for >3 days; etc      -PRECAUTIONS TO TAKE: keep hot foods hot and cold foods cold; scrub fruits/veggies with a brush and water before eating or cutting; put leftovers in the fridge as soon as you have finished eating; soak berries and other foods that are not easily scrubbed in water then rinse; wash your hands/utensils/ counter tops before and after preparing food; wash hands each time you touch raw meat/poultry/fish; use one cutting board for meat and another for produce; thaw meat/poultry/fish in the fridge or defrost in microwave - do not thaw at room-temp; cook all meats/poultry/fish/eggs to the proper temperatures; make sure your juice and milk products are pasteurized; eat nuts that are shelled and roasted; etc     · Try to increase fluid intake  Goal is 68-80 oz daily       Follow Up Plan: 2/1/21 phone follow up 10:15am   Recommend Referral to Other Providers: none at this time

## 2021-01-11 NOTE — TELEPHONE ENCOUNTER
Patient advised that [facility] lab results will give more information than and a BMP  Advised patient that [facility] doctors can see results through care everywhere in [facility] [place]  Patient verbalized understanding of above

## 2021-01-11 NOTE — PATIENT INSTRUCTIONS
Nutrition Rx & Recommendations:  · Keep a daily food journal (pen/paper, martha such as [employer])  · Nutrition Supplements: [place] Instant Breakfast mixed with whole milk   May use homemade shakes/smoothies as desired  If using a pre-made shake/bar, choose ones with >300 calories and >10 grams protein (ex  Ensure Enlive, Ensure Plus, Boost Plus, Boost Very High Calorie, etc )  · Small, frequent meals/snacks may be easier to tolerate than 3 large daily meals  Aim for 5-6 small meals per day (every 2-3 hours)  · Include protein at all meals/snacks  · Include a variety of foods (as tolerated/allowed by diet)  · Follow a Cancer Preventative Nutrition Pattern: colorful, plant-based, low-fat, avoid added sugars, limit alcohol, include high fiber foods, limit processed meats, limit red meat, choose lean protein sources, use low-fat cooking methods, balance calories with physical activity, avoid excessive weight gain throughout life  · Incorporate physical activity as able/allowed  · Stay hydrated by sipping fluids of choice/tolerance throughout the day  · Alter food choices and eating patterns to accommodate changing needs  · Refer to Eating Hints book for other meal/snack ideas and symptom management  · Weigh yourself regularly  If you notice weight loss, make an effort to increase your daily food/calorie intake  If you continue to notice loss after these efforts, reach out to your dietitian to establish a plan to stabilize weight  · High protein foods to try: whole milk, peanut butter, beans, chicken, fish (see pages 49-51 in your Eating Hints book)  · Follow proper food safety when your risk for infection is high (see pages 4-5 in your [address])       -FOODS TO AVOID: raw fish/shellfish/sushi; raw nuts; foods/drinks/condiments past freshness date; foods from bulk bins; buffets/salad bars; moldy foods; perishable foods that have been sitting at room-temp for >2hrs; eat leftovers that have been in the fridge for >3 days; etc      -PRECAUTIONS TO TAKE: keep hot foods hot and cold foods cold; scrub fruits/veggies with a brush and water before eating or cutting; put leftovers in the fridge as soon as you have finished eating; soak berries and other foods that are not easily scrubbed in water then rinse; wash your hands/utensils/ counter tops before and after preparing food; wash hands each time you touch raw meat/poultry/fish; use one cutting board for meat and another for produce; thaw meat/poultry/fish in the fridge or defrost in microwave - do not thaw at room-temp; cook all meats/poultry/fish/eggs to the proper temperatures; make sure your juice and milk products are pasteurized; eat nuts that are shelled and roasted; etc     · Try to increase fluid intake  Goal is 68-80 oz daily       Follow Up Plan: 2/1/21 phone follow up 10:15am   Recommend Referral to Other Providers: none at this time

## 2021-01-13 NOTE — TELEPHONE ENCOUNTER
Returned call to patients wife- msg left explaining ok for [address] to get the COVID vaccine

## 2021-02-01 NOTE — PATIENT INSTRUCTIONS
Nutrition Rx & Recommendations:  · Keep a daily food journal (pen/paper, martha such as [employer])  · Nutrition Supplements: [employer] [employer] Breakfast mixed with milk every other day  May use homemade shakes/smoothies as desired  If using a pre-made shake/bar, choose ones with >300 calories and >10 grams protein (ex  Ensure Enlive, Ensure Plus, Boost Plus, Boost Very High Calorie, etc )  · Small, frequent meals/snacks may be easier to tolerate than 3 large daily meals  Aim for 5-6 small meals per day (every 2-3 hours)  · Include protein at all meals/snacks  · Include a variety of foods (as tolerated/allowed by diet)  · Follow a Cancer Preventative Nutrition Pattern: colorful, plant-based, low-fat, avoid added sugars, limit alcohol, include high fiber foods, limit processed meats, limit red meat, choose lean protein sources, use low-fat cooking methods, balance calories with physical activity, avoid excessive weight gain throughout life  · Incorporate physical activity as able/allowed  · Stay hydrated by sipping fluids of choice/tolerance throughout the day  · Alter food choices and eating patterns to accommodate changing needs  · Refer to Eating Hints book for other meal/snack ideas and symptom management  · Weigh yourself regularly  If you notice weight loss, make an effort to increase your daily food/calorie intake  If you continue to notice loss after these efforts, reach out to your dietitian to establish a plan to stabilize weight  · High protein foods to try: peanut butter, beans, chicken, fish, eggs (see pages 49-51 in your Eating Hints book)   · Try to increase fluid intake  Goal is 68-80 oz daily       Follow Up Plan: 3/5/21 10am phone follow up    Recommend Referral to Other Providers: none at this time

## 2021-02-01 NOTE — PROGRESS NOTES
Outpatient Oncology Nutrition Consult   Type of Consult: Follow Up  Care Location: Telephone Call-pt and wife Candelario Fregoso    Reason for referral: RN (Danielle Anderson) request due to pt has nutrition questions (Date of referral: 2/19/20)    Nutrition Assessment:   Oncology Diagnosis & Treatments: Left breast carcinoma diagnosed 11/2018, BRCA negative  Neoadjuvant tamoxifen 20 mg daily from December 27, 2018 until May 2, 2019  Left modified radical mastectomy May 2, 2019  Tamoxifen was resumed postoperatively  Widespread metastatic disease including multiple lung nodules, liver metastasis, and osseous metastasis found in Feb 2020  Began Lupron in Feb 2020, and Letrozole and Sherlean Hensley March 2020  Oncology History Overview Note   Patient returns today for discuss results of CT and MRI  Initial consult with Dr Lieutenant Bentley on 320/20   66year old male with [place] 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 status post neoadjuvant tamoxifen followed by left mastectomy +ALND, with pathology showing 5 cm tumor extending to margins, and involvement of dermis with 10/10 LN positive, followed by re-excision showing residual tumor, with final margins negative  He declined adjuvant chemotherapy and declined repeat imaging with Dr Suni Alex  Plan at time of consult was radiation to left chestwall and regional lymph nodes  1/27/20 CT simulation for radiation  Dr Lieutenant Bentley discussed with patient findings of CT sim which show metastatic disease  She also discussed with Dr Suni Alex and radiology to confirm  Plan to order diagnostic CT c/A/P with contrast, and MRI brain  forego PMRT    1/31/20 Med Onc follow-up with Dr Suni Alex  Pt is scheduled for MRI brain and CT 2/8/20, in view of azotemia,  recommend imaging without IV contrast to reduce risk of kidney dysfunction       2/8/20 CT chest abd pelvis  IMPRESSION:   1   Progressively enlarging 9 mm right middle lobe nodule with Satellite nodules and few additional new pulmonary nodules, suspicious for progressive metastatic disease      2   Multiple hypoattenuating liver lesions, new from December 2018, and progressed from January 2020, highly suspicious for metastasis      3   Osseous metastatic disease as described, new from December 2018, and slightly progressed from January 2020  Right acetabular lytic lesion was not previously imaged      4  Indeterminate 3 2 cm nodular lesion in the left upper quadrant of the abdomen, which was only partially imaged on prior studies and was not adequately evaluated  This may represent a splenule, tumor implant or metastatic lymph node  Further   characterization with PET CT can be performed if clinically warranted      2/8/20 MRI brain - pending      2/14/20 Med Onc f/u with Dr Mary Ortiz       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, estrogen receptor positive ([facility] [address] )   2018 Initial Diagnosis    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, estrogen receptor positive ([facility] [address] )     11/14/2018 Biopsy    A  Breast, left subareolar mass, core biopsy (11 slides, [address], collected on 11/14/2018): - Invasive mammary carcinoma with mucinous features (see note)  -- [place] grade 1 of 3 (total score: 5 of 9)        * Tubule formation < 10%, score 3        * Nuclear grade 1 of 3, score 1        * Mitoses < 3/mm2, (</= 7 mitoses/10HPF), score 1     -- Invasive carcinoma involves 4 of 4 submitted core biopsy fragments, max  Dimension= 22 mm     -- Estrogen, Progesterone & HER2 receptor studies performed at the referring institution show the tumor to be positive for ER (>95% strong), positive for RI (75% moderate), and negative for HER2 IHC (score 0)  - Ductal carcinoma in-situ (DCIS): Not identified  - Lymph-vascular invasion: Not identified  - Microcalcifications: Absent    - Best representative tumor block:  A1    -- Sufficient tumor present for        Agendia Mammaprint/Blueprint (1 cm2 of invasive tumor in aggregate): No         MI Profile/Foundation One (at least 5 x 5 mm of tumor): Yes  Note:  The tumor shows prominent mucinous differentiation, raising the strong possibility of invasive mucinous carcinoma, though this designation is possible only complete excision with evaluation of the entirety of the tumor mass  12/27/2018 -  Hormone Therapy    Tamoxifen 20 mg daily (Dr Teja Scott)     2018 Genetic Testing    BRCA negative      5/2/2019 Surgery    A  Breast, Left, Mastectomy and Axillary Contents:  - 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), Jim Grade II (3 + 2 + 1 = 6), 51 mm in greatest dimension, involving dermis  See Tumor Synoptic Report (below) and Note  - Margins negative for carcinoma in this specimen (see separately submitted additional margins, Parts C and D)  - Intradermal nevi      B  Lymph Node, Left Periaxillary Vein, Lymphadenectomy:  - Metastatic carcinoma present in one lymph node (1/1), with extranodal extension   - Metastatic focus measures at least 1 5 cm in greatest dimension      C  Breast, Left Inferior Medial Margin, Excision:  - Benign fibroadipose tissue and skeletal muscle      D  Breast, Left Inferior Medial Margin Skin, Excision:  - Invasive mammary carcinoma, underlying benign skin  - Anterior margin is multifocally positive for carcinoma     (Dr Stacy Verma)     10/9/2019 Surgery    Left breast, re-excision scar mastectomy site medial and inferior margins:     - Residual invasive breast carcinoma of no special type (ductal NST/invasive ductal carcinoma), multifocal        -- Four foci identified ranging in size from 1 5 to approximately 12 mm        -- Tumor foci are present in dermis and adjacent subcutaneous tissues       - All margins are negative for tumor with closest margin as follows:       -- One focus of tumor comes to within 0 7 mm of the superior margin (A16)  -- Largest focus comes to within 1 5 mm of the superior margin (A37)  - Cicatricial fibrosis of skin and subcutaneous tissues      (Dr Susan Hughes)       Past Medical & Surgical Hx: HTN, pancreatitis, anemia, afib  Patient Active Problem List   Diagnosis    Mass of breast, left    Hypertension    Leukocytosis    PAC (premature atrial contraction)    Pancreatic mass    Pancreatitis    RBBB (right bundle branch block with left anterior fascicular block)    Transaminitis    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, estrogen receptor positive ([address] )    Anemia, unspecified    Prerenal azotemia    Liver metastasis ([address]    Lung metastasis ([address]    Osseous metastasis ([address] )    CRI (chronic renal insufficiency)     Past Medical History:   Diagnosis Date    Acute gallstone pancreatitis     Arthritis     Atrial fibrillation ([address] )     Breast cancer ([facility] [address] )     Cancer (HCC)     breast - left    Chronic pain disorder     Extremity pain     Hypertension     Irregular heartbeat     PAC (premature atrial contraction)     PVC (premature ventricular contraction)     RBBB     Wears glasses      Past Surgical History:   Procedure Laterality Date    APPENDECTOMY      CHOLECYSTECTOMY      FRACTURE SURGERY Left     arm    NC EXC SKIN BENIG 3 1-4 CM TRUNK,ARM,LEG Left 10/9/2019    Procedure: BREAST RE-EXCISION OF SKIN MARGINS;  Surgeon: Susan Hughes MD;  [facility];  Service: General    NC LAP,CHOLECYSTECTOMY N/A 12/18/2018    Procedure: LAP CHOLECYSTECTOMY; LYSIS OF ADHESIONS; ATTEMPTED CHOLANGIOGRAM;  Surgeon: Susan Hughes MD;  [facility];  Service: General    NC MASTECTOMY, MODIFIED RADICAL Left 5/2/2019    Procedure: MASTECTOMY MODIFIED RADICAL; AXILLARY DISSECTION;  Surgeon: Susan Hughes MD;  [facility];  Service: General    TONSILLECTOMY         Review of Medications:   Vitamins, Supplements and Herbals: No, pt denies taking supplements     Current Outpatient Medications:     acetaminophen (TYLENOL) 500 mg tablet, Take 500 mg by mouth every 6 (six) hours as needed, Disp: , Rfl:     capsaicin (ZOSTRIX) 0 025 % cream, Apply 1 application topically 2 (two) times a day, Disp: 60 g, Rfl: 0    furosemide (LASIX) 40 mg tablet, Take 40 mg by mouth daily, Disp: , Rfl: 0    letrozole (FEMARA) 2 5 mg tablet, TAKE ONE TABLET BY MOUTH EVERY DAY, Disp: 90 tablet, Rfl: 1    losartan (COZAAR) 50 mg tablet, Take 50 mg by mouth daily , Disp: , Rfl: 0    Multiple Vitamins-Minerals (CENTRUM SILVER 50+MEN PO), Take by mouth, Disp: , Rfl:     Palbociclib (Ibrance) 75 MG capsule, Take 1 capsule (75 mg total) by mouth daily with breakfast Take with food   Take for 3 weeks on, followed by 1 week off , Disp: 21 capsule, Rfl: 4    polyethylene glycol (MIRALAX) 17 g packet, Take 17 g by mouth daily as needed , Disp: , Rfl:     Most Recent Lab Results:   Lab Results   Component Value Date    WBC 5 44 12/01/2020    IRON 71 06/26/2019    TIBC 311 06/26/2019    FERRITIN 89 06/26/2019    ALT 33 12/01/2020    AST 33 12/01/2020    ALB 3 7 12/01/2020    SODIUM 141 12/01/2020    SODIUM 139 10/28/2020    K 4 2 12/01/2020    K 4 5 10/28/2020     12/01/2020    BUN 42 (H) 12/01/2020    BUN 40 (H) 10/28/2020    CREATININE 1 66 (H) 12/01/2020    CREATININE 1 72 (H) 10/28/2020    EGFR 39 12/01/2020    GLUF 86 12/01/2020    GLUF 95 07/21/2020    GLUC 92 10/28/2020    CALCIUM 9 4 12/01/2020       Anthropometric Measurements:   Height: 65"  Ht Readings from Last 1 Encounters:   01/07/21 5' 5" (1 651 m)     -Weight History:   Usual Weight: 198#  Ideal Body Weight: 136#  Wt Readings from Last 20 Encounters:   01/07/21 87 kg (191 lb 12 8 oz)   12/07/20 85 3 kg (188 lb)   11/04/20 82 2 kg (181 lb 3 2 oz)   08/17/20 81 6 kg (180 lb)   07/30/20 80 9 kg (178 lb 6 4 oz)   07/15/20 81 6 kg (179 lb 14 3 oz)   06/15/20 83 5 kg (184 lb) 06/08/20 79 7 kg (175 lb 9 6 oz)   05/14/20 83 5 kg (184 lb)   03/27/20 83 5 kg (184 lb)   02/14/20 86 2 kg (190 lb)   01/31/20 89 8 kg (198 lb)   01/20/20 87 3 kg (192 lb 7 4 oz)   12/17/19 89 4 kg (197 lb)   10/21/19 89 8 kg (198 lb)   10/14/19 89 2 kg (196 lb 9 6 oz)   10/09/19 90 7 kg (200 lb)   09/17/19 90 8 kg (200 lb 3 2 oz)   09/12/19 90 1 kg (198 lb 9 6 oz)   07/29/19 87 1 kg (192 lb)     -Varian:n/a  -Home weights: (6/15/20)177#, (7/6/20) 178 2#, (7/27/20) 180#, (8/10/20)179#, (8/24/20) 181#, (9/12/20)180#, (10/5/20)179 8#, (10/25/20) 183#, (11/9/20) 183 2#, (12/8/20)182#, (2/1/21) 187#  -Comments: No new epic weights since 1/7/21  Home weight has decreased about 5#       Oncology Nutrition-Anthropometrics      Most Recent Value   Patient age (years):  66 years   Patient (male) height (in):  72 in   Current weight to be used for anthropometric calculations (lbs)  191 8 lbs   Current weight to be used for anthropometric calculations (kg)  87 kg   BMI:  31 91   IBW male  136 lb   IBW (kg) male  61 8 kg   IBW % (male)  141 %   Adjusted BW (male):  150 lbs   % weight change after 1 month:  2 %   Weight change after 1 month (lbs)  4 lbs        Nutrition-Focused Physical Findings: n/a due to telephone call    Food/Nutrition-Related History & Client/Social History:    Current Nutrition Impact Symptoms:  [] Nausea  [] Reduced Appetite  [] Acid Reflux    [] Vomiting  [] Unintended Wt Loss  [] Malabsorption    [] Diarrhea  [] Unintended Wt Gain  [] Dumping Syndrome    [] Constipation -"sometimes" takes Miralax [] Thick Mucous/Secretions  [] Abdominal Pain    [] Dysgeusia (Altered Taste)  [x] Xerostomia (Dry Mouth) -in the morning  [] Gas    [] Dysosmia (Altered Smell)  [] Thrush  [] Difficulty Chewing    [] Oral Mucositis (Sore Mouth)  [] Fatigue   [] Other:    [] Odynophagia   [] Esophagitis  [] Other:    [] Dysphagia  [] Early Satiety  [x] No Problems Eating      Food Allergies: no  Food Intolerances: no    Current Diet: Regular Diet, No Restrictions  Current Nutrition Intake: Unchanged from last visit  Appetite: Good  Nutrition Route: PO  Meal planning/preparation mainly done by: Self and Spouse/Partner  Oral Care: brushes BID  Activity level: Strength increasing       24 Hr Diet Recall: average:  2100kcal, 113g pro, 304 CHO   Breakfast: 1-2 eggs, toast with jelly  Lunch:  Turkey/ham and cheese sandwich, manning OR soup   Dinner: turkey/chicken OR hamburger OR roast beef  Snack: granola bar around 11am, popsicle, Special K protein 1/2c (15g pro)     Beverages: water (8oz x1-2), OJ (8oz x1-2), milk (8oz x1-2), ginger ale (12oz x2-3), cran-raspberry juice (16oz x1-2)  Supplements:    [place] Instant Breakfast Essentials (3jpl=117 kcal, 5 g pro) mixed with mixed in milk; 1x daily       Oncology Nutrition-Estimated Needs      Nutrition from 2021 in [address] [place] [address] Oncology Dietitian Services Nutrition from 2021 in [address] [place] [address] Oncology Dietitian Services Nutrition from 2020 in [address] [place] [address] Oncology Dietitian Services Nutrition from 2020 in [address] [place] [address] Oncology Dietitian Services   Weight type used  Adjusted weight  Adjusted weight  Actual weight  Actual weight   Weight in pounds (lbs) used for estimated needs  150 lbs  150 lbs  --  --   Weight in kilograms (kg) used for estimated needs  68 2 kg  68 2 kg  --  --   Weight in pounds (lbs) used for estimated needs  --  --  188 lbs  181 2 lbs   Weight in kilograms (kg) used for estimated needs  --  --  85 5 kg  82 4 kg   Energy needs based on 30 kcal/k kcal  2045 kcal  2564 kcal  2471 kcal   Energy needs based on 35 kcal/k kcal  2386 kcal  2991 kcal  2883 kcal   Protein needs based on 1 g/kg  68 g  68 g  --  --   Protein needs based on 1 2 g/k g  82 g  --  --   Protein needs based on 1 2 g/kg:  --  --  103 g  99 g   Protein needs based on 1 5 g/kg  --  --  128 g  124 g   Fluid needs based on 30 mL/kg  2046 mL  2046 mL  2565 mL  --   Fluid needs in ounces  69 oz  69 oz  87 oz  --   Fluid needs based on 35 mL/kg  2387 mL  2387 mL  2993 mL  --   Fluid needs in ounces  81 oz  81 oz  101 oz  --        Discussion & Intervention:   Go was evaluated today for an RD follow up regarding nutrition impact sx management  Go is currently undergoing hormone therapy for breast cancer  Today David Odell reports no changes to Anthony's PO intakes/appetite  He has no new epic weights since 1/7/21, but his home weight has decreased about 5# over the past month  Reviewed the importance of wt maintenance and the role of a high kcal/ high protein diet and a cancer preventative diet in managing wt and overall health  Discussed ways to optimize nutrient intake, suggestions include: eating smaller/more frequent meals, including high protein foods in diet (eggs, chicken, fish, beans/legumes, nuts/nut butters, etc ), including high fiber foods in diet (fruits, vegetables, whole grains, beans, etc ), eating when feeling most hungry, choosing a variety of colorful, plant-based foods, spreading carbohydrate intake throughout the day, counting carbohydrates for adequate glycemic control and balancing energy intake with physical activity  Reviewed 24 hour recall, which revealed an adequate po intake, and discussed ways to balance energy intake to promote a healthy body weight, suggestions include: having a protein source at all meals and snacks, having a fiber source at all meals and snacks and trying new recipes     Also discussed: weight management, indications & use of oral nutrition supplements, high protein foods to include at all meals & snacks, fiber content of foods, encouraged eating every 2-3 hours (5-6 small meals/day), adequate hydation & tips to increase overall fluid intake, MNT for: weight management, recipe suggestions/resources, a cancer preventative eating pattern as a long-term nutrition goal, individualized calorie, protein and fluid daily goals and mindful eating concepts  Moving forward, Jasmyn Mason will continue current nutrition plan of care  We will follow up in 1 month  Materials Provided: not applicable  All questions and concerns addressed during todays visit  Go has RD contact information  Nutrition Diagnosis:    Increased Nutrient Needs (kcal & pro) related to increased demand for nutrients and disease state as evidenced by cancer dx and pt undergoing tx for cancer  Monitoring & Evaluation:     Barriers: None  Readiness to change: action  Comprehension: verbalizes understanding  Expected Compliance: good    Goals:  · weight maintenance/stabilization  · pt to meet >/=75% estimated nutrition needs daily  · Increase hydration to goal of 68-80oz / day     · Progress Towards Goals: Progressing and Goal(s) Met    Nutrition Rx & Recommendations:  · Keep a daily food journal (pen/paper, martha such as [employer])  · Nutrition Supplements: [place] [employer] Breakfast mixed with milk every other day  May use homemade shakes/smoothies as desired  If using a pre-made shake/bar, choose ones with >300 calories and >10 grams protein (ex  Ensure Enlive, Ensure Plus, Boost Plus, Boost Very High Calorie, etc )  · Small, frequent meals/snacks may be easier to tolerate than 3 large daily meals  Aim for 5-6 small meals per day (every 2-3 hours)  · Include protein at all meals/snacks  · Include a variety of foods (as tolerated/allowed by diet)  · Follow a Cancer Preventative Nutrition Pattern: colorful, plant-based, low-fat, avoid added sugars, limit alcohol, include high fiber foods, limit processed meats, limit red meat, choose lean protein sources, use low-fat cooking methods, balance calories with physical activity, avoid excessive weight gain throughout life  · Incorporate physical activity as able/allowed  · Stay hydrated by sipping fluids of choice/tolerance throughout the day    · Alter food choices and eating patterns to accommodate changing needs  · Refer to Eating Hints book for other meal/snack ideas and symptom management  · Weigh yourself regularly  If you notice weight loss, make an effort to increase your daily food/calorie intake  If you continue to notice loss after these efforts, reach out to your dietitian to establish a plan to stabilize weight  · High protein foods to try: peanut butter, beans, chicken, fish, eggs (see pages 49-51 in your Eating Hints book)   · Try to increase fluid intake  Goal is 68-80 oz daily       Follow Up Plan: 3/5/21 10am phone follow up    Recommend Referral to Other Providers: none at this time

## 2021-03-02 NOTE — TELEPHONE ENCOUNTER
Patient's wife called the office and would just like Dr Fredy Muller to be aware of recent lab results  Wife is very please with how labs have been improving and would just like to make Dr Fredy Muller aware  Patient will be getting labs done in April for nephrology

## 2021-03-05 NOTE — PATIENT INSTRUCTIONS
Nutrition Rx & Recommendations:  · Keep a daily food journal (pen/paper, martha such as [employer])  · Nutrition Supplements: [place] [employer] Breakfast mixed with milk every other day  May use homemade shakes/smoothies as desired  If using a pre-made shake/bar, choose ones with >300 calories and >10 grams protein (ex  Ensure Enlive, Ensure Plus, Boost Plus, Boost Very High Calorie, etc )  · Small, frequent meals/snacks may be easier to tolerate than 3 large daily meals  Aim for 5-6 small meals per day (every 2-3 hours)  · Include protein at all meals/snacks  · Include a variety of foods (as tolerated/allowed by diet)  · Follow a Cancer Preventative Nutrition Pattern: colorful, plant-based, low-fat, avoid added sugars, limit alcohol, include high fiber foods, limit processed meats, limit red meat, choose lean protein sources, use low-fat cooking methods, balance calories with physical activity, avoid excessive weight gain throughout life  · Incorporate physical activity as able/allowed  · Stay hydrated by sipping fluids of choice/tolerance throughout the day  · Alter food choices and eating patterns to accommodate changing needs  · Refer to Eating Hints book for other meal/snack ideas and symptom management  · Weigh yourself regularly  If you notice weight loss, make an effort to increase your daily food/calorie intake  If you continue to notice loss after these efforts, reach out to your dietitian to establish a plan to stabilize weight  · High protein foods to try: peanut butter, beans, chicken, fish, eggs (see pages 49-51 in your Eating Hints book)   · Try to increase fluid intake  Goal is 69-81 oz daily       Follow Up Plan: 4/12/21 10am phone follow up    Recommend Referral to Other Providers: none at this time

## 2021-03-05 NOTE — PROGRESS NOTES
Outpatient Oncology Nutrition Consult   Type of Consult: Follow Up  Care Location: Telephone Call-pt and wife Lara Martin    Reason for referral: RN (Mary Rausch) request due to pt has nutrition questions (Date of referral: 2/19/20)    Nutrition Assessment:   Oncology Diagnosis & Treatments: Left breast carcinoma diagnosed 11/2018, BRCA negative  Neoadjuvant tamoxifen 20 mg daily from December 27, 2018 until May 2, 2019  Left modified radical mastectomy May 2, 2019  Tamoxifen was resumed postoperatively  Widespread metastatic disease including multiple lung nodules, liver metastasis, and osseous metastasis found in Feb 2020  Began Lupron in Feb 2020, and Letrozole and Floreen Madison March 2020  Oncology History Overview Note   Patient returns today for discuss results of CT and MRI  Initial consult with Dr Regi Birch on 320/20   66year old male with Michael Din 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 status post neoadjuvant tamoxifen followed by left mastectomy +ALND, with pathology showing 5 cm tumor extending to margins, and involvement of dermis with 10/10 LN positive, followed by re-excision showing residual tumor, with final margins negative  He declined adjuvant chemotherapy and declined repeat imaging with Dr Sallie Vora  Plan at time of consult was radiation to left chestwall and regional lymph nodes  1/27/20 CT simulation for radiation  Dr Regi Birch discussed with patient findings of CT sim which show metastatic disease  She also discussed with Dr Sallie Vora and radiology to confirm  Plan to order diagnostic CT c/A/P with contrast, and MRI brain  forego PMRT    1/31/20 Med Onc follow-up with Dr Sallie Vora  Pt is scheduled for MRI brain and CT 2/8/20, in view of azotemia,  recommend imaging without IV contrast to reduce risk of kidney dysfunction       2/8/20 CT chest abd pelvis  IMPRESSION:   1   Progressively enlarging 9 mm right middle lobe nodule with Satellite nodules and few additional new pulmonary nodules, suspicious for progressive metastatic disease      2   Multiple hypoattenuating liver lesions, new from December 2018, and progressed from January 2020, highly suspicious for metastasis      3   Osseous metastatic disease as described, new from December 2018, and slightly progressed from January 2020  Right acetabular lytic lesion was not previously imaged      4  Indeterminate 3 2 cm nodular lesion in the left upper quadrant of the abdomen, which was only partially imaged on prior studies and was not adequately evaluated  This may represent a splenule, tumor implant or metastatic lymph node  Further   characterization with PET CT can be performed if clinically warranted      2/8/20 MRI brain - pending      2/14/20 Med Onc f/u with Dr Ginny Haines       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, estrogen receptor positive ([facility] [address] )   2018 Initial Diagnosis    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, estrogen receptor positive ([facility] [address] )     11/14/2018 Biopsy    A  Breast, left subareolar mass, core biopsy (11 slides, [address], collected on 11/14/2018): - Invasive mammary carcinoma with mucinous features (see note)  -- [place] grade 1 of 3 (total score: 5 of 9)        * Tubule formation < 10%, score 3        * Nuclear grade 1 of 3, score 1        * Mitoses < 3/mm2, (</= 7 mitoses/10HPF), score 1     -- Invasive carcinoma involves 4 of 4 submitted core biopsy fragments, max  Dimension= 22 mm     -- Estrogen, Progesterone & HER2 receptor studies performed at the referring institution show the tumor to be positive for ER (>95% strong), positive for UT (75% moderate), and negative for HER2 IHC (score 0)  - Ductal carcinoma in-situ (DCIS): Not identified  - Lymph-vascular invasion: Not identified  - Microcalcifications: Absent    - Best representative tumor block:  A1    -- Sufficient tumor present for        Agendia Mammaprint/Blueprint (1 cm2 of invasive tumor in aggregate): No         MI Profile/Foundation One (at least 5 x 5 mm of tumor): Yes  Note:  The tumor shows prominent mucinous differentiation, raising the strong possibility of invasive mucinous carcinoma, though this designation is possible only complete excision with evaluation of the entirety of the tumor mass  12/27/2018 -  Hormone Therapy    Tamoxifen 20 mg daily (Dr Ballard Schwab)     2018 Genetic Testing    BRCA negative      5/2/2019 Surgery    A  Breast, Left, Mastectomy and Axillary Contents:  - 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), Grace Grade II (3 + 2 + 1 = 6), 51 mm in greatest dimension, involving dermis  See Tumor Synoptic Report (below) and Note  - Margins negative for carcinoma in this specimen (see separately submitted additional margins, Parts C and D)  - Intradermal nevi      B  Lymph Node, Left Periaxillary Vein, Lymphadenectomy:  - Metastatic carcinoma present in one lymph node (1/1), with extranodal extension   - Metastatic focus measures at least 1 5 cm in greatest dimension      C  Breast, Left Inferior Medial Margin, Excision:  - Benign fibroadipose tissue and skeletal muscle      D  Breast, Left Inferior Medial Margin Skin, Excision:  - Invasive mammary carcinoma, underlying benign skin  - Anterior margin is multifocally positive for carcinoma     (Dr Antelmo Carvajal)     10/9/2019 Surgery    Left breast, re-excision scar mastectomy site medial and inferior margins:     - Residual invasive breast carcinoma of no special type (ductal NST/invasive ductal carcinoma), multifocal        -- Four foci identified ranging in size from 1 5 to approximately 12 mm        -- Tumor foci are present in dermis and adjacent subcutaneous tissues       - All margins are negative for tumor with closest margin as follows:       -- One focus of tumor comes to within 0 7 mm of the superior margin (A16)  -- Largest focus comes to within 1 5 mm of the superior margin (A37)  - Cicatricial fibrosis of skin and subcutaneous tissues      (Dr Susan Hughes)       Past Medical & Surgical Hx: HTN, pancreatitis, anemia, afib  Patient Active Problem List   Diagnosis    Mass of breast, left    Hypertension    Leukocytosis    PAC (premature atrial contraction)    Pancreatic mass    Pancreatitis    RBBB (right bundle branch block with left anterior fascicular block)    Transaminitis    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, estrogen receptor positive ([address] )    Anemia, unspecified    Prerenal azotemia    Liver metastasis ([address]    Lung metastasis ([address]    Osseous metastasis ([address] )    CRI (chronic renal insufficiency)     Past Medical History:   Diagnosis Date    Acute gallstone pancreatitis     Arthritis     Atrial fibrillation ([address] )     Breast cancer [facility] [address] )     Cancer (HCC)     breast - left    Chronic pain disorder     Extremity pain     Hypertension     Irregular heartbeat     PAC (premature atrial contraction)     PVC (premature ventricular contraction)     RBBB     Wears glasses      Past Surgical History:   Procedure Laterality Date    APPENDECTOMY      CHOLECYSTECTOMY      FRACTURE SURGERY Left     arm    UT EXC SKIN BENIG 3 1-4 CM TRUNK,ARM,LEG Left 10/9/2019    Procedure: BREAST RE-EXCISION OF SKIN MARGINS;  Surgeon: Susan Hughes MD;  [facility];  Service: General    UT LAP,CHOLECYSTECTOMY N/A 12/18/2018    Procedure: LAP CHOLECYSTECTOMY; LYSIS OF ADHESIONS; ATTEMPTED CHOLANGIOGRAM;  Surgeon: Susan Hughes MD;  [facility];  Service: General    UT MASTECTOMY, MODIFIED RADICAL Left 5/2/2019    Procedure: MASTECTOMY MODIFIED RADICAL; AXILLARY DISSECTION;  Surgeon: Susan Hughes MD;  [facility];  Service: General    TONSILLECTOMY         Review of Medications:   Vitamins, Supplements and Herbals: Med List Reviewed & pt is only taking: MVI     Current Outpatient Medications:     acetaminophen (TYLENOL) 500 mg tablet, Take 500 mg by mouth every 6 (six) hours as needed, Disp: , Rfl:     capsaicin (ZOSTRIX) 0 025 % cream, Apply 1 application topically 2 (two) times a day, Disp: 60 g, Rfl: 0    furosemide (LASIX) 40 mg tablet, Take 40 mg by mouth daily, Disp: , Rfl: 0    letrozole (FEMARA) 2 5 mg tablet, TAKE ONE TABLET BY MOUTH EVERY DAY, Disp: 90 tablet, Rfl: 1    losartan (COZAAR) 50 mg tablet, Take 50 mg by mouth daily , Disp: , Rfl: 0    Multiple Vitamins-Minerals (CENTRUM SILVER 50+MEN PO), Take by mouth, Disp: , Rfl:     Palbociclib (Ibrance) 75 MG capsule, Take 1 capsule (75 mg total) by mouth daily with breakfast Take with food   Take for 3 weeks on, followed by 1 week off , Disp: 21 capsule, Rfl: 4    polyethylene glycol (MIRALAX) 17 g packet, Take 17 g by mouth daily as needed , Disp: , Rfl:     Most Recent Lab Results:   Lab Results   Component Value Date    WBC 4 34 03/01/2021    IRON 71 06/26/2019    TIBC 311 06/26/2019    FERRITIN 89 06/26/2019    ALT 32 03/01/2021    AST 32 03/01/2021    ALB 3 7 03/01/2021    SODIUM 142 03/01/2021    SODIUM 141 12/01/2020    K 4 2 03/01/2021    K 4 2 12/01/2020     03/01/2021    BUN 35 (H) 03/01/2021    BUN 42 (H) 12/01/2020    CREATININE 1 74 (H) 03/01/2021    CREATININE 1 66 (H) 12/01/2020    EGFR 37 03/01/2021    GLUF 87 03/01/2021    GLUF 86 12/01/2020    GLUC 92 10/28/2020    CALCIUM 9 9 03/01/2021       Anthropometric Measurements:   Height: 65"  Ht Readings from Last 1 Encounters:   01/07/21 5' 5" (1 651 m)     -Weight History:   Usual Weight: 198#  Ideal Body Weight: 136#  Wt Readings from Last 20 Encounters:   01/07/21 87 kg (191 lb 12 8 oz)   12/07/20 85 3 kg (188 lb)   11/04/20 82 2 kg (181 lb 3 2 oz)   08/17/20 81 6 kg (180 lb)   07/30/20 80 9 kg (178 lb 6 4 oz)   07/15/20 81 6 kg (179 lb 14 3 oz)   06/15/20 83 5 kg (184 lb)   06/08/20 79 7 kg (175 lb 9 6 oz)   05/14/20 83 5 kg (184 lb)   03/27/20 83 5 kg (184 lb)   02/14/20 86 2 kg (190 lb)   01/31/20 89 8 kg (198 lb)   01/20/20 87 3 kg (192 lb 7 4 oz)   12/17/19 89 4 kg (197 lb)   10/21/19 89 8 kg (198 lb)   10/14/19 89 2 kg (196 lb 9 6 oz)   10/09/19 90 7 kg (200 lb)   09/17/19 90 8 kg (200 lb 3 2 oz)   09/12/19 90 1 kg (198 lb 9 6 oz)   07/29/19 87 1 kg (192 lb)     -Varian:n/a  -Home weights: (6/15/20)177#, (7/6/20) 178 2#, (7/27/20) 180#, (8/10/20)179#, (8/24/20) 181#, (9/12/20)180#, (10/5/20)179 8#, (10/25/20) 183#, (11/9/20) 183 2#, (12/8/20)182#, (2/1/21) 187#  -Comments: No new epic weights since 1/7/21  Pt reports that home weight has been stable        Oncology Nutrition-Anthropometrics      Nutrition from 3/5/2021 in [address] [place] [address] Oncology Dietitian Services Nutrition from 2/1/2021 in [address] [place] [address] Oncology Dietitian Services   Patient age (years):  64 years  66 years   Patient (male) height (in):  72 in  72 in   Current weight (lbs):  191 8 lbs  191 8 lbs   Current weight to be used for anthropometric calculations (kg)  87 2 kg  87 kg   BMI:  31 9  31 91   IBW male  136 lb  136 lb   IBW (kg) male  61 8 kg  61 8 kg   IBW % (male)  141 %  141 %   Adjusted BW (male):  150 lbs  150 lbs   Adjusted BW in kg (male):  68 2 kg  --   % weight change after 1 month:  2 %  2 %   Weight change after 1 month (lbs)  3 8 lbs  4 lbs        Nutrition-Focused Physical Findings: n/a due to telephone call    Food/Nutrition-Related History & Client/Social History:    Current Nutrition Impact Symptoms:  [] Nausea  [] Reduced Appetite  [] Acid Reflux    [] Vomiting  [] Unintended Wt Loss  [] Malabsorption    [] Diarrhea  [] Unintended Wt Gain  [] Dumping Syndrome    [] Constipation -"sometimes" takes Miralax [] Thick Mucous/Secretions  [] Abdominal Pain    [] Dysgeusia (Altered Taste)  [x] Xerostomia (Dry Mouth) -in the morning  [] Gas    [] Dysosmia (Altered Smell) [] Thrush  [] Difficulty Chewing    [] Oral Mucositis (Sore Mouth)  [] Fatigue   [] Other:    [] Odynophagia   [] Esophagitis  [] Other:    [] Dysphagia  [] Early Satiety  [x] No Problems Eating      Food Allergies: no  Food Intolerances: no    Current Diet: Regular Diet, No Restrictions  Current Nutrition Intake: Unchanged from last visit  Appetite: Good  Nutrition Route: PO  Meal planning/preparation mainly done by: Self and Spouse/Partner  Oral Care: brushes BID  Activity level: Strength increasing       24 Hr Diet Recall: average:  1969kcal, 107g pro, 308 CHO   Breakfast: 1-2 eggs, toast with jelly  Lunch:  Turkey/ham and cheese sandwich, manning OR soup   Dinner: turkey/chicken OR hamburger OR roast beef  Snack: granola bar around 11am, popsicle, Special K protein 1/2c (15g pro)     Beverages: water (8oz x1-2), OJ (8oz x1-2), milk (8oz x1-2), ginger ale (12oz x2-3), cran-raspberry juice (16oz x1-2)  Supplements:    [place] Instant Breakfast Essentials (8hch=499 kcal, 5 g pro) mixed with mixed in milk; 1x daily       Oncology Nutrition-Estimated Needs      Nutrition from 3/5/2021 in [facility] Nutrition from 2021 in [facility] On[facility] Dietitian [facility]   Weight type used  Adjusted weight  Adjusted weight   Weight in kilograms (kg) used for estimated needs  68 2 kg  68 2 kg   Energy needs formula:   30-35 kcal/kg  30-35 kcal/kg   Energy needs based on 30 kcal/k kcal  2045 kcal   Energy needs based on 35 kcal/k kcal  2386 kcal   Protein needs formula:  1-1 2 g/kg  1-1 2 g/kg   Protein needs based on 1 g/kg  68 g  68 g   Protein needs based on 1 2 g/k g  82 g   Fluid needs formula:  30-35 mL/kg  30-35 mL/kg   Fluid needs based on 30 mL/kg  2046 mL  2046 mL   Fluid needs in ounces  69 oz  69 oz   Fluid needs based on 35 mL/kg  2387 mL  2387 mL   Fluid needs in ounces  81 oz  81 oz        Discussion & Intervention:   Go was evaluated today for an RD follow up regarding nutrition impact sx management  Go is currently undergoing hormone therapy for breast cancer  Today Marsisa Ramirez reports that he is doing well  Josefa Serrano provided averages for Anthony's calorie, protein, and carb intakes  He continues to consume adequate nutrition intake  Marissa Ramirez had questions about foods that can cause kidney stones and if he should avoid these foods  Reviewed the types of kidney stones and some foods that commonly cause kidney stones and the importance of adequate hydration to help decrease risk of kidney stones  Marissa Ramirez and Josefa Serrano also had questions about the COVID vaccine and if it is safe for Marissa Ramirez to get while on Ibrance  Suggested they discuss with HemOnc MD, and they stated that Dr Addie Brush has given the OK for Marissa Ramirez to get the vaccine  He is unsure if he wants to get it at this time  Reviewed the nearest [facility] vaccine clinic to them and scheduling information in the event they do decide to schedule vaccine appointments  Based on today's assessment, discussion included: choosing a variety of colorful, plant-based foods to support a cancer preventative diet, adequate hydration & fluid choices, continuing oral nutrition supplements and mindful eating concepts  Moving forward, Nikos Ramirez will continue current nutrition plan of care  We will follow up in 1 month  Materials Provided: not applicable  All questions and concerns addressed during todays visit  Go has RD contact information  Nutrition Diagnosis:    Increased Nutrient Needs (kcal & pro) related to increased demand for nutrients and disease state as evidenced by cancer dx and pt undergoing tx for cancer    Monitoring & Evaluation:       Goals:  · weight maintenance/stabilization  · pt to meet >/=75% estimated nutrition needs daily  · Increase hydration to goal of 68-80oz / day     · Progress Towards Goals: Progressing and Goal(s) Met    Nutrition Rx & Recommendations:  · Keep a daily food journal (pen/paper, martha such as [place])  · Nutrition Supplements: [employer] Breakfast mixed with milk every other day  May use homemade shakes/smoothies as desired  If using a pre-made shake/bar, choose ones with >300 calories and >10 grams protein (ex  Ensure Enlive, Ensure Plus, Boost Plus, Boost Very High Calorie, etc )  · Small, frequent meals/snacks may be easier to tolerate than 3 large daily meals  Aim for 5-6 small meals per day (every 2-3 hours)  · Include protein at all meals/snacks  · Include a variety of foods (as tolerated/allowed by diet)  · Follow a Cancer Preventative Nutrition Pattern: colorful, plant-based, low-fat, avoid added sugars, limit alcohol, include high fiber foods, limit processed meats, limit red meat, choose lean protein sources, use low-fat cooking methods, balance calories with physical activity, avoid excessive weight gain throughout life  · Incorporate physical activity as able/allowed  · Stay hydrated by sipping fluids of choice/tolerance throughout the day  · Alter food choices and eating patterns to accommodate changing needs  · Refer to Eating Hints book for other meal/snack ideas and symptom management  · Weigh yourself regularly  If you notice weight loss, make an effort to increase your daily food/calorie intake  If you continue to notice loss after these efforts, reach out to your dietitian to establish a plan to stabilize weight  · High protein foods to try: peanut butter, beans, chicken, fish, eggs (see pages 49-51 in your Eating Hints book)   · Try to increase fluid intake  Goal is 69-81 oz daily       Follow Up Plan: 4/12/21 10am phone follow up    Recommend Referral to Other Providers: none at this time

## 2021-03-08 NOTE — PROGRESS NOTES
Hematology / Oncology Outpatient Follow Up Note    Carlton Coy 64 y o  male :1945 PPN:[id number]         Date:  3/8/2021    Assessment / Plan:  A 57-year-old gentleman who has history of locally advanced left breast cancer, grade 1, ER 95% positive, NE 75% positive, HER2 negative disease, diagnosed in 2018  He was treated with neoadjuvant tamoxifen followed by mastectomy and lymph node dissection which showed 10 positive lymph nodes   He continued with adjuvant tamoxifen   Unfortunately, he was found to have widespread metastatic disease in his liver and bone   He is currently on Lupron, palbociclib and letrozole with no toxicity  He has continuous biochemical response  I recommended him to continue with palbociclib 75 mg 3 weeks on followed by 1 week off as well as letrozole 2 5 mg daily  He will stay on Lupron shot every 3 months as well as denosumab injection  I will see him again in 3 months with CBC, CMP and tumor antigens  He is in agreement with my recommendations        Subjective:      HPI:             Interval History:  A 57-year-old gentleman who has metastatic breast cancer, ER 95% positive, NE 75% positive, HER2 negative disease   He was initially diagnosed with breast cancer with invasive mucinous histology in 2018  He appeared to have had locally advanced disease   He was on neoadjuvant hormonal therapy with tamoxifen from 2018 through May 2019  Subsequently, he underwent left mastectomy and axillary lymph node dissection which showed 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   10/10 axillary lymph node were positive for metastatic disease with largest tumor measuring 2 5 cm   Tamoxifen was continued as adjuvant setting   Unfortunately, he developed liver, bone metastasis   He has been on Lupron shot as well as palbociclib and letrozole since 2020  He has been tolerating current treatment very well    He presents today for routine follow-up  Based on the tumor antigen, he has biochemical response  He denied fever, chills or night sweats  He has good appetite resulting in stable weight  He has no respiratory symptoms  He denied any pain  His performance status is normal          Objective:      Primary Diagnosis:     1  Left breast cancer, stage IIIC (pT3, pN3, M0) grade 1, invasive mucinous histology, ER 95% positive, MT 75% positive, HER2 negative disease   Diagnosed in November 2018       2  Metastatic breast cancer in his liver and multiple bone, diagnosed in February 2020       Cancer Staging:  Cancer Staging  No matching staging information was found for the patient         Previous Hematologic/ Oncologic Treatment:      1  Neoadjuvant and adjuvant hormonal therapy with tamoxifen from December 2018 through February 2020       Current Hematologic/ Oncologic Treatment:       1  Lupron, since February 2020       2  Letrozole and palbociclib since March 2020       3  Denosumab every 3 months      Disease Status:      Biochemical response      Test Results:     Pathology:           Radiology:     CT scan of chest abdomen pelvis in February 2020 showed liver and extensive bone metastasis      Laboratory:     See below   CA 27, 29 is  219 in March 2021      Physical Exam:        General Appearance:    Alert, oriented          Eyes:    PERRL   Ears:    Normal external ear canals, both ears   Nose:   Nares normal, septum midline   Throat:   Mucosa moist  Pharynx without injection  Neck:   Supple         Lungs:     Clear to auscultation bilaterally   Chest Wall:    No tenderness or deformity    Heart:    Regular rate and rhythm         Abdomen:     Soft, non-tender, bowel sounds +, no organomegaly               Extremities:   Extremities no cyanosis or edema         Skin:   no rash or icterus      Lymph nodes:   Cervical, supraclavicular, and axillary nodes normal   Neurologic:   CNII-XII intact, normal strength, sensation and reflexes   Throughout             Breast exam:   Status post left mastectomy with no palpable abnormality on his left chest wall   Right showed gynecomastia             ROS: Review of Systems   All other systems reviewed and are negative  Imaging: No results found  Labs:   Lab Results   Component Value Date    WBC 4 34 03/01/2021    HGB 11 4 (L) 03/01/2021    HCT 33 8 (L) 03/01/2021     (H) 03/01/2021     03/01/2021     Lab Results   Component Value Date    K 4 2 03/01/2021     03/01/2021    CO2 32 03/01/2021    BUN 35 (H) 03/01/2021    CREATININE 1 74 (H) 03/01/2021    GLUF 87 03/01/2021    CALCIUM 9 9 03/01/2021    AST 32 03/01/2021    ALT 32 03/01/2021    ALKPHOS 83 03/01/2021    EGFR 37 03/01/2021         Lab Results   Component Value Date     (H) 02/26/2020       Lab Results   Component Value Date    SPEP  05/06/2020     The SPEP shows a faint possible band in the gamma region  Immunofixation to be performed  Reviewed by: [place] ([id number]) **Electronic Signature**    UPEP  05/06/2020     The UPEP shows selective proteinuria  No monoclonal bands noted  Reviewed by: Twilla Feather [place] ([id number]) **Electronic Signature**       Lab Results   Component Value Date    PSA <0 1 02/26/2020       Lab Results   Component Value Date    CEA 2 2 02/26/2020         Lab Results   Component Value Date    IRON 71 06/26/2019    TIBC 311 06/26/2019    FERRITIN 89 06/26/2019       Lab Results   Component Value Date    RPIFOSQA35 488 12/03/2019         Current Medications: Reviewed  Allergies: Reviewed  PMH/FH/SH:  Reviewed      Vital Sign:    Body surface area is 1 97 meters squared      Wt Readings from Last 3 Encounters:   03/08/21 90 2 kg (198 lb 12 8 oz)   01/07/21 87 kg (191 lb 12 8 oz)   12/07/20 85 3 kg (188 lb)        Temp Readings from Last 3 Encounters:   03/08/21 97 6 °F (36 4 °C) (Tympanic Core)   01/07/21 97 8 °F (36 6 °C) (Tympanic)   12/07/20 98 2 °F (36 8 °C) (Tympanic Core)        BP Readings from Last 3 Encounters:   03/08/21 162/78   01/07/21 139/69   12/07/20 156/82         Pulse Readings from Last 3 Encounters:   03/08/21 (!) 108   01/07/21 86   12/07/20 (!) 107     @LASTSAO2(3)@

## 2021-03-10 NOTE — TELEPHONE ENCOUNTER
Received call from Katya Camara asking about iron rich foods that Nas Bee can eat  Reviewed iron rich foods including; iron fortified cereal, liver, cream of wheat/oatmeal, beans/lentils, dark leafy greens, turkey, shellfish, and beef  She plans to incorporate more iron rich foods into Anthony's diet  She is appreciative of suggestions  All questions/concerns addressed at this time

## 2021-03-11 NOTE — TELEPHONE ENCOUNTER
Received voice message from Radhames Machuca asking what %DV means on a nutrition label, specifically what 6% DV of iron means  She also asked if this RD could send another copy of the Eating Hints Book to her  Attempted to call her back to discuss, no answer, LVM explaining what %DV means and how to translate that to grams of iron  Will send copy of Eating Hints Book in mail today

## 2021-03-12 NOTE — TELEPHONE ENCOUNTER
Received call from [facility] requesting lists of iron rich foods and their iron content  Handouts mailed to pts home today

## 2021-03-17 NOTE — TELEPHONE ENCOUNTER
Received call from Betty Wong regarding [address] that this RD mailed to her and Miguel Chilel after oncology nutrition follow up last week, she states that she has not received in the mail yet  Confirmed that book was sent and should be arriving this week  Will send another copy if she does not receive one previously sent

## 2021-03-26 NOTE — TELEPHONE ENCOUNTER
Received call from Dharmesh Tapia asking what foods Cheyenne Rosa can eat to help boost immune system  Called back and spoke to Cheyenne Rosa, explained that foods high in phytochemicals such as fruits and vegetables can help boost the immune system  Will email him "Yamileth Chacon in [address] [place] [address]" document with examples of foods high in phytochemicals  Cheyenne Rosa is appreciative of suggestions

## 2021-04-05 NOTE — PROGRESS NOTES
Pt  Denies new symptoms or concerns today  Xgeva given SQ in JOSEPH as ordered  Eligard given IM in Rt  Outer gluteus muscle  Pt  Tolerated well  Future appointment scheduled  AVS declined

## 2021-04-05 NOTE — PLAN OF CARE
Problem: Knowledge Deficit  Goal: Patient/family/caregiver demonstrates understanding of disease process, treatment plan, medications, and discharge instructions  Description: Complete learning assessment and assess knowledge base  Interventions:  - Provide teaching at level of understanding  - Provide teaching via preferred learning methods  Outcome: Progressing     Problem: PAIN - ADULT  Goal: Verbalizes/displays adequate comfort level or baseline comfort level  Description: Interventions:  - Encourage patient to monitor pain and request assistance  - Assess pain using appropriate pain scale  - Administer analgesics based on type and severity of pain and evaluate response  - Implement non-pharmacological measures as appropriate and evaluate response  - Consider cultural and social influences on pain and pain management  - Notify physician/advanced practitioner if interventions unsuccessful or patient reports new pain  Outcome: Progressing     Problem: SAFETY ADULT  Goal: Patient will remain free of falls  Description: INTERVENTIONS:  - Assess patient frequently for physical needs  -  Identify cognitive and physical deficits and behaviors that affect risk of falls    -  [place] fall precautions as indicated by assessment   - Educate patient/family on patient safety including physical limitations  - Instruct patient to call for assistance with activity based on assessment  - Modify environment to reduce risk of injury  - Consider OT/PT consult to assist with strengthening/mobility  Outcome: Progressing     Problem: INFECTION - ADULT  Goal: Absence or prevention of progression during hospitalization  Description: INTERVENTIONS:  - Assess and monitor for signs and symptoms of infection  - Monitor lab/diagnostic results  - Monitor all insertion sites, i e  indwelling lines, tubes, and drains  - Monitor endotracheal if appropriate and nasal secretions for changes in amount and color  - [place] appropriate cooling/warming therapies per order  - Administer medications as ordered  - Instruct and encourage patient and family to use good hand hygiene technique  - Identify and instruct in appropriate isolation precautions for identified infection/condition  Outcome: Progressing  Goal: Absence of fever/infection during neutropenic period  Description: INTERVENTIONS:  - Monitor WBC    Outcome: Progressing

## 2021-04-06 NOTE — TELEPHONE ENCOUNTER
Received call from Quintin Saenz to [facility] base in regards to Anthony's nutrition  She states that Nimo Ortez is doing well, he has increased his fluid intake, and his calorie and protein intakes are about the same   He is scheduled for oncology nutrition phone follow up 4/12/21 at 10am

## 2021-04-12 NOTE — PROGRESS NOTES
Outpatient Oncology Nutrition Consult   Type of Consult: Follow Up  Care Location: Telephone Call-pt and wife Belle Avalos    Reason for referral: RN (Bryant Pelayo) request due to pt has nutrition questions (Date of referral: 2/19/20)    Nutrition Assessment:   Oncology Diagnosis & Treatments: Left breast carcinoma diagnosed 11/2018, BRCA negative  Neoadjuvant tamoxifen 20 mg daily from December 27, 2018 until May 2, 2019  Left modified radical mastectomy May 2, 2019  Tamoxifen was resumed postoperatively  Widespread metastatic disease including multiple lung nodules, liver metastasis, and osseous metastasis found in Feb 2020  Began Lupron in Feb 2020, and Letrozole and Clydia Guillory March 2020  Oncology History Overview Note   Patient returns today for discuss results of CT and MRI  Initial consult with Dr Maxime Briceño on 320/20   66year old male with Napoleon Pop 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 status post neoadjuvant tamoxifen followed by left mastectomy +ALND, with pathology showing 5 cm tumor extending to margins, and involvement of dermis with 10/10 LN positive, followed by re-excision showing residual tumor, with final margins negative  He declined adjuvant chemotherapy and declined repeat imaging with Dr Ama Almaraz  Plan at time of consult was radiation to left chestwall and regional lymph nodes  1/27/20 CT simulation for radiation  Dr Maxime Briceño discussed with patient findings of CT sim which show metastatic disease  She also discussed with Dr Ama Almaraz and radiology to confirm  Plan to order diagnostic CT c/A/P with contrast, and MRI brain  forego PMRT    1/31/20 Med Onc follow-up with Dr Ama Almaraz  Pt is scheduled for MRI brain and CT 2/8/20, in view of azotemia,  recommend imaging without IV contrast to reduce risk of kidney dysfunction       2/8/20 CT chest abd pelvis  IMPRESSION:   1   Progressively enlarging 9 mm right middle lobe nodule with Satellite nodules and few additional new pulmonary nodules, suspicious for progressive metastatic disease      2   Multiple hypoattenuating liver lesions, new from December 2018, and progressed from January 2020, highly suspicious for metastasis      3   Osseous metastatic disease as described, new from December 2018, and slightly progressed from January 2020  Right acetabular lytic lesion was not previously imaged      4  Indeterminate 3 2 cm nodular lesion in the left upper quadrant of the abdomen, which was only partially imaged on prior studies and was not adequately evaluated  This may represent a splenule, tumor implant or metastatic lymph node  Further   characterization with PET CT can be performed if clinically warranted      2/8/20 MRI brain - pending      2/14/20 Med Onc f/u with Dr Marlena Purvis       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, estrogen receptor positive ([facility] )   2018 Initial Diagnosis    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, estrogen receptor positive ([facility] [address] )     11/14/2018 Biopsy    A  Breast, left subareolar mass, core biopsy (11 slides, [address], collected on 11/14/2018): - Invasive mammary carcinoma with mucinous features (see note)  -- Jim grade 1 of 3 (total score: 5 of 9)        * Tubule formation < 10%, score 3        * Nuclear grade 1 of 3, score 1        * Mitoses < 3/mm2, (</= 7 mitoses/10HPF), score 1     -- Invasive carcinoma involves 4 of 4 submitted core biopsy fragments, max  Dimension= 22 mm     -- Estrogen, Progesterone & HER2 receptor studies performed at the referring institution show the tumor to be positive for ER (>95% strong), positive for WI (75% moderate), and negative for HER2 IHC (score 0)  - Ductal carcinoma in-situ (DCIS): Not identified  - Lymph-vascular invasion: Not identified  - Microcalcifications: Absent    - Best representative tumor block:  A1    -- Sufficient tumor present for        Agendia Mammaprint/Blueprint (1 cm2 of invasive tumor in aggregate): No         MI Profile/Foundation One (at least 5 x 5 mm of tumor): Yes  Note:  The tumor shows prominent mucinous differentiation, raising the strong possibility of invasive mucinous carcinoma, though this designation is possible only complete excision with evaluation of the entirety of the tumor mass  12/27/2018 -  Hormone Therapy    Tamoxifen 20 mg daily (Dr Charli Pantoja)     2018 Genetic Testing    BRCA negative      5/2/2019 Surgery    A  Breast, Left, Mastectomy and Axillary Contents:  - 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), [place] Grade II (3 + 2 + 1 = 6), 51 mm in greatest dimension, involving dermis  See Tumor Synoptic Report (below) and Note  - Margins negative for carcinoma in this specimen (see separately submitted additional margins, Parts C and D)  - Intradermal nevi      B  Lymph Node, Left Periaxillary Vein, Lymphadenectomy:  - Metastatic carcinoma present in one lymph node (1/1), with extranodal extension   - Metastatic focus measures at least 1 5 cm in greatest dimension      C  Breast, Left Inferior Medial Margin, Excision:  - Benign fibroadipose tissue and skeletal muscle      D  Breast, Left Inferior Medial Margin Skin, Excision:  - Invasive mammary carcinoma, underlying benign skin  - Anterior margin is multifocally positive for carcinoma     (Dr Yohana Coates)     10/9/2019 Surgery    Left breast, re-excision scar mastectomy site medial and inferior margins:     - Residual invasive breast carcinoma of no special type (ductal NST/invasive ductal carcinoma), multifocal        -- Four foci identified ranging in size from 1 5 to approximately 12 mm        -- Tumor foci are present in dermis and adjacent subcutaneous tissues       - All margins are negative for tumor with closest margin as follows:       -- One focus of tumor comes to within 0 7 mm of the superior margin (A16)  -- Largest focus comes to within 1 5 mm of the superior margin (A37)  - Cicatricial fibrosis of skin and subcutaneous tissues      (Dr Adal Vieira)       Past Medical & Surgical Hx: HTN, pancreatitis, anemia, afib  Patient Active Problem List   Diagnosis    Mass of breast, left    Hypertension    Leukocytosis    PAC (premature atrial contraction)    Pancreatic mass    Pancreatitis    RBBB (right bundle branch block with left anterior fascicular block)    Transaminitis    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, estrogen receptor positive ([address] )    Anemia, unspecified    Prerenal azotemia    Liver metastasis ([address]    Lung metastasis ([address]    Osseous metastasis ([address] )    CRI (chronic renal insufficiency)     Past Medical History:   Diagnosis Date    Acute gallstone pancreatitis     Arthritis     Atrial fibrillation ([address] )     Breast cancer ([facility] )     Cancer (HCC)     breast - left    Chronic pain disorder     Extremity pain     Hypertension     Irregular heartbeat     PAC (premature atrial contraction)     PVC (premature ventricular contraction)     RBBB     Wears glasses      Past Surgical History:   Procedure Laterality Date    APPENDECTOMY      CHOLECYSTECTOMY      FRACTURE SURGERY Left     arm    MD EXC SKIN BENIG 3 1-4 CM TRUNK,ARM,LEG Left 10/9/2019    Procedure: BREAST RE-EXCISION OF SKIN MARGINS;  Surgeon: Adal Vieira MD;  [facility];  Service: General    MD LAP,CHOLECYSTECTOMY N/A 12/18/2018    Procedure: LAP CHOLECYSTECTOMY; LYSIS OF ADHESIONS; ATTEMPTED CHOLANGIOGRAM;  Surgeon: Adal Vieira MD;  [facility];  Service: General    MD MASTECTOMY, MODIFIED RADICAL Left 5/2/2019    Procedure: MASTECTOMY MODIFIED RADICAL; AXILLARY DISSECTION;  Surgeon: Adal Vieira MD;  [facility];  Service: General    TONSILLECTOMY         Review of Medications:   Vitamins, Supplements and Herbals: Med List Reviewed & pt is only taking: MVI     Current Outpatient Medications:     acetaminophen (TYLENOL) 500 mg tablet, Take 500 mg by mouth every 6 (six) hours as needed, Disp: , Rfl:     capsaicin (ZOSTRIX) 0 025 % cream, Apply 1 application topically 2 (two) times a day, Disp: 60 g, Rfl: 0    furosemide (LASIX) 40 mg tablet, Take 40 mg by mouth daily, Disp: , Rfl: 0    letrozole (FEMARA) 2 5 mg tablet, Take 1 tablet (2 5 mg total) by mouth daily, Disp: 90 tablet, Rfl: 1    losartan (COZAAR) 50 mg tablet, Take 50 mg by mouth daily , Disp: , Rfl: 0    Multiple Vitamins-Minerals (CENTRUM SILVER 50+MEN PO), Take by mouth, Disp: , Rfl:     Palbociclib (Ibrance) 75 MG capsule, Take 1 capsule (75 mg total) by mouth daily with breakfast Take with food   Take for 3 weeks on, followed by 1 week off , Disp: 21 capsule, Rfl: 4    polyethylene glycol (MIRALAX) 17 g packet, Take 17 g by mouth daily as needed , Disp: , Rfl:     Most Recent Lab Results:   Lab Results   Component Value Date    WBC 4 34 03/01/2021    IRON 71 06/26/2019    TIBC 311 06/26/2019    FERRITIN 89 06/26/2019    ALT 32 03/01/2021    AST 32 03/01/2021    ALB 3 7 03/01/2021    SODIUM 142 03/01/2021    SODIUM 141 12/01/2020    K 4 2 03/01/2021    K 4 2 12/01/2020     03/01/2021    BUN 35 (H) 03/01/2021    BUN 42 (H) 12/01/2020    CREATININE 1 74 (H) 03/01/2021    CREATININE 1 66 (H) 12/01/2020    EGFR 37 03/01/2021    GLUF 87 03/01/2021    GLUF 86 12/01/2020    GLUC 92 10/28/2020    CALCIUM 9 9 03/01/2021       Anthropometric Measurements:   Height: 65"  Ht Readings from Last 1 Encounters:   04/05/21 5' 5" (1 651 m)     -Weight History:   Usual Weight: 198#  Ideal Body Weight: 136#  Wt Readings from Last 20 Encounters:   04/05/21 89 2 kg (196 lb 10 4 oz)   03/08/21 90 2 kg (198 lb 12 8 oz)   01/07/21 87 kg (191 lb 12 8 oz)   12/07/20 85 3 kg (188 lb)   11/04/20 82 2 kg (181 lb 3 2 oz)   08/17/20 81 6 kg (180 lb) 07/30/20 80 9 kg (178 lb 6 4 oz)   07/15/20 81 6 kg (179 lb 14 3 oz)   06/15/20 83 5 kg (184 lb)   06/08/20 79 7 kg (175 lb 9 6 oz)   05/14/20 83 5 kg (184 lb)   03/27/20 83 5 kg (184 lb)   02/14/20 86 2 kg (190 lb)   01/31/20 89 8 kg (198 lb)   01/20/20 87 3 kg (192 lb 7 4 oz)   12/17/19 89 4 kg (197 lb)   10/21/19 89 8 kg (198 lb)   10/14/19 89 2 kg (196 lb 9 6 oz)   10/09/19 90 7 kg (200 lb)   09/17/19 90 8 kg (200 lb 3 2 oz)     -Varian:n/a  -Home weights: (6/15/20)177#, (7/6/20) 178 2#, (7/27/20) 180#, (8/10/20)179#, (8/24/20) 181#, (9/12/20)180#, (10/5/20)179 8#, (10/25/20) 183#, (11/9/20) 183 2#, (12/8/20)182#, (2/1/21) 187#      Oncology Nutrition-Anthropometrics      Nutrition from 4/12/2021 in [address] [place] [address] Oncology Dietitian Services Nutrition from 3/5/2021 in [address] [place] [address] Oncology Dietitian Services   Patient age (years):  64 years  64 years   Patient (male) height (in):  72 in  72 in   Current weight (lbs):  196 7 lbs  191 8 lbs   Current weight to be used for anthropometric calculations (kg)  89 4 kg  87 2 kg   BMI:  32 7  31 9   IBW male  136 lb  136 lb   IBW (kg) male  61 8 kg  61 8 kg   IBW % (male)  144 6 %  141 %   Adjusted BW (male):  151 2 lbs  150 lbs   Adjusted BW in kg (male):  68 7 kg  68 2 kg   % weight change after 1 month:  -1 1 %  2 %   Weight change after 1 month (lbs)  -2 1 lbs  3 8 lbs   % weight change after 3 months:  2 6 %  --   Weight change after 3 months (lbs)  4 9 lbs  --        Nutrition-Focused Physical Findings: n/a due to telephone call    Food/Nutrition-Related History & Client/Social History:    Current Nutrition Impact Symptoms:  [] Nausea  [] Reduced Appetite  [] Acid Reflux    [] Vomiting  [] Unintended Wt Loss  [] Malabsorption    [] Diarrhea  [] Unintended Wt Gain  [] Dumping Syndrome    [] Constipation -"sometimes" takes Miralax [] Thick Mucous/Secretions  [] Abdominal Pain    [] Dysgeusia (Altered Taste)  [x] Xerostomia (Dry Mouth) -in the morning  [] Gas    [] Dysosmia (Altered Smell)  [] Thrush  [] Difficulty Chewing    [] Oral Mucositis (Sore Mouth)  [] Fatigue   [] Other:    [] Odynophagia   [] Esophagitis  [] Other:    [] Dysphagia  [] Early Satiety  [x] No Problems Eating      Food Allergies: no  Food Intolerances: no    Current Diet: Regular Diet, No Restrictions  Current Nutrition Intake: Unchanged from last visit  Appetite: Good  Nutrition Route: PO  Meal planning/preparation mainly done by: Self and Spouse/Partner  Oral Care: brushes BID  Activity level: Strength increasing       25 Hr Diet Recall: average:  2188kcal, 100g pro, 310 CHO   Breakfast: 1-2 eggs, toast with jelly  Lunch:  Turkey/ham and cheese sandwich, manning OR soup   Dinner: turkey/chicken OR hamburger OR roast beef  Snack: granola bar around 11am, popsicle, Special K protein 1/2c (15g pro)     Beverages:  milk (8oz x1-2), ginger ale (12oz x2-3), cran-raspberry juice (16oz x1-2), hot chocolate (8oz x1)   Supplements:    [employer] (5ive=322 kcal, 5 g pro) mixed with mixed in milk; every other day        Oncology Nutrition-Estimated Needs      Nutrition from 2021 in [address] [place] [address] Oncology Dietitian Services Nutrition from 3/5/2021 in [address] [place] [address] Oncology Dietitian Services   Weight type used  Adjusted weight  Adjusted weight   Weight in kilograms (kg) used for estimated needs  68 7 kg  --   Weight in kilograms (kg) used for estimated needs  --  68 2 kg   Energy needs formula:   25-30 kcal/kg  30-35 kcal/kg   Energy needs based on 25 kcal/k kcal  --   Energy needs based on 30 kcal/k kcal  --   Energy needs based on 30 kcal/kg:  --  2045 kcal   Energy needs based on 35 kcal/kg:  --  2386 kcal   Protein needs formula:  1-1 2 g/kg  1-1 2 g/kg   Protein needs based on 1 g/kg  69 g  68 g   Protein needs based on 1 2 g/k g  82 g   Fluid needs formula:  30-35 mL/kg  30-35 mL/kg   Fluid needs based on 30 mL/kg 2058 mL  2046 mL   Fluid needs in ounces  70 oz  69 oz   Fluid needs based on 35 mL/kg  2401 mL  2387 mL   Fluid needs in ounces  81 oz  81 oz        Discussion & Intervention:   Go was evaluated today for an RD follow up regarding nutrition impact sx management  Go is currently undergoing hormone therapy for breast cancer  Today Marsha Shishmaref IRA reports that he is doing well  London Caller provided averages for Anthony's calorie, protein, and carb intakes  He continues to consume adequate nutrition intake  Marsha Shishmaref IRA states that he has been having some reflux/burping especially at night when he goes to bed  Reviewed foods that he has been eating that could be causing this  He drinks mostly ginger ale and cranberry juice  Explained that ginger ale is carbonated which could be causing burping, and cranberry juice is acidic which could be causing increased acid in the stomach  Suggested trying another type of juice that is less acidic or water in the evening to see if this helps decrease reflux at bed time  Based on today's assessment, discussion included: choosing a variety of colorful, plant-based foods to support a cancer preventative diet, adequate hydration & fluid choices, continuing oral nutrition supplements and mindful eating concepts, avoiding common GERD trigger foods: caffeine-containing beverages, carbonated beverages, alcohol, mint, chocolate, spicy foods, acidic foods, (tomato, citrus, cranberry juice, OJ, all coffee and tea including decaf) , black pepper, and high fat/greasy foods      Moving forward, Haylee Jay will continue current nutrition plan of care  We will follow up in 2 months  Materials Provided: not applicable  All questions and concerns addressed during todays visit  Go has RD contact information      Nutrition Diagnosis:    Food and nutrition related knowledge deficit related to Lack of prior exposure to accurate nutrition related information as evidenced by No prior knowledge of need for food and nutrition related recommendations  Monitoring & Evaluation:       Goals:  · weight maintenance/stabilization  · pt to meet >/=75% estimated nutrition needs daily  · Increase hydration to goal of 68-80oz / day     · Progress Towards Goals: Progressing and Goal(s) Met    Nutrition Rx & Recommendations:  · Keep a daily food journal (pen/paper, martha such as [employer])  · Nutrition Supplements: [place] [employer] Breakfast mixed with milk every other day  May use homemade shakes/smoothies as desired  If using a pre-made shake/bar, choose ones with >300 calories and >10 grams protein (ex  Ensure Enlive, Ensure Plus, Boost Plus, Boost Very High Calorie, etc )  · Small, frequent meals/snacks may be easier to tolerate than 3 large daily meals  Aim for 5-6 small meals per day (every 2-3 hours)  · Include protein at all meals/snacks  · Include a variety of foods (as tolerated/allowed by diet)  · Follow a Cancer Preventative Nutrition Pattern: colorful, plant-based, low-fat, avoid added sugars, limit alcohol, include high fiber foods, limit processed meats, limit red meat, choose lean protein sources, use low-fat cooking methods, balance calories with physical activity, avoid excessive weight gain throughout life  · Incorporate physical activity as able/allowed  · Stay hydrated by sipping fluids of choice/tolerance throughout the day  · Alter food choices and eating patterns to accommodate changing needs  · Refer to Eating Hints book for other meal/snack ideas and symptom management  · Weigh yourself regularly  If you notice weight loss, make an effort to increase your daily food/calorie intake  If you continue to notice loss after these efforts, reach out to your dietitian to establish a plan to stabilize weight    · High protein foods to try: peanut butter, beans, chicken, fish, eggs (see pages 49-51 in your Eating Hints book)  · For acid reflux (GERD): avoid large meals, choose 5-6 small meals/day, stay upright for 30-60 min after eating, avoid smoking and second hand smoke, wear loose fitting clothes, incorporate physical activity as tolerated, common trigger foods: caffeine-containing beverages, carbonated beverages, alcohol, mint, chocolate, spicy foods, acidic foods, (tomato, citrus, cranberry juice, OJ, all coffee and tea including decaf) , black pepper, and high fat/greasy foods         Follow Up Plan: 6/4/21 10am phone follow up   Recommend Referral to Other Providers: none at this time

## 2021-04-12 NOTE — PATIENT INSTRUCTIONS
Nutrition Rx & Recommendations:  · Keep a daily food journal (pen/paper, martha such as [employer])  · Nutrition Supplements: [employer] Breakfast mixed with milk every other day  May use homemade shakes/smoothies as desired  If using a pre-made shake/bar, choose ones with >300 calories and >10 grams protein (ex  Ensure Enlive, Ensure Plus, Boost Plus, Boost Very High Calorie, etc )  · Small, frequent meals/snacks may be easier to tolerate than 3 large daily meals  Aim for 5-6 small meals per day (every 2-3 hours)  · Include protein at all meals/snacks  · Include a variety of foods (as tolerated/allowed by diet)  · Follow a Cancer Preventative Nutrition Pattern: colorful, plant-based, low-fat, avoid added sugars, limit alcohol, include high fiber foods, limit processed meats, limit red meat, choose lean protein sources, use low-fat cooking methods, balance calories with physical activity, avoid excessive weight gain throughout life  · Incorporate physical activity as able/allowed  · Stay hydrated by sipping fluids of choice/tolerance throughout the day  · Alter food choices and eating patterns to accommodate changing needs  · Refer to Eating Hints book for other meal/snack ideas and symptom management  · Weigh yourself regularly  If you notice weight loss, make an effort to increase your daily food/calorie intake  If you continue to notice loss after these efforts, reach out to your dietitian to establish a plan to stabilize weight    · High protein foods to try: peanut butter, beans, chicken, fish, eggs (see pages 49-51 in your Eating Hints book)  · For acid reflux (GERD): avoid large meals, choose 5-6 small meals/day, stay upright for 30-60 min after eating, avoid smoking and second hand smoke, wear loose fitting clothes, incorporate physical activity as tolerated, common trigger foods: caffeine-containing beverages, carbonated beverages, alcohol, mint, chocolate, spicy foods, acidic foods, (tomato, citrus, cranberry juice, OJ, all coffee and tea including decaf) , black pepper, and high fat/greasy foods         Follow Up Plan: 6/4/21 10am phone follow up   Recommend Referral to Other Providers: none at this time

## 2021-05-12 NOTE — TELEPHONE ENCOUNTER
Pt wife Alexandra Encarnacion is requesting a call back regarding pt nephrosclerosis  She is stating that she was not aware that had this and would like to discuss with you the specifics of this diagnosis

## 2021-05-12 NOTE — PROGRESS NOTES
NEPHROLOGY PROGRESS NOTE    Luis Carlos Gaytan 64 y o  male MRN: [id number]  Unit/Bed#:  Encounter: [id number]  Reason for Consult:  Chronic renal insufficiency    The patient is here for routine follow-up he states he has been doing well  Otherwise he is in good spirits with no acute complaints for me  We reviewed his medications  ASSESSMENT/PLAN:  1  Renal    The patient has chronic renal insufficiency likely due to nephrosclerosis given lack of significant proteinuria in the past   His latest creatinine is 1 8 with normal electrolytes so he is stable in his baseline range without any progression  His blood pressure is under good control he feels he is urinating well and emptying his bladder well  The patient does have some lower extremity swelling that is well controlled and he wears compression stockings but the fluctuations could be due to diuretic therapy from time to time  No changes in medications  Labs and follow-up as scheduled  Please call if you have any questions or concerns before next visit  SUBJECTIVE:  Review of Systems   Constitution: Negative for chills, decreased appetite, fever and malaise/fatigue  HENT: Negative  Eyes: Negative  Cardiovascular: Negative for chest pain, dyspnea on exertion and orthopnea  Mild leg swelling  Respiratory: Negative  Negative for cough, shortness of breath, sputum production and wheezing  Gastrointestinal: Negative  Negative for bloating, abdominal pain, diarrhea, nausea and vomiting  Genitourinary: Negative for dysuria, flank pain, hematuria and incomplete emptying  Neurological: Negative for dizziness, focal weakness, headaches and weakness  Psychiatric/Behavioral: Negative for altered mental status, depression, hallucinations and hypervigilance  OBJECTIVE:  Current Weight: Weight - Scale: 89 4 kg (197 lb)  Man@[employer] com:     Blood pressure 136/64, pulse 70, resp   rate 18, height 5' 5" (1 651 m), weight 89 4 kg (197 lb)  , Body mass index is 32 78 kg/m²  [unfilled]    Physical Exam: /64 (BP Location: Right arm, Patient Position: Sitting, Cuff Size: Standard)   Pulse 70   Resp 18   Ht 5' 5" (1 651 m)   Wt 89 4 kg (197 lb)   BMI 32 78 kg/m²   Physical Exam  Constitutional:       General: He is not in acute distress  Appearance: He is not ill-appearing or diaphoretic  HENT:      Head: Normocephalic and atraumatic  Nose:      Comments: Wearing mask     Mouth/Throat:      Comments: Wearing mask  Eyes:      General: No scleral icterus  Extraocular Movements: Extraocular movements intact  Neck:      Musculoskeletal: Normal range of motion and neck supple  Cardiovascular:      Rate and Rhythm: Normal rate and regular rhythm  Heart sounds: No friction rub  No gallop  Comments: Positive edema with compression stockings on  Pulmonary:      Effort: Pulmonary effort is normal  No respiratory distress  Breath sounds: Normal breath sounds  No wheezing, rhonchi or rales  Abdominal:      General: Bowel sounds are normal  There is no distension  Palpations: Abdomen is soft  Tenderness: There is no abdominal tenderness  There is no rebound  Neurological:      General: No focal deficit present  Mental Status: He is alert and oriented to person, place, and time  Mental status is at baseline  Psychiatric:         Mood and Affect: Mood normal          Behavior: Behavior normal          Thought Content:  Thought content normal          Judgment: Judgment normal          Medications:    Current Outpatient Medications:     acetaminophen (TYLENOL) 500 mg tablet, Take 500 mg by mouth every 6 (six) hours as needed, Disp: , Rfl:     furosemide (LASIX) 40 mg tablet, Take 40 mg by mouth daily, Disp: , Rfl: 0    letrozole (FEMARA) 2 5 mg tablet, Take 1 tablet (2 5 mg total) by mouth daily, Disp: 90 tablet, Rfl: 1    losartan (COZAAR) 50 mg tablet, Take 50 mg by mouth daily , Disp: , Rfl: 0    Multiple Vitamins-Minerals (CENTRUM SILVER 50+MEN PO), Take by mouth, Disp: , Rfl:     Palbociclib (Ibrance) 75 MG capsule, Take 1 capsule (75 mg total) by mouth daily with breakfast Take with food   Take for 3 weeks on, followed by 1 week off , Disp: 21 capsule, Rfl: 4    polyethylene glycol (MIRALAX) 17 g packet, Take 17 g by mouth daily as needed , Disp: , Rfl:     capsaicin (ZOSTRIX) 0 025 % cream, Apply 1 application topically 2 (two) times a day (Patient not taking: Reported on 5/12/2021), Disp: 60 g, Rfl: 0    Laboratory Results:  Lab Results   Component Value Date    WBC 4 34 03/01/2021    HGB 11 4 (L) 03/01/2021    HCT 33 8 (L) 03/01/2021     (H) 03/01/2021     03/01/2021     Lab Results   Component Value Date    SODIUM 140 04/26/2021    K 4 5 04/26/2021     04/26/2021    CO2 30 04/26/2021    BUN 42 (H) 04/26/2021    CREATININE 1 87 (H) 04/26/2021    GLUC 92 10/28/2020    CALCIUM 9 1 04/26/2021     Lab Results   Component Value Date    CALCIUM 9 1 04/26/2021     No results found for: LABPROT

## 2021-05-12 NOTE — PATIENT INSTRUCTIONS
You are here for follow-up it was a pleasure to see you and thank you for sharing all those nice stories  With respect your kidneys the creatinine is 1 7 which is stable at her baseline as I told you you do not appear to have aggressive kidney disease because there was no significant protein in the urine you likely have nephrosclerosis or aging of the kidneys  Blood pressure is well controlled  Sometimes he may see fluctuations due to the medications especially the diuretic that can get rid of fluid more effectively some doses than the other    Overall things are stable continue current medications      Labs and follow-up as scheduled

## 2021-05-13 NOTE — TELEPHONE ENCOUNTER
Received call from Ashley Gray regarding Anthony's protein intake  Reviewed individualized protein needs (see below for details)  Ashley Gray states that his weight has been stable and he is doing well  No other questions/concerns at this time  Phone follow up is scheduled for 21         Oncology Nutrition-Estimated Needs      Telephone from 2021 in [facility] Nutrition from 2021 in [facility]   Weight type used  Adjusted weight  Adjusted weight   Weight in kilograms (kg) used for estimated needs  68 7 kg  68 7 kg   Energy needs formula:   25-30 kcal/kg  25-30 kcal/kg   Energy needs based on 25 kcal/k kcal  1718 kcal   Energy needs based on 30 kcal/k kcal  2062 kcal   Protein needs formula:  1-1 2 g/kg  1-1 2 g/kg   Protein needs based on 1 g/kg  69 g  69 g   Protein needs based on 1 2 g/k g  82 g   Fluid needs formula:  30-35 mL/kg  30-35 mL/kg   Fluid needs based on 30 mL/kg  2058 mL  2058 mL   Fluid needs in ounces  70 oz  70 oz   Fluid needs based on 35 mL/kg  2401 mL  2401 mL   Fluid needs in ounces  81 oz  81 oz

## 2021-05-13 NOTE — TELEPHONE ENCOUNTER
Per Dr Baker Courts - Call and let her know this is nothing different it is just the medical way of saying aging of the kidney   It really means that there is no aggressive kidney disease   Thank you   I explained this at length with the patient  I have spoken with pt wife Bala Jordan and made her aware of the above  She has no further questions or concerns at this time

## 2021-05-14 NOTE — TELEPHONE ENCOUNTER
Tristan Turpin was sent to [facility] in error  Will send to Dr Marine Diaz RN to reroute to appropriate pharmacy

## 2021-06-01 NOTE — TELEPHONE ENCOUNTER
Patient is calling in to confirm that his labs are in his chart , I have confirmed and patient voiced understanding

## 2021-06-04 NOTE — TELEPHONE ENCOUNTER
Go was scheduled for an RD phone follow up today (6/4/2021) and was unable to attend his appointment (no answer x1 attempt @[phone], and x1 attempt @[phone])  A voice message was left @[phone] encouraging him to call back and reschedule his appointment at a more convenient time for him  RD contact information was provided

## 2021-06-07 NOTE — TELEPHONE ENCOUNTER
Received call back from Ana Arndt to follow up on Anthony's nutrition  Outpatient Oncology Nutrition Consult   Type of Consult: Follow Up  Care Location: Telephone Call- with Liberty Lawton was on his way out for appt to get bloodwork done    Reason for referral: RN (Elisa Covarrubias) request due to pt has nutrition questions (Date of referral: 2/19/20)    Nutrition Assessment:   Oncology Diagnosis & Treatments: Left breast carcinoma diagnosed 11/2018, BRCA negative  Neoadjuvant tamoxifen 20 mg daily from December 27, 2018 until May 2, 2019  Left modified radical mastectomy May 2, 2019  Tamoxifen was resumed postoperatively  Widespread metastatic disease including multiple lung nodules, liver metastasis, and osseous metastasis found in Feb 2020  Began Lupron in Feb 2020, and Letrozole and Denice Chato March 2020  Oncology History Overview Note   Patient returns today for discuss results of CT and MRI  Initial consult with Dr Tree Muller on 320/20   66year old male with Peters Tabor 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 status post neoadjuvant tamoxifen followed by left mastectomy +ALND, with pathology showing 5 cm tumor extending to margins, and involvement of dermis with 10/10 LN positive, followed by re-excision showing residual tumor, with final margins negative  He declined adjuvant chemotherapy and declined repeat imaging with Dr Bruce Lambert  Plan at time of consult was radiation to left chestwall and regional lymph nodes  1/27/20 CT simulation for radiation  Dr Tree Muller discussed with patient findings of CT sim which show metastatic disease  She also discussed with Dr Bruce Lambert and radiology to confirm  Plan to order diagnostic CT c/A/P with contrast, and MRI brain  forego PMRT    1/31/20 Med Onc follow-up with Dr Bruce Lambert  Pt is scheduled for MRI brain and CT 2/8/20, in view of azotemia,  recommend imaging without IV contrast to reduce risk of kidney dysfunction       2/8/20 CT chest abd pelvis  IMPRESSION:   1   Progressively enlarging 9 mm right middle lobe nodule with Satellite nodules and few additional new pulmonary nodules, suspicious for progressive metastatic disease      2   Multiple hypoattenuating liver lesions, new from December 2018, and progressed from January 2020, highly suspicious for metastasis      3   Osseous metastatic disease as described, new from December 2018, and slightly progressed from January 2020  Right acetabular lytic lesion was not previously imaged      4  Indeterminate 3 2 cm nodular lesion in the left upper quadrant of the abdomen, which was only partially imaged on prior studies and was not adequately evaluated  This may represent a splenule, tumor implant or metastatic lymph node  Further   characterization with PET CT can be performed if clinically warranted      2/8/20 MRI brain - pending      2/14/20 Med Onc f/u with Dr Rodarte Show       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, estrogen receptor positive ([facility] )   2018 Initial Diagnosis    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, estrogen receptor positive ([facility] [address] )     11/14/2018 Biopsy    A  Breast, left subareolar mass, core biopsy (11 slides, [address], collected on 11/14/2018): - Invasive mammary carcinoma with mucinous features (see note)  -- Baylis grade 1 of 3 (total score: 5 of 9)        * Tubule formation < 10%, score 3        * Nuclear grade 1 of 3, score 1        * Mitoses < 3/mm2, (</= 7 mitoses/10HPF), score 1     -- Invasive carcinoma involves 4 of 4 submitted core biopsy fragments, max  Dimension= 22 mm     -- Estrogen, Progesterone & HER2 receptor studies performed at the referring institution show the tumor to be positive for ER (>95% strong), positive for LA (75% moderate), and negative for HER2 IHC (score 0)  - Ductal carcinoma in-situ (DCIS): Not identified     - Lymph-vascular invasion: Not identified  - Microcalcifications: Absent  - Best representative tumor block:  A1    -- Sufficient tumor present for        Agendia Mammaprint/Blueprint (1 cm2 of invasive tumor in aggregate): No         MI Profile/Foundation One (at least 5 x 5 mm of tumor): Yes  Note:  The tumor shows prominent mucinous differentiation, raising the strong possibility of invasive mucinous carcinoma, though this designation is possible only complete excision with evaluation of the entirety of the tumor mass  12/27/2018 -  Hormone Therapy    Tamoxifen 20 mg daily (Dr Farhat Rizzo)     2018 Genetic Testing    BRCA negative      5/2/2019 Surgery    A  Breast, Left, Mastectomy and Axillary Contents:  - 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), Jim Grade II (3 + 2 + 1 = 6), 51 mm in greatest dimension, involving dermis  See Tumor Synoptic Report (below) and Note  - Margins negative for carcinoma in this specimen (see separately submitted additional margins, Parts C and D)  - Intradermal nevi      B  Lymph Node, Left Periaxillary Vein, Lymphadenectomy:  - Metastatic carcinoma present in one lymph node (1/1), with extranodal extension   - Metastatic focus measures at least 1 5 cm in greatest dimension      C  Breast, Left Inferior Medial Margin, Excision:  - Benign fibroadipose tissue and skeletal muscle      D  Breast, Left Inferior Medial Margin Skin, Excision:  - Invasive mammary carcinoma, underlying benign skin    - Anterior margin is multifocally positive for carcinoma     (Dr Oscar Cardenas)     10/9/2019 Surgery    Left breast, re-excision scar mastectomy site medial and inferior margins:     - Residual invasive breast carcinoma of no special type (ductal NST/invasive ductal carcinoma), multifocal        -- Four foci identified ranging in size from 1 5 to approximately 12 mm        -- Tumor foci are present in dermis and adjacent subcutaneous tissues  - All margins are negative for tumor with closest margin as follows:       -- One focus of tumor comes to within 0 7 mm of the superior margin (A16)  -- Largest focus comes to within 1 5 mm of the superior margin (A37)  - Cicatricial fibrosis of skin and subcutaneous tissues      (Dr Shiva Tyler)       Past Medical & Surgical Hx: HTN, pancreatitis, anemia, afib  Patient Active Problem List   Diagnosis    Mass of breast, left    Hypertension    Leukocytosis    PAC (premature atrial contraction)    Pancreatic mass    Pancreatitis    RBBB (right bundle branch block with left anterior fascicular block)    Transaminitis    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, estrogen receptor positive ([address] )    Anemia, unspecified    Prerenal azotemia    Liver metastasis ([address]    Lung metastasis ([address]    Osseous metastasis ([address] )    CRI (chronic renal insufficiency)     Past Medical History:   Diagnosis Date    Acute gallstone pancreatitis     Arthritis     Atrial fibrillation ([address] )     Breast cancer ([address] )     Cancer (HCC)     breast - left    Chronic pain disorder     Extremity pain     Hypertension     Irregular heartbeat     PAC (premature atrial contraction)     PVC (premature ventricular contraction)     RBBB     Wears glasses      Past Surgical History:   Procedure Laterality Date    APPENDECTOMY      CHOLECYSTECTOMY      FRACTURE SURGERY Left     arm    CA EXC SKIN BENIG 3 1-4 CM TRUNK,ARM,LEG Left 10/9/2019    Procedure: BREAST RE-EXCISION OF SKIN MARGINS;  Surgeon: Shiva Tyler MD;  Location: [facility];  Service: General    CA LAP,CHOLECYSTECTOMY N/A 12/18/2018    Procedure: LAP CHOLECYSTECTOMY; LYSIS OF ADHESIONS; ATTEMPTED CHOLANGIOGRAM;  Surgeon: Shiva Tyler MD;  Location: [facility];  Service: General    CA MASTECTOMY, MODIFIED RADICAL Left 5/2/2019    Procedure: MASTECTOMY MODIFIED RADICAL; AXILLARY DISSECTION; Surgeon: Feliciano Rico MD;  Location: [facility] OR;  Service: General    TONSILLECTOMY         Review of Medications:   Vitamins, Supplements and Herbals: Med List Reviewed & pt is only taking: MVI     Current Outpatient Medications:     acetaminophen (TYLENOL) 500 mg tablet, Take 500 mg by mouth every 6 (six) hours as needed, Disp: , Rfl:     capsaicin (ZOSTRIX) 0 025 % cream, Apply 1 application topically 2 (two) times a day (Patient not taking: Reported on 5/12/2021), Disp: 60 g, Rfl: 0    furosemide (LASIX) 40 mg tablet, Take 40 mg by mouth daily, Disp: , Rfl: 0    letrozole (FEMARA) 2 5 mg tablet, Take 1 tablet (2 5 mg total) by mouth daily, Disp: 90 tablet, Rfl: 1    losartan (COZAAR) 50 mg tablet, Take 50 mg by mouth daily , Disp: , Rfl: 0    Multiple Vitamins-Minerals (CENTRUM SILVER 50+MEN PO), Take by mouth, Disp: , Rfl:     Palbociclib (Ibrance) 75 MG capsule, Take 1 capsule (75 mg total) by mouth daily with breakfast Take with food   Take for 3 weeks on, followed by 1 week off , Disp: 21 capsule, Rfl: 4    polyethylene glycol (MIRALAX) 17 g packet, Take 17 g by mouth daily as needed , Disp: , Rfl:     Most Recent Lab Results:   Lab Results   Component Value Date    WBC 4 34 03/01/2021    IRON 71 06/26/2019    TIBC 311 06/26/2019    FERRITIN 89 06/26/2019    ALT 32 03/01/2021    AST 32 03/01/2021    ALB 3 7 03/01/2021    SODIUM 140 04/26/2021    SODIUM 142 03/01/2021    K 4 5 04/26/2021    K 4 2 03/01/2021     04/26/2021    BUN 42 (H) 04/26/2021    BUN 35 (H) 03/01/2021    CREATININE 1 87 (H) 04/26/2021    CREATININE 1 74 (H) 03/01/2021    EGFR 34 04/26/2021    GLUF 88 04/26/2021    GLUF 87 03/01/2021    GLUC 92 10/28/2020    CALCIUM 9 1 04/26/2021       Anthropometric Measurements:   Height: 65"  Ht Readings from Last 1 Encounters:   05/12/21 5' 5" (1 651 m)     -Weight History:   Usual Weight: 198#  Ideal Body Weight: 136#  Wt Readings from Last 20 Encounters:   05/12/21 89 4 kg (197 lb) 04/05/21 89 2 kg (196 lb 10 4 oz)   03/08/21 90 2 kg (198 lb 12 8 oz)   01/07/21 87 kg (191 lb 12 8 oz)   12/07/20 85 3 kg (188 lb)   11/04/20 82 2 kg (181 lb 3 2 oz)   08/17/20 81 6 kg (180 lb)   07/30/20 80 9 kg (178 lb 6 4 oz)   07/15/20 81 6 kg (179 lb 14 3 oz)   06/15/20 83 5 kg (184 lb)   06/08/20 79 7 kg (175 lb 9 6 oz)   05/14/20 83 5 kg (184 lb)   03/27/20 83 5 kg (184 lb)   02/14/20 86 2 kg (190 lb)   01/31/20 89 8 kg (198 lb)   01/20/20 87 3 kg (192 lb 7 4 oz)   12/17/19 89 4 kg (197 lb)   10/21/19 89 8 kg (198 lb)   10/14/19 89 2 kg (196 lb 9 6 oz)   10/09/19 90 7 kg (200 lb)     -Varian:n/a  -Home weights: (6/15/20)177#, (7/6/20) 178 2#, (7/27/20) 180#, (8/10/20)179#, (8/24/20) 181#, (9/12/20)180#, (10/5/20)179 8#, (10/25/20) 183#, (11/9/20) 183 2#, (12/8/20)182#, (2/1/21) 187#, (6/7/21) 188#      Oncology Nutrition-Anthropometrics      Telephone from 6/7/2021 in [place] [address] Oncology Dietitian [place] Telephone from 5/13/2021 in [place] [address] Oncology Dietitian [place]   Patient age (years):  64 years  64 years   Patient (male) height (in):  72 in  72 in   Current weight (lbs):  197 lbs  197 lbs   Current weight to be used for anthropometric calculations (kg)  89 5 kg  89 5 kg   BMI:  32 8  32 8   IBW male  136 lb  136 lb   IBW (kg) male  61 8 kg  61 8 kg   IBW % (male)  144 9 %  144 9 %   Adjusted BW (male):  151 2 lbs  151 2 lbs   Adjusted BW in kg (male):  68 7 kg  68 7 kg   % weight change after 1 month:  0 2 %  0 2 %   Weight change after 1 month (lbs)  0 3 lbs  0 4 lbs   % weight change after 3 months:  --  (!) 8 7 %   Weight change after 3 months (lbs)  --  15 8 lbs   % weight change after 6 months:  8 7 %  --   Weight change after 6 months (lbs)  15 8 lbs  --   % weight change after 1 year:  7 1 %  --   Weight change after 1 year (lbs)  13 lbs  --        Nutrition-Focused Physical Findings: n/a due to telephone call    Food/Nutrition-Related History & Client/Social History:    Current Nutrition Impact Symptoms:  [] Nausea  [] Reduced Appetite  [] Acid Reflux    [] Vomiting  [] Unintended Wt Loss  [] Malabsorption    [] Diarrhea  [] Unintended Wt Gain  [] Dumping Syndrome    [] Constipation -"sometimes" takes Miralax [] Thick Mucous/Secretions  [] Abdominal Pain    [] Dysgeusia (Altered Taste)  [x] Xerostomia (Dry Mouth) -in the morning  [] Gas    [] Dysosmia (Altered Smell)  [] Thrush  [] Difficulty Chewing    [] Oral Mucositis (Sore Mouth)  [] Fatigue   [] Other:    [] Odynophagia   [] Esophagitis  [] Other:    [] Dysphagia  [] Early Satiety  [x] No Problems Eating      Food Allergies: no  Food Intolerances: no    Current Diet: Regular Diet, No Restrictions  Current Nutrition Intake: Unchanged from last visit  Appetite: Good  Nutrition Route: PO  Meal planning/preparation mainly done by: Self and Spouse/Partner  Oral Care: brushes BID  Activity level: Strength increasing       25 Hr Diet Recall: average:  2054kcal, 136g pro, 227 CHO   Breakfast: 1-2 eggs, toast with jelly  Snack: Granola bar  Lunch:  1/2 turkey/ham and cheese sandwich, manning    Dinner: turkey/chicken OR hamburger OR roast beef, peas  Snack: blueberries, watermelon      Beverages:  milk (8oz x1-2), ginger ale (12oz x2-3), cran-raspberry juice (16oz x1-2)  Supplements:    [employer] Instant Breakfast Essentials (0buf=866 kcal, 5 g pro) mixed with mixed in milk; every other day        Oncology Nutrition-Estimated Needs      Telephone from 6/7/2021 in [address] [place] [address] Telephone from 5/13/2021 in [place] [address] Oncology Dietitian [place]   Weight type used  Adjusted weight  Adjusted weight   Weight in kilograms (kg) used for estimated needs  --  68 7 kg   Weight in kilograms (kg) used for estimated needs  68 7 kg  --   Energy needs formula:   30-35 kcal/kg  25-30 kcal/kg   Energy needs based on 25 kcal/kg:  --  1718 kcal   Energy needs based on 30 kcal/kg:  --  2062 kcal   Energy needs based on 30 kcal/kg:  kcal  --   Energy needs based on 35 kcal/k kcal  --   Protein needs formula:  1-1 2 g/kg  1-1 2 g/kg   Protein needs based on 1 g/kg  69 g  69 g   Protein needs based on 1 2 g/k g  82 g   Fluid needs formula:  30-35 mL/kg  30-35 mL/kg   Fluid needs based on 30 mL/kg  2058 mL  2058 mL   Fluid needs in ounces  70 oz  70 oz   Fluid needs based on 35 mL/kg  2401 mL  2401 mL   Fluid needs in ounces  81 oz  81 oz        Discussion & Intervention:   Go was evaluated today for an RD follow up regarding nutrition impact sx management  Go is currently undergoing hormone therapy for breast cancer  Today Ana Arndt reports that Darrin Navarro is doing well  Ana Arndt provided averages for Anthony's calorie, protein, and carb intakes  He continues to consume adequate nutrition intake  Ana Arndt reports that Anthony's home weights are remaining ~188-192#  Based on today's assessment, discussion included: choosing a variety of colorful, plant-based foods to support a cancer preventative diet, spreading carbohydrate intake throughout the day & counting carbohydrates for adequate glycemic control, adequate hydration & fluid choices, continuing oral nutrition supplements and mindful eating concepts      Moving forward, Yudy Yip will continue current nutrition plan of care  We will follow up in 1 month  Materials Provided: not applicable  All questions and concerns addressed during todays visit  Go has RD contact information  Nutrition Diagnosis:    Food and nutrition related knowledge deficit related to Lack of prior exposure to accurate nutrition related information as evidenced by No prior knowledge of need for food and nutrition related recommendations    Monitoring & Evaluation:       Goals:  · weight maintenance/stabilization  · pt to meet >/=75% estimated nutrition needs daily  · Increase hydration to goal of 68-80oz / day     · Progress Towards Goals: Progressing and Goal(s) Met    Nutrition Rx & Recommendations:  · Keep a daily food journal (pen/paper, martha such as [employer])  · Nutrition Supplements: [employer] Breakfast mixed with milk every other day  May use homemade shakes/smoothies as desired  If using a pre-made shake/bar, choose ones with >300 calories and >10 grams protein (ex  Ensure Enlive, Ensure Plus, Boost Plus, Boost Very High Calorie, etc )  · Small, frequent meals/snacks may be easier to tolerate than 3 large daily meals  Aim for 5-6 small meals per day (every 2-3 hours)  · Include protein at all meals/snacks  · Include a variety of foods (as tolerated/allowed by diet)  · Follow a Cancer Preventative Nutrition Pattern: colorful, plant-based, low-fat, avoid added sugars, limit alcohol, include high fiber foods, limit processed meats, limit red meat, choose lean protein sources, use low-fat cooking methods, balance calories with physical activity, avoid excessive weight gain throughout life  · Incorporate physical activity as able/allowed  · Stay hydrated by sipping fluids of choice/tolerance throughout the day  · Alter food choices and eating patterns to accommodate changing needs  · Refer to Eating Hints book for other meal/snack ideas and symptom management  · Weigh yourself regularly  If you notice weight loss, make an effort to increase your daily food/calorie intake  If you continue to notice loss after these efforts, reach out to your dietitian to establish a plan to stabilize weight    · High protein foods to try: peanut butter, beans, chicken, fish, eggs (see pages 49-51 in your Eating Hints book)  · For acid reflux (GERD): avoid large meals, choose 5-6 small meals/day, stay upright for 30-60 min after eating, avoid smoking and second hand smoke, wear loose fitting clothes, incorporate physical activity as tolerated, common trigger foods: caffeine-containing beverages, carbonated beverages, alcohol, mint, chocolate, spicy foods, acidic foods, (tomato, citrus, cranberry juice, OJ, all coffee and tea including decaf) , black pepper, and high fat/greasy foods         Follow Up Plan: 7/7/21 phone follow up 10am    Recommend Referral to Other Providers: none at this time

## 2021-06-08 NOTE — TELEPHONE ENCOUNTER
Received call from De Young regarding Anthony's nutrition and recent blood work  She states that Anthony's hemoglobin and RBC are still low, and would like to review iron rich foods to help improve this  Reviewed iron rich foods including; iron fortified cereal, liver, cream of wheat/oatmeal, beans/lentils, dark leafy greens, turkey, shellfish, and beef  She is appreciative of suggestions  All questions/concerns addressed at this time

## 2021-06-10 NOTE — PROGRESS NOTES
Hematology / Oncology Outpatient Follow Up Note    Diana Austin 64 y o  male :1945 ISS:[id number]         Date:  6/10/2021    Assessment / Plan:  A 26-year-old gentleman who has history of locally advanced left breast cancer, grade 1, ER 95% positive, [place] 75% positive, HER2 negative disease, diagnosed in 2018  He was treated with neoadjuvant tamoxifen followed by mastectomy and lymph node dissection which showed 10 positive lymph nodes   He continued with adjuvant tamoxifen   Unfortunately, he was found to have widespread metastatic disease in his liver and bone   He is currently on Lupron, palbociclib and letrozole with no toxicity  He has continues decline of tumor antigens  He is tolerating current treatment very well  I recommended him to continue with Lupron, and denosumab every 3 months  He will continue with palbociclib 75 mg 3 weeks on followed by 1 week off as well as letrozole daily  He is in agreement with my recommendation  I will see him again in 3 months with CBC, CMP and cancer antigens         Subjective:      HPI:             Interval History:  A 26-year-old gentleman who has metastatic breast cancer, ER 95% positive, [place] 75% positive, HER2 negative disease   He was initially diagnosed with breast cancer with invasive mucinous histology in 2018  He appeared to have had locally advanced disease   He was on neoadjuvant hormonal therapy with tamoxifen from 2018 through May 2019  Subsequently, he underwent left mastectomy and axillary lymph node dissection which showed 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   10/10 axillary lymph node were positive for metastatic disease with largest tumor measuring 2 5 cm   Tamoxifen was continued as adjuvant setting   Unfortunately, he developed liver, bone metastasis   He has been on Lupron shot as well as palbociclib and letrozole since 2020, with excellent tolerance  He has biochemical response  He presents today for routine follow-up  He has further reduction of tumor antigen recently  He feels well  He has no complaint of pain  He denied any respiratory symptoms  He has no hot flashes  His performance status is normal           Objective:      Primary Diagnosis:     1  Left breast cancer, stage IIIC (pT3, pN3, M0) grade 1, invasive mucinous histology, ER 95% positive, MS 75% positive, HER2 negative disease   Diagnosed in November 2018       2  Metastatic breast cancer in his liver and multiple bone, diagnosed in February 2020       Cancer Staging:  Cancer Staging  No matching staging information was found for the patient         Previous Hematologic/ Oncologic Treatment:      1  Neoadjuvant and adjuvant hormonal therapy with tamoxifen from December 2018 through February 2020       Current Hematologic/ Oncologic Treatment:       1  Lupron, since February 2020       2  Letrozole and palbociclib since March 2020       3  Denosumab every 3 months      Disease Status:       continues biochemical response      Test Results:     Pathology:           Radiology:     CT scan of chest abdomen pelvis in February 2020 showed liver and extensive bone metastasis      Laboratory:     See below   CA 27, 29 is   178 in June 2021      Physical Exam:        General Appearance:    Alert, oriented          Eyes:    PERRL   Ears:    Normal external ear canals, both ears   Nose:   Nares normal, septum midline   Throat:   Mucosa moist  Pharynx without injection  Neck:   Supple         Lungs:     Clear to auscultation bilaterally   Chest Wall:    No tenderness or deformity    Heart:    Regular rate and rhythm         Abdomen:     Soft, non-tender, bowel sounds +, no organomegaly               Extremities:   Extremities no cyanosis or edema         Skin:   no rash or icterus      Lymph nodes:   Cervical, supraclavicular, and axillary nodes normal   Neurologic:   CNII-XII intact, normal strength, sensation and reflexes   Throughout             Breast exam:   Status post left mastectomy with no palpable abnormality on his left chest wall   Right showed gynecomastia             ROS: Review of Systems   All other systems reviewed and are negative  Imaging: No results found  Labs:   Lab Results   Component Value Date    WBC 6 07 06/07/2021    HGB 10 9 (L) 06/07/2021    HCT 32 6 (L) 06/07/2021     (H) 06/07/2021     06/07/2021     Lab Results   Component Value Date    K 4 3 06/07/2021     06/07/2021    CO2 33 (H) 06/07/2021    BUN 37 (H) 06/07/2021    CREATININE 1 64 (H) 06/07/2021    GLUF 90 06/07/2021    CALCIUM 9 7 06/07/2021    AST 38 06/07/2021    ALT 35 06/07/2021    ALKPHOS 89 06/07/2021    EGFR 39 06/07/2021         Lab Results   Component Value Date     (H) 02/26/2020       Lab Results   Component Value Date    SPEP  05/06/2020     The SPEP shows a faint possible band in the gamma region  Immunofixation to be performed  Reviewed by: Bolivar Ferreira MD ([id number]) **Electronic Signature**    UPEP  05/06/2020     The UPEP shows selective proteinuria  No monoclonal bands noted  Reviewed by: Bolivar Ferreira MD ([id number]) **Electronic Signature**       Lab Results   Component Value Date    PSA <0 1 02/26/2020       Lab Results   Component Value Date    CEA 2 2 02/26/2020         Lab Results   Component Value Date    IRON 71 06/26/2019    TIBC 311 06/26/2019    FERRITIN 89 06/26/2019       Lab Results   Component Value Date    [id number] 488 12/03/2019         Current Medications: Reviewed  Allergies: Reviewed  PMH/FH/SH:  Reviewed      Vital Sign:    Body surface area is 1 97 meters squared      Wt Readings from Last 3 Encounters:   06/10/21 90 kg (198 lb 6 4 oz)   05/12/21 89 4 kg (197 lb)   04/05/21 89 2 kg (196 lb 10 4 oz)        Temp Readings from Last 3 Encounters:   06/10/21 (!) 96 6 °F (35 9 °C) (Tympanic Core)   04/05/21 98 5 °F (36 9 °C) (Temporal)   03/08/21 97 6 °F (36 4 °C) (Tympanic Core)        BP Readings from Last 3 Encounters:   06/10/21 142/70   05/12/21 136/64   04/05/21 160/75         Pulse Readings from Last 3 Encounters:   06/10/21 89   05/12/21 70   04/05/21 89     @LASTSAO2(3)@

## 2021-07-06 NOTE — PROGRESS NOTES
Outpatient Oncology Nutrition Consult   Type of Consult: Follow Up  Care Location: Telephone Call- with pt and wife [place]    Reason for referral: RN (Anastasiia Gage) request due to pt has nutrition questions (Date of referral: 2/19/20)    Nutrition Assessment:   Oncology Diagnosis & Treatments: Left breast carcinoma diagnosed 11/2018, BRCA negative  Neoadjuvant tamoxifen 20 mg daily from December 27, 2018 until May 2, 2019  Left modified radical mastectomy May 2, 2019  Tamoxifen was resumed postoperatively  Widespread metastatic disease including multiple lung nodules, liver metastasis, and osseous metastasis found in Feb 2020  Began Lupron in Feb 2020, and Letrozole and Gaile Angela March 2020  Oncology History Overview Note   Patient returns today for discuss results of CT and MRI  Initial consult with Dr Theron Cary on 320/20   66year old male with Elvira Stern 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 status post neoadjuvant tamoxifen followed by left mastectomy +ALND, with pathology showing 5 cm tumor extending to margins, and involvement of dermis with 10/10 LN positive, followed by re-excision showing residual tumor, with final margins negative  He declined adjuvant chemotherapy and declined repeat imaging with Dr Tao Lan  Plan at time of consult was radiation to left chestwall and regional lymph nodes  1/27/20 CT simulation for radiation  Dr Theron Cary discussed with patient findings of CT sim which show metastatic disease  She also discussed with Dr Tao Lan and radiology to confirm  Plan to order diagnostic CT c/A/P with contrast, and MRI brain  forego PMRT    1/31/20 Med Onc follow-up with Dr Tao Lan  Pt is scheduled for MRI brain and CT 2/8/20, in view of azotemia,  recommend imaging without IV contrast to reduce risk of kidney dysfunction       2/8/20 CT chest abd pelvis  IMPRESSION:   1   Progressively enlarging 9 mm right middle lobe nodule with Satellite nodules and few additional new pulmonary nodules, suspicious for progressive metastatic disease      2   Multiple hypoattenuating liver lesions, new from December 2018, and progressed from January 2020, highly suspicious for metastasis      3   Osseous metastatic disease as described, new from December 2018, and slightly progressed from January 2020  Right acetabular lytic lesion was not previously imaged      4  Indeterminate 3 2 cm nodular lesion in the left upper quadrant of the abdomen, which was only partially imaged on prior studies and was not adequately evaluated  This may represent a splenule, tumor implant or metastatic lymph node  Further   characterization with PET CT can be performed if clinically warranted      2/8/20 MRI brain - pending      2/14/20 Med Onc f/u with Dr Marcie Winter       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, estrogen receptor positive ([facility] [address] )   2018 Initial Diagnosis    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, estrogen receptor positive ([facility] [address] )     11/14/2018 Biopsy    A  Breast, left subareolar mass, core biopsy (11 slides, [address], collected on 11/14/2018): - Invasive mammary carcinoma with mucinous features (see note)  -- Lincoln grade 1 of 3 (total score: 5 of 9)        * Tubule formation < 10%, score 3        * Nuclear grade 1 of 3, score 1        * Mitoses < 3/mm2, (</= 7 mitoses/10HPF), score 1     -- Invasive carcinoma involves 4 of 4 submitted core biopsy fragments, max  Dimension= 22 mm     -- Estrogen, Progesterone & HER2 receptor studies performed at the referring institution show the tumor to be positive for ER (>95% strong), positive for VT (75% moderate), and negative for HER2 IHC (score 0)  - Ductal carcinoma in-situ (DCIS): Not identified  - Lymph-vascular invasion: Not identified  - Microcalcifications: Absent    - Best representative tumor block:  A1    -- Sufficient tumor present for        Agendia Mammaprint/Blueprint (1 cm2 of invasive tumor in aggregate): No         MI Profile/Foundation One (at least 5 x 5 mm of tumor): Yes  Note:  The tumor shows prominent mucinous differentiation, raising the strong possibility of invasive mucinous carcinoma, though this designation is possible only complete excision with evaluation of the entirety of the tumor mass  12/27/2018 -  Hormone Therapy    Tamoxifen 20 mg daily (Dr Jojo Irvin)     2018 Genetic Testing    BRCA negative      5/2/2019 Surgery    A  Breast, Left, Mastectomy and Axillary Contents:  - 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), Jim Grade II (3 + 2 + 1 = 6), 51 mm in greatest dimension, involving dermis  See Tumor Synoptic Report (below) and Note  - Margins negative for carcinoma in this specimen (see separately submitted additional margins, Parts C and D)  - Intradermal nevi      B  Lymph Node, Left Periaxillary Vein, Lymphadenectomy:  - Metastatic carcinoma present in one lymph node (1/1), with extranodal extension   - Metastatic focus measures at least 1 5 cm in greatest dimension      C  Breast, Left Inferior Medial Margin, Excision:  - Benign fibroadipose tissue and skeletal muscle      D  Breast, Left Inferior Medial Margin Skin, Excision:  - Invasive mammary carcinoma, underlying benign skin  - Anterior margin is multifocally positive for carcinoma     (Dr Meme Hassan)     10/9/2019 Surgery    Left breast, re-excision scar mastectomy site medial and inferior margins:     - Residual invasive breast carcinoma of no special type (ductal NST/invasive ductal carcinoma), multifocal        -- Four foci identified ranging in size from 1 5 to approximately 12 mm        -- Tumor foci are present in dermis and adjacent subcutaneous tissues       - All margins are negative for tumor with closest margin as follows:       -- One focus of tumor comes to within 0 7 mm of the superior margin (A16)  -- Largest focus comes to within 1 5 mm of the superior margin (A37)  - Cicatricial fibrosis of skin and subcutaneous tissues      (Dr Yaima Villalta)       Past Medical & Surgical Hx: HTN, pancreatitis, anemia, afib  Patient Active Problem List   Diagnosis    Mass of breast, left    Hypertension    Leukocytosis    PAC (premature atrial contraction)    Pancreatic mass    Pancreatitis    RBBB (right bundle branch block with left anterior fascicular block)    Transaminitis    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, estrogen receptor positive ([address] )    Anemia, unspecified    Prerenal azotemia    Liver metastasis ([address]    Lung metastasis ([address]    Osseous metastasis ([address] )    CRI (chronic renal insufficiency)     Past Medical History:   Diagnosis Date    Acute gallstone pancreatitis     Arthritis     Atrial fibrillation ([facility] )     Breast cancer ([facility] [address] [facility] )     Cancer (HCC)     breast - left    Chronic pain disorder     Extremity pain     Hypertension     Irregular heartbeat     PAC (premature atrial contraction)     PVC (premature ventricular contraction)     RBBB     Wears glasses      Past Surgical History:   Procedure Laterality Date    APPENDECTOMY      CHOLECYSTECTOMY      FRACTURE SURGERY Left     arm    KS EXC SKIN BENIG 3 1-4 CM TRUNK,ARM,LEG Left 10/9/2019    Procedure: BREAST RE-EXCISION OF SKIN MARGINS;  Surgeon: Yaima Villalta MD;  [facility];  Service: General    KS LAP,CHOLECYSTECTOMY N/A 12/18/2018    Procedure: LAP CHOLECYSTECTOMY; LYSIS OF ADHESIONS; ATTEMPTED CHOLANGIOGRAM;  Surgeon: Yaima Villalta MD;  [facility];  Service: General    KS MASTECTOMY, MODIFIED RADICAL Left 5/2/2019    Procedure: MASTECTOMY MODIFIED RADICAL; AXILLARY DISSECTION;  Surgeon: Yaima Villalta MD;  [facility];  Service: General    TONSILLECTOMY         Review of Medications:   Vitamins, Supplements and Herbals: Med List Reviewed & pt is only taking: MVI     Current Outpatient Medications:     acetaminophen (TYLENOL) 500 mg tablet, Take 500 mg by mouth every 6 (six) hours as needed, Disp: , Rfl:     capsaicin (ZOSTRIX) 0 025 % cream, Apply 1 application topically 2 (two) times a day (Patient not taking: Reported on 5/12/2021), Disp: 60 g, Rfl: 0    furosemide (LASIX) 40 mg tablet, Take 40 mg by mouth daily, Disp: , Rfl: 0    letrozole (FEMARA) 2 5 mg tablet, Take 1 tablet (2 5 mg total) by mouth daily, Disp: 90 tablet, Rfl: 1    losartan (COZAAR) 50 mg tablet, Take 50 mg by mouth daily , Disp: , Rfl: 0    Multiple Vitamins-Minerals (CENTRUM SILVER 50+MEN PO), Take by mouth, Disp: , Rfl:     Palbociclib (Ibrance) 75 MG capsule, Take 1 capsule (75 mg total) by mouth daily with breakfast Take with food   Take for 3 weeks on, followed by 1 week off , Disp: 21 capsule, Rfl: 4    polyethylene glycol (MIRALAX) 17 g packet, Take 17 g by mouth daily as needed , Disp: , Rfl:     Most Recent Lab Results:   Lab Results   Component Value Date    WBC 6 07 06/07/2021    IRON 71 06/26/2019    TIBC 311 06/26/2019    FERRITIN 89 06/26/2019    ALT 35 06/07/2021    AST 38 06/07/2021    ALB 3 6 06/07/2021    SODIUM 139 06/07/2021    SODIUM 140 04/26/2021    K 4 3 06/07/2021    K 4 5 04/26/2021     06/07/2021    BUN 37 (H) 06/07/2021    BUN 42 (H) 04/26/2021    CREATININE 1 64 (H) 06/07/2021    CREATININE 1 87 (H) 04/26/2021    EGFR 39 06/07/2021    GLUF 90 06/07/2021    GLUF 88 04/26/2021    GLUC 92 10/28/2020    CALCIUM 9 7 06/07/2021       Anthropometric Measurements:   Height: 65"  Ht Readings from Last 1 Encounters:   06/10/21 5' 5" (1 651 m)     -Weight History:   Usual Weight: 198#  Ideal Body Weight: 136#  Wt Readings from Last 20 Encounters:   06/28/21 90 5 kg (199 lb 8 3 oz)   06/10/21 90 kg (198 lb 6 4 oz)   05/12/21 89 4 kg (197 lb)   04/05/21 89 2 kg (196 lb 10 4 oz)   03/08/21 90 2 kg (198 lb 12 8 oz)   01/07/21 87 kg (191 lb 12 8 oz)   12/07/20 85 3 kg (188 lb)   11/04/20 82 2 kg (181 lb 3 2 oz)   08/17/20 81 6 kg (180 lb)   07/30/20 80 9 kg (178 lb 6 4 oz)   07/15/20 81 6 kg (179 lb 14 3 oz)   06/15/20 83 5 kg (184 lb)   06/08/20 79 7 kg (175 lb 9 6 oz)   05/14/20 83 5 kg (184 lb)   03/27/20 83 5 kg (184 lb)   02/14/20 86 2 kg (190 lb)   01/31/20 89 8 kg (198 lb)   01/20/20 87 3 kg (192 lb 7 4 oz)   12/17/19 89 4 kg (197 lb)   10/21/19 89 8 kg (198 lb)     -Varian:n/a  -Home weights: (6/15/20)177#, (7/6/20) 178 2#, (7/27/20) 180#, (8/10/20)179#, (8/24/20) 181#, (9/12/20)180#, (10/5/20)179 8#, (10/25/20) 183#, (11/9/20) 183 2#, (12/8/20)182#, (2/1/21) 187#, (6/7/21) 188#      Oncology Nutrition-Anthropometrics      Nutrition from 7/6/2021 in [address] [place] [address] Oncology Dietitian Services Telephone from 6/7/2021 in [place] [address] Oncology Dietitian [place]   Patient age (years):  64 years  64 years   Patient (male) height (in):  72 in  72 in   Current weight (lbs):  199 5 lbs  197 lbs   Current weight to be used for anthropometric calculations (kg)  90 7 kg  89 5 kg   BMI:  33 2  32 8   IBW male  136 lb  136 lb   IBW (kg) male  61 8 kg  61 8 kg   IBW % (male)  146 7 %  144 9 %   Adjusted BW (male):  151 9 lbs  151 2 lbs   Adjusted BW in kg (male):  69 kg  68 7 kg   % weight change after 1 month:  --  0 2 %   Weight change after 1 month (lbs)  --  0 3 lbs   % weight change after 3 months:  1 4 %  --   Weight change after 3 months (lbs)  2 8 lbs  --   % weight change after 6 months:  6 1 %  8 7 %   Weight change after 6 months (lbs)  11 5 lbs  15 8 lbs   % weight change after 1 year:  --  7 1 %   Weight change after 1 year (lbs)  --  13 lbs        Nutrition-Focused Physical Findings: n/a due to telephone call    Food/Nutrition-Related History & Client/Social History:    Current Nutrition Impact Symptoms:  [] Nausea  [] Reduced Appetite  [] Acid Reflux    [] Vomiting  [] Unintended Wt Loss  [] Malabsorption    [] Diarrhea  [] Unintended Wt Gain  [] Dumping Syndrome    [] Constipation -"sometimes" takes Miralax [] Thick Mucous/Secretions  [] Abdominal Pain    [] Dysgeusia (Altered Taste)  [x] Xerostomia (Dry Mouth) -in the morning  [] Gas    [] Dysosmia (Altered Smell)  [] Thrush  [] Difficulty Chewing    [] Oral Mucositis (Sore Mouth)  [] Fatigue   [] Other:    [] Odynophagia   [] Esophagitis  [] Other:    [] Dysphagia  [] Early Satiety  [x] No Problems Eating      Food Allergies: no  Food Intolerances: no    Current Diet: Regular Diet, No Restrictions  Current Nutrition Intake: Unchanged from last visit  Appetite: Good  Nutrition Route: PO  Meal planning/preparation mainly done by: Self and Spouse/Partner  Oral Care: brushes BID  Activity level: Strength increasing       25 Hr Diet Recall:   Breakfast: 1-2 eggs, toast with jelly  Snack: Granola bar  Lunch:  1/2 turkey/ham and cheese sandwich, manning    Dinner: chicken breaded with corn flakes  Snack: blueberries, watermelon      Beverages:  water (8oz x3-4), OJ (8-10oz x1)  Supplements:    [employer] (0tqh=736 kcal, 5 g pro) mixed with mixed in milk; every other day        Oncology Nutrition-Estimated Needs      Nutrition from 2021 in [address] [place] [address] Oncology Dietitian Services Telephone from 2021 in [place] [address] Oncology Dietitian [place]   Weight type used  Adjusted weight  Adjusted weight   Weight in kilograms (kg) used for estimated needs  69 kg  --   Weight in kilograms (kg) used for estimated needs  --  68 7 kg   Energy needs formula:   25-30 kcal/kg  30-35 kcal/kg   Energy needs based on 25 kcal/k kcal  --   Energy needs based on 30 kcal/k kcal  --   Energy needs based on 30 kcal/kg:  --  2062 kcal   Energy needs based on 35 kcal/kg:  --  2405 kcal   Protein needs formula:  1-1 2 g/kg  1-1 2 g/kg   Protein needs based on 1 g/kg  69 g  69 g   Protein needs based on 1 2 g/k g  82 g Fluid needs formula:  30-35 mL/kg  30-35 mL/kg   Fluid needs based on 30 mL/kg  2073 mL  2058 mL   Fluid needs in ounces  70 oz  70 oz   Fluid needs based on 35 mL/kg  2419 mL  2401 mL   Fluid needs in ounces  82 oz  81 oz        Discussion & Intervention:   Go was evaluated today for an RD follow up regarding nutrition impact sx management  Go is currently undergoing hormone therapy for breast cancer  Today Maricruz Lea reports that Ion Deutsch is doing well  His appetite and PO intakes continue to be good  He continues to consume adequate nutrition intake  He has recently switched from drinking ginger ale and cranberry juice to drinking mostly water  Maricruz Lea also states that he will start walking more outside once the weather gets cooler  Based on today's assessment, discussion included: choosing a variety of colorful, plant-based foods to support a cancer preventative diet, spreading carbohydrate intake throughout the day & counting carbohydrates for adequate glycemic control, adequate hydration & fluid choices, continuing oral nutrition supplements and mindful eating concepts      Moving forward, Naomi Sindi will continue current nutrition plan of care  We will follow up in 1 month  Materials Provided: not applicable  All questions and concerns addressed during todays visit  Go has RD contact information  Nutrition Diagnosis:    Food and nutrition related knowledge deficit related to Lack of prior exposure to accurate nutrition related information as evidenced by No prior knowledge of need for food and nutrition related recommendations  Monitoring & Evaluation:       Goals:  · weight maintenance/stabilization  · pt to meet >/=75% estimated nutrition needs daily  · Increase hydration to goal of 68-80oz / day     · Progress Towards Goals: Progressing and Goal(s) Met    Nutrition Rx & Recommendations:  · Keep a daily food journal (pen/paper, martha such as [employer])    · Nutrition Supplements: [employer] mixed with milk every other day  May use homemade shakes/smoothies as desired  If using a pre-made shake/bar, choose ones with >300 calories and >10 grams protein (ex  Ensure Enlive, Ensure Plus, Boost Plus, Boost Very High Calorie, etc )  · Small, frequent meals/snacks may be easier to tolerate than 3 large daily meals  Aim for 5-6 small meals per day (every 2-3 hours)  · Include protein at all meals/snacks  · Include a variety of foods (as tolerated/allowed by diet)  · Follow a Cancer Preventative Nutrition Pattern: colorful, plant-based, low-fat, avoid added sugars, limit alcohol, include high fiber foods, limit processed meats, limit red meat, choose lean protein sources, use low-fat cooking methods, balance calories with physical activity, avoid excessive weight gain throughout life  · Incorporate physical activity as able/allowed  · Stay hydrated by sipping fluids of choice/tolerance throughout the day  · Alter food choices and eating patterns to accommodate changing needs  · Refer to Eating Hints book for other meal/snack ideas and symptom management  · Weigh yourself regularly  If you notice weight loss, make an effort to increase your daily food/calorie intake  If you continue to notice loss after these efforts, reach out to your dietitian to establish a plan to stabilize weight  · High protein foods to try: peanut butter, beans, chicken, fish, eggs (see pages 49-51 in your Eating Hints book)  · For acid reflux (GERD): avoid large meals, choose 5-6 small meals/day, stay upright for 30-60 min after eating, avoid smoking and second hand smoke, wear loose fitting clothes, incorporate physical activity as tolerated, common trigger foods: caffeine-containing beverages, carbonated beverages, alcohol, mint, chocolate, spicy foods, acidic foods, (tomato, citrus, cranberry juice, OJ, all coffee and tea including decaf) , black pepper, and high fat/greasy foods  Follow Up Plan: 8/10/21 10am phone follow up   Recommend Referral to Other Providers: none at this time

## 2021-07-06 NOTE — PATIENT INSTRUCTIONS
Nutrition Rx & Recommendations:  · Keep a daily food journal (pen/paper, martha such as [employer])  · Nutrition Supplements: [place] [employer] Breakfast mixed with milk every other day  May use homemade shakes/smoothies as desired  If using a pre-made shake/bar, choose ones with >300 calories and >10 grams protein (ex  Ensure Enlive, Ensure Plus, Boost Plus, Boost Very High Calorie, etc )  · Small, frequent meals/snacks may be easier to tolerate than 3 large daily meals  Aim for 5-6 small meals per day (every 2-3 hours)  · Include protein at all meals/snacks  · Include a variety of foods (as tolerated/allowed by diet)  · Follow a Cancer Preventative Nutrition Pattern: colorful, plant-based, low-fat, avoid added sugars, limit alcohol, include high fiber foods, limit processed meats, limit red meat, choose lean protein sources, use low-fat cooking methods, balance calories with physical activity, avoid excessive weight gain throughout life  · Incorporate physical activity as able/allowed  · Stay hydrated by sipping fluids of choice/tolerance throughout the day  · Alter food choices and eating patterns to accommodate changing needs  · Refer to Eating Hints book for other meal/snack ideas and symptom management  · Weigh yourself regularly  If you notice weight loss, make an effort to increase your daily food/calorie intake  If you continue to notice loss after these efforts, reach out to your dietitian to establish a plan to stabilize weight    · High protein foods to try: peanut butter, beans, chicken, fish, eggs (see pages 49-51 in your Eating Hints book)  · For acid reflux (GERD): avoid large meals, choose 5-6 small meals/day, stay upright for 30-60 min after eating, avoid smoking and second hand smoke, wear loose fitting clothes, incorporate physical activity as tolerated, common trigger foods: caffeine-containing beverages, carbonated beverages, alcohol, mint, chocolate, spicy foods, acidic foods, (tomato, citrus, cranberry juice, OJ, all coffee and tea including decaf) , black pepper, and high fat/greasy foods         Follow Up Plan: 8/10/21 10am phone follow up   Recommend Referral to Other Providers: none at this time

## 2021-07-26 NOTE — TELEPHONE ENCOUNTER
Patient calling to see if getting the COVID vaccine will impact his GFR  I explained to patient that I would have to check with Dr Theresa Miller - I do not know how the COVID vaccine impacts kidney function  Suggested patient reach out to his PCP - he states he does not want to talk to him as he would just tell the patient to "get the vaccine"  I explained that from an oncology standpoint patient is ok to receive the vaccine  Patient stated he will take care of this and hung up  Will sent to RN as Willard Harry

## 2021-08-03 NOTE — TELEPHONE ENCOUNTER
Received call from Chey Ferraro explaining that Rhea Stanford has increased phlegm from seasonal allergies and she would like to know what he can eat to help decrease phlegm  She also states that he has trouble swallowing food sometimes if the phlegm is increased at the time he is eating  Suggested adequate hydration with at least 64 oz fluid daily to help loosen phlegm  Suggested eating moist foods, adding extra sauces/gravies, and having a beverage nearby to help swallow foods if they are too dry  Possible allergy medications should be discussed with PCP  Chey Ferraro verbalized understanding and is appreciative of suggestions

## 2021-08-09 NOTE — PROGRESS NOTES
Outpatient Oncology Nutrition Consult   Type of Consult: Follow Up  Care Location: Telephone Call- with pt and wife Kern Saint    Reason for referral: RN (America Staton) request due to pt has nutrition questions (Date of referral: 2/19/20)    Nutrition Assessment:   Oncology Diagnosis & Treatments: Left breast carcinoma diagnosed 11/2018, BRCA negative  Neoadjuvant tamoxifen 20 mg daily from December 27, 2018 until May 2, 2019  Left modified radical mastectomy May 2, 2019  Tamoxifen was resumed postoperatively  Widespread metastatic disease including multiple lung nodules, liver metastasis, and osseous metastasis found in Feb 2020  Began Lupron in Feb 2020, and Letrozole and Merrianne Ebbing March 2020  Oncology History Overview Note   Patient returns today for discuss results of CT and MRI  Initial consult with Dr Nena Garcia on 320/20   66year old male with Glory Divine 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 status post neoadjuvant tamoxifen followed by left mastectomy +ALND, with pathology showing 5 cm tumor extending to margins, and involvement of dermis with 10/10 LN positive, followed by re-excision showing residual tumor, with final margins negative  He declined adjuvant chemotherapy and declined repeat imaging with Dr Fabian Brower  Plan at time of consult was radiation to left chestwall and regional lymph nodes  1/27/20 CT simulation for radiation  Dr Nena Garcia discussed with patient findings of CT sim which show metastatic disease  She also discussed with Dr Fabian Brower and radiology to confirm  Plan to order diagnostic CT c/A/P with contrast, and MRI brain  forego PMRT    1/31/20 Med Onc follow-up with Dr Fabian Brower  Pt is scheduled for MRI brain and CT 2/8/20, in view of azotemia,  recommend imaging without IV contrast to reduce risk of kidney dysfunction       2/8/20 CT chest abd pelvis  IMPRESSION:   1   Progressively enlarging 9 mm right middle lobe nodule with Satellite nodules and few additional new pulmonary nodules, suspicious for progressive metastatic disease      2   Multiple hypoattenuating liver lesions, new from December 2018, and progressed from January 2020, highly suspicious for metastasis      3   Osseous metastatic disease as described, new from December 2018, and slightly progressed from January 2020  Right acetabular lytic lesion was not previously imaged      4  Indeterminate 3 2 cm nodular lesion in the left upper quadrant of the abdomen, which was only partially imaged on prior studies and was not adequately evaluated  This may represent a splenule, tumor implant or metastatic lymph node  Further   characterization with PET CT can be performed if clinically warranted      2/8/20 MRI brain - pending      2/14/20 Med Onc f/u with Dr Marcie Winter       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, estrogen receptor positive ([facility] [address] )   2018 Initial Diagnosis    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, estrogen receptor positive ([facility] [address] )     11/14/2018 Biopsy    A  Breast, left subareolar mass, core biopsy (11 slides, [address], collected on 11/14/2018): - Invasive mammary carcinoma with mucinous features (see note)  -- [place]chanin grade 1 of 3 (total score: 5 of 9)        * Tubule formation < 10%, score 3        * Nuclear grade 1 of 3, score 1        * Mitoses < 3/mm2, (</= 7 mitoses/10HPF), score 1     -- Invasive carcinoma involves 4 of 4 submitted core biopsy fragments, max  Dimension= 22 mm     -- Estrogen, Progesterone & HER2 receptor studies performed at the referring institution show the tumor to be positive for ER (>95% strong), positive for [place] (75% moderate), and negative for HER2 IHC (score 0)  - Ductal carcinoma in-situ (DCIS): Not identified  - Lymph-vascular invasion: Not identified  - Microcalcifications: Absent    - Best representative tumor block:  A1    -- Sufficient tumor present for        Agendia Mammaprint/Blueprint (1 cm2 of invasive tumor in aggregate): No         MI Profile/Foundation One (at least 5 x 5 mm of tumor): Yes  Note:  The tumor shows prominent mucinous differentiation, raising the strong possibility of invasive mucinous carcinoma, though this designation is possible only complete excision with evaluation of the entirety of the tumor mass  12/27/2018 -  Hormone Therapy    Tamoxifen 20 mg daily (Dr Rene Murray)     2018 Genetic Testing    BRCA negative      5/2/2019 Surgery    A  Breast, Left, Mastectomy and Axillary Contents:  - 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), [place] Grade II (3 + 2 + 1 = 6), 51 mm in greatest dimension, involving dermis  See Tumor Synoptic Report (below) and Note  - Margins negative for carcinoma in this specimen (see separately submitted additional margins, Parts C and D)  - Intradermal nevi      B  Lymph Node, Left Periaxillary Vein, Lymphadenectomy:  - Metastatic carcinoma present in one lymph node (1/1), with extranodal extension   - Metastatic focus measures at least 1 5 cm in greatest dimension      C  Breast, Left Inferior Medial Margin, Excision:  - Benign fibroadipose tissue and skeletal muscle      D  Breast, Left Inferior Medial Margin Skin, Excision:  - Invasive mammary carcinoma, underlying benign skin  - Anterior margin is multifocally positive for carcinoma     (Dr Jorge Curtis)     10/9/2019 Surgery    Left breast, re-excision scar mastectomy site medial and inferior margins:     - Residual invasive breast carcinoma of no special type (ductal NST/invasive ductal carcinoma), multifocal        -- Four foci identified ranging in size from 1 5 to approximately 12 mm        -- Tumor foci are present in dermis and adjacent subcutaneous tissues       - All margins are negative for tumor with closest margin as follows:       -- One focus of tumor comes to within 0 7 mm of the superior margin (A16)  -- Largest focus comes to within 1 5 mm of the superior margin (A37)  - Cicatricial fibrosis of skin and subcutaneous tissues      (Dr Kole Hogan)       Past Medical & Surgical Hx: HTN, pancreatitis, anemia, afib  Patient Active Problem List   Diagnosis    Mass of breast, left    Hypertension    Leukocytosis    PAC (premature atrial contraction)    Pancreatic mass    Pancreatitis    RBBB (right bundle branch block with left anterior fascicular block)    Transaminitis    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, estrogen receptor positive ([facility] [address] )    Anemia, unspecified    Prerenal azotemia    Liver metastasis ([address]    Lung metastasis ([address]    Osseous metastasis ([address] )    CRI (chronic renal insufficiency)     Past Medical History:   Diagnosis Date    Acute gallstone pancreatitis     Arthritis     Atrial fibrillation ([facility] [address] )     Breast cancer ([facility] [address] )     Cancer (HCC)     breast - left    Chronic pain disorder     Extremity pain     Hypertension     Irregular heartbeat     PAC (premature atrial contraction)     PVC (premature ventricular contraction)     RBBB     Wears glasses      Past Surgical History:   Procedure Laterality Date    APPENDECTOMY      CHOLECYSTECTOMY      FRACTURE SURGERY Left     arm    GA EXC SKIN BENIG 3 1-4 CM TRUNK,ARM,LEG Left 10/9/2019    Procedure: BREAST RE-EXCISION OF SKIN MARGINS;  Surgeon: Kole Hogan MD;  Location: [facility];  Service: General    GA LAP,CHOLECYSTECTOMY N/A 12/18/2018    Procedure: LAP CHOLECYSTECTOMY; LYSIS OF ADHESIONS; ATTEMPTED CHOLANGIOGRAM;  Surgeon: Kole Hogan MD;  Location: [facility];  Service: General    GA MASTECTOMY, MODIFIED RADICAL Left 5/2/2019    Procedure: MASTECTOMY MODIFIED RADICAL; AXILLARY DISSECTION;  Surgeon: Kole Hogan MD;  Location: [facility];  Service: General    TONSILLECTOMY         Review of Medications:   Vitamins, Supplements and Herbals: Med List Reviewed & pt is only taking: MVI     Current Outpatient Medications:     acetaminophen (TYLENOL) 500 mg tablet, Take 500 mg by mouth every 6 (six) hours as needed, Disp: , Rfl:     capsaicin (ZOSTRIX) 0 025 % cream, Apply 1 application topically 2 (two) times a day (Patient not taking: Reported on 5/12/2021), Disp: 60 g, Rfl: 0    furosemide (LASIX) 40 mg tablet, Take 40 mg by mouth daily, Disp: , Rfl: 0    letrozole (FEMARA) 2 5 mg tablet, Take 1 tablet (2 5 mg total) by mouth daily, Disp: 90 tablet, Rfl: 1    losartan (COZAAR) 50 mg tablet, Take 50 mg by mouth daily , Disp: , Rfl: 0    Multiple Vitamins-Minerals (CENTRUM SILVER 50+MEN PO), Take by mouth, Disp: , Rfl:     Palbociclib (Ibrance) 75 MG capsule, Take 1 capsule (75 mg total) by mouth daily with breakfast Take with food   Take for 3 weeks on, followed by 1 week off , Disp: 21 capsule, Rfl: 4    polyethylene glycol (MIRALAX) 17 g packet, Take 17 g by mouth daily as needed , Disp: , Rfl:     Most Recent Lab Results:   Lab Results   Component Value Date    WBC 6 07 06/07/2021    IRON 71 06/26/2019    TIBC 311 06/26/2019    FERRITIN 89 06/26/2019    ALT 35 06/07/2021    AST 38 06/07/2021    ALB 3 6 06/07/2021    SODIUM 139 06/07/2021    SODIUM 140 04/26/2021    K 4 3 06/07/2021    K 4 5 04/26/2021     06/07/2021    BUN 37 (H) 06/07/2021    BUN 42 (H) 04/26/2021    CREATININE 1 64 (H) 06/07/2021    CREATININE 1 87 (H) 04/26/2021    EGFR 39 06/07/2021    GLUF 90 06/07/2021    GLUF 88 04/26/2021    GLUC 92 10/28/2020    CALCIUM 9 7 06/07/2021       Anthropometric Measurements:   Height: 65"  Ht Readings from Last 1 Encounters:   06/10/21 5' 5" (1 651 m)     -Weight History:   Usual Weight: 198#  Ideal Body Weight: 136#  Wt Readings from Last 20 Encounters:   06/28/21 90 5 kg (199 lb 8 3 oz)   06/10/21 90 kg (198 lb 6 4 oz)   05/12/21 89 4 kg (197 lb)   04/05/21 89 2 kg (196 lb 10 4 oz)   03/08/21 90 2 kg (198 lb 12 8 oz)   01/07/21 87 kg (191 lb 12 8 oz)   12/07/20 85 3 kg (188 lb)   11/04/20 82 2 kg (181 lb 3 2 oz)   08/17/20 81 6 kg (180 lb)   07/30/20 80 9 kg (178 lb 6 4 oz)   07/15/20 81 6 kg (179 lb 14 3 oz)   06/15/20 83 5 kg (184 lb)   06/08/20 79 7 kg (175 lb 9 6 oz)   05/14/20 83 5 kg (184 lb)   03/27/20 83 5 kg (184 lb)   02/14/20 86 2 kg (190 lb)   01/31/20 89 8 kg (198 lb)   01/20/20 87 3 kg (192 lb 7 4 oz)   12/17/19 89 4 kg (197 lb)   10/21/19 89 8 kg (198 lb)     -Varian:n/a  -Home weights: (6/15/20)177#, (7/6/20) 178 2#, (7/27/20) 180#, (8/10/20)179#, (8/24/20) 181#, (9/12/20)180#, (10/5/20)179 8#, (10/25/20) 183#, (11/9/20) 183 2#, (12/8/20)182#, (2/1/21) 187#, (6/7/21) 188#, (8/10/21) 188-191#      Oncology Nutrition-Anthropometrics      Nutrition from 8/10/2021 in [address] [place] [address] Oncology Dietitian Services Nutrition from 7/6/2021 in [address] [place] [address] Oncology Dietitian Services   Patient age (years):  64 years  64 years   Patient (male) height (in):  72 in  72 in   Current weight (lbs):  199 5 lbs  199 5 lbs   Current weight to be used for anthropometric calculations (kg)  90 7 kg  90 7 kg   BMI:  33 2  33 2   IBW male  136 lb  136 lb   IBW (kg) male  61 8 kg  61 8 kg   IBW % (male)  146 7 %  146 7 %   Adjusted BW (male):  151 9 lbs  151 9 lbs   Adjusted BW in kg (male):  69 kg  69 kg   % weight change after 1 month:  1 3 %  --   Weight change after 1 month (lbs)  2 5 lbs  --   % weight change after 3 months:  0 4 %  1 4 %   Weight change after 3 months (lbs)  0 7 lbs  2 8 lbs   % weight change after 6 months:  --  6 1 %   Weight change after 6 months (lbs)  --  11 5 lbs        Nutrition-Focused Physical Findings: n/a due to telephone call    Food/Nutrition-Related History & Client/Social History:    Current Nutrition Impact Symptoms:  [] Nausea  [] Reduced Appetite  [] Acid Reflux    [] Vomiting  [] Unintended Wt Loss  [] Malabsorption    [] Diarrhea  [] Unintended Wt Gain  [] Dumping Syndrome    [] Constipation -"sometimes" takes Miralax [] Thick Mucous/Secretions  [] Abdominal Pain    [] Dysgeusia (Altered Taste)  [x] Xerostomia (Dry Mouth) -in the morning  [] Gas    [] Dysosmia (Altered Smell)  [] Thrush  [] Difficulty Chewing    [] Oral Mucositis (Sore Mouth)  [] Fatigue   [] Other:    [] Odynophagia   [] Esophagitis  [] Other:    [] Dysphagia  [] Early Satiety  [x] No Problems Eating      Food Allergies: no  Food Intolerances: no    Current Diet: Regular Diet, No Restrictions  Current Nutrition Intake: Unchanged from last visit  Appetite: Good  Nutrition Route: PO  Meal planning/preparation mainly done by: Self and Spouse/Partner  Oral Care: brushes BID  Activity level: Strength increasing       24 Hr Diet Recall: 131g protein yesterday (21)   Breakfast: cheerios with milk OR bagel with cream cheese   Snack: Granola bar  Lunch:  1/2 turkey/ham and cheese sandwich, manning    Dinner: chicken OR turkey OR fish   Snack: blueberries, watermelon, dry cereal      Beverages:  water (8oz x3-4), OJ (8-10oz x1)  Supplements:    [employer] (5jbz=351 kcal, 5 g pro) mixed with milk; every other day        Oncology Nutrition-Estimated Needs      Nutrition from 8/10/2021 in [address] [place] [address] Oncology Dietitian Services Nutrition from 2021 in [address] [place] [address] Oncology Dietitian Services   Weight type used  Adjusted weight  Adjusted weight   Weight in kilograms (kg) used for estimated needs  90 7 kg  69 kg   Energy needs formula:   25-30 kcal/kg  25-30 kcal/kg   Energy needs based on 25 kcal/k kcal  1726 kcal   Energy needs based on 30 kcal/k kcal  2071 kcal   Protein needs formula:  1-1 2 g/kg  1-1 2 g/kg   Protein needs based on 1 g/kg  91 g  69 g   Protein needs based on 1 2 g/k g  83 g   Fluid needs formula:  30-35 mL/kg  30-35 mL/kg   Fluid needs based on 30 mL/kg  2727 mL  2073 mL   Fluid needs in ounces  92 oz  70 oz   Fluid needs based on 35 mL/kg  3182 mL  2419 mL   Fluid needs in ounces  108 oz  82 oz        Discussion & Intervention:   Mena Man was evaluated today for an RD follow up regarding nutrition impact sx management  Go is currently undergoing hormone therapy for breast cancer  Today Fely Norman and Gerardo Weir report that Fely Norman is doing well  His appetite is good, and PO intakes remain unchanged  His home weight is stable ~188-191#  He continues to include protein at all meals and snacks  At this time he is working on increasing fluid intake  Based on today's assessment, discussion included: choosing a variety of colorful, plant-based foods to support a cancer preventative diet, spreading carbohydrate intake throughout the day & counting carbohydrates for adequate glycemic control, adequate hydration & fluid choices, continuing oral nutrition supplements and mindful eating concepts      Moving forward, Mena Man will continue current nutrition plan of care  We will follow up in 2 months  Materials Provided: not applicable  All questions and concerns addressed during todays visit  Go has RD contact information  Nutrition Diagnosis:    Food and nutrition related knowledge deficit related to Lack of prior exposure to accurate nutrition related information as evidenced by No prior knowledge of need for food and nutrition related recommendations  Monitoring & Evaluation:       Goals:  · weight maintenance/stabilization  · pt to meet >/=75% estimated nutrition needs daily  · increase fluid intake    · Progress Towards Goals: Progressing and Goal(s) Met    Nutrition Rx & Recommendations:  · Keep a daily food journal (pen/paper, martha such as [employer])  · Nutrition Supplements: [place] [employer] Breakfast mixed with milk every other day  May use homemade shakes/smoothies as desired  If using a pre-made shake/bar, choose ones with >300 calories and >10 grams protein (ex   Ensure Complete, Ensure Enlive, Ensure Plus, Boost Plus, Boost Very High Calorie, etc )  · Small, frequent meals/snacks may be easier to tolerate than 3 large daily meals  Aim for 5-6 small meals per day (every 2-3 hours)  · Include protein at all meals/snacks  · Include a variety of foods (as tolerated/allowed by diet)  · Follow a Cancer Preventative Nutrition Pattern: colorful, plant-based, low-fat, avoid added sugars, limit alcohol, include high fiber foods, limit processed meats, limit red meat, choose lean protein sources, use low-fat cooking methods, balance calories with physical activity, avoid excessive weight gain throughout life  · Incorporate physical activity as able/allowed  · Stay hydrated by sipping fluids of choice/tolerance throughout the day  · Alter food choices and eating patterns to accommodate changing needs  · Refer to Eating Hints book for other meal/snack ideas and symptom management  · Weigh yourself regularly  If you notice weight loss, make an effort to increase your daily food/calorie intake  If you continue to notice loss after these efforts, reach out to your dietitian to establish a plan to stabilize weight  · High protein foods to try: peanut butter, beans, chicken, fish, eggs (see pages 49-51 in your Eating Hints book)  · For acid reflux (GERD): avoid large meals, choose 5-6 small meals/day, stay upright for 30-60 min after eating, avoid smoking and second hand smoke, wear loose fitting clothes, incorporate physical activity as tolerated, common trigger foods: caffeine-containing beverages, carbonated beverages, alcohol, mint, chocolate, spicy foods, acidic foods, (tomato, citrus, cranberry juice, OJ, all coffee and tea including decaf) , black pepper, and high fat/greasy foods         Follow Up Plan: 10/12/21 10:30am phone follow up    Recommend Referral to Other Providers: none at this time

## 2021-08-10 NOTE — PATIENT INSTRUCTIONS
Nutrition Rx & Recommendations:  · Keep a daily food journal (pen/paper, martha such as [employer])  · Nutrition Supplements: [place] [employer] Breakfast mixed with milk every other day  May use homemade shakes/smoothies as desired  If using a pre-made shake/bar, choose ones with >300 calories and >10 grams protein (ex  Ensure Complete, Ensure Enlive, Ensure Plus, Boost Plus, Boost Very High Calorie, etc )  · Small, frequent meals/snacks may be easier to tolerate than 3 large daily meals  Aim for 5-6 small meals per day (every 2-3 hours)  · Include protein at all meals/snacks  · Include a variety of foods (as tolerated/allowed by diet)  · Follow a Cancer Preventative Nutrition Pattern: colorful, plant-based, low-fat, avoid added sugars, limit alcohol, include high fiber foods, limit processed meats, limit red meat, choose lean protein sources, use low-fat cooking methods, balance calories with physical activity, avoid excessive weight gain throughout life  · Incorporate physical activity as able/allowed  · Stay hydrated by sipping fluids of choice/tolerance throughout the day  · Alter food choices and eating patterns to accommodate changing needs  · Refer to Eating Hints book for other meal/snack ideas and symptom management  · Weigh yourself regularly  If you notice weight loss, make an effort to increase your daily food/calorie intake  If you continue to notice loss after these efforts, reach out to your dietitian to establish a plan to stabilize weight    · High protein foods to try: peanut butter, beans, chicken, fish, eggs (see pages 49-51 in your Eating Hints book)  · For acid reflux (GERD): avoid large meals, choose 5-6 small meals/day, stay upright for 30-60 min after eating, avoid smoking and second hand smoke, wear loose fitting clothes, incorporate physical activity as tolerated, common trigger foods: caffeine-containing beverages, carbonated beverages, alcohol, mint, chocolate, spicy foods, acidic foods, (tomato, citrus, cranberry juice, OJ, all coffee and tea including decaf) , black pepper, and high fat/greasy foods         Follow Up Plan: 10/12/21 10:30am phone follow up    Recommend Referral to Other Providers: none at this time

## 2021-09-10 NOTE — TELEPHONE ENCOUNTER
Please inform patient Dr Myrtle Hu will review his labs/plan with him at his [address] on 9/16

## 2021-09-10 NOTE — TELEPHONE ENCOUNTER
Call from patient  Patient concerned about rising tumor marker  Will send to Dr Osbaldo Urbina RN/MA team  Patient aware of plan

## 2021-09-13 NOTE — TELEPHONE ENCOUNTER
Spoke to Talon and his wife  Informed him that Dr Verlena Apgar would speak with them on Thursday at the [address]  Talon and his wife are extremely anxious  Patient states he is feeling good and is not having symptoms at this time  Asked they both write down questions for Dr Verlena Apgar so all of their concerns can be address on Thursday  Both voiced understanding

## 2021-09-13 NOTE — TELEPHONE ENCOUNTER
Patient states that he is highly concerned with his blood work results, states he is not having any active  Symptoms but is considering going to the ED  Best call back [phone]

## 2021-09-13 NOTE — TELEPHONE ENCOUNTER
Outpatient Oncology Nutrition Education  Type of Consult:  Nutrition Education  Care Location: Telephone Call  - met w/pt & wife Vianey Evans)      Oncology Diagnosis & Treatments:Left breast carcinoma diagnosed 11/2018, BRCA negative  Neoadjuvant tamoxifen 20 mg daily from December 27, 2018 until May 2, 2019  Left modified radical mastectomy May 2, 2019  Tamoxifen was resumed postoperatively  Widespread metastatic disease including multiple lung nodules, liver metastasis, and osseous metastasis found in Feb 2020  Began Lupron in Feb 2020, and Letrozole and Junella Orthodox March 2020  Oncology History Overview Note   Patient returns today for discuss results of CT and MRI  Initial consult with Dr Ji Montgomery on 320/20   66year old male with Emily Helms 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 status post neoadjuvant tamoxifen followed by left mastectomy +ALND, with pathology showing 5 cm tumor extending to margins, and involvement of dermis with 10/10 LN positive, followed by re-excision showing residual tumor, with final margins negative  He declined adjuvant chemotherapy and declined repeat imaging with Dr Hilda Monroe  Plan at time of consult was radiation to left chestwall and regional lymph nodes  1/27/20 CT simulation for radiation  Dr Ji Montgomery discussed with patient findings of CT sim which show metastatic disease  She also discussed with Dr Hilda Monroe and radiology to confirm  Plan to order diagnostic CT c/A/P with contrast, and MRI brain  forego PMRT    1/31/20 Med Onc follow-up with Dr Hilda Monroe  Pt is scheduled for MRI brain and CT 2/8/20, in view of azotemia,  recommend imaging without IV contrast to reduce risk of kidney dysfunction       2/8/20 CT chest abd pelvis  IMPRESSION:   1   Progressively enlarging 9 mm right middle lobe nodule with Satellite nodules and few additional new pulmonary nodules, suspicious for progressive metastatic disease      2   Multiple hypoattenuating liver lesions, new from December 2018, and progressed from January 2020, highly suspicious for metastasis      3   Osseous metastatic disease as described, new from December 2018, and slightly progressed from January 2020  Right acetabular lytic lesion was not previously imaged      4  Indeterminate 3 2 cm nodular lesion in the left upper quadrant of the abdomen, which was only partially imaged on prior studies and was not adequately evaluated  This may represent a splenule, tumor implant or metastatic lymph node  Further   characterization with PET CT can be performed if clinically warranted      2/8/20 MRI brain - pending      2/14/20 Med Onc f/u with Dr Annette Bajwa       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, estrogen receptor positive ([facility] )   2018 Initial Diagnosis    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, estrogen receptor positive ([facility] [address] )     11/14/2018 Biopsy    A  Breast, left subareolar mass, core biopsy (11 slides, 40 Nicky BourgeoisSrinivasan, collected on 11/14/2018): - Invasive mammary carcinoma with mucinous features (see note)  -- Jim grade 1 of 3 (total score: 5 of 9)        * Tubule formation < 10%, score 3        * Nuclear grade 1 of 3, score 1        * Mitoses < 3/mm2, (</= 7 mitoses/10HPF), score 1     -- Invasive carcinoma involves 4 of 4 submitted core biopsy fragments, max  Dimension= 22 mm     -- Estrogen, Progesterone & HER2 receptor studies performed at the referring institution show the tumor to be positive for ER (>95% strong), positive for CA (75% moderate), and negative for HER2 IHC (score 0)  - Ductal carcinoma in-situ (DCIS): Not identified  - Lymph-vascular invasion: Not identified  - Microcalcifications: Absent  - Best representative tumor block:  A1    -- Sufficient tumor present for        Agendia Mammaprint/Blueprint (1 cm2 of invasive tumor in aggregate):  No         MI Profile/Foundation One (at least 5 x 5 mm of tumor): Yes  Note:  The tumor shows prominent mucinous differentiation, raising the strong possibility of invasive mucinous carcinoma, though this designation is possible only complete excision with evaluation of the entirety of the tumor mass  12/27/2018 -  Hormone Therapy    Tamoxifen 20 mg daily (Dr Andrew Duncan)     2018 Genetic Testing    BRCA negative      5/2/2019 Surgery    A  Breast, Left, Mastectomy and Axillary Contents:  - 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), [place] Grade II (3 + 2 + 1 = 6), 51 mm in greatest dimension, involving dermis  See Tumor Synoptic Report (below) and Note  - Margins negative for carcinoma in this specimen (see separately submitted additional margins, Parts C and D)  - Intradermal nevi      B  Lymph Node, Left Periaxillary Vein, Lymphadenectomy:  - Metastatic carcinoma present in one lymph node (1/1), with extranodal extension   - Metastatic focus measures at least 1 5 cm in greatest dimension      C  Breast, Left Inferior Medial Margin, Excision:  - Benign fibroadipose tissue and skeletal muscle      D  Breast, Left Inferior Medial Margin Skin, Excision:  - Invasive mammary carcinoma, underlying benign skin  - Anterior margin is multifocally positive for carcinoma     (Dr Cheyenne Escalante)     10/9/2019 Surgery    Left breast, re-excision scar mastectomy site medial and inferior margins:     - Residual invasive breast carcinoma of no special type (ductal NST/invasive ductal carcinoma), multifocal        -- Four foci identified ranging in size from 1 5 to approximately 12 mm        -- Tumor foci are present in dermis and adjacent subcutaneous tissues  - All margins are negative for tumor with closest margin as follows:       -- One focus of tumor comes to within 0 7 mm of the superior margin (A16)  -- Largest focus comes to within 1 5 mm of the superior margin (A37)       - Cicatricial fibrosis of skin and subcutaneous tissues      (Dr Aj Brower)       Past Medical & Surgical Hx:   Patient Active Problem List   Diagnosis    Mass of breast, left    Hypertension    Leukocytosis    PAC (premature atrial contraction)    Pancreatic mass    Pancreatitis    RBBB (right bundle branch block with left anterior fascicular block)    Transaminitis    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, estrogen receptor positive ([facility] [address] )    Anemia, unspecified    Prerenal azotemia    Liver metastasis ([facility] [address] [facility] )    Lung metastasis (HCC)    Osseous metastasis (HCC)    CRI (chronic renal insufficiency)     Past Medical History:   Diagnosis Date    Acute gallstone pancreatitis     Arthritis     Atrial fibrillation ([facility]     Breast cancer ([facility] [facility]     Cancer (HCC)     breast - left    Chronic pain disorder     Extremity pain     Hypertension     Irregular heartbeat     PAC (premature atrial contraction)     PVC (premature ventricular contraction)     RBBB     Wears glasses      Past Surgical History:   Procedure Laterality Date    APPENDECTOMY      CHOLECYSTECTOMY      FRACTURE SURGERY Left     arm    RI EXC SKIN BENIG 3 1-4 CM TRUNK,ARM,LEG Left 10/9/2019    Procedure: BREAST RE-EXCISION OF SKIN MARGINS;  Surgeon: Aj Brower MD;  [facility];  Service: General    RI LAP,CHOLECYSTECTOMY N/A 12/18/2018    Procedure: LAP CHOLECYSTECTOMY; LYSIS OF ADHESIONS; ATTEMPTED CHOLANGIOGRAM;  Surgeon: Aj Brower MD;  [facility];  Service: General    RI MASTECTOMY, MODIFIED RADICAL Left 5/2/2019    Procedure: MASTECTOMY MODIFIED RADICAL; AXILLARY DISSECTION;  Surgeon: Aj Brower MD;  [facility];  Service: General    TONSILLECTOMY         Review of Medications:     Current Outpatient Medications:     acetaminophen (TYLENOL) 500 mg tablet, Take 500 mg by mouth every 6 (six) hours as needed, Disp: , Rfl:     capsaicin (ZOSTRIX) 0 025 % cream, Apply 1 application topically 2 (two) times a day (Patient not taking: Reported on 5/12/2021), Disp: 60 g, Rfl: 0    furosemide (LASIX) 40 mg tablet, Take 40 mg by mouth daily, Disp: , Rfl: 0    letrozole (FEMARA) 2 5 mg tablet, Take 1 tablet (2 5 mg total) by mouth daily, Disp: 90 tablet, Rfl: 1    losartan (COZAAR) 50 mg tablet, Take 50 mg by mouth daily , Disp: , Rfl: 0    Multiple Vitamins-Minerals (CENTRUM SILVER 50+MEN PO), Take by mouth, Disp: , Rfl:     Palbociclib (Ibrance) 75 MG capsule, Take 1 capsule (75 mg total) by mouth daily with breakfast Take with food  Take for 3 weeks on, followed by 1 week off , Disp: 21 capsule, Rfl: 4    polyethylene glycol (MIRALAX) 17 g packet, Take 17 g by mouth daily as needed , Disp: , Rfl:     Most Recent Lab Results:   Lab Results   Component Value Date    WBC 5 11 09/10/2021    IRON 71 06/26/2019    TIBC 311 06/26/2019    FERRITIN 89 06/26/2019    ALT 62 09/10/2021     (H) 09/10/2021    K 4 5 09/10/2021    K 4 3 06/07/2021    BUN 39 (H) 09/10/2021    BUN 37 (H) 06/07/2021    CREATININE 2 09 (H) 09/10/2021    CREATININE 1 64 (H) 06/07/2021    CALCIUM 9 4 09/10/2021       Anthropometric Measurements:   Ht Readings from Last 1 Encounters:   06/10/21 5' 5" (1 651 m)     -Weight History: Wt Readings from Last 15 Encounters:   06/28/21 90 5 kg (199 lb 8 3 oz)   06/10/21 90 kg (198 lb 6 4 oz)   05/12/21 89 4 kg (197 lb)   04/05/21 89 2 kg (196 lb 10 4 oz)   03/08/21 90 2 kg (198 lb 12 8 oz)   01/07/21 87 kg (191 lb 12 8 oz)   12/07/20 85 3 kg (188 lb)   11/04/20 82 2 kg (181 lb 3 2 oz)   08/17/20 81 6 kg (180 lb)   07/30/20 80 9 kg (178 lb 6 4 oz)   07/15/20 81 6 kg (179 lb 14 3 oz)   06/15/20 83 5 kg (184 lb)   06/08/20 79 7 kg (175 lb 9 6 oz)   05/14/20 83 5 kg (184 lb)   03/27/20 83 5 kg (184 lb)     Estimated body mass index is 33 2 kg/m² as calculated from the following:    Height as of 6/10/21: 5' 5" (1 651 m)      Weight as of 6/28/21: 90 5 kg (199 lb 8 3 oz)  Discussion/Summary:  Received call from Delilah Guerrero today regarding Anthony's nutrition  Saba Marines would like to know what type of diet will help improve Anthony's liver function  Suggested lean proteins (chicken, fish, nuts/seeds, beans/legumes), low sodium ( less than 2000mg daily), whole grains, variety of fruits and vegetables, and adequate hydration with at least 64oz non caffeine beverages daily  Marnie English also states that he has trouble finishing his dinner sometimes as he does not feel hungry by the end of the day  Suggested switching his lunch and dinner meals, so that he is having a larger meal in the middle of the day when he is more hungry  Marnie English plans to try this  Additional discussion included: MNT for: weight management, choosing a variety of colorful, plant-based foods to support a cancer preventative diet, spreading carbohydrate intake throughout the day & counting carbohydrates for adequate glycemic control, adequate hydration & fluid choices, having consistent and planned snacks between meals, mindful eating concepts and balancing energy intake with physical activity  Materials Provided: not applicable  All questions and concerns addressed during todays visit  Go santillan RD contact information        Follow Up Plan: phone follow up 10/12/21  Recommend Referral to Other Providers: none at this time

## 2021-09-17 NOTE — TELEPHONE ENCOUNTER
Patient's wife, Justin Beltran, is calling in inquiring whether a new marilu will be required for a new prescription Janece Elder will be getting and if he will still be getting injections?  Justin Beltran can be reached back at [phone]

## 2021-09-17 NOTE — TELEPHONE ENCOUNTER
Outpatient Oncology Nutrition Education  Type of Consult:  Nutrition Education  Care Location: Telephone Call  - met w/pt & wife Lindsay Ascencio)    Reason for referral: call from pt and wife on 9/16/21 regarding healthy diet education    Oncology Diagnosis & Treatments: Left breast carcinoma diagnosed 11/2018, BRCA negative  Neoadjuvant tamoxifen 20 mg daily from December 27, 2018 until May 2, 2019  Left modified radical mastectomy May 2, 2019  Tamoxifen was resumed postoperatively  Widespread metastatic disease including multiple lung nodules, liver metastasis, and osseous metastasis found in Feb 2020  Began Lupron in Feb 2020, and Letrozole and Márquez Tristian March 2020     Oncology History Overview Note   Patient returns today for discuss results of CT and MRI  Initial consult with Dr Lakshmi Glaser on 320/20   66year old male with Lagrange Deonte 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 status post neoadjuvant tamoxifen followed by left mastectomy +ALND, with pathology showing 5 cm tumor extending to margins, and involvement of dermis with 10/10 LN positive, followed by re-excision showing residual tumor, with final margins negative  He declined adjuvant chemotherapy and declined repeat imaging with Dr Andrew Duncan  Plan at time of consult was radiation to left chestwall and regional lymph nodes  1/27/20 CT simulation for radiation  Dr Lakshmi Glaser discussed with patient findings of CT sim which show metastatic disease  She also discussed with Dr Andrew Duncan and radiology to confirm  Plan to order diagnostic CT c/A/P with contrast, and MRI brain  forego PMRT    1/31/20 Med Onc follow-up with Dr Andrew Duncan  Pt is scheduled for MRI brain and CT 2/8/20, in view of azotemia,  recommend imaging without IV contrast to reduce risk of kidney dysfunction       2/8/20 CT chest abd pelvis  IMPRESSION:   1   Progressively enlarging 9 mm right middle lobe nodule with Satellite nodules and few additional new pulmonary nodules, suspicious for progressive metastatic disease      2   Multiple hypoattenuating liver lesions, new from December 2018, and progressed from January 2020, highly suspicious for metastasis      3   Osseous metastatic disease as described, new from December 2018, and slightly progressed from January 2020  Right acetabular lytic lesion was not previously imaged      4  Indeterminate 3 2 cm nodular lesion in the left upper quadrant of the abdomen, which was only partially imaged on prior studies and was not adequately evaluated  This may represent a splenule, tumor implant or metastatic lymph node  Further   characterization with PET CT can be performed if clinically warranted      2/8/20 MRI brain - pending      2/14/20 Med Onc f/u with Dr Fabian Brower       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, estrogen receptor positive ([facility])   2018 Initial Diagnosis    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, estrogen receptor positive ([facility])     11/14/2018 Biopsy    A  Breast, left subareolar mass, core biopsy (11 slides, [address], collected on 11/14/2018): - Invasive mammary carcinoma with mucinous features (see note)  -- Jim grade 1 of 3 (total score: 5 of 9)        * Tubule formation < 10%, score 3        * Nuclear grade 1 of 3, score 1        * Mitoses < 3/mm2, (</= 7 mitoses/10HPF), score 1     -- Invasive carcinoma involves 4 of 4 submitted core biopsy fragments, max  Dimension= 22 mm     -- Estrogen, Progesterone & HER2 receptor studies performed at the referring institution show the tumor to be positive for ER (>95% strong), positive for [place] (75% moderate), and negative for HER2 IHC (score 0)  - Ductal carcinoma in-situ (DCIS): Not identified  - Lymph-vascular invasion: Not identified  - Microcalcifications: Absent    - Best representative tumor block:  A1    -- Sufficient tumor present for        Agendia Mammaprint/Blueprint (1 cm2 of invasive tumor in aggregate): No         MI Profile/Foundation One (at least 5 x 5 mm of tumor): Yes  Note:  The tumor shows prominent mucinous differentiation, raising the strong possibility of invasive mucinous carcinoma, though this designation is possible only complete excision with evaluation of the entirety of the tumor mass  12/27/2018 -  Hormone Therapy    Tamoxifen 20 mg daily (Dr Kath Cruz)     2018 Genetic Testing    BRCA negative      5/2/2019 Surgery    A  Breast, Left, Mastectomy and Axillary Contents:  - 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), [place] Grade II (3 + 2 + 1 = 6), 51 mm in greatest dimension, involving dermis  See Tumor Synoptic Report (below) and Note  - Margins negative for carcinoma in this specimen (see separately submitted additional margins, Parts C and D)  - Intradermal nevi      B  Lymph Node, Left Periaxillary Vein, Lymphadenectomy:  - Metastatic carcinoma present in one lymph node (1/1), with extranodal extension   - Metastatic focus measures at least 1 5 cm in greatest dimension      C  Breast, Left Inferior Medial Margin, Excision:  - Benign fibroadipose tissue and skeletal muscle      D  Breast, Left Inferior Medial Margin Skin, Excision:  - Invasive mammary carcinoma, underlying benign skin  - Anterior margin is multifocally positive for carcinoma     (Dr Carolyne Sesay)     10/9/2019 Surgery    Left breast, re-excision scar mastectomy site medial and inferior margins:     - Residual invasive breast carcinoma of no special type (ductal NST/invasive ductal carcinoma), multifocal        -- Four foci identified ranging in size from 1 5 to approximately 12 mm        -- Tumor foci are present in dermis and adjacent subcutaneous tissues       - All margins are negative for tumor with closest margin as follows:       -- One focus of tumor comes to within 0 7 mm of the superior margin (A16)  -- Largest focus comes to within 1 5 mm of the superior margin (A37)  - Cicatricial fibrosis of skin and subcutaneous tissues      (Dr Concetta Fernandez)       Past Medical & Surgical Hx:   Patient Active Problem List   Diagnosis    Mass of breast, left    Hypertension    Leukocytosis    PAC (premature atrial contraction)    Pancreatic mass    Pancreatitis    RBBB (right bundle branch block with left anterior fascicular block)    Transaminitis    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, estrogen receptor positive ([facility] [address] )    Anemia, unspecified    Prerenal azotemia    Liver metastasis ([facility] [address] [facility]    Lung metastasis (HCC)    Osseous metastasis (HCC)    CRI (chronic renal insufficiency)     Past Medical History:   Diagnosis Date    Acute gallstone pancreatitis     Arthritis     Atrial fibrillation ([facility] [address] [facility]     [facility]     Cancer (HCC)     breast - left    Chronic pain disorder     Extremity pain     Hypertension     Irregular heartbeat     PAC (premature atrial contraction)     PVC (premature ventricular contraction)     RBBB     Wears glasses      Past Surgical History:   Procedure Laterality Date    APPENDECTOMY      CHOLECYSTECTOMY      FRACTURE SURGERY Left     arm    UT EXC SKIN BENIG 3 1-4 CM TRUNK,ARM,LEG Left 10/9/2019    Procedure: BREAST RE-EXCISION OF SKIN MARGINS;  Surgeon: Concetta Fernandez MD;  [facility];  Service: General    UT LAP,CHOLECYSTECTOMY N/A 12/18/2018    Procedure: LAP CHOLECYSTECTOMY; LYSIS OF ADHESIONS; ATTEMPTED CHOLANGIOGRAM;  Surgeon: Concetta Fernandez MD;  [facility];  Service: General    UT MASTECTOMY, MODIFIED RADICAL Left 5/2/2019    Procedure: MASTECTOMY MODIFIED RADICAL; AXILLARY DISSECTION;  Surgeon: Concetta Fernandez MD;  [facility];  Service: General    TONSILLECTOMY         Review of Medications:     Current Outpatient Medications:     acetaminophen (TYLENOL) 500 mg tablet, Take 500 mg by mouth every 6 (six) hours as needed, Disp: , Rfl:     capsaicin (ZOSTRIX) 0 025 % cream, Apply 1 application topically 2 (two) times a day, Disp: 60 g, Rfl: 0    ferrous gluconate (FERGON) 324 mg tablet, Take 324 mg by mouth daily with breakfast, Disp: , Rfl:     furosemide (LASIX) 40 mg tablet, Take 40 mg by mouth daily, Disp: , Rfl: 0    letrozole (FEMARA) 2 5 mg tablet, Take 1 tablet (2 5 mg total) by mouth daily, Disp: 90 tablet, Rfl: 1    losartan (COZAAR) 50 mg tablet, Take 50 mg by mouth daily , Disp: , Rfl: 0    Multiple Vitamins-Minerals (CENTRUM SILVER 50+MEN PO), Take by mouth, Disp: , Rfl:     Palbociclib (Ibrance) 75 MG capsule, Take 1 capsule (75 mg total) by mouth daily with breakfast Take with food  Take for 3 weeks on, followed by 1 week off , Disp: 21 capsule, Rfl: 4    polyethylene glycol (MIRALAX) 17 g packet, Take 17 g by mouth daily as needed , Disp: , Rfl:     Most Recent Lab Results:   Lab Results   Component Value Date    WBC 5 11 09/10/2021    IRON 71 06/26/2019    TIBC 311 06/26/2019    FERRITIN 89 06/26/2019    ALT 62 09/10/2021     (H) 09/10/2021    K 4 5 09/10/2021    K 4 3 06/07/2021    BUN 39 (H) 09/10/2021    BUN 37 (H) 06/07/2021    CREATININE 2 09 (H) 09/10/2021    CREATININE 1 64 (H) 06/07/2021    CALCIUM 9 4 09/10/2021       Anthropometric Measurements:   Ht Readings from Last 1 Encounters:   09/16/21 5' 5" (1 651 m)     -Weight History:    Wt Readings from Last 15 Encounters:   09/16/21 85 7 kg (189 lb)   06/28/21 90 5 kg (199 lb 8 3 oz)   06/10/21 90 kg (198 lb 6 4 oz)   05/12/21 89 4 kg (197 lb)   04/05/21 89 2 kg (196 lb 10 4 oz)   03/08/21 90 2 kg (198 lb 12 8 oz)   01/07/21 87 kg (191 lb 12 8 oz)   12/07/20 85 3 kg (188 lb)   11/04/20 82 2 kg (181 lb 3 2 oz)   08/17/20 81 6 kg (180 lb)   07/30/20 80 9 kg (178 lb 6 4 oz)   07/15/20 81 6 kg (179 lb 14 3 oz)   06/15/20 83 5 kg (184 lb)   06/08/20 79 7 kg (175 lb 9 6 oz)   05/14/20 83 5 kg (184 lb)     Estimated body mass index is 31 45 kg/m² as calculated from the following:    Height as of 9/16/21: 5' 5" (1 651 m)  Weight as of 9/16/21: 85 7 kg (189 lb)  Discussion/Summary:  Received call from Edward regarding Jacquelines diet  Edward explains that Heidi Barrera recently found out that his recent blood work showed elevated cancer markers  Per Asia Tyler discussed following a general healthy diet with his MD  She would like to know what changes Heidi Barrera should make to his current diet to make it healthier  Provided diet education on a cancer preventative diet  This diet focuses on plant based food sources, healthy fat intake (avocado, nuts, seeds, olive oil), whole grains, high fiber (>25g daily), lean protein (chicken, fish, beans), adequate hydration (at least 64 oz non caffeine beverages daily)  This diet avoids/limits: alcohol consumption, fried/greasy foods, refined carbohydrates, high concentrated sugars  Jacquelines typical diet generally follows these recommendations, assured Heidi Barrera and Edward that he does not have to make any significant changes to his current diet  Will send materials to reinforce cancer preventative diet in the mail to Anthony's home  Santy Doss are appreciative of conversation and diet education  Materials Provided: Nutrition for Cancer Survivors and Protein foods list, fiber foods list, Cook[employer] handout- mailed to pts home  All questions and concerns addressed during todays visit  Go santillan RD contact information        Follow Up Plan: phone follow up 10/12/21  Recommend Referral to Other Providers: none at this time

## 2021-09-20 NOTE — TELEPHONE ENCOUNTER
Left msg for patients wife to call me back to discuss treatment questions  Per Dr Myrtle Hu no treatment changes at this time  They will be made at his next follow up appointment on 10/15/21 if needed    Patient has a CT scan scheduled 10/8/21

## 2021-09-20 NOTE — TELEPHONE ENCOUNTER
Spoke with patients wife  Explained that new treatment plan will be discussed at next appointment with Dr Wilmer Love  Treatment change will only be made if scan shows disease progression  Patient will keep scan and follow up appointment as scheduled  Wife aware that authorizations / financial assistance will be provided if new treatment plan has a high co pay    Patient will be picking up berry flavored barium at the \[facility]\"" office today per his request   Barium is at the      Wife verbalized understanding

## 2021-09-20 NOTE — PROGRESS NOTES
Pt received Eligard and Xgeva today, no parameters noted  Pt tolerated injections well, AVS provided and pt aware of future appointment

## 2021-09-27 NOTE — TELEPHONE ENCOUNTER
Patient called wondering how long it would take to get a marilu if he decides to change off of Ibrance to something else   Please call patient at [phone] Telephone Encounter by Cinthya Ochoa at 11/19/18 12:10 PM     Author:  Cinthya Ochoa Service:  (none) Author Type:       Filed:  11/19/18 12:11 PM Encounter Date:  11/16/2018 Status:  Signed     :  Cinthya Ochoa ()            Pt was here to  his water bottle he left when here on Friday (11/16/18) for his stress test and in doing so mentioned that he also received a msge from Elma.    Pt states if Elma still needs to talk with him to please give him a call back this afternoon  Routing msge to [facility] nurse pool[HF1.1M]      Revision History        User Key Date/Time User Provider Type Action    > HF1.1 11/19/18 12:11 PM Cinthya Ochoa  Sign    M - Manual

## 2021-09-28 NOTE — TELEPHONE ENCOUNTER
Returned call to patient  He would like to try and move up his CT appointment  Patient only wants to got to [facility] for this  I provided him with the number for central scheduling - he stated he would like to wait to try and schedule when his wife is around  Patient reports decrease in appetite and nausea at times  He thinks nausea is from post nasal drip and cough  He did not feel antinausea medication was needed at this time  He also has increase in weakness when ambulating  He notices that he is "huffing and puffing" at times  This has gotten worse more recently  After eating patient notices that he has tightness on the right side of his throat  This causes trouble with swallowing and patient does not continue eating      I will discuss this with Dr Dirk Weathers tomorrow - patient requesting a call back after 10am on 9/29/21

## 2021-09-28 NOTE — TELEPHONE ENCOUNTER
Received call from Edward today regarding Anthony's current conditions  Edward explains that Gay Negrete has a post nasal drip and would like to know what is causing the post nasal drip and what she can do to help resolve it, encouraged her to reach out to Anthony's PCP about this concern  Edward also explains that Gay Negrete is feeling very fatigued which she believes is due to his [place]  She would like to know what he can eat to improve his energy  Discussed the importance of calorie and protein intake to help maintain strength  Suggested increasing intake of Clive Instant Breakfast, eating small/frequent meals, and including snacks in between meals  Edward is appreciative of suggestions  Encouraged her to reach out if any other questions/concerns arise

## 2021-09-29 NOTE — TELEPHONE ENCOUNTER
Called to follow up with patient on phone call from yesterday  His wife stated that he is going to keep his scheduled CT scan appointment for 10/8/21  I offered patient anti nausea medication to help with nausea / vomiting  He declines at this time  He states episodes with trouble eating and vomiting do not happen all the time - he will call back if he decides to try medication  I suggested patient reach out to his PCP if he continues to have trouble swallowing    Dr Nadeen Moran does not think this is related to his treatment  Patient verbalized understanding

## 2021-10-19 PROBLEM — E83.52 HYPERCALCEMIA OF MALIGNANCY: Status: ACTIVE | Noted: 2021-01-01

## 2021-10-19 PROBLEM — R06.02 SHORTNESS OF BREATH: Status: ACTIVE | Noted: 2021-01-01

## 2021-10-21 PROBLEM — R05.9 COUGH IN ADULT PATIENT: Status: ACTIVE | Noted: 2021-01-01

## 2021-10-22 PROBLEM — E44.0 MODERATE PROTEIN-CALORIE MALNUTRITION (HCC): Status: ACTIVE | Noted: 2021-01-01

## 2021-11-02 PROBLEM — E87.5 HYPERKALEMIA: Status: ACTIVE | Noted: 2021-01-01

## 2021-12-30 ENCOUNTER — HOSPITAL ENCOUNTER (OUTPATIENT)
Dept: INFUSION CENTER | Facility: CLINIC | Age: 76
End: 2021-12-30

## 2022-09-21 NOTE — ANESTHESIA PREPROCEDURE EVALUATION
Review of Systems/Medical History  Patient summary reviewed  Chart reviewed      Cardiovascular  EKG reviewed, Hypertension , No dysrhythmias ,   Comment: RBBB  ekg 4/27/19,  Pulmonary  Negative pulmonary ROS Recent URI resolved,        GI/Hepatic    GERD well controlled,        Negative  ROS        Endo/Other  Negative endo/other ROS      GYN    Breast cancer        Hematology  Negative hematology ROS      Musculoskeletal    Arthritis     Neurology  Negative neurology ROS      Psychology   Negative psychology ROS              Physical Exam    Airway    Mallampati score: II  TM Distance: <3 FB  Neck ROM: full     Dental   No notable dental hx     Cardiovascular  Rhythm: regular, Rate: normal, Cardiovascular exam normal    Pulmonary  Pulmonary exam normal Breath sounds clear to auscultation,     Other Findings        Anesthesia Plan  ASA Score- 2     Anesthesia Type- general with ASA Monitors  Additional Monitors:   Airway Plan: LMA  Plan Factors- Patient instructed to abstain from smoking on day of procedure  Patient did not smoke on day of surgery  Induction- intravenous  Postoperative Plan-     Informed Consent- Anesthetic plan and risks discussed with patient 
0

## 2024-05-01 NOTE — LETTER
May 12, 2021     Oscar Gilbert DO  [address]  [address] [place] [address]    Patient: Abelardo Bai   YOB: 1945   Date of Visit: 5/12/2021       Dear Dr Bonnie Francois:    Thank you for referring Bryanna Tinoco to me for evaluation  Below are my notes for this consultation  If you have questions, please do not hesitate to call me  I look forward to following your patient along with you  Sincerely,        Jens Ferrer MD        CC: No Recipients  Jens Ferrer MD  5/12/2021  4:32 PM  Sign when Signing Visit  NEPHROLOGY PROGRESS NOTE    Abelardo Bai 64 y o  male MRN: [id number]  Unit/Bed#:  Encounter: [id number]  Reason for Consult:  Chronic renal insufficiency    The patient is here for routine follow-up he states he has been doing well  He has not had the COVID vaccine as he has concerns  Otherwise he is in good spirits with no acute complaints for me  We reviewed his medications  ASSESSMENT/PLAN:  1  Renal    The patient has chronic renal insufficiency likely due to nephrosclerosis given lack of significant proteinuria in the past   His latest creatinine is 1 8 with normal electrolytes so he is stable in his baseline range without any progression  His blood pressure is under good control he feels he is urinating well and emptying his bladder well  The patient does have some lower extremity swelling that is well controlled and he wears compression stockings but the fluctuations could be due to diuretic therapy from time to time  No changes in medications  Labs and follow-up as scheduled  Please call if you have any questions or concerns before next visit  SUBJECTIVE:  Review of Systems   Constitution: Negative for chills, decreased appetite, fever and malaise/fatigue  HENT: Negative  Eyes: Negative  Cardiovascular: Negative for chest pain, dyspnea on exertion and orthopnea  Mild leg swelling  Respiratory: Negative    Negative for cough, shortness of breath, sputum production and wheezing  Gastrointestinal: Negative  Negative for bloating, abdominal pain, diarrhea, nausea and vomiting  Genitourinary: Negative for dysuria, flank pain, hematuria and incomplete emptying  Neurological: Negative for dizziness, focal weakness, headaches and weakness  Psychiatric/Behavioral: Negative for altered mental status, depression, hallucinations and hypervigilance  OBJECTIVE:  Current Weight: Weight - Scale: 89 4 kg (197 lb)  [email or web address] com:     Blood pressure 136/64, pulse 70, resp  rate 18, height 5' 5" (1 651 m), weight 89 4 kg (197 lb)  , Body mass index is 32 78 kg/m²  [unfilled]    Physical Exam: /64 (BP Location: Right arm, Patient Position: Sitting, Cuff Size: Standard)   Pulse 70   Resp 18   Ht 5' 5" (1 651 m)   Wt 89 4 kg (197 lb)   BMI 32 78 kg/m²   Physical Exam  Constitutional:       General: He is not in acute distress  Appearance: He is not ill-appearing or diaphoretic  HENT:      Head: Normocephalic and atraumatic  Nose:      Comments: Wearing mask     Mouth/Throat:      Comments: Wearing mask  Eyes:      General: No scleral icterus  Extraocular Movements: Extraocular movements intact  Neck:      Musculoskeletal: Normal range of motion and neck supple  Cardiovascular:      Rate and Rhythm: Normal rate and regular rhythm  Heart sounds: No friction rub  No gallop  Comments: Positive edema with compression stockings on  Pulmonary:      Effort: Pulmonary effort is normal  No respiratory distress  Breath sounds: Normal breath sounds  No wheezing, rhonchi or rales  Abdominal:      General: Bowel sounds are normal  There is no distension  Palpations: Abdomen is soft  Tenderness: There is no abdominal tenderness  There is no rebound  Neurological:      General: No focal deficit present  Mental Status: He is alert and oriented to person, place, and time   Mental status is at baseline  Psychiatric:         Mood and Affect: Mood normal          Behavior: Behavior normal          Thought Content: Thought content normal          Judgment: Judgment normal          Medications:    Current Outpatient Medications:     acetaminophen (TYLENOL) 500 mg tablet, Take 500 mg by mouth every 6 (six) hours as needed, Disp: , Rfl:     furosemide (LASIX) 40 mg tablet, Take 40 mg by mouth daily, Disp: , Rfl: 0    letrozole (FEMARA) 2 5 mg tablet, Take 1 tablet (2 5 mg total) by mouth daily, Disp: 90 tablet, Rfl: 1    losartan (COZAAR) 50 mg tablet, Take 50 mg by mouth daily , Disp: , Rfl: 0    Multiple Vitamins-Minerals (CENTRUM SILVER 50+MEN PO), Take by mouth, Disp: , Rfl:     Palbociclib (Ibrance) 75 MG capsule, Take 1 capsule (75 mg total) by mouth daily with breakfast Take with food   Take for 3 weeks on, followed by 1 week off , Disp: 21 capsule, Rfl: 4    polyethylene glycol (MIRALAX) 17 g packet, Take 17 g by mouth daily as needed , Disp: , Rfl:     capsaicin (ZOSTRIX) 0 025 % cream, Apply 1 application topically 2 (two) times a day (Patient not taking: Reported on 5/12/2021), Disp: 60 g, Rfl: 0    Laboratory Results:  Lab Results   Component Value Date    WBC 4 34 03/01/2021    HGB 11 4 (L) 03/01/2021    HCT 33 8 (L) 03/01/2021     (H) 03/01/2021     03/01/2021     Lab Results   Component Value Date    SODIUM 140 04/26/2021    K 4 5 04/26/2021     04/26/2021    CO2 30 04/26/2021    BUN 42 (H) 04/26/2021    CREATININE 1 87 (H) 04/26/2021    GLUC 92 10/28/2020    CALCIUM 9 1 04/26/2021     Lab Results   Component Value Date    CALCIUM 9 1 04/26/2021     No results found for: [facility]

## 2024-06-11 ENCOUNTER — TELEPHONE (OUTPATIENT)
Age: 79
End: 2024-06-11

## 2024-06-11 NOTE — TELEPHONE ENCOUNTER
Spouse called because she is trying to find out information on a marilu from [facility]. Pt's personal information was hacked from the info on the marilu.   I directed her to the billing/business office

## 2024-06-20 NOTE — TELEPHONE ENCOUNTER
Patients wife had called in regarding patient's medical records. Returned caller's call to provide caller with the medical records phone number. Warm transferred caller to the medical records department regarding her request.

## 2024-12-09 NOTE — PROGRESS NOTES
Annual Comprehensive Medical Exam    64 y.o. female presents for annual comprehensive medical evaluation and preventive services screening.  No recent hospitalizations, surgeries or significant injuries.    History of Present Illness    Colonoscopy 2022  DEXA scan  May 2024  Mammogram May 2024  History of hysterectomy    Lab reviewed    Melanoma removed earlier this year    Right hip/low back pain from Aug - Nov.  Now resolved    Weight management: Having difficulty with significant and sustained weight loss.  Not following a particular weight loss/low calorie diet.  Continues to have approximately 2 glasses of wine per night.    Elevated blood pressure: Patient is not checking blood pressure at home.  She denies headache, chest pain, shortness of breath, dizziness, vision change.    Past Medical History:   Diagnosis Date    Personal history of other malignant neoplasm of skin     History of malignant neoplasm of skin    Personal history of urinary calculi 04/10/2017    History of renal calculi    Plantar fascial fibromatosis 12/15/2014    Plantar fasciitis, left      Past Surgical History:   Procedure Laterality Date    ANTERIOR CRUCIATE LIGAMENT REPAIR  05/20/2019    Anterior cruciate ligament repair    COLONOSCOPY  12/13/2021    Complete Colonoscopy    EYE SURGERY  06/01/2023    glaucoma surgery    HYSTERECTOMY  04/13/2015    Hysterectomy     Family History   Problem Relation Name Age of Onset    Dementia Mother      Hypertension Mother      Other (cerebral infarction) Father      Other (myocardial infarction) Father      Other (gallbladder cancer) Maternal Grandmother      Other (cardiac disorder) Maternal Grandfather      Other (cerebral infarction) Maternal Grandfather        Social History     Socioeconomic History    Marital status:      Spouse name: Not on file    Number of children: Not on file    Years of education: Not on file    Highest education level: Not on file   Occupational History    Not  Hematology / Oncology Outpatient Follow Up Note    Melchor Arreaga 64 y o  male :1945 [facility]:[id number]         Date:  2021    Assessment / Plan:  A 27-year-old gentleman who has history of locally advanced left breast cancer, grade 1, ER 95% positive, NH 75% positive, HER2 negative disease, diagnosed in 2018  He was treated with neoadjuvant tamoxifen followed by mastectomy and lymph node dissection which showed 10 positive lymph nodes   He continued with adjuvant tamoxifen   Unfortunately, he was found to have widespread metastatic disease in his liver and bone   He is currently on Lupron, palbociclib and letrozole with no toxicity , resulting in good biochemical response  However, recent CA 27, 29 was found to be elevated  He has no symptoms of metastatic disease  Therefore, I recommended him to have CT scan of chest abdomen pelvis without IV contrast to have radiographic tumor evaluation  I will see him again in a month to discuss those results  If he has radiographic progression, I would consider switching to second-line treatment  He is in agreement with my recommendations         Subjective:      HPI:             Interval History:  A 27-year-old gentleman who has metastatic breast cancer, ER 95% positive, NH 75% positive, HER2 negative disease   He was initially diagnosed with breast cancer with invasive mucinous histology in 2018  He appeared to have had locally advanced disease   He was on neoadjuvant hormonal therapy with tamoxifen from 2018 through May 2019   Subsequently, he underwent left mastectomy and axillary lymph node dissection which showed 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   10/10 axillary lymph node were positive for metastatic disease with largest tumor measuring 2 5 cm   Tamoxifen was continued as adjuvant setting   Unfortunately, he developed liver, bone metastasis   He has been on Lupron shot as well as palbociclib and "on file   Tobacco Use    Smoking status: Never    Smokeless tobacco: Never   Substance and Sexual Activity    Alcohol use: Yes     Alcohol/week: 14.0 standard drinks of alcohol     Types: 14 Glasses of wine per week    Drug use: Never    Sexual activity: Not on file   Other Topics Concern    Not on file   Social History Narrative    Not on file     Social Drivers of Health     Financial Resource Strain: Not on file   Food Insecurity: Not on file   Transportation Needs: Not on file   Physical Activity: Not on file   Stress: Not on file   Social Connections: Not on file   Intimate Partner Violence: Not on file   Housing Stability: Not on file       Current Outpatient Medications on File Prior to Visit   Medication Sig Dispense Refill    calcium carbonate-vit D3-min 600 mg calcium- 400 unit tablet Take 2 tablets by mouth once daily.      calcium carbonate-vitamin D3 500 mg-5 mcg (200 unit) tablet Take 1 tablet by mouth once daily.      vitamin K2 100 mcg capsule Take by mouth.      [DISCONTINUED] atorvastatin (Lipitor) 10 mg tablet Take 1 tablet (10 mg) by mouth once daily at bedtime. 90 tablet 2    [DISCONTINUED] meloxicam (Mobic) 15 mg tablet Take 1 tablet (15 mg) by mouth once daily. 90 tablet 2     No current facility-administered medications on file prior to visit.       Allergies   Allergen Reactions    Insect Venom Anaphylaxis       Complete review of systems is negative today except for that mentioned in the history of present illness.  In particular patient denies chest pain, shortness of breath, headaches and GI disturbances.      Visit Vitals  /82   Pulse 77   Ht 1.626 m (5' 4\")   Wt 80.3 kg (177 lb)   SpO2 97%   BMI 30.38 kg/m²   OB Status Hysterectomy   Smoking Status Never   BSA 1.9 m²      Vitals:    12/09/24 1425 12/09/24 1435 12/09/24 1446 12/09/24 1447   BP: 156/78 159/74 152/73 145/82   BP Location:   Right arm    Patient Position:   Lying    Pulse: 77      SpO2: 97%      Weight: 80.3 kg (177 " letrozole since March 2020, with excellent tolerance  He has biochemical response  He presents today for routine follow-up  Most recent cancer antigen was found to be elevated  However, he has no new symptoms  He denied any pain  He has good appetite  He has no respiratory symptoms  He is maintaining his weight  His performance status is normal          Objective:      Primary Diagnosis:     1  Left breast cancer, stage IIIC (pT3, pN3, M0) grade 1, invasive mucinous histology, ER 95% positive, OH 75% positive, HER2 negative disease   Diagnosed in November 2018       2  Metastatic breast cancer in his liver and multiple bone, diagnosed in February 2020       Cancer Staging:  Cancer Staging  No matching staging information was found for the patient         Previous Hematologic/ Oncologic Treatment:      1  Neoadjuvant and adjuvant hormonal therapy with tamoxifen from December 2018 through February 2020       Current Hematologic/ Oncologic Treatment:       1  Lupron, since February 2020       2  Letrozole and palbociclib since March 2020       3  Denosumab every 3 months      Disease Status:        Biochemical response followed by minor progression      Test Results:     Pathology:           Radiology:     CT scan of chest abdomen pelvis in February 2020 showed liver and extensive bone metastasis      Laboratory:     See below   CA 27, 29 is  242 in  September 2021      Physical Exam:        General Appearance:    Alert, oriented          Eyes:    PERRL   Ears:    Normal external ear canals, both ears   Nose:   Nares normal, septum midline   Throat:   Mucosa moist  Pharynx without injection  Neck:   Supple         Lungs:     Clear to auscultation bilaterally   Chest Wall:    No tenderness or deformity    Heart:    Regular rate and rhythm         Abdomen:     Soft, non-tender, bowel sounds +, no organomegaly               Extremities:   Extremities no cyanosis or edema         Skin:   no rash or icterus  "lb)      Height: 1.626 m (5' 4\")        Physical Exam  Gen.: Alert and oriented ×3 female in no acute distress.  HEENT: Head is normocephalic.  Pupils equal and reactive to light.  Tympanic membranes are clear.  Pharynx is clear.  Neck is supple without adenopathy or carotid bruits.  No masses or thyromegaly  Heart: Regular rate and rhythm without murmurs.  Lungs: Clear to auscultation bilaterally.  Breasts: deferred to GYN at pt request.  Pelvic: Deferred to GYN at pt request.  Abdomen: Soft with normal bowel sounds.  No masses or pain to palpation.  No bruits auscultated.  Extremities: Good range of motion of all joints.  No significant edema. Pedal pulses +1-2/4  Skin: No significant or irregular nevi visualized.  Neuro: No signs of focal neurologic deficit.  No tremor.  Speech and hearing are normal.  DTRs +3/4;  Muscle Strength +5/5.  Musculoskeletal: Spine with good ROM.  No scoliosis.  Leg lengths are equal.  Psych: normal affect.  No suicidal ideation.  Good judgement and insight.     The 10-year ASCVD risk score (Gabriel ZAPATA, et al., 2019) is: 6.4%    Values used to calculate the score:      Age: 64 years      Sex: Female      Is Non- : No      Diabetic: No      Tobacco smoker: No      Systolic Blood Pressure: 145 mmHg      Is BP treated: No      HDL Cholesterol: 80 mg/dL      Total Cholesterol: 175 mg/dL  I discussed face-to-face with this individual discussing their cardiovascular risk and behavioral therapies of nutritional choices, exercise and elimination of habits contributing to risk.  We agreed on a plan how they may be able to reduce their current cardiovascular risk.  For patients with risk calculation greater than 10%, aspirin use was discussed and encouraged unless known allergy or increased risk of bleeding contraindicate use.  15 minutes.        Diagnosis/Plan  1. Hyperlipidemia, unspecified hyperlipidemia type (Primary)  - Comprehensive Metabolic Panel; Future  - Lipid " Lymph nodes:   Cervical, supraclavicular, and axillary nodes normal   Neurologic:   CNII-XII intact, normal strength, sensation and reflexes     Throughout             Breast exam:   Status post left mastectomy with no palpable abnormality on his left chest wall   Right showed gynecomastia  ROS: Review of Systems   All other systems reviewed and are negative  Imaging: No results found  Labs:   Lab Results   Component Value Date    WBC 5 11 09/10/2021    HGB 10 9 (L) 09/10/2021    HCT 33 4 (L) 09/10/2021     (H) 09/10/2021     (H) 09/10/2021     Lab Results   Component Value Date    K 4 5 09/10/2021     09/10/2021    CO2 27 09/10/2021    BUN 39 (H) 09/10/2021    CREATININE 2 09 (H) 09/10/2021    GLUF 92 09/10/2021    CALCIUM 9 4 09/10/2021    CORRECTEDCA 10 1 09/10/2021     (H) 09/10/2021    ALT 62 09/10/2021    ALKPHOS 229 (H) 09/10/2021    EGFR 29 09/10/2021         Lab Results   Component Value Date     (H) 02/26/2020       Lab Results   Component Value Date    SPEP  05/06/2020     The SPEP shows a faint possible band in the gamma region  Immunofixation to be performed  Reviewed by: Gustavo [place] Tam[place] ([id number]) **Electronic Signature**    UPEP  05/06/2020     The UPEP shows selective proteinuria  No monoclonal bands noted  Reviewed by: Gustavo Mcardle Tamera Skeans, MD ([id number]) **Electronic Signature**       Lab Results   Component Value Date    PSA <0 1 02/26/2020       Lab Results   Component Value Date    CEA 2 2 02/26/2020         Lab Results   Component Value Date    IRON 71 06/26/2019    TIBC 311 06/26/2019    FERRITIN 89 06/26/2019       Lab Results   Component Value Date    FQDPJEVY20 488 12/03/2019         Current Medications: Reviewed  Allergies: Reviewed  PMH/FH/SH:  Reviewed      Vital Sign:    Body surface area is 1 93 meters squared      Wt Readings from Last 3 Encounters:   09/16/21 85 7 kg (189 lb)   06/28/21 90 5 kg (199 lb 8 3 oz)   06/10/21 90 kg (198 lb 6 4 oz)        Temp Readings from Last 3 Encounters:   09/16/21 97 7 °F (36 5 °C) (Tympanic Core)   06/28/21 98 5 °F (36 9 °C) (Temporal)   06/10/21 (!) 96 6 °F (35 9 °C) (Tympanic Core)        BP Readings from Last 3 Encounters:   09/16/21 138/72   06/28/21 133/76   06/10/21 142/70         Pulse Readings from Last 3 Encounters:   09/16/21 101   06/28/21 103   06/10/21 89     @LASTSAO2(3)@ Panel; Future  - TSH with reflex to Free T4 if abnormal; Future  - atorvastatin (Lipitor) 10 mg tablet; Take 1 tablet (10 mg) by mouth once daily at bedtime.  Dispense: 90 tablet; Refill: 2    2. Class 1 obesity due to excess calories with serious comorbidity and body mass index (BMI) of 30.0 to 30.9 in adult  Patient encouraged to commit to a diet of lower carbohydrates and increase vegetable and fruit intake. Patient also encouraged to increase water intake to 80 ounces/day. Continue exercise for at least 30 minutes a day on most days of the week.  Sustained obesity leads to increased risk for multiple medical problems including heart attack, stroke, cancer and infection.  For more assistance and weight loss options, go to the website: Yourweightmatters.org.  Additional resources:  RethinkObesity.com, obesity.org, obesityaction.org, Rollstream.com  - TSH with reflex to Free T4 if abnormal; Future    3. Osteoarthritis of left thumb  - meloxicam (Mobic) 15 mg tablet; Take 1 tablet (15 mg) by mouth once daily.  Dispense: 90 tablet; Refill: 2    4. Vitamin D deficiency  - CBC; Future  - Vitamin D 25-Hydroxy,Total (for eval of Vitamin D levels); Future    5. Medicare annual wellness visit, subsequent  Living Will / Advanced Care Planning: I spent more than 15 minutes discussing advance care planning including explanation and discussion of advanced directives.  If patient does not have current up-to-date documents, examples and information were provided on how to create both living will and power of .  Patient was encouraged to work on completing these documents.        6. Dysuria  - Urinalysis with Reflex Microscopic; Future    7. Shortness of breath  - ECG 12 Lead    8. Elevated BP without diagnosis of hypertension  Patient will take blood pressure daily at home and notify me in 1 week of the results.  She understands blood pressure goal is less than 130/80.  -Hypertension: Discussed importance of good blood  pressure control to avoid long-term complications such as heart attack and stroke.  Patient is aware that blood pressure goal is less than 130/80.  Maintaining a regular exercise program and body mass index (BMI) less than 25 as well as a diet lower in carbohydrates will help reach these goals.          Follow up in 6 months for medical management    I will continue to monitor, evaluate, assess and treat all problems/diagnoses as appropriate and continue to collaborate with specialists.    Contact office or send a  MY Chart message with any questions or concerns    Encouraged to sign up with my  My Chart  Patient will only be notified of labs that require medical intervention.    Prescriptions will not be filled unless you are compliant with your follow up appointments or have a follow up appointment scheduled as per instruction of your physician. Refills should be requested at the time of your visit.    **Charting was completed using voice recognition technology and may include unintended errors**    Tobin Fowler DO, CODY  [id number] [facility]  [place] [address]  [phone]      Tobin Fowler DO, CODY

## 2025-06-16 NOTE — TELEPHONE ENCOUNTER
Called patient and spoke to spouse, pt was home and also listening to conversation  Introduced myself as Breast Nurse Navigator from [address] [place] [address] [place] and purpose of call and services available through cancer support throughout treatment and survivorship  Spouse stating they don't need any services or navigator, they have a family member who is a doctor and he is guiding them through as well as their amaris and do not wish for any additional calls, again stating they are fine  Respected wishes requested and advised if support needed at a future time to please request a nurse navigator, spouse said thank you and hung up  Will not follow patient as per request   Can be consulted at a later time if needed  Detail Level: Detailed Prescription Strength Graduated Compression Stockings Recommendations: The patient was counseled that prescription strength graduated compression stockings should be worn for all waking hours. They will follow up with a venous specialist to monitor graduated compression stocking usage and their symptoms.

## (undated) DEVICE — [HIGH FLOW INSUFFLATOR,  DO NOT USE IF PACKAGE IS DAMAGED,  KEEP DRY,  KEEP AWAY FROM SUNLIGHT,  PROTECT FROM HEAT AND RADIOACTIVE SOURCES.]: Brand: PNEUMOSURE

## (undated) DEVICE — GLOVE SRG BIOGEL 6.5

## (undated) DEVICE — ENDOPATH 5MM CURVED SCISSORS WITH MONOPOLAR CAUTERY: Brand: ENDOPATH

## (undated) DEVICE — JACKSON-PRATT 100CC BULB RESERVOIR: Brand: CARDINAL HEALTH

## (undated) DEVICE — CHLORAPREP HI-LITE 26ML ORANGE

## (undated) DEVICE — 3M™ STERI-STRIP™ REINFORCED ADHESIVE SKIN CLOSURES, R1547, 1/2 IN X 4 IN (12 MM X 100 MM), 6 STRIPS/ENVELOPE: Brand: 3M™ STERI-STRIP™

## (undated) DEVICE — GLOVE SRG BIOGEL 7

## (undated) DEVICE — IV CATH 14 G SAFETY

## (undated) DEVICE — GLOVE INDICATOR PI UNDERGLOVE SZ 6.5 BLUE

## (undated) DEVICE — DRAPE PROBE NEO-PROBE/ULTRASOUND

## (undated) DEVICE — NEEDLE HYPO 22G X 1-1/2 IN

## (undated) DEVICE — GLOVE INDICATOR PI UNDERGLOVE SZ 7 BLUE

## (undated) DEVICE — ELECTRODE NEEDLE MOD E-Z CLEAN 2.75IN 7CM -0013M

## (undated) DEVICE — 2963 MEDIPORE SOFT CLOTH TAPE 3 IN X 10 YD 12 RLS/CS: Brand: 3M™ MEDIPORE™

## (undated) DEVICE — INTENDED FOR TISSUE SEPARATION, AND OTHER PROCEDURES THAT REQUIRE A SHARP SURGICAL BLADE TO PUNCTURE OR CUT.: Brand: BARD-PARKER SAFETY BLADES SIZE 15, STERILE

## (undated) DEVICE — ELECTRODE LAP J HOOK E-Z CLEAN 33CM-0021

## (undated) DEVICE — SUT ETHILON 2-0 FS 18 IN 664H

## (undated) DEVICE — ALL PURPOSE SPONGES,NON-WOVEN, 4 PLY: Brand: CURITY

## (undated) DEVICE — CHEST/BREAST DRAPE: Brand: CONVERTORS

## (undated) DEVICE — MARGIN MARKER SET

## (undated) DEVICE — JP PERF DRN SIL FLT 10MM FULL: Brand: CARDINAL HEALTH

## (undated) DEVICE — SUT VICRYL 3-0 SH 27 IN J416H

## (undated) DEVICE — PENCIL ELECTROSURG E-Z CLEAN -0035H

## (undated) DEVICE — SUT MONOCRYL 4-0 PS-2 27 IN Y426H

## (undated) DEVICE — DRAPE EQUIPMENT RF WAND

## (undated) DEVICE — BETHLEHEM UNIVERSAL MINOR GEN: Brand: CARDINAL HEALTH

## (undated) DEVICE — ADHESIVE SKIN HIGH VISCOSITY EXOFIN 1ML

## (undated) DEVICE — SUT VICRYL 2-0 REEL 54 IN J286G

## (undated) DEVICE — TELFA NON-ADHERENT ABSORBENT DRESSING: Brand: TELFA

## (undated) DEVICE — LIGACLIP MCA MULTIPLE CLIP APPLIERS, 20 SMALL CLIPS: Brand: LIGACLIP

## (undated) DEVICE — SYRINGE 30ML LL

## (undated) DEVICE — BLUE HEAT SCOPE WARMER

## (undated) DEVICE — MEDI-VAC YANKAUER SUCTION HANDLE W/BULBOUS AND CONTROL VENT: Brand: CARDINAL HEALTH

## (undated) DEVICE — LAPAROSCOPIC SMOKE EVAC TUBING

## (undated) DEVICE — 3M™ STERI-STRIP™ COMPOUND BENZOIN TINCTURE 40 BAGS/CARTON 4 CARTONS/CASE C1544: Brand: 3M™ STERI-STRIP™

## (undated) DEVICE — 5 MM CURVED DISSECTORS WITH MONOPOLAR CAUTERY: Brand: ENDOPATH

## (undated) DEVICE — PLUMEPEN PRO 10FT

## (undated) DEVICE — TAUT CATH INTRODUCER 4.5 FR

## (undated) DEVICE — SCD SEQUENTIAL COMPRESSION COMFORT SLEEVE MEDIUM KNEE LENGTH: Brand: KENDALL SCD

## (undated) DEVICE — ABDOMINAL PAD: Brand: DERMACEA

## (undated) DEVICE — GAUZE SPONGES,USP TYPE VII GAUZE, 12 PLY: Brand: CURITY

## (undated) DEVICE — SYRINGE 10ML LL

## (undated) DEVICE — SYRINGE 20ML LL

## (undated) DEVICE — LIGACLIP MCA MULTIPLE CLIP APPLIERS, 20 MEDIUM CLIPS: Brand: LIGACLIP

## (undated) DEVICE — 2000CC GUARDIAN II: Brand: GUARDIAN

## (undated) DEVICE — STOPCOCK 3-WAY

## (undated) DEVICE — IRRIG ENDO FLO TUBING

## (undated) DEVICE — HARMONIC ACE 5MM DIAMETER SHEARS 36CM SHAFT LENGTH + ADAPTIVE TISSUE TECHNOLOGY FOR USE WITH GENERATOR G11: Brand: HARMONIC ACE

## (undated) DEVICE — GAUZE SPONGES,16 PLY: Brand: CURITY

## (undated) DEVICE — BETHLEHEM UNIVERSAL LAPAROTOMY: Brand: CARDINAL HEALTH

## (undated) DEVICE — BINDER ABDOMINAL 30-45 IN

## (undated) DEVICE — SUT SILK 2-0 SH 30 IN K833H

## (undated) DEVICE — ALLENTOWN LAP CHOLE APP PACK: Brand: CARDINAL HEALTH

## (undated) DEVICE — DRAPE C-ARM X-RAY

## (undated) DEVICE — LIGACLIP 10-M/L, 10MM ENDOSCOPIC ROTATING MULTIPLE CLIP APPLIERS: Brand: LIGACLIP

## (undated) DEVICE — SUT VICRYL 2-0 SH 27 IN UNDYED J417H

## (undated) DEVICE — IV EXTENSION TUBING 33 IN